# Patient Record
Sex: FEMALE | Race: WHITE | NOT HISPANIC OR LATINO | Employment: OTHER | URBAN - METROPOLITAN AREA
[De-identification: names, ages, dates, MRNs, and addresses within clinical notes are randomized per-mention and may not be internally consistent; named-entity substitution may affect disease eponyms.]

---

## 2017-01-27 ENCOUNTER — APPOINTMENT (EMERGENCY)
Dept: RADIOLOGY | Facility: HOSPITAL | Age: 77
DRG: 189 | End: 2017-01-27
Payer: COMMERCIAL

## 2017-01-27 ENCOUNTER — ALLSCRIPTS OFFICE VISIT (OUTPATIENT)
Dept: OTHER | Facility: OTHER | Age: 77
End: 2017-01-27

## 2017-01-27 ENCOUNTER — HOSPITAL ENCOUNTER (INPATIENT)
Facility: HOSPITAL | Age: 77
LOS: 3 days | Discharge: HOME | DRG: 189 | End: 2017-01-30
Attending: EMERGENCY MEDICINE | Admitting: INTERNAL MEDICINE
Payer: COMMERCIAL

## 2017-01-27 ENCOUNTER — APPOINTMENT (EMERGENCY)
Dept: CT IMAGING | Facility: HOSPITAL | Age: 77
DRG: 189 | End: 2017-01-27
Payer: COMMERCIAL

## 2017-01-27 DIAGNOSIS — R93.89 ABNORMAL CT OF THE CHEST: ICD-10-CM

## 2017-01-27 DIAGNOSIS — J18.9 PNEUMONIA: Primary | ICD-10-CM

## 2017-01-27 DIAGNOSIS — J45.901 ACUTE ASTHMA EXACERBATION: ICD-10-CM

## 2017-01-27 DIAGNOSIS — R09.02 HYPOXIA: ICD-10-CM

## 2017-01-27 PROBLEM — J96.01 ACUTE RESPIRATORY FAILURE WITH HYPOXIA (HCC): Status: ACTIVE | Noted: 2017-01-27

## 2017-01-27 LAB
ALBUMIN SERPL BCP-MCNC: 3 G/DL (ref 3.5–5)
ALP SERPL-CCNC: 97 U/L (ref 46–116)
ALT SERPL W P-5'-P-CCNC: 26 U/L (ref 12–78)
ANION GAP SERPL CALCULATED.3IONS-SCNC: 8 MMOL/L (ref 4–13)
AST SERPL W P-5'-P-CCNC: 41 U/L (ref 5–45)
ATRIAL RATE: 118 BPM
BASOPHILS # BLD AUTO: 0.02 THOUSANDS/ΜL (ref 0–0.1)
BASOPHILS NFR BLD AUTO: 0 % (ref 0–1)
BILIRUB SERPL-MCNC: 0.74 MG/DL (ref 0.2–1)
BUN SERPL-MCNC: 15 MG/DL (ref 5–25)
CALCIUM SERPL-MCNC: 9.3 MG/DL (ref 8.3–10.1)
CHLORIDE SERPL-SCNC: 91 MMOL/L (ref 100–108)
CO2 SERPL-SCNC: 32 MMOL/L (ref 21–32)
CREAT SERPL-MCNC: 0.68 MG/DL (ref 0.6–1.3)
EOSINOPHIL # BLD AUTO: 0 THOUSAND/ΜL (ref 0–0.61)
EOSINOPHIL NFR BLD AUTO: 0 % (ref 0–6)
ERYTHROCYTE [DISTWIDTH] IN BLOOD BY AUTOMATED COUNT: 13.4 % (ref 11.6–15.1)
GFR SERPL CREATININE-BSD FRML MDRD: >60 ML/MIN/1.73SQ M
GLUCOSE SERPL-MCNC: 117 MG/DL (ref 65–140)
HCT VFR BLD AUTO: 41.8 % (ref 34.8–46.1)
HGB BLD-MCNC: 14.8 G/DL (ref 11.5–15.4)
LACTATE SERPL-SCNC: 1.1 MMOL/L (ref 0.5–2)
LYMPHOCYTES # BLD AUTO: 1.23 THOUSANDS/ΜL (ref 0.6–4.47)
LYMPHOCYTES NFR BLD AUTO: 8 % (ref 14–44)
MCH RBC QN AUTO: 31.4 PG (ref 26.8–34.3)
MCHC RBC AUTO-ENTMCNC: 35.4 G/DL (ref 31.4–37.4)
MCV RBC AUTO: 89 FL (ref 82–98)
MONOCYTES # BLD AUTO: 2.27 THOUSAND/ΜL (ref 0.17–1.22)
MONOCYTES NFR BLD AUTO: 15 % (ref 4–12)
NEUTROPHILS # BLD AUTO: 11.96 THOUSANDS/ΜL (ref 1.85–7.62)
NEUTS SEG NFR BLD AUTO: 77 % (ref 43–75)
NRBC BLD AUTO-RTO: 0 /100 WBCS
NT-PROBNP SERPL-MCNC: 355 PG/ML
P AXIS: 76 DEGREES
PLATELET # BLD AUTO: 260 THOUSANDS/UL (ref 149–390)
PMV BLD AUTO: 11 FL (ref 8.9–12.7)
POTASSIUM SERPL-SCNC: 3.5 MMOL/L (ref 3.5–5.3)
PR INTERVAL: 166 MS
PROT SERPL-MCNC: 7.8 G/DL (ref 6.4–8.2)
QRS AXIS: 85 DEGREES
QRSD INTERVAL: 70 MS
QT INTERVAL: 290 MS
QTC INTERVAL: 406 MS
RBC # BLD AUTO: 4.72 MILLION/UL (ref 3.81–5.12)
SODIUM SERPL-SCNC: 131 MMOL/L (ref 136–145)
SPECIMEN SOURCE: NORMAL
T WAVE AXIS: 61 DEGREES
TROPONIN I BLD-MCNC: 0 NG/ML (ref 0–0.08)
VENTRICULAR RATE: 118 BPM
WBC # BLD AUTO: 15.48 THOUSAND/UL (ref 4.31–10.16)

## 2017-01-27 PROCEDURE — 85025 COMPLETE CBC W/AUTO DIFF WBC: CPT | Performed by: EMERGENCY MEDICINE

## 2017-01-27 PROCEDURE — 83605 ASSAY OF LACTIC ACID: CPT | Performed by: EMERGENCY MEDICINE

## 2017-01-27 PROCEDURE — 36415 COLL VENOUS BLD VENIPUNCTURE: CPT | Performed by: EMERGENCY MEDICINE

## 2017-01-27 PROCEDURE — 96365 THER/PROPH/DIAG IV INF INIT: CPT

## 2017-01-27 PROCEDURE — 87798 DETECT AGENT NOS DNA AMP: CPT | Performed by: PHYSICIAN ASSISTANT

## 2017-01-27 PROCEDURE — 96361 HYDRATE IV INFUSION ADD-ON: CPT

## 2017-01-27 PROCEDURE — 87040 BLOOD CULTURE FOR BACTERIA: CPT | Performed by: EMERGENCY MEDICINE

## 2017-01-27 PROCEDURE — 71275 CT ANGIOGRAPHY CHEST: CPT

## 2017-01-27 PROCEDURE — 99285 EMERGENCY DEPT VISIT HI MDM: CPT

## 2017-01-27 PROCEDURE — 84484 ASSAY OF TROPONIN QUANT: CPT

## 2017-01-27 PROCEDURE — 83880 ASSAY OF NATRIURETIC PEPTIDE: CPT | Performed by: EMERGENCY MEDICINE

## 2017-01-27 PROCEDURE — 93005 ELECTROCARDIOGRAM TRACING: CPT | Performed by: EMERGENCY MEDICINE

## 2017-01-27 PROCEDURE — 96375 TX/PRO/DX INJ NEW DRUG ADDON: CPT

## 2017-01-27 PROCEDURE — 93005 ELECTROCARDIOGRAM TRACING: CPT

## 2017-01-27 PROCEDURE — 71020 HB CHEST X-RAY 2VW FRONTAL&LATL: CPT

## 2017-01-27 PROCEDURE — 80053 COMPREHEN METABOLIC PANEL: CPT | Performed by: EMERGENCY MEDICINE

## 2017-01-27 RX ORDER — LEVOTHYROXINE SODIUM 0.05 MG/1
50 TABLET ORAL DAILY
COMMUNITY
End: 2018-03-09 | Stop reason: SDUPTHER

## 2017-01-27 RX ORDER — ASPIRIN 81 MG/1
81 TABLET, CHEWABLE ORAL DAILY
Status: DISCONTINUED | OUTPATIENT
Start: 2017-01-28 | End: 2017-01-30 | Stop reason: HOSPADM

## 2017-01-27 RX ORDER — DILTIAZEM HYDROCHLORIDE 5 MG/ML
10 INJECTION INTRAVENOUS EVERY 6 HOURS PRN
Status: DISCONTINUED | OUTPATIENT
Start: 2017-01-27 | End: 2017-01-30 | Stop reason: HOSPADM

## 2017-01-27 RX ORDER — METHYLPREDNISOLONE SODIUM SUCCINATE 125 MG/2ML
125 INJECTION, POWDER, LYOPHILIZED, FOR SOLUTION INTRAMUSCULAR; INTRAVENOUS ONCE
Status: COMPLETED | OUTPATIENT
Start: 2017-01-27 | End: 2017-01-27

## 2017-01-27 RX ORDER — ALBUTEROL SULFATE 90 UG/1
2 AEROSOL, METERED RESPIRATORY (INHALATION) EVERY 4 HOURS PRN
Status: DISCONTINUED | OUTPATIENT
Start: 2017-01-27 | End: 2017-01-30 | Stop reason: HOSPADM

## 2017-01-27 RX ORDER — ATORVASTATIN CALCIUM 10 MG/1
10 TABLET, FILM COATED ORAL
Status: DISCONTINUED | OUTPATIENT
Start: 2017-01-28 | End: 2017-01-30 | Stop reason: HOSPADM

## 2017-01-27 RX ORDER — SODIUM CHLORIDE 9 MG/ML
250 INJECTION, SOLUTION INTRAVENOUS CONTINUOUS
Status: DISCONTINUED | OUTPATIENT
Start: 2017-01-27 | End: 2017-01-30 | Stop reason: HOSPADM

## 2017-01-27 RX ORDER — PANTOPRAZOLE SODIUM 40 MG/1
40 TABLET, DELAYED RELEASE ORAL
Status: DISCONTINUED | OUTPATIENT
Start: 2017-01-28 | End: 2017-01-30 | Stop reason: HOSPADM

## 2017-01-27 RX ORDER — LEVALBUTEROL 1.25 MG/.5ML
1.25 SOLUTION, CONCENTRATE RESPIRATORY (INHALATION) EVERY 6 HOURS PRN
Status: DISCONTINUED | OUTPATIENT
Start: 2017-01-27 | End: 2017-01-27

## 2017-01-27 RX ORDER — ESOMEPRAZOLE MAGNESIUM 40 MG/1
40 CAPSULE, DELAYED RELEASE ORAL
COMMUNITY
End: 2018-02-06 | Stop reason: ALTCHOICE

## 2017-01-27 RX ORDER — MAGNESIUM HYDROXIDE/ALUMINUM HYDROXICE/SIMETHICONE 120; 1200; 1200 MG/30ML; MG/30ML; MG/30ML
30 SUSPENSION ORAL EVERY 6 HOURS PRN
Status: DISCONTINUED | OUTPATIENT
Start: 2017-01-27 | End: 2017-01-30 | Stop reason: HOSPADM

## 2017-01-27 RX ORDER — MONTELUKAST SODIUM 10 MG/1
10 TABLET ORAL
COMMUNITY
End: 2019-09-11 | Stop reason: ALTCHOICE

## 2017-01-27 RX ORDER — HYDROCHLOROTHIAZIDE 25 MG/1
25 TABLET ORAL DAILY
COMMUNITY
End: 2018-03-09 | Stop reason: SDUPTHER

## 2017-01-27 RX ORDER — ATORVASTATIN CALCIUM 10 MG/1
10 TABLET, FILM COATED ORAL DAILY
COMMUNITY
End: 2018-03-09 | Stop reason: SDUPTHER

## 2017-01-27 RX ORDER — B-COMPLEX WITH VITAMIN C
1 TABLET ORAL
Status: DISCONTINUED | OUTPATIENT
Start: 2017-01-28 | End: 2017-01-30 | Stop reason: HOSPADM

## 2017-01-27 RX ORDER — AZITHROMYCIN 250 MG/1
500 TABLET, FILM COATED ORAL EVERY 24 HOURS
Status: DISCONTINUED | OUTPATIENT
Start: 2017-01-28 | End: 2017-01-30

## 2017-01-27 RX ORDER — MONTELUKAST SODIUM 10 MG/1
10 TABLET ORAL
Status: DISCONTINUED | OUTPATIENT
Start: 2017-01-27 | End: 2017-01-30 | Stop reason: HOSPADM

## 2017-01-27 RX ORDER — ONDANSETRON 2 MG/ML
4 INJECTION INTRAMUSCULAR; INTRAVENOUS EVERY 6 HOURS PRN
Status: DISCONTINUED | OUTPATIENT
Start: 2017-01-27 | End: 2017-01-30 | Stop reason: HOSPADM

## 2017-01-27 RX ORDER — GUAIFENESIN 600 MG
600 TABLET, EXTENDED RELEASE 12 HR ORAL 2 TIMES DAILY
Status: DISCONTINUED | OUTPATIENT
Start: 2017-01-27 | End: 2017-01-30 | Stop reason: HOSPADM

## 2017-01-27 RX ORDER — SODIUM CHLORIDE 9 MG/ML
50 INJECTION, SOLUTION INTRAVENOUS ONCE
Status: COMPLETED | OUTPATIENT
Start: 2017-01-27 | End: 2017-01-27

## 2017-01-27 RX ORDER — LEVOTHYROXINE SODIUM 0.05 MG/1
50 TABLET ORAL
Status: DISCONTINUED | OUTPATIENT
Start: 2017-01-28 | End: 2017-01-30 | Stop reason: HOSPADM

## 2017-01-27 RX ORDER — CHLORAL HYDRATE 500 MG
1000 CAPSULE ORAL 2 TIMES DAILY
Status: DISCONTINUED | OUTPATIENT
Start: 2017-01-27 | End: 2017-01-30 | Stop reason: HOSPADM

## 2017-01-27 RX ADMIN — GUAIFENESIN 600 MG: 600 TABLET, EXTENDED RELEASE ORAL at 19:51

## 2017-01-27 RX ADMIN — AZITHROMYCIN MONOHYDRATE 500 MG: 500 INJECTION, POWDER, LYOPHILIZED, FOR SOLUTION INTRAVENOUS at 16:39

## 2017-01-27 RX ADMIN — SODIUM CHLORIDE 250 ML/HR: 0.9 INJECTION, SOLUTION INTRAVENOUS at 14:32

## 2017-01-27 RX ADMIN — DILTIAZEM HYDROCHLORIDE 10 MG: 5 INJECTION INTRAVENOUS at 17:26

## 2017-01-27 RX ADMIN — MONTELUKAST SODIUM 10 MG: 10 TABLET, FILM COATED ORAL at 22:36

## 2017-01-27 RX ADMIN — CEFTRIAXONE 1000 MG: 1 INJECTION, POWDER, FOR SOLUTION INTRAMUSCULAR; INTRAVENOUS at 15:08

## 2017-01-27 RX ADMIN — METHYLPREDNISOLONE SODIUM SUCCINATE 125 MG: 125 INJECTION, POWDER, FOR SOLUTION INTRAMUSCULAR; INTRAVENOUS at 13:13

## 2017-01-27 RX ADMIN — SODIUM CHLORIDE 50 ML/HR: 0.9 INJECTION, SOLUTION INTRAVENOUS at 18:57

## 2017-01-27 RX ADMIN — IOHEXOL 75 ML: 350 INJECTION, SOLUTION INTRAVENOUS at 14:47

## 2017-01-28 LAB
ANION GAP SERPL CALCULATED.3IONS-SCNC: 5 MMOL/L (ref 4–13)
BUN SERPL-MCNC: 12 MG/DL (ref 5–25)
CALCIUM SERPL-MCNC: 8.2 MG/DL (ref 8.3–10.1)
CHLORIDE SERPL-SCNC: 96 MMOL/L (ref 100–108)
CO2 SERPL-SCNC: 32 MMOL/L (ref 21–32)
CREAT SERPL-MCNC: 0.58 MG/DL (ref 0.6–1.3)
ERYTHROCYTE [DISTWIDTH] IN BLOOD BY AUTOMATED COUNT: 13.5 % (ref 11.6–15.1)
FLUAV AG SPEC QL: NORMAL
FLUBV AG SPEC QL: NORMAL
GFR SERPL CREATININE-BSD FRML MDRD: >60 ML/MIN/1.73SQ M
GLUCOSE SERPL-MCNC: 150 MG/DL (ref 65–140)
HCT VFR BLD AUTO: 36.1 % (ref 34.8–46.1)
HGB BLD-MCNC: 12.4 G/DL (ref 11.5–15.4)
L PNEUMO1 AG UR QL IA.RAPID: NEGATIVE
MCH RBC QN AUTO: 30.5 PG (ref 26.8–34.3)
MCHC RBC AUTO-ENTMCNC: 34.3 G/DL (ref 31.4–37.4)
MCV RBC AUTO: 89 FL (ref 82–98)
PLATELET # BLD AUTO: 249 THOUSANDS/UL (ref 149–390)
PMV BLD AUTO: 10.8 FL (ref 8.9–12.7)
POTASSIUM SERPL-SCNC: 3.1 MMOL/L (ref 3.5–5.3)
RBC # BLD AUTO: 4.06 MILLION/UL (ref 3.81–5.12)
RSV B RNA SPEC QL NAA+PROBE: NORMAL
SODIUM SERPL-SCNC: 133 MMOL/L (ref 136–145)
WBC # BLD AUTO: 13.35 THOUSAND/UL (ref 4.31–10.16)

## 2017-01-28 PROCEDURE — G8979 MOBILITY GOAL STATUS: HCPCS

## 2017-01-28 PROCEDURE — 85027 COMPLETE CBC AUTOMATED: CPT | Performed by: PHYSICIAN ASSISTANT

## 2017-01-28 PROCEDURE — G8978 MOBILITY CURRENT STATUS: HCPCS

## 2017-01-28 PROCEDURE — 87449 NOS EACH ORGANISM AG IA: CPT | Performed by: PHYSICIAN ASSISTANT

## 2017-01-28 PROCEDURE — 80048 BASIC METABOLIC PNL TOTAL CA: CPT | Performed by: PHYSICIAN ASSISTANT

## 2017-01-28 PROCEDURE — 87205 SMEAR GRAM STAIN: CPT | Performed by: PHYSICIAN ASSISTANT

## 2017-01-28 PROCEDURE — G8980 MOBILITY D/C STATUS: HCPCS

## 2017-01-28 PROCEDURE — 87070 CULTURE OTHR SPECIMN AEROBIC: CPT | Performed by: PHYSICIAN ASSISTANT

## 2017-01-28 PROCEDURE — 97161 PT EVAL LOW COMPLEX 20 MIN: CPT

## 2017-01-28 RX ORDER — POTASSIUM CHLORIDE 20 MEQ/1
20 TABLET, EXTENDED RELEASE ORAL ONCE
Status: COMPLETED | OUTPATIENT
Start: 2017-01-28 | End: 2017-01-28

## 2017-01-28 RX ORDER — ACETAMINOPHEN 325 MG/1
650 TABLET ORAL 4 TIMES DAILY PRN
Status: DISCONTINUED | OUTPATIENT
Start: 2017-01-28 | End: 2017-01-30 | Stop reason: HOSPADM

## 2017-01-28 RX ADMIN — GUAIFENESIN 600 MG: 600 TABLET, EXTENDED RELEASE ORAL at 09:37

## 2017-01-28 RX ADMIN — GUAIFENESIN 600 MG: 600 TABLET, EXTENDED RELEASE ORAL at 17:38

## 2017-01-28 RX ADMIN — PANTOPRAZOLE SODIUM 40 MG: 40 TABLET, DELAYED RELEASE ORAL at 06:16

## 2017-01-28 RX ADMIN — ENOXAPARIN SODIUM 40 MG: 40 INJECTION SUBCUTANEOUS at 09:37

## 2017-01-28 RX ADMIN — ATORVASTATIN CALCIUM 10 MG: 10 TABLET, FILM COATED ORAL at 17:38

## 2017-01-28 RX ADMIN — AZITHROMYCIN 500 MG: 250 TABLET, FILM COATED ORAL at 17:38

## 2017-01-28 RX ADMIN — CEFTRIAXONE 1000 MG: 1 INJECTION, POWDER, FOR SOLUTION INTRAMUSCULAR; INTRAVENOUS at 14:46

## 2017-01-28 RX ADMIN — ACETAMINOPHEN 650 MG: 325 TABLET, FILM COATED ORAL at 22:30

## 2017-01-28 RX ADMIN — MONTELUKAST SODIUM 10 MG: 10 TABLET, FILM COATED ORAL at 22:21

## 2017-01-28 RX ADMIN — ASPIRIN 81 MG 81 MG: 81 TABLET ORAL at 09:37

## 2017-01-28 RX ADMIN — ALBUTEROL SULFATE 2 PUFF: 90 AEROSOL, METERED RESPIRATORY (INHALATION) at 22:21

## 2017-01-28 RX ADMIN — POTASSIUM CHLORIDE 20 MEQ: 1500 TABLET, EXTENDED RELEASE ORAL at 22:30

## 2017-01-28 RX ADMIN — LEVOTHYROXINE SODIUM 50 MCG: 50 TABLET ORAL at 06:16

## 2017-01-29 LAB
ANION GAP SERPL CALCULATED.3IONS-SCNC: 4 MMOL/L (ref 4–13)
BASOPHILS # BLD MANUAL: 0 THOUSAND/UL (ref 0–0.1)
BASOPHILS NFR MAR MANUAL: 0 % (ref 0–1)
BUN SERPL-MCNC: 17 MG/DL (ref 5–25)
CALCIUM SERPL-MCNC: 8.1 MG/DL (ref 8.3–10.1)
CHLORIDE SERPL-SCNC: 99 MMOL/L (ref 100–108)
CO2 SERPL-SCNC: 33 MMOL/L (ref 21–32)
CREAT SERPL-MCNC: 0.59 MG/DL (ref 0.6–1.3)
EOSINOPHIL # BLD MANUAL: 0 THOUSAND/UL (ref 0–0.4)
EOSINOPHIL NFR BLD MANUAL: 0 % (ref 0–6)
ERYTHROCYTE [DISTWIDTH] IN BLOOD BY AUTOMATED COUNT: 13.7 % (ref 11.6–15.1)
GFR SERPL CREATININE-BSD FRML MDRD: >60 ML/MIN/1.73SQ M
GLUCOSE SERPL-MCNC: 105 MG/DL (ref 65–140)
HCT VFR BLD AUTO: 37.4 % (ref 34.8–46.1)
HGB BLD-MCNC: 12.5 G/DL (ref 11.5–15.4)
LYMPHOCYTES # BLD AUTO: 17 % (ref 14–44)
LYMPHOCYTES # BLD AUTO: 2.33 THOUSAND/UL (ref 0.6–4.47)
MCH RBC QN AUTO: 30.2 PG (ref 26.8–34.3)
MCHC RBC AUTO-ENTMCNC: 33.4 G/DL (ref 31.4–37.4)
MCV RBC AUTO: 90 FL (ref 82–98)
MONOCYTES # BLD AUTO: 0.82 THOUSAND/UL (ref 0–1.22)
MONOCYTES NFR BLD: 6 % (ref 4–12)
NEUTROPHILS # BLD MANUAL: 9.75 THOUSAND/UL (ref 1.85–7.62)
NEUTS BAND NFR BLD MANUAL: 19 % (ref 0–8)
NEUTS SEG NFR BLD AUTO: 52 % (ref 43–75)
NRBC BLD AUTO-RTO: 0 /100 WBCS
PLATELET # BLD AUTO: 266 THOUSANDS/UL (ref 149–390)
PLATELET BLD QL SMEAR: ADEQUATE
PMV BLD AUTO: 10.2 FL (ref 8.9–12.7)
POTASSIUM SERPL-SCNC: 2.8 MMOL/L (ref 3.5–5.3)
RBC # BLD AUTO: 4.14 MILLION/UL (ref 3.81–5.12)
RBC MORPH BLD: NORMAL
SODIUM SERPL-SCNC: 136 MMOL/L (ref 136–145)
TOTAL CELLS COUNTED SPEC: 100
VARIANT LYMPHS # BLD AUTO: 6 %
WBC # BLD AUTO: 13.73 THOUSAND/UL (ref 4.31–10.16)

## 2017-01-29 PROCEDURE — 80048 BASIC METABOLIC PNL TOTAL CA: CPT | Performed by: INTERNAL MEDICINE

## 2017-01-29 PROCEDURE — 85027 COMPLETE CBC AUTOMATED: CPT | Performed by: INTERNAL MEDICINE

## 2017-01-29 PROCEDURE — 85007 BL SMEAR W/DIFF WBC COUNT: CPT | Performed by: INTERNAL MEDICINE

## 2017-01-29 RX ORDER — HYDROCHLOROTHIAZIDE 25 MG/1
25 TABLET ORAL DAILY
Status: DISCONTINUED | OUTPATIENT
Start: 2017-01-29 | End: 2017-01-30 | Stop reason: HOSPADM

## 2017-01-29 RX ORDER — POTASSIUM CHLORIDE 20 MEQ/1
40 TABLET, EXTENDED RELEASE ORAL 2 TIMES DAILY
Status: DISCONTINUED | OUTPATIENT
Start: 2017-01-29 | End: 2017-01-30 | Stop reason: HOSPADM

## 2017-01-29 RX ADMIN — PANTOPRAZOLE SODIUM 40 MG: 40 TABLET, DELAYED RELEASE ORAL at 05:50

## 2017-01-29 RX ADMIN — GUAIFENESIN 600 MG: 600 TABLET, EXTENDED RELEASE ORAL at 08:16

## 2017-01-29 RX ADMIN — ASPIRIN 81 MG 81 MG: 81 TABLET ORAL at 08:16

## 2017-01-29 RX ADMIN — HYDROCHLOROTHIAZIDE 25 MG: 25 TABLET ORAL at 15:15

## 2017-01-29 RX ADMIN — LEVOTHYROXINE SODIUM 50 MCG: 50 TABLET ORAL at 05:51

## 2017-01-29 RX ADMIN — ATORVASTATIN CALCIUM 10 MG: 10 TABLET, FILM COATED ORAL at 17:03

## 2017-01-29 RX ADMIN — ALBUTEROL SULFATE 2 PUFF: 90 AEROSOL, METERED RESPIRATORY (INHALATION) at 16:59

## 2017-01-29 RX ADMIN — AZITHROMYCIN 500 MG: 250 TABLET, FILM COATED ORAL at 17:02

## 2017-01-29 RX ADMIN — ENOXAPARIN SODIUM 40 MG: 40 INJECTION SUBCUTANEOUS at 08:16

## 2017-01-29 RX ADMIN — GUAIFENESIN 600 MG: 600 TABLET, EXTENDED RELEASE ORAL at 17:03

## 2017-01-29 RX ADMIN — POTASSIUM CHLORIDE 40 MEQ: 1500 TABLET, EXTENDED RELEASE ORAL at 17:02

## 2017-01-29 RX ADMIN — CEFTRIAXONE 1000 MG: 1 INJECTION, POWDER, FOR SOLUTION INTRAMUSCULAR; INTRAVENOUS at 15:15

## 2017-01-30 VITALS
OXYGEN SATURATION: 91 % | TEMPERATURE: 99.4 F | WEIGHT: 180.34 LBS | DIASTOLIC BLOOD PRESSURE: 88 MMHG | SYSTOLIC BLOOD PRESSURE: 166 MMHG | RESPIRATION RATE: 18 BRPM | HEART RATE: 94 BPM

## 2017-01-30 LAB
BACTERIA SPT RESP CULT: NORMAL
GRAM STN SPEC: NORMAL
PLATELET # BLD AUTO: 326 THOUSANDS/UL (ref 149–390)
PMV BLD AUTO: 9.9 FL (ref 8.9–12.7)
POTASSIUM SERPL-SCNC: 3.6 MMOL/L (ref 3.5–5.3)

## 2017-01-30 PROCEDURE — 84132 ASSAY OF SERUM POTASSIUM: CPT | Performed by: INTERNAL MEDICINE

## 2017-01-30 PROCEDURE — 85049 AUTOMATED PLATELET COUNT: CPT | Performed by: PHYSICIAN ASSISTANT

## 2017-01-30 PROCEDURE — 94761 N-INVAS EAR/PLS OXIMETRY MLT: CPT

## 2017-01-30 RX ORDER — GUAIFENESIN 600 MG
600 TABLET, EXTENDED RELEASE 12 HR ORAL 2 TIMES DAILY
Qty: 20 TABLET | Refills: 0 | Status: SHIPPED | OUTPATIENT
Start: 2017-01-30 | End: 2017-02-09

## 2017-01-30 RX ORDER — CEFDINIR 300 MG/1
300 CAPSULE ORAL EVERY 12 HOURS
Qty: 6 CAPSULE | Refills: 0 | Status: SHIPPED | OUTPATIENT
Start: 2017-01-30 | End: 2017-02-02

## 2017-01-30 RX ADMIN — CEFTRIAXONE 1000 MG: 1 INJECTION, POWDER, FOR SOLUTION INTRAMUSCULAR; INTRAVENOUS at 15:14

## 2017-01-30 RX ADMIN — GUAIFENESIN 600 MG: 600 TABLET, EXTENDED RELEASE ORAL at 09:00

## 2017-01-30 RX ADMIN — HYDROCHLOROTHIAZIDE 25 MG: 25 TABLET ORAL at 09:00

## 2017-01-30 RX ADMIN — ENOXAPARIN SODIUM 40 MG: 40 INJECTION SUBCUTANEOUS at 09:02

## 2017-01-30 RX ADMIN — POTASSIUM CHLORIDE 40 MEQ: 1500 TABLET, EXTENDED RELEASE ORAL at 09:02

## 2017-01-30 RX ADMIN — LEVOTHYROXINE SODIUM 50 MCG: 50 TABLET ORAL at 06:08

## 2017-01-30 RX ADMIN — PANTOPRAZOLE SODIUM 40 MG: 40 TABLET, DELAYED RELEASE ORAL at 06:10

## 2017-01-30 RX ADMIN — DILTIAZEM HYDROCHLORIDE 10 MG: 5 INJECTION INTRAVENOUS at 00:09

## 2017-01-30 RX ADMIN — ASPIRIN 81 MG 81 MG: 81 TABLET ORAL at 09:00

## 2017-02-01 LAB
BACTERIA BLD CULT: NORMAL
BACTERIA BLD CULT: NORMAL

## 2017-02-03 ENCOUNTER — ALLSCRIPTS OFFICE VISIT (OUTPATIENT)
Dept: OTHER | Facility: OTHER | Age: 77
End: 2017-02-03

## 2017-02-08 ENCOUNTER — GENERIC CONVERSION - ENCOUNTER (OUTPATIENT)
Dept: OTHER | Facility: OTHER | Age: 77
End: 2017-02-08

## 2017-02-12 ENCOUNTER — LAB CONVERSION - ENCOUNTER (OUTPATIENT)
Dept: OTHER | Facility: OTHER | Age: 77
End: 2017-02-12

## 2017-02-12 LAB — TSH SERPL DL<=0.05 MIU/L-ACNC: 1.73 MIU/L (ref 0.4–4.5)

## 2017-02-13 ENCOUNTER — GENERIC CONVERSION - ENCOUNTER (OUTPATIENT)
Dept: OTHER | Facility: OTHER | Age: 77
End: 2017-02-13

## 2017-02-23 ENCOUNTER — TRANSCRIBE ORDERS (OUTPATIENT)
Dept: ADMINISTRATIVE | Facility: HOSPITAL | Age: 77
End: 2017-02-23

## 2017-02-23 DIAGNOSIS — R93.89 ABNORMAL RADIOLOGICAL FINDINGS IN SKIN AND SUBCUTANEOUS TISSUE: Primary | ICD-10-CM

## 2017-02-23 DIAGNOSIS — J45.20 ASTHMATIC BRONCHITIS, MILD INTERMITTENT, UNCOMPLICATED: ICD-10-CM

## 2017-03-09 ENCOUNTER — HOSPITAL ENCOUNTER (OUTPATIENT)
Dept: PULMONOLOGY | Facility: HOSPITAL | Age: 77
Discharge: HOME | End: 2017-03-09
Attending: INTERNAL MEDICINE
Payer: COMMERCIAL

## 2017-03-09 ENCOUNTER — GENERIC CONVERSION - ENCOUNTER (OUTPATIENT)
Dept: OTHER | Facility: OTHER | Age: 77
End: 2017-03-09

## 2017-03-09 DIAGNOSIS — R93.89 ABNORMAL RADIOLOGICAL FINDINGS IN SKIN AND SUBCUTANEOUS TISSUE: ICD-10-CM

## 2017-03-09 DIAGNOSIS — J45.20 ASTHMATIC BRONCHITIS, MILD INTERMITTENT, UNCOMPLICATED: ICD-10-CM

## 2017-03-09 PROCEDURE — 94729 DIFFUSING CAPACITY: CPT

## 2017-03-09 PROCEDURE — 94726 PLETHYSMOGRAPHY LUNG VOLUMES: CPT

## 2017-03-09 PROCEDURE — 94760 N-INVAS EAR/PLS OXIMETRY 1: CPT

## 2017-03-09 PROCEDURE — 94060 EVALUATION OF WHEEZING: CPT

## 2017-03-09 RX ORDER — ALBUTEROL SULFATE 2.5 MG/3ML
2.5 SOLUTION RESPIRATORY (INHALATION) ONCE AS NEEDED
Status: DISCONTINUED | OUTPATIENT
Start: 2017-03-09 | End: 2017-03-13 | Stop reason: HOSPADM

## 2017-03-21 ENCOUNTER — ALLSCRIPTS OFFICE VISIT (OUTPATIENT)
Dept: OTHER | Facility: OTHER | Age: 77
End: 2017-03-21

## 2017-04-04 ENCOUNTER — TRANSCRIBE ORDERS (OUTPATIENT)
Dept: PULMONOLOGY | Facility: CLINIC | Age: 77
End: 2017-04-04

## 2017-04-04 DIAGNOSIS — J43.9 PULMONARY EMPHYSEMA, UNSPECIFIED EMPHYSEMA TYPE (HCC): ICD-10-CM

## 2017-04-04 DIAGNOSIS — J44.9 CHRONIC OBSTRUCTIVE PULMONARY DISEASE, UNSPECIFIED COPD TYPE (HCC): Primary | ICD-10-CM

## 2017-04-21 ENCOUNTER — APPOINTMENT (OUTPATIENT)
Dept: PULMONOLOGY | Facility: CLINIC | Age: 77
End: 2017-04-21
Payer: COMMERCIAL

## 2017-04-21 DIAGNOSIS — J44.9 CHRONIC OBSTRUCTIVE PULMONARY DISEASE, UNSPECIFIED COPD TYPE (HCC): ICD-10-CM

## 2017-04-21 DIAGNOSIS — J43.9 PULMONARY EMPHYSEMA, UNSPECIFIED EMPHYSEMA TYPE (HCC): ICD-10-CM

## 2017-04-21 PROCEDURE — 94620 HB PULMONARY STRESS TEST/SIMPLE: CPT

## 2017-04-24 ENCOUNTER — HOSPITAL ENCOUNTER (OUTPATIENT)
Dept: CT IMAGING | Facility: HOSPITAL | Age: 77
Discharge: HOME | End: 2017-04-24
Attending: INTERNAL MEDICINE
Payer: COMMERCIAL

## 2017-04-24 DIAGNOSIS — R93.89 ABNORMAL RADIOLOGICAL FINDINGS IN SKIN AND SUBCUTANEOUS TISSUE: ICD-10-CM

## 2017-04-24 PROCEDURE — 71250 CT THORAX DX C-: CPT

## 2017-04-26 ENCOUNTER — APPOINTMENT (OUTPATIENT)
Dept: PULMONOLOGY | Facility: CLINIC | Age: 77
End: 2017-04-26
Payer: COMMERCIAL

## 2017-04-26 PROCEDURE — G0424 PULMONARY REHAB W EXER: HCPCS

## 2017-05-01 ENCOUNTER — APPOINTMENT (OUTPATIENT)
Dept: PULMONOLOGY | Facility: CLINIC | Age: 77
End: 2017-05-01
Payer: COMMERCIAL

## 2017-05-01 PROCEDURE — G0424 PULMONARY REHAB W EXER: HCPCS

## 2017-05-03 ENCOUNTER — APPOINTMENT (OUTPATIENT)
Dept: PULMONOLOGY | Facility: CLINIC | Age: 77
End: 2017-05-03
Payer: COMMERCIAL

## 2017-05-03 PROCEDURE — G0424 PULMONARY REHAB W EXER: HCPCS

## 2017-05-08 ENCOUNTER — APPOINTMENT (OUTPATIENT)
Dept: PULMONOLOGY | Facility: CLINIC | Age: 77
End: 2017-05-08
Payer: COMMERCIAL

## 2017-05-08 PROCEDURE — G0424 PULMONARY REHAB W EXER: HCPCS

## 2017-05-10 ENCOUNTER — APPOINTMENT (OUTPATIENT)
Dept: PULMONOLOGY | Facility: CLINIC | Age: 77
End: 2017-05-10
Payer: COMMERCIAL

## 2017-05-10 PROCEDURE — G0424 PULMONARY REHAB W EXER: HCPCS

## 2017-05-12 ENCOUNTER — ALLSCRIPTS OFFICE VISIT (OUTPATIENT)
Dept: OTHER | Facility: OTHER | Age: 77
End: 2017-05-12

## 2017-05-15 ENCOUNTER — APPOINTMENT (OUTPATIENT)
Dept: PULMONOLOGY | Facility: CLINIC | Age: 77
End: 2017-05-15
Payer: COMMERCIAL

## 2017-05-15 PROCEDURE — G0424 PULMONARY REHAB W EXER: HCPCS

## 2017-05-17 ENCOUNTER — APPOINTMENT (OUTPATIENT)
Dept: PULMONOLOGY | Facility: CLINIC | Age: 77
End: 2017-05-17
Payer: COMMERCIAL

## 2017-05-17 PROCEDURE — G0424 PULMONARY REHAB W EXER: HCPCS

## 2017-05-22 ENCOUNTER — APPOINTMENT (OUTPATIENT)
Dept: PULMONOLOGY | Facility: CLINIC | Age: 77
End: 2017-05-22
Payer: COMMERCIAL

## 2017-05-22 PROCEDURE — G0424 PULMONARY REHAB W EXER: HCPCS

## 2017-05-24 ENCOUNTER — APPOINTMENT (OUTPATIENT)
Dept: PULMONOLOGY | Facility: CLINIC | Age: 77
End: 2017-05-24
Payer: COMMERCIAL

## 2017-05-24 PROCEDURE — G0424 PULMONARY REHAB W EXER: HCPCS

## 2017-05-31 ENCOUNTER — APPOINTMENT (OUTPATIENT)
Dept: PULMONOLOGY | Facility: CLINIC | Age: 77
End: 2017-05-31
Payer: COMMERCIAL

## 2017-06-06 ENCOUNTER — ALLSCRIPTS OFFICE VISIT (OUTPATIENT)
Dept: OTHER | Facility: OTHER | Age: 77
End: 2017-06-06

## 2017-06-30 ENCOUNTER — GENERIC CONVERSION - ENCOUNTER (OUTPATIENT)
Dept: OTHER | Facility: OTHER | Age: 77
End: 2017-06-30

## 2017-06-30 ENCOUNTER — ALLSCRIPTS OFFICE VISIT (OUTPATIENT)
Dept: OTHER | Facility: OTHER | Age: 77
End: 2017-06-30

## 2017-07-05 ENCOUNTER — ALLSCRIPTS OFFICE VISIT (OUTPATIENT)
Dept: OTHER | Facility: OTHER | Age: 77
End: 2017-07-05

## 2017-07-10 ENCOUNTER — ALLSCRIPTS OFFICE VISIT (OUTPATIENT)
Dept: OTHER | Facility: OTHER | Age: 77
End: 2017-07-10

## 2017-07-10 ENCOUNTER — LAB REQUISITION (OUTPATIENT)
Dept: LAB | Facility: HOSPITAL | Age: 77
End: 2017-07-10
Payer: COMMERCIAL

## 2017-07-10 DIAGNOSIS — N30.00 ACUTE CYSTITIS WITHOUT HEMATURIA: ICD-10-CM

## 2017-07-10 LAB
BILIRUB UR QL STRIP: NORMAL
CLARITY UR: NORMAL
COLOR UR: YELLOW
GLUCOSE (HISTORICAL): NORMAL
HGB UR QL STRIP.AUTO: NORMAL
KETONES UR STRIP-MCNC: NORMAL MG/DL
LEUKOCYTE ESTERASE UR QL STRIP: NORMAL
NITRITE UR QL STRIP: NORMAL
PH UR STRIP.AUTO: 9 [PH]
PROT UR STRIP-MCNC: 15 MG/DL
SP GR UR STRIP.AUTO: 1
UROBILINOGEN UR QL STRIP.AUTO: 0.2

## 2017-07-10 PROCEDURE — 87147 CULTURE TYPE IMMUNOLOGIC: CPT | Performed by: FAMILY MEDICINE

## 2017-07-10 PROCEDURE — 87086 URINE CULTURE/COLONY COUNT: CPT | Performed by: FAMILY MEDICINE

## 2017-07-12 LAB — BACTERIA UR CULT: NORMAL

## 2017-07-14 ENCOUNTER — GENERIC CONVERSION - ENCOUNTER (OUTPATIENT)
Dept: OTHER | Facility: OTHER | Age: 77
End: 2017-07-14

## 2017-09-08 ENCOUNTER — ALLSCRIPTS OFFICE VISIT (OUTPATIENT)
Dept: OTHER | Facility: OTHER | Age: 77
End: 2017-09-08

## 2017-10-20 ENCOUNTER — ALLSCRIPTS OFFICE VISIT (OUTPATIENT)
Dept: OTHER | Facility: OTHER | Age: 77
End: 2017-10-20

## 2017-10-23 NOTE — PROGRESS NOTES
Assessment  1  URTI (acute upper respiratory infection) (465 9) (J06 9)    Plan  Screening for genitourinary condition    · *VB - Urinary Incontinence Screen (Dx Z13 89 Screen for UI); Status:Complete;   Done:  96WCV6841 09:56AM  URTI (acute upper respiratory infection)    · Azithromycin 250 MG Oral Tablet; TAKE 2 TABLETS ON DAY 1 THEN TAKE 1  TABLET A DAY FOR 4 DAYS    Chief Complaint  Patient c/o stuffy nose, headache, and sore throat since yesterday  History of Present Illness  HPI: nasal congestion feels pressure in face  past 2-3 days  Review of Systems    Constitutional: feeling poorly  ENT: nasal congestion  Cardiovascular: no complaints of slow or fast heart rate, no chest pain, no palpitations, no leg claudication or lower extremity edema  Respiratory: cough  Breasts: no complaints of breast pain, breast lump or nipple discharge  Gastrointestinal: no complaints of abdominal pain, no constipation, no nausea or diarrhea, no vomiting, no bloody stools  Genitourinary: no complaints of dysuria, no incontinence, no pelvic pain, no dysmenorrhea, no vaginal discharge or abnormal vaginal bleeding  Musculoskeletal: no complaints of arthralgia, no myalgia, no joint swelling or stiffness, no limb pain or swelling  Integumentary: no complaints of skin rash or lesion, no itching or dry skin, no skin wounds  Neurological: no complaints of headache, no confusion, no numbness or tingling, no dizziness or fainting  Preventive Quality 65 and Older: The patient is currently experiencing urinary symptoms  Urinary Incontinence Symptoms includes: nocturia, but no urinary incontinence, no incomplete bladder emptying, no urinary frequency, no urinary urgency, no dysuria, no straining, no weak stream, no intermittent stream, no post-void dribbling, no vaginal pressure and no vaginal dryness       Active Problems  1  Abnormal chest CT (793 2) (R93 8)   2   Acute cystitis without hematuria (595 0) (N30 00)   3  Anxiety (300 00) (F41 9)   4  Back pain (724 5) (M54 9)   5  Cataract, anterior subcapsular polar senile (366 13) (H25 039)   6  Chronic fatigue (780 79) (R53 82)   7  Chronic respiratory failure with hypoxia (518 83,799 02) (J96 11)   8  Cough, persistent (786 2) (R05)   9  Encounter for screening mammogram for breast cancer (V76 12) (Z12 31)   10  Essential hypertension (401 9) (I10)   11  Flu vaccine need (V04 81) (Z23)   12  History of Frequent urination (788 41) (R35 0)   13  Gastroesophageal reflux disease with esophagitis (530 11) (K21 0)   14  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 1) (K14 1)   15  GERD (gastroesophageal reflux disease) (530 81) (K21 9)   16  HTN (hypertension) (401 9) (I10)   17  Hyperlipidemia (272 4) (E78 5)   18  Hypothyroidism (244 9) (E03 9)   19  Multiple pulmonary nodules (793 19) (R91 8)   20  Need for influenza vaccination (V04 81) (Z23)   21  Pharyngitis, acute (462) (J02 9)   22  Pulmonary emphysema (492 8) (J43 9)   23  Sciatica of left side (724 3) (M54 32)   24  Screening for genitourinary condition (V81 6) (Z13 89)   25  Screening for osteoporosis (V82 81) (Z13 820)   26  Seasonal allergies (477 9) (J30 2)   27  Tachycardia (785 0) (R00 0)   28  Tremor (781 0) (R25 1)   29  History of Urinary tract infection (599 0) (N39 0)    Past Medical History  1  History of Cough, persistent (786 2) (R05)   2  History of Frequent urination (788 41) (R35 0)   3  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 1) (K14 1)   4  History of Urinary tract infection (599 0) (N39 0)  Active Problems And Past Medical History Reviewed: The active problems and past medical history were reviewed and updated today  Family History  Mother    1  Family history of cerebrovascular accident (V17 1) (Z82 3)  Daughter    2  Family history of malignant neoplasm (V16 9) (Z80 9)  Sister    3  Family history of malignant neoplasm (V16 9) (Z80 9)  Family History    4   Denied: Family history of substance abuse 5  Denied: FHx: mental illness    Social History   · Denied: History of Active advance directive   · Denied: History of Alcohol use   · Always uses seat belt   · Daily caffeine consumption, 2-3 servings a day   · Former smoker (V15 82) (Z87 891)   · 30 years   · Lack physical exercise (V69 0) (Z72 3)   ·    · No living will   · Retired   · Denied: History of Substances, toxic environmental   · Supportive and safe   · Three children  The social history was reviewed and updated today  The social history was reviewed and is unchanged  Surgical History  1  History of Back Surgery    Current Meds   1  Anoro Ellipta 62 5-25 MCG/INH Inhalation Aerosol Powder Breath Activated; INHALE 1   PUFFS Daily; Therapy: (Recorded:21Mar2017) to Recorded   2  Aspirin 81 MG TABS; TAKE 1 TABLET DAILY; Therapy: (Recorded:09Jun2014) to Recorded   3  Atorvastatin Calcium 10 MG Oral Tablet; Take 1 tablet daily  Requested for: 61LGT4027;   Last Rx:13Eex3124 Ordered   4  Enalapril Maleate 5 MG Oral Tablet; TAKE 1 TABLET DAILY  Requested for: 11Orq1078;   Last Rx:32Ggu4367 Ordered   5  Fish Oil CAPS; 2 daily Recorded   6  Levothyroxine Sodium 50 MCG Oral Tablet; TAKE 1 TABLET DAILY  Requested for:   62YHZ4881; Last Rx:97Jyk6358 Ordered   7  Mucinex Maximum Strength 1200 MG Oral Tablet Extended Release 12 Hour; Take 1   tablet daily; Therapy: (Erwin Montalvo) to Recorded   8  Singulair 10 MG Oral Tablet; Take 1 tablet daily; Therapy: (Recorded:09Jun2014) to Recorded   9  Sulfamethoxazole-Trimethoprim 800-160 MG Oral Tablet; TAKE 1 TABLET TWICE DAILY   UNTIL FINISHED; Therapy: 74XIM4591 to (Evaluate:09Ieg2152)  Requested for: 03DXA4007; Last   Rx:04Qim1626 Ordered    The medication list was reviewed and updated today  Allergies  1  amlodipine   2  Levaquin TABS  3   No Known Food Allergies    Vitals   Recorded: 58XAG5334 09:57AM   Temperature 98 3 F   Heart Rate 74   Respiration 14   Systolic 914   Diastolic 72 Height 5 ft 6 in   Weight 176 lb 8 0 oz   BMI Calculated 28 49   BSA Calculated 1 9     Physical Exam    Constitutional   General appearance: No acute distress, well appearing and well nourished  Ears, Nose, Mouth, and Throat   Otoscopic examination: Tympanic membranes translucent with normal light reflex  Canals patent without erythema  Oropharynx: Normal with no erythema, edema, exudate or lesions  Pulmonary   Auscultation of lungs: Clear to auscultation  Cardiovascular   Auscultation of heart: Normal rate and rhythm, normal S1 and S2, without murmurs  Examination of extremities for edema and/or varicosities: Normal     Lymphatic   Palpation of lymph nodes in neck: No lymphadenopathy      Psychiatric   Orientation to person, place, and time: Normal     Mood and affect: Normal          Results/Data  *VB - Urinary Incontinence Screen (Dx Z13 89 Screen for UI) 20Oct2017 09:56AM Griselda Bulls     Test Name Result Flag Reference   Urinary Incontinence Assessment 20Oct2017         Future Appointments    Date/Time Provider Specialty Site   03/09/2018 08:00 AM Griselda Bulls, DO [address] PRACTICE     Signatures   Electronically signed by : Branden Gallegos DO; Oct 22 2017  7:53PM EST                       (Author)

## 2017-11-10 ENCOUNTER — GENERIC CONVERSION - ENCOUNTER (OUTPATIENT)
Dept: OTHER | Facility: OTHER | Age: 77
End: 2017-11-10

## 2017-12-08 ENCOUNTER — ALLSCRIPTS OFFICE VISIT (OUTPATIENT)
Dept: OTHER | Facility: OTHER | Age: 77
End: 2017-12-08

## 2017-12-11 NOTE — PROGRESS NOTES
Assessment    1  Acute pharyngitis, unspecified etiology (462) (J02 9)    Plan  Acute pharyngitis, unspecified etiology    · Azithromycin 250 MG Oral Tablet; TAKE 2 TABLETS ON DAY 1 THEN TAKE 1TABLET A DAY FOR 4 DAYS    Chief Complaint  head congestion/sore throat      History of Present Illness  HPI: pt co sore throat congestion past few days      Review of Systems   Constitutional: feeling poorly  ENT: sore throat  Cardiovascular: no complaints of slow or fast heart rate, no chest pain, no palpitations, no leg claudication or lower extremity edema  Respiratory: cough  Gastrointestinal: no complaints of abdominal pain, no constipation, no nausea or diarrhea, no vomiting, no bloody stools  Genitourinary: no complaints of dysuria, no incontinence, no pelvic pain, no dysmenorrhea, no vaginal discharge or abnormal vaginal bleeding  Musculoskeletal: no complaints of arthralgia, no myalgia, no joint swelling or stiffness, no limb pain or swelling  Integumentary: no complaints of skin rash or lesion, no itching or dry skin, no skin wounds  Active Problems  1  Abnormal chest CT (793 2) (R93 8)   2  Acute cystitis without hematuria (595 0) (N30 00)   3  Anxiety (300 00) (F41 9)   4  Back pain (724 5) (M54 9)   5  Cataract, anterior subcapsular polar senile (366 13) (H25 039)   6  Chronic fatigue (780 79) (R53 82)   7  Chronic respiratory failure with hypoxia (518 83,799 02) (J96 11)   8  Cough, persistent (786 2) (R05)   9  Encounter for screening mammogram for breast cancer (V76 12) (Z12 31)   10  Essential hypertension (401 9) (I10)   11  Flu vaccine need (V04 81) (Z23)   12  History of Frequent urination (788 41) (R35 0)   13  Gastroesophageal reflux disease with esophagitis (530 11) (K21 0)   14  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 1) (K14 1)   15  GERD (gastroesophageal reflux disease) (530 81) (K21 9)   16  HTN (hypertension) (401 9) (I10)   17  Hyperlipidemia (272 4) (E78 5)   18   Hypothyroidism (244 9) (E03 9)   19  Multiple pulmonary nodules (793 19) (R91 8)   20  Need for influenza vaccination (V04 81) (Z23)   21  Pharyngitis, acute (462) (J02 9)   22  Pulmonary emphysema (492 8) (J43 9)   23  Sciatica of left side (724 3) (M54 32)   24  Screening for genitourinary condition (V81 6) (Z13 89)   25  Screening for osteoporosis (V82 81) (Z13 820)   26  Seasonal allergies (477 9) (J30 2)   27  Tachycardia (785 0) (R00 0)   28  Tremor (781 0) (R25 1)   29  History of Urinary tract infection (599 0) (N39 0)   30  URTI (acute upper respiratory infection) (465 9) (J06 9)    Past Medical History  1  History of Cough, persistent (786 2) (R05)   2  History of Frequent urination (788 41) (R35 0)   3  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 1) (K14 1)   4  History of Urinary tract infection (599 0) (N39 0)  Active Problems And Past Medical History Reviewed: The active problems and past medical history were reviewed and updated today  Family History  Mother    1  Family history of cerebrovascular accident (V17 1) (Z82 3)  Daughter    2  Family history of malignant neoplasm (V16 9) (Z80 9)  Sister    3  Family history of malignant neoplasm (V16 9) (Z80 9)  Family History    4  Denied: Family history of substance abuse   5  Denied: FHx: mental illness    Social History   · Denied: History of Active advance directive   · Denied: History of Alcohol use   · Always uses seat belt   · Daily caffeine consumption, 2-3 servings a day   · Former smoker (V15 82) (Z87 891)   · 30 years   · Lack physical exercise (V69 0) (Z72 3)   ·    · No living will   · Retired   · Denied: History of Substances, toxic environmental   · Supportive and safe   · Three children  The social history was reviewed and updated today  The social history was reviewed and is unchanged  Surgical History    1  History of Back Surgery    Current Meds   1   Anoro Ellipta 62 5-25 MCG/INH Inhalation Aerosol Powder Breath Activated; INHALE 1 PUFFS Daily; Therapy: (Recorded:21Mar2017) to Recorded   2  Aspirin 81 MG TABS; TAKE 1 TABLET DAILY; Therapy: (Recorded:09Jun2014) to Recorded   3  Atorvastatin Calcium 10 MG Oral Tablet; Take 1 tablet daily  Requested for: 35YKM3537; Last Rx:83Dzy3431 Ordered   4  Enalapril Maleate 5 MG Oral Tablet; TAKE 1 TABLET DAILY  Requested for: 20Jlv3492; Last Rx:04Ytu5906 Ordered   5  Fish Oil CAPS; 2 daily Recorded   6  Levothyroxine Sodium 50 MCG Oral Tablet; TAKE 1 TABLET DAILY  Requested for: 69SWS9026; Last Rx:66Emm6812 Ordered   7  Singulair 10 MG Oral Tablet; Take 1 tablet daily; Therapy: (Recorded:09Jun2014) to Recorded    The medication list was reviewed and updated today  Allergies  1  amlodipine   2  Levaquin TABS  3  No Known Food Allergies    Vitals   Recorded: 78FHE5720 08:54AM   Temperature 97 6 F   Heart Rate 78   Respiration 18   Systolic 530   Diastolic 76   Height 5 ft 6 in   Weight 173 lb 12 8 oz   BMI Calculated 28 05   BSA Calculated 1 88       Physical Exam   Ears, Nose, Mouth, and Throat  Oropharynx: Abnormal   There was 2+ enlargement of the right tonsil          Future Appointments    Date/Time Provider Specialty Site   03/09/2018 08:00 AM Crista Councilman, DO Family Medicine [place] [address] [facility]       Signatures   Electronically signed by : Shiloh Greenwood DO; Dec 10 2017  7:56PM EST                       (Author)

## 2018-01-11 NOTE — RESULT NOTES
Verified Results  (Q) TSH, 3RD GENERATION 86AJJ2024 09:59AM Daylene Began   REPORT COMMENT:  FASTING:NO     Test Name Result Flag Reference   TSH 1 73 mIU/L  0 40-4 50

## 2018-01-12 VITALS
BODY MASS INDEX: 28.28 KG/M2 | HEIGHT: 66 IN | WEIGHT: 176 LBS | OXYGEN SATURATION: 94 % | TEMPERATURE: 98.4 F | RESPIRATION RATE: 16 BRPM | HEART RATE: 68 BPM | SYSTOLIC BLOOD PRESSURE: 128 MMHG | DIASTOLIC BLOOD PRESSURE: 78 MMHG

## 2018-01-12 NOTE — PROGRESS NOTES
History of Present Illness  Care Coordination Encounter Information:   Type of Encounter: Telephonic    Spoke to  Left voicemail message  Care Coordination [facility] Nurse  Luke: Called pt to speak to her about how she is managing postdischarge  Left a message on her voicemail  Active Problems    1  Acute bronchitis (466 0) (J20 9)   2  Anxiety (300 00) (F41 9)   3  Asthma (493 90) (J45 909)   4  Back pain (724 5) (M54 9)   5  Cataract, anterior subcapsular polar senile (366 13) (H25 039)   6  Cough, persistent (786 2) (R05)   7  Encounter for screening mammogram for breast cancer (V76 12) (Z12 31)   8  History of Frequent urination (788 41) (R35 0)   9  Gastroesophageal reflux disease with esophagitis (530 11) (K21 0)   10  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 1) (K14 1)   11  GERD (gastroesophageal reflux disease) (530 81) (K21 9)   12  HTN (hypertension) (401 9) (I10)   13  Hyperlipidemia (272 4) (E78 5)   14  Hypothyroidism (244 9) (E03 9)   15  Hypoxia (799 02) (R09 02)   16  Sciatica of left side (724 3) (M54 32)   17  Screening for genitourinary condition (V81 6) (Z13 89)   18  Seasonal allergies (477 9) (J30 2)   19  Tachycardia (785 0) (R00 0)   20  Tremor (781 0) (R25 1)   21  History of Urinary tract infection (599 0) (N39 0)    Past Medical History    1  Acute bronchitis (466 0) (J20 9)   2  History of Cough, persistent (786 2) (R05)   3  History of Frequent urination (788 41) (R35 0)   4  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 1) (K14 1)   5  History of Urinary tract infection (599 0) (N39 0)    Surgical History    1  History of Back Surgery    Family History  Mother    1  Family history of cerebrovascular accident (V17 1) (Z82 3)  Daughter    2  Family history of malignant neoplasm (V16 9) (Z80 9)  Sister    3   Family history of malignant neoplasm (V16 9) (Z80 9)    Social History    · Denied: History of Alcohol use   · Daily caffeine consumption, 2-3 servings a day   · Former smoker (V15 82) (X16 058)   · Lack physical exercise (V69 0) (Z72 3)   ·    · Retired   · Three children    Current Meds    1  LORazepam 0 5 MG Oral Tablet (Ativan); Take 1 tablet 3 times daily as needed; Therapy: 66URL9583 to (Evaluate:16Vuo1230); Last Rx:12Jan2016 Ordered    2  Sertraline HCl - 100 MG Oral Tablet; TAKE 1 TABLET DAILY AS DIRECTED; Therapy: 04JUM0160 to (Evaluate:36Xyd1645)  Requested for: 14Apr2015; Last   Rx:14Apr2015 Ordered    3  Spiriva HandiHaler 18 MCG Inhalation Capsule; INHALE CONTENTS OF CAPSULE   ONCE DAY; Therapy: (Recorded:09Jun2014) to Recorded    4  Metaxalone 800 MG Oral Tablet (Skelaxin); 1/2-1 PO TID PRN; Therapy: 86Xtr8885 to (Evaluate:50Zjv8747)  Requested for: 93Ykp3232; Last   Rx:24Aio1060 Ordered   5  TraMADol HCl - 50 MG Oral Tablet; TAKE 1 TABLET 4 TIMES DAILY AS NEEDED FOR   PAIN;   Therapy: 43YEX8208 to (Evaluate:08Oct2015); Last Rx:53Aan4512 Ordered    6  HydroCHLOROthiazide 25 MG Oral Tablet; TAKE 1 TABLET DAILY  Requested for:   02DIY9783; Last Rx:19Oct2015 Ordered    7  Atorvastatin Calcium 10 MG Oral Tablet; Take 1 tablet daily  Requested for: 31BND1919;   Last Rx:32Baw3713 Ordered    8  Levothyroxine Sodium 50 MCG Oral Tablet (Synthroid); TAKE 1 TABLET DAILY    Requested for: 33XGW4997; Last Rx:19Oct2015 Ordered    9  Singulair 10 MG Oral Tablet (Montelukast Sodium); Take 1 tablet daily; Therapy: (Recorded:09Jun2014) to Recorded    10  Aspirin 81 MG TABS; TAKE 1 TABLET DAILY; Therapy: (Recorded:09Jun2014) to Recorded   11  Calcium 1200 CHEW; CHEW 1 TABLET Daily at 4pm Recorded   12  Claritin 10 MG Oral Capsule; TAKE 1 CAPSULE 4 TIMES DAILY AS NEEDED Recorded   13  Fish Oil CAPS; Therapy: (Recorded:09Jun2014) to Recorded   14  Foradil Aerolizer 12 MCG CAPS; ONE INHALATION EVERY 12 HOURS DAILY Recorded   15  PredniSONE 10 MG Oral Tablet; Therapy: (Recorded:27Jan2017) to Recorded    Allergies    1  amlodipine   2  Levaquin TABS    3   No Known Food Allergies    Health Management   Medicare Annual Wellness Visit; every 1 year; Next Due: 68TYE0016; Overdue    End of Encounter Meds    1  LORazepam 0 5 MG Oral Tablet (Ativan); Take 1 tablet 3 times daily as needed; Therapy: 06QER4864 to (Evaluate:89Phm6133); Last Rx:12Jan2016 Ordered    2  Sertraline HCl - 100 MG Oral Tablet; TAKE 1 TABLET DAILY AS DIRECTED; Therapy: 32POT4101 to (Evaluate:36Jyc8714)  Requested for: 14Apr2015; Last   Rx:47Egl3850 Ordered    3  Spiriva HandiHaler 18 MCG Inhalation Capsule; INHALE CONTENTS OF CAPSULE   ONCE DAY; Therapy: (Recorded:09Jun2014) to Recorded    4  Metaxalone 800 MG Oral Tablet (Skelaxin); 1/2-1 PO TID PRN; Therapy: 79Kww9633 to (Evaluate:44Was2031)  Requested for: 35Ozs7501; Last   Rx:39Nwb1669 Ordered   5  TraMADol HCl - 50 MG Oral Tablet; TAKE 1 TABLET 4 TIMES DAILY AS NEEDED FOR   PAIN;   Therapy: 69ZHZ2136 to (Evaluate:08Oct2015); Last Rx:22Bqc0493 Ordered    6  HydroCHLOROthiazide 25 MG Oral Tablet; TAKE 1 TABLET DAILY  Requested for:   80FFG0042; Last Rx:19Oct2015 Ordered    7  Atorvastatin Calcium 10 MG Oral Tablet; Take 1 tablet daily  Requested for: 84LVF8389;   Last Rx:97Dli9780 Ordered    8  Levothyroxine Sodium 50 MCG Oral Tablet (Synthroid); TAKE 1 TABLET DAILY    Requested for: 79JWU9570; Last Rx:19Oct2015 Ordered    9  Singulair 10 MG Oral Tablet (Montelukast Sodium); Take 1 tablet daily; Therapy: (Recorded:09Jun2014) to Recorded    10  Aspirin 81 MG TABS; TAKE 1 TABLET DAILY; Therapy: (Recorded:09Jun2014) to Recorded   11  Calcium 1200 CHEW; CHEW 1 TABLET Daily at 4pm Recorded   12  Claritin 10 MG Oral Capsule; TAKE 1 CAPSULE 4 TIMES DAILY AS NEEDED Recorded   13  Fish Oil CAPS; Therapy: (Recorded:09Jun2014) to Recorded   14  Foradil Aerolizer 12 MCG CAPS; ONE INHALATION EVERY 12 HOURS DAILY Recorded   15  PredniSONE 10 MG Oral Tablet;     Therapy: (Christina Daily) to Recorded    Future Appointments    Date/Time Provider Specialty Site 02/23/2017 11:30 AM NAOMI Ibarra   Pulmonary [address] [place] [address]     Patient Care Team    Care Team Member Role Specialty Office Number   Krystyna Granados MD Specialist Otolaryngology [phone]   Mai Moseley MD Specialist Anesthesia [phone]   Coreen Solorio MD Specialist Gastroenterology Adult [phone]     Signatures   Electronically signed by : Zora Moreno RN; Feb 8 2017  1:37PM EST                       (Author)

## 2018-01-12 NOTE — RESULT NOTES
Verified Results  (1) URINE CULTURE 14MYY4840 05:17PM Marky Scherer     Test Name Result Flag Reference   CLINICAL REPORT (Report)     Test:        Urine culture  Specimen Type:   Urine  Specimen Date:   7/10/2017 5:17 PM  Result Date:    7/12/2017 3:41 PM  Result Status:   Final result  Resulting Lab:   [facility] [address] [place] [address]            Tel: [phone]      CULTURE                                       ------------------                                   80,000-89,000 cfu/ml Mixed Contaminants X6     *** This organism has been edited   The previous result was Enterococcus species     on 7/11/2017 at 1723 EDT  ***

## 2018-01-13 VITALS
DIASTOLIC BLOOD PRESSURE: 74 MMHG | RESPIRATION RATE: 14 BRPM | WEIGHT: 174.38 LBS | TEMPERATURE: 97.9 F | OXYGEN SATURATION: 92 % | BODY MASS INDEX: 28.03 KG/M2 | HEIGHT: 66 IN | SYSTOLIC BLOOD PRESSURE: 126 MMHG | HEART RATE: 72 BPM

## 2018-01-13 VITALS
TEMPERATURE: 99.7 F | HEIGHT: 66 IN | WEIGHT: 173.6 LBS | OXYGEN SATURATION: 84 % | DIASTOLIC BLOOD PRESSURE: 84 MMHG | SYSTOLIC BLOOD PRESSURE: 144 MMHG | BODY MASS INDEX: 27.9 KG/M2 | HEART RATE: 130 BPM

## 2018-01-13 VITALS
RESPIRATION RATE: 16 BRPM | WEIGHT: 174 LBS | TEMPERATURE: 98.3 F | BODY MASS INDEX: 28.08 KG/M2 | DIASTOLIC BLOOD PRESSURE: 60 MMHG | OXYGEN SATURATION: 94 % | HEART RATE: 76 BPM | SYSTOLIC BLOOD PRESSURE: 130 MMHG

## 2018-01-13 VITALS
BODY MASS INDEX: 28.21 KG/M2 | DIASTOLIC BLOOD PRESSURE: 80 MMHG | RESPIRATION RATE: 16 BRPM | TEMPERATURE: 99.2 F | SYSTOLIC BLOOD PRESSURE: 164 MMHG | HEIGHT: 66 IN | HEART RATE: 84 BPM | WEIGHT: 175.5 LBS | OXYGEN SATURATION: 92 %

## 2018-01-14 VITALS
RESPIRATION RATE: 24 BRPM | HEIGHT: 66 IN | HEART RATE: 83 BPM | DIASTOLIC BLOOD PRESSURE: 78 MMHG | WEIGHT: 176 LBS | SYSTOLIC BLOOD PRESSURE: 160 MMHG | BODY MASS INDEX: 28.28 KG/M2

## 2018-01-14 VITALS
HEIGHT: 66 IN | DIASTOLIC BLOOD PRESSURE: 96 MMHG | BODY MASS INDEX: 28.63 KG/M2 | OXYGEN SATURATION: 92 % | RESPIRATION RATE: 18 BRPM | SYSTOLIC BLOOD PRESSURE: 148 MMHG | HEART RATE: 90 BPM | WEIGHT: 178.13 LBS

## 2018-01-15 VITALS
BODY MASS INDEX: 28.37 KG/M2 | RESPIRATION RATE: 14 BRPM | SYSTOLIC BLOOD PRESSURE: 140 MMHG | HEART RATE: 74 BPM | HEIGHT: 66 IN | DIASTOLIC BLOOD PRESSURE: 72 MMHG | WEIGHT: 176.5 LBS | TEMPERATURE: 98.3 F

## 2018-01-16 NOTE — PROGRESS NOTES
Plan   Chronic respiratory failure with hypoxia    · Prevnar 13 Intramuscular Suspension  Essential hypertension, SocHx: No living will    · Enalapril Maleate 5 MG Oral Tablet; TAKE 1 TABLET DAILY AS DIRECTED    Prevnar 13 Intramuscular Suspension; INJECT 0 5  ML Intramuscular; To Be Done: 53KSN0256; Status: Hold For - Administration, Retrospective Authorization;  Ordered  For: Need for influenza vaccination; Ordered By:Almas Richey; Effective Date:06Jun2017; Last Updated By: Zenobia Dutta; 6/7/2017 8:12:49 AM     Discussion/Summary  Impression: Initial Annual Wellness Visit  Cardiovascular screening and counseling: counseling was given on ways to improve exercise tolerance  Diabetes screening and counseling: screening is current  Colorectal cancer screening and counseling: the patient declines screening  Cervical cancer screening and counseling: screening not indicated  Osteoporosis screening and counseling: screening is current  Glaucoma screening and counseling: screening is current  HIV screening and counseling: screening not indicated  Immunizations: prevnar today  Chief Complaint  Pt here for AWV      History of Present Illness  The patient is being seen for the initial annual wellness visit  Medicare Screening and Risk Factors   Hospitalizations: she has been previously hospitalizied  Once per lifetime medicare screening tests: ECG has not been done and AAA screening US has not yet been done  Medicare Screening Tests Risk Questions   Abdominal aortic aneurysm risk assessment: none indicated  Osteoporosis risk assessment: none indicated  HIV risk assessment: none indicated  Drug and Alcohol Use: The patient quit smoking 1993  She has smoked for 50 year(s) and has 50+ pack year(s) of cigarette use  The patient reports never drinking alcohol  She has never used illicit drugs     Diet and Physical Activity: Current diet includes well balanced meals, 1 servings of fruit per day, 2 servings of vegetables per day, 1 servings of meat per day, 1 servings of whole grains per day, 1 servings of dairy products per day, 2 cups of coffee per day and 0 cans of regular soda per day  She exercises 5 times per week  Exercise: walking 30 minutes per day  Mood Disorder and Cognitive Impairment Screening: She denies feeling down, depressed, or hopeless over the past two weeks  She denies feeling little interest or pleasure in doing things over the past two weeks  Cognitive impairment screening: denies difficulty learning/retaining new information, denies difficulty handling complex tasks, denies difficulty with reasoning, denies difficulty with spatial ability and orientation, denies difficulty with language and denies difficulty with behavior  Functional Ability/Level of Safety: Hearing is slightly decreased, slightly decreased in the right ear, slightly decreased in the left ear and a hearing aid is used  The patient is currently able to do activities of daily living without limitations, able to do instrumental activities of daily living without limitations, able to participate in social activities without limitations and able to drive without limitations  Activities of daily living details: does not need help using the phone, no transportation help needed, does not need help shopping, no meal preparation help needed, does not need help doing housework, does not need help doing laundry, does not need help managing medications and does not need help managing money  Fall risk factors: The patient fell 0 times in the past 12 months , no polypharmacy, no alcohol use, no mobility impairment, no antidepressant use, no deconditioning, no postural hypotension, no sedative use, no visual impairment, no urinary incontinence, no antihypertensive use, no cognitive impairment, up and go test was normal and no previous fall     Advance Directives: Advance directives: no living will, no durable power of  for health care directives and no advance directives  end of life decisions were reviewed with the patient and I agree with the patient's decisions  Co-Managers and Medical Equipment/Suppliers: See Patient Care Team   Urinary Incontinence Symptoms includes: urinary urgency and nocturia, but no urinary incontinence, no incomplete bladder emptying, no urinary frequency, no urinary hesitancy, no dysuria, no straining, no weak stream, no intermittent stream, no post-void dribbling, no vaginal pressure and no vaginal dryness        Patient Care Team    Care Team Member Role Specialty Office Number   Kinga Thurman MD Specialist Otolaryngology [phone]   Gris Guerrier MD Specialist Anesthesia [phone]   Josep Whalen MD Specialist Gastroenterology Adult [phone]   Michael Downing MD Specialist Pulmonary Medicine [phone]     Active Problems    1  Abnormal chest CT (793 2) (R93 8)   2  Acute bronchitis (466 0) (J20 9)   3  Anxiety (300 00) (F41 9)   4  Back pain (724 5) (M54 9)   5  Cataract, anterior subcapsular polar senile (366 13) (H25 039)   6  Chronic fatigue (780 79) (R53 82)   7  Chronic respiratory failure with hypoxia (518 83,799 02) (J96 11)   8  Cough, persistent (786 2) (R05)   9  Encounter for screening mammogram for breast cancer (V76 12) (Z12 31)   10  Essential hypertension (401 9) (I10)   11  History of Frequent urination (788 41) (R35 0)   12  Gastroesophageal reflux disease with esophagitis (530 11) (K21 0)   13  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 1) (K14 1)   14  GERD (gastroesophageal reflux disease) (530 81) (K21 9)   15  HTN (hypertension) (401 9) (I10)   16  Hyperlipidemia (272 4) (E78 5)   17  Hypothyroidism (244 9) (E03 9)   18  Pneumonia (486) (J18 9)   19  Pulmonary emphysema (492 8) (J43 9)   20  Sciatica of left side (724 3) (M54 32)   21  Screening for genitourinary condition (V81 6) (Z13 89)   22  Seasonal allergies (477 9) (J30 2)   23   Tachycardia (785 0) (R00 0)   24  Tremor (781 0) (R25 1)   25  History of Urinary tract infection (599 0) (N39 0)    Past Medical History    1  Acute bronchitis (466 0) (J20 9)   2  History of Cough, persistent (786 2) (R05)   3  History of Frequent urination (788 41) (R35 0)   4  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 1) (K14 1)   5  History of Urinary tract infection (599 0) (N39 0)    Surgical History    1  History of Back Surgery    Family History  Mother    1  Family history of cerebrovascular accident (V17 1) (Z82 3)  Daughter    2  Family history of malignant neoplasm (V16 9) (Z80 9)  Sister    3  Family history of malignant neoplasm (V16 9) (Z80 9)  Family History    4  Denied: Family history of substance abuse   5  Denied: FHx: mental illness    Social History    · Denied: History of Alcohol use   · Always uses seat belt   · Daily caffeine consumption, 2-3 servings a day   · Former smoker (V15 82) (E93 954)   · Lack physical exercise (V69 0) (Z72 3)   ·    · No living will   · Retired   · Denied: History of Substances, toxic environmental   · Supportive and safe   · Three children    Current Meds   1  Anoro Ellipta 62 5-25 MCG/INH Inhalation Aerosol Powder Breath Activated; INHALE 1   PUFFS Daily; Therapy: (Recorded:21Mar2017) to Recorded   2  Aspirin 81 MG TABS; TAKE 1 TABLET DAILY; Therapy: (Recorded:09Jun2014) to Recorded   3  Atorvastatin Calcium 10 MG Oral Tablet; Take 1 tablet daily  Requested for: 73JZI0359;   Last Rx:11Oyh8778 Ordered   4  Enalapril Maleate 5 MG Oral Tablet; TAKE 1 TABLET DAILY AS DIRECTED; Therapy: 79ABO3011 to (Evaluate:08Nov2017)  Requested for: 69WLV6280; Last   Rx:09Tmr4113 Ordered   5  Esomeprazole Magnesium 40 MG Oral Capsule Delayed Release; TAKE 1 CAPSULE   ONCE DAILY; Therapy: (Joanne Velez) to Recorded   6  Fish Oil CAPS; 2 daily Recorded   7  Levothyroxine Sodium 50 MCG Oral Tablet; TAKE 1 TABLET DAILY  Requested for:   34QZW0303; Last HH:97GTI1753 Ordered   8   Levothyroxine Sodium 50 MCG Oral Tablet; TAKE 1 TABLET DAILY  Requested for:   51PQN7671; Last Rx:19Oct2015 Ordered   9  Lipitor 10 MG Oral Tablet; TAKE 1 TABLET AT BEDTIME; Therapy: (Recorded:21Mar2017) to Recorded   10  Mucinex Maximum Strength 1200 MG Oral Tablet Extended Release 12 Hour; Take 1    tablet daily; Therapy: (Vevelyn Dues) to Recorded   11  Singulair 10 MG Oral Tablet; Take 1 tablet daily; Therapy: (Recorded:09Jun2014) to Recorded    Allergies    1  amlodipine   2  Levaquin TABS    3  No Known Food Allergies    Vitals  Signs   Recorded: 06Jun2017 09:47AM   Temperature: 98 2 F  Heart Rate: 80  Respiration: 16  Systolic: 803  Diastolic: 74  Height: 5 ft 6 in  Weight: 179 lb 2 08 oz  BMI Calculated: 28 91  BSA Calculated: 1 92  O2 Saturation: 93    Health Management  Health Maintenance   Medicare Annual Wellness Visit; every 1 year; Next Due: 93PTN8130; Overdue    Future Appointments    Date/Time Provider Specialty Site   07/05/2017 01:40 PM NAOMI Jaffe   Pulmonary Medicine [facility] [place]     Signatures   Electronically signed by : Aurea Levin DO; Jun 18 2017  6:58PM EST                       (Author)

## 2018-01-16 NOTE — MISCELLANEOUS
Assessment   1  Hypoxia (799 02) (R09 02)1   2  Pneumonia (5) (J18 9)1      1 Amended By: Chaitanya Zamarripa; Feb 27 2017 8:44 PM EST    Plan  Hyperlipidemia    · Renew: Atorvastatin Calcium 10 MG Oral Tablet; Take 1 tablet daily  Rx By: Chaitanya Zamarripa; Dispense: 90 Days ; #:90 Tablet; Refill: 3;For: Hyperlipidemia; KVNG =   N; Verified Transmission to [address] [place] [address]; Last Updated By: System   CLEAR; 2/3/2017 10:40:44 AM    Chief Complaint  Chief Complaint Free Text Note Form: charly for shortness of breath      History of Present Illness  TCM Communication [facility]: The patient is being contacted for follow-up after hospitalization  Hospital records were reviewed  She was hospitalized at [facility]  The date of admission: 01/27/2017, date of discharge: 01/30/2017  She was discharged to home  Symptoms: cough and shortness of breath, but no fever, no weakness, no dizziness, no headache, no fatigue, no chest pain, no back pain on left side, no back pain on right side, no arm pain left side, no arm pain on right side, no leg pain on left side, no leg pain on right side, no upper abdominal pain, no middle abdominal pain, no lower abdominal pain, no rash:, no anorexia, no nausea, no vomiting, no loose stools, no constipation, no pain with urinating, no incisional pain, no wound drainage and no swelling  The patient is currently experiencing symptoms  Communication performed and completed by      Review of Systems  Complete-Female:   Constitutional: No fever, no chills, feels well, no tiredness, no recent weight gain or weight loss  Eyes: No complaints of eye pain, no red eyes, no eyesight problems, no discharge, no dry eyes, no itching of eyes  ENT: no complaints of earache, no loss of hearing, no nose bleeds, no nasal discharge, no sore throat, no hoarseness  Cardiovascular: No complaints of slow heart rate, no fast heart rate, no chest pain, no palpitations, no leg claudication, no lower extremity edema  Respiratory: cough and shortness of breath during exertion  Gastrointestinal: No complaints of abdominal pain, no constipation, no nausea or vomiting, no diarrhea, no bloody stools  Genitourinary: No complaints of dysuria, no incontinence, no pelvic pain, no dysmenorrhea, no vaginal discharge or bleeding  Musculoskeletal: No complaints of arthralgias, no myalgias, no joint swelling or stiffness, no limb pain or swelling  Integumentary: No complaints of skin rash or lesions, no itching, no skin wounds, no breast pain or lump  Neurological: No complaints of headache, no confusion, no convulsions, no numbness, no dizziness or fainting, no tingling, no limb weakness, no difficulty walking  Psychiatric: Not suicidal, no sleep disturbance, no anxiety or depression, no change in personality, no emotional problems  Endocrine: No complaints of proptosis, no hot flashes, no muscle weakness, no deepening of the voice, no feelings of weakness  Hematologic/Lymphatic: No complaints of swollen glands, no swollen glands in the neck, does not bleed easily, does not bruise easily  Active Problems   1  Acute bronchitis (466 0) (J20 9)  2  Anxiety (300 00) (F41 9)  3  Asthma (493 90) (J45 909)  4  Back pain (724 5) (M54 9)  5  Cataract, anterior subcapsular polar senile (366 13) (H25 039)  6  Cough, persistent (786 2) (R05)  7  Encounter for screening mammogram for breast cancer (V76 12) (Z12 31)  8  History of Frequent urination (788 41) (R35 0)  9  Gastroesophageal reflux disease with esophagitis (530 11) (K21 0)  10  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 1) (K14 1)  11  GERD (gastroesophageal reflux disease) (530 81) (K21 9)  12  HTN (hypertension) (401 9) (I10)  13  Hyperlipidemia (272 4) (E78 5)  14  Hypothyroidism (244 9) (E03 9)  15  Hypoxia (799 02) (R09 02)  16  Sciatica of left side (724 3) (M54 32)  17  Screening for genitourinary condition (V81 6) (Z13 89)  18  Seasonal allergies (477 9) (J30 2)  19  Tachycardia (785 0) (R00 0)  20  Tremor (781 0) (R25 1)  21  History of Urinary tract infection (599 0) (N39 0)    Past Medical History   1  Acute bronchitis (466 0) (J20 9)  2  History of Cough, persistent (786 2) (R05)  3  History of Frequent urination (788 41) (R35 0)  4  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 1) (K14 1)  5  History of Urinary tract infection (599 0) (N39 0)    Surgical History   1  History of Back Surgery  Surgical History Reviewed: The surgical history was reviewed and updated today  Family History  Mother   1  Family history of cerebrovascular accident (V17 1) (Z82 3)  Daughter   2  Family history of malignant neoplasm (V16 9) (Z80 9)  Sister   3  Family history of malignant neoplasm (V16 9) (Z80 9)    Social History    · Denied: History of Alcohol use   · Daily caffeine consumption, 2-3 servings a day   · Former smoker (V15 82) (V43 844)   · Lack physical exercise (V69 0) (Z72 3)   ·    · Retired   · Three children  Social History Reviewed: The social history was reviewed and updated today  The social history was reviewed and is unchanged  Current Meds  1  Aspirin 81 MG TABS; TAKE 1 TABLET DAILY; Therapy: (Recorded:09Jun2014) to Recorded  2  Atorvastatin Calcium 10 MG Oral Tablet; Take 1 tablet daily  Requested for: 36YXL2543;   Last Rx:23Mar2016 Ordered  3  Calcium 1200 CHEW; CHEW 1 TABLET Daily at 4pm Recorded  4  Claritin 10 MG Oral Capsule; TAKE 1 CAPSULE 4 TIMES DAILY AS NEEDED Recorded  5  Fish Oil CAPS; Therapy: (Recorded:09Jun2014) to Recorded  6  Foradil Aerolizer 12 MCG CAPS; ONE INHALATION EVERY 12 HOURS DAILY Recorded  7  HydroCHLOROthiazide 25 MG Oral Tablet; TAKE 1 TABLET DAILY  Requested for:   80VFC1463; Last Rx:19Oct2015 Ordered  8  Levothyroxine Sodium 50 MCG Oral Tablet; TAKE 1 TABLET DAILY  Requested for:   12LTK8607; Last Rx:19Oct2015 Ordered  9  LORazepam 0 5 MG Oral Tablet; Take 1 tablet 3 times daily as needed;    Therapy: 28LNM2616 to (Evaluate:65Ivv7804); Last Rx:12Jan2016 Ordered  10  Metaxalone 800 MG Oral Tablet; 1/2-1 PO TID PRN; Therapy: 44Laj0771 to (Evaluate:92Ccz4024)  Requested for: 84Kwk8387; Last    Rx:50Vkz6270 Ordered  11  PredniSONE 10 MG Oral Tablet; Therapy: (608 676 542) to Recorded  12  Sertraline HCl - 100 MG Oral Tablet; TAKE 1 TABLET DAILY AS DIRECTED; Therapy: 60CVQ1663 to (Evaluate:74Dfx6324)  Requested for: 74Dca2288; Last    Rx:09Fvw1483 Ordered  13  Singulair 10 MG Oral Tablet; Take 1 tablet daily; Therapy: (Recorded:09Jun2014) to Recorded  14  Spiriva HandiHaler 18 MCG Inhalation Capsule; INHALE CONTENTS OF CAPSULE    ONCE DAY; Therapy: (Recorded:09Jun2014) to Recorded  15  TraMADol HCl - 50 MG Oral Tablet; TAKE 1 TABLET 4 TIMES DAILY AS NEEDED FOR    PAIN;    Therapy: 56TIW3877 to (Evaluate:08Oct2015); Last Rx:08Sep2015 Ordered  Medication List Reviewed: The medication list was reviewed and updated today  Allergies   1  amlodipine  2  Levaquin TABS   3  No Known Food Allergies    Vitals  Signs   Recorded: 80Qar3333 10:34AM   O2 Saturation: 88, RA  Recorded: 07PFI6588 90:68RD   Systolic: 950  Diastolic: 82  Height: 5 ft 6 in  Weight: 174 lb 12 8 oz  BMI Calculated: 28 21  BSA Calculated: 1 89  O2 Saturation: 80    Physical Exam    Constitutional   General appearance: No acute distress, well appearing and well nourished  Pulmonary   Auscultation of lungs: Clear to auscultation  Cardiovascular   Auscultation of heart: Normal rate and rhythm, normal S1 and S2, without murmurs  Examination of extremities for edema and/or varicosities: Normal     Abdomen   Abdomen: Non-tender, no masses  Musculoskeletal   Gait and station: Normal     Psychiatric   Orientation to person, place, and time: Normal     Mood and affect: Normal          Health Management  Health Maintenance   Medicare Annual Wellness Visit; every 1 year; Next Due: 02DZW3168;  Overdue    Future Appointments    Date/Time Provider Specialty Site   03/21/2017 02:30 PM NAOMI Campbell   Pulmonary Medicine [facility] [facility] [place]     Signatures   Electronically signed by : Anahi Wisdom DO; Feb 27 2017  8:42PM EST                       (Author)    Electronically signed by : Anahi Wisdom DO; Feb 27 2017  8:44PM EST                       (Author)

## 2018-01-22 VITALS
RESPIRATION RATE: 24 BRPM | HEIGHT: 66 IN | HEART RATE: 83 BPM | TEMPERATURE: 98 F | DIASTOLIC BLOOD PRESSURE: 78 MMHG | BODY MASS INDEX: 28.19 KG/M2 | OXYGEN SATURATION: 87 % | WEIGHT: 175.38 LBS | SYSTOLIC BLOOD PRESSURE: 160 MMHG

## 2018-01-22 VITALS
WEIGHT: 174.8 LBS | HEIGHT: 66 IN | DIASTOLIC BLOOD PRESSURE: 82 MMHG | BODY MASS INDEX: 28.09 KG/M2 | SYSTOLIC BLOOD PRESSURE: 134 MMHG | OXYGEN SATURATION: 88 %

## 2018-01-22 VITALS
OXYGEN SATURATION: 93 % | HEIGHT: 66 IN | RESPIRATION RATE: 16 BRPM | WEIGHT: 179.13 LBS | SYSTOLIC BLOOD PRESSURE: 140 MMHG | DIASTOLIC BLOOD PRESSURE: 74 MMHG | HEART RATE: 80 BPM | TEMPERATURE: 98.2 F | BODY MASS INDEX: 28.79 KG/M2

## 2018-01-23 VITALS
DIASTOLIC BLOOD PRESSURE: 76 MMHG | BODY MASS INDEX: 27.93 KG/M2 | HEIGHT: 66 IN | WEIGHT: 173.8 LBS | SYSTOLIC BLOOD PRESSURE: 134 MMHG | HEART RATE: 78 BPM | RESPIRATION RATE: 18 BRPM | TEMPERATURE: 97.6 F

## 2018-02-06 ENCOUNTER — OFFICE VISIT (OUTPATIENT)
Dept: PULMONOLOGY | Facility: CLINIC | Age: 78
End: 2018-02-06
Payer: COMMERCIAL

## 2018-02-06 VITALS
DIASTOLIC BLOOD PRESSURE: 72 MMHG | WEIGHT: 176.6 LBS | HEIGHT: 67 IN | TEMPERATURE: 97.7 F | HEART RATE: 77 BPM | SYSTOLIC BLOOD PRESSURE: 126 MMHG | OXYGEN SATURATION: 92 % | BODY MASS INDEX: 27.72 KG/M2

## 2018-02-06 DIAGNOSIS — J96.11 CHRONIC RESPIRATORY FAILURE WITH HYPOXIA (HCC): ICD-10-CM

## 2018-02-06 DIAGNOSIS — R91.8 MULTIPLE PULMONARY NODULES: ICD-10-CM

## 2018-02-06 DIAGNOSIS — J43.2 CENTRILOBULAR EMPHYSEMA (HCC): Primary | ICD-10-CM

## 2018-02-06 DIAGNOSIS — J45.30 MILD PERSISTENT ASTHMA WITHOUT COMPLICATION: ICD-10-CM

## 2018-02-06 PROBLEM — J43.9 PULMONARY EMPHYSEMA (HCC): Status: ACTIVE | Noted: 2017-03-21

## 2018-02-06 PROCEDURE — 99213 OFFICE O/P EST LOW 20 MIN: CPT | Performed by: INTERNAL MEDICINE

## 2018-02-06 RX ORDER — ENALAPRIL MALEATE 5 MG/1
5 TABLET ORAL DAILY
COMMUNITY
End: 2018-02-06

## 2018-02-06 RX ORDER — LOSARTAN POTASSIUM 50 MG/1
50 TABLET ORAL DAILY
Refills: 0 | COMMUNITY
Start: 2018-01-10 | End: 2018-02-16 | Stop reason: SDUPTHER

## 2018-02-06 NOTE — LETTER
February 6, 2018     Briseida Moreau DO  [address] [facility]    Patient: Maria Del Rosario Ribera   YOB: 1940   Date of Visit: 2/6/2018       Dear Dr Jayshree Thompson:    Thank you for referring Maria Del Rosario Ribera to me for evaluation  Below are my notes for this consultation  If you have questions, please do not hesitate to call me  I look forward to following your patient along with you  Sincerely,        Nicanor Strong MD        CC: No Recipients  Nicanor Strong MD  2/6/2018  8:15 PM  Sign at close encounter    Progress note - [address] 65 y o  female MRN: [id number]        Multiple pulmonary nodules  · These have been radiographically stable and do not require any additional follow-up    Pulmonary emphysema ([address] )  · She has overlap with asthma, Co managed with Dr Harsh Jonas  · Continue Haider Handy which she seems to respond well to  I did renew her prescription for this today    Mild persistent asthma without complication  · Continue on Anoro Ellipta  · Also on Singulair for component of seasonal allergies contributing to allergic rhinitis  · Significant component of chronic cough which may be in part related to asthma and allergies but she has been on enalapril  This was recently transitioned to Cozaar and may take some time to evaluate the effectiveness of this change    Chronic respiratory failure with hypoxia ([address] )  · Continues on supplemental oxygen and is using this appropriately  · She does take it off for several hours while she is sitting and resting  She has a home pulse oximeter which she monitors her oxygen levels with         ______________________________________________________________________    HPI:    Maria Del Rosario Ribera presents today for follow-up of COPD with asthma overlap and chronic hypoxemic respiratory failure  She also has a chronic cough which has been bothersome to her for several months    She has been on enalapril which was recently transitioned by Dr Lili Longoria to [place]  This was cleared with her primary care physician  This had been recommended by me at her last evaluation but she was going to clear her primary care physician  She has been doing very well  She is using her supplemental oxygen appropriately  She feels that the [employer] works well for her  She does have a rescue inhaler which she does not need to use very frequently  She is still bothered by the cough which is somewhat paroxysmal and harsh at times  It is dry and typically nonproductive  From the asthma perspective she remains on Singulair for seasonal allergic rhinitis and the combination Anoro Ellipta inhaler  The symptoms seem to be fairly well controlled  She does not have chest pain or cardiac complaints  She denies any headache or visual change  She has not had any sore throat or other upper respiratory complaints  She does not have active esophageal reflux symptoms  She denies abdominal pain  She has some chronic arthritic complaints but otherwise is doing well from that standpoint as well  Her back does sometimes bother her with sciatic lumbar pain        Review of Systems:  Review of Systems   Constitutional: Negative for unexpected weight change  As per HPI   HENT: Negative for congestion, postnasal drip, sinus pressure, sore throat, trouble swallowing and voice change  Eyes: Negative  Respiratory:        As per HPI   Cardiovascular:        As per HPI   Gastrointestinal: Negative for abdominal pain  No GERD   Endocrine: Negative  Genitourinary: Negative  Musculoskeletal: Negative for arthralgias and myalgias  Skin: Negative for rash  Allergic/Immunologic: Positive for environmental allergies  Neurological: Negative for speech difficulty and headaches  Hematological: Negative  Psychiatric/Behavioral: Negative for confusion and sleep disturbance  The patient is not nervous/anxious            Social history updates:  History   Smoking Status    Former Smoker    Packs/day: 2 00    Years: 36 00    Types: Cigarettes    Quit date: 1993   Smokeless Tobacco    Never Used         PhysicalExamination:  Vitals:   /72 (BP Location: Left arm, Patient Position: Sitting)   Pulse 77   Temp 97 7 °F (36 5 °C) (Tympanic)   Ht 5' 7" (1 702 m)   Wt 80 1 kg (176 lb 9 6 oz)   SpO2 92%   BMI 27 66 kg/m²    Gen:   Looks well today  No respiratory distress  Using her supplemental oxygen at 2 L   HEENT: PERRL  Oropharynx is clear, moist  Neck: Supple  There is no JVD, lymphadenopathy or thyromegaly  Trachea is midline  Chest:  Chest excursion symmetric  Lungs are hyperinflated  Breath sounds are distant bilaterally but otherwise clear  Hyper-resonant to percussion  Cardiac:   Regular  There are no murmurs  Abdomen: Soft and nontender  Benign  Extremities: No clubbing, cyanosis or edema  Diagnostic Data:  Labs:   I personally reviewed the most recent laboratory data pertinent to today's visit  None pertinent to today's encounter      Josephine Mcclain MD

## 2018-02-07 NOTE — ASSESSMENT & PLAN NOTE
· Continue on Anoro Ellipta  · Also on Singulair for component of seasonal allergies contributing to allergic rhinitis  · Significant component of chronic cough which may be in part related to asthma and allergies but she has been on enalapril    This was recently transitioned to Cozaar and may take some time to evaluate the effectiveness of this change

## 2018-02-07 NOTE — ASSESSMENT & PLAN NOTE
· She has overlap with asthma, Co managed with Dr Ashu Finney  · Continue Henrique Stephie which she seems to respond well to    I did renew her prescription for this today

## 2018-02-07 NOTE — PROGRESS NOTES
Progress note - Pulmonary Medicine   Maylin Urias 65 y o  female MRN: [id number]        Multiple pulmonary nodules  · These have been radiographically stable and do not require any additional follow-up    Pulmonary emphysema (HCC)  · She has overlap with asthma, Co managed with Dr Peggy Díaz  · Continue Rogena Repress which she seems to respond well to  I did renew her prescription for this today    Mild persistent asthma without complication  · Continue on Anoro Ellipta  · Also on Singulair for component of seasonal allergies contributing to allergic rhinitis  · Significant component of chronic cough which may be in part related to asthma and allergies but she has been on enalapril  This was recently transitioned to Cozaar and may take some time to evaluate the effectiveness of this change    Chronic respiratory failure with hypoxia ([address] )  · Continues on supplemental oxygen and is using this appropriately  · She does take it off for several hours while she is sitting and resting  She has a home pulse oximeter which she monitors her oxygen levels with         ______________________________________________________________________    HPI:    Maylin Urias presents today for follow-up of COPD with asthma overlap and chronic hypoxemic respiratory failure  She also has a chronic cough which has been bothersome to her for several months  She has been on enalapril which was recently transitioned by Dr Peggy Díaz to Cozaar  This was cleared with her primary care physician  This had been recommended by me at her last evaluation but she was going to clear her primary care physician  She has been doing very well  She is using her supplemental oxygen appropriately  She feels that the Rogena Repress works well for her  She does have a rescue inhaler which she does not need to use very frequently  She is still bothered by the cough which is somewhat paroxysmal and harsh at times    It is dry and typically nonproductive  From the asthma perspective she remains on Singulair for seasonal allergic rhinitis and the combination Anoro Ellipta inhaler  The symptoms seem to be fairly well controlled  She does not have chest pain or cardiac complaints  She denies any headache or visual change  She has not had any sore throat or other upper respiratory complaints  She does not have active esophageal reflux symptoms  She denies abdominal pain  She has some chronic arthritic complaints but otherwise is doing well from that standpoint as well  Her back does sometimes bother her with sciatic lumbar pain        Review of Systems:  Review of Systems   Constitutional: Negative for unexpected weight change  As per HPI   HENT: Negative for congestion, postnasal drip, sinus pressure, sore throat, trouble swallowing and voice change  Eyes: Negative  Respiratory:        As per HPI   Cardiovascular:        As per HPI   Gastrointestinal: Negative for abdominal pain  No GERD   Endocrine: Negative  Genitourinary: Negative  Musculoskeletal: Negative for arthralgias and myalgias  Skin: Negative for rash  Allergic/Immunologic: Positive for environmental allergies  Neurological: Negative for speech difficulty and headaches  Hematological: Negative  Psychiatric/Behavioral: Negative for confusion and sleep disturbance  The patient is not nervous/anxious  Social history updates:  History   Smoking Status    Former Smoker    Packs/day: 2 00    Years: 36 00    Types: Cigarettes    Quit date: 1993   Smokeless Tobacco    Never Used         PhysicalExamination:  Vitals:   /72 (BP Location: Left arm, Patient Position: Sitting)   Pulse 77   Temp 97 7 °F (36 5 °C) (Tympanic)   Ht 5' 7" (1 702 m)   Wt 80 1 kg (176 lb 9 6 oz)   SpO2 92%   BMI 27 66 kg/m²   Gen:   Looks well today  No respiratory distress  Using her supplemental oxygen at 2 L   HEENT: PERRL   Oropharynx is clear, moist  Neck: Supple  There is no JVD, lymphadenopathy or thyromegaly  Trachea is midline  Chest:  Chest excursion symmetric  Lungs are hyperinflated  Breath sounds are distant bilaterally but otherwise clear  Hyper-resonant to percussion  Cardiac:   Regular  There are no murmurs  Abdomen: Soft and nontender  Benign  Extremities: No clubbing, cyanosis or edema  Diagnostic Data:  Labs:   I personally reviewed the most recent laboratory data pertinent to today's visit  None pertinent to today's encounter      Usman Dodson MD

## 2018-02-07 NOTE — PATIENT INSTRUCTIONS
· Continue Anoro Ellipta, Singulair, and rescue inhaler if necessary  · I agree with the change of your blood pressure medicine from enalapril to losartan  · Continue using her supplemental oxygen at 2 L continuously  It is okay to take the oxygen off for periods of rest   He should use it regularly and routinely with exercise of any kind and during sleep    · Periodically check your oxygen level with your pulse oximeter

## 2018-02-07 NOTE — ASSESSMENT & PLAN NOTE
· Continues on supplemental oxygen and is using this appropriately  · She does take it off for several hours while she is sitting and resting  She has a home pulse oximeter which she monitors her oxygen levels with

## 2018-02-16 ENCOUNTER — OFFICE VISIT (OUTPATIENT)
Dept: FAMILY MEDICINE CLINIC | Facility: CLINIC | Age: 78
End: 2018-02-16
Payer: COMMERCIAL

## 2018-02-16 VITALS
SYSTOLIC BLOOD PRESSURE: 158 MMHG | HEART RATE: 88 BPM | HEIGHT: 65 IN | TEMPERATURE: 98.6 F | RESPIRATION RATE: 16 BRPM | DIASTOLIC BLOOD PRESSURE: 72 MMHG | BODY MASS INDEX: 29.2 KG/M2 | WEIGHT: 175.25 LBS

## 2018-02-16 DIAGNOSIS — I10 ESSENTIAL HYPERTENSION: ICD-10-CM

## 2018-02-16 DIAGNOSIS — J00 ACUTE NASOPHARYNGITIS: Primary | ICD-10-CM

## 2018-02-16 PROCEDURE — 99213 OFFICE O/P EST LOW 20 MIN: CPT | Performed by: FAMILY MEDICINE

## 2018-02-16 RX ORDER — AZITHROMYCIN 250 MG/1
250 TABLET, FILM COATED ORAL EVERY 24 HOURS
Qty: 6 TABLET | Refills: 0 | Status: SHIPPED | OUTPATIENT
Start: 2018-02-16 | End: 2018-02-21

## 2018-02-16 RX ORDER — LOSARTAN POTASSIUM 100 MG/1
100 TABLET ORAL DAILY
Qty: 30 TABLET | Refills: 1 | Status: SHIPPED | OUTPATIENT
Start: 2018-02-16 | End: 2018-03-09

## 2018-02-16 NOTE — PROGRESS NOTES
Assessment/Plan:    No problem-specific Assessment & Plan notes found for this encounter  There are no diagnoses linked to this encounter  Subjective:      Patient ID: Radha Clark is a 65 y o  female  Sore throat congestion past 3 days dayquuil not helping        The following portions of the patient's history were reviewed and updated as appropriate: allergies, current medications, past family history, past medical history, past social history, past surgical history and problem list     Review of Systems   HENT: Positive for congestion and sore throat  All other systems reviewed and are negative  Objective:    Vitals:    02/16/18 1001   BP: 158/72   Pulse: 88   Resp: 16   Temp: 98 6 °F (37 °C)        Physical Exam   Constitutional: She is oriented to person, place, and time  She appears well-developed and well-nourished  HENT:   Head: Normocephalic  Eyes: EOM are normal  Pupils are equal, round, and reactive to light  Neck: No thyromegaly present  Cardiovascular: Normal rate and regular rhythm  No murmur heard  Pulmonary/Chest: Effort normal and breath sounds normal  No respiratory distress  She has no wheezes  She has no rales  Abdominal: She exhibits no distension  Musculoskeletal: She exhibits no edema  Lymphadenopathy:     She has no cervical adenopathy  Neurological: She is alert and oriented to person, place, and time  Skin: Skin is warm  Psychiatric: She has a normal mood and affect   Her behavior is normal

## 2018-03-09 ENCOUNTER — OFFICE VISIT (OUTPATIENT)
Dept: FAMILY MEDICINE CLINIC | Facility: CLINIC | Age: 78
End: 2018-03-09
Payer: COMMERCIAL

## 2018-03-09 VITALS
TEMPERATURE: 98.6 F | HEART RATE: 65 BPM | WEIGHT: 177.6 LBS | HEIGHT: 65 IN | RESPIRATION RATE: 18 BRPM | SYSTOLIC BLOOD PRESSURE: 154 MMHG | DIASTOLIC BLOOD PRESSURE: 70 MMHG | BODY MASS INDEX: 29.59 KG/M2

## 2018-03-09 DIAGNOSIS — E03.9 HYPOTHYROIDISM, UNSPECIFIED TYPE: ICD-10-CM

## 2018-03-09 DIAGNOSIS — R30.0 DYSURIA: ICD-10-CM

## 2018-03-09 DIAGNOSIS — E78.5 HYPERLIPIDEMIA, UNSPECIFIED HYPERLIPIDEMIA TYPE: ICD-10-CM

## 2018-03-09 DIAGNOSIS — R82.90 FOUL SMELLING URINE: ICD-10-CM

## 2018-03-09 DIAGNOSIS — J43.9 PULMONARY EMPHYSEMA, UNSPECIFIED EMPHYSEMA TYPE (HCC): ICD-10-CM

## 2018-03-09 DIAGNOSIS — I10 ESSENTIAL HYPERTENSION: Primary | ICD-10-CM

## 2018-03-09 LAB
SL AMB  POCT GLUCOSE, UA: ABNORMAL
SL AMB LEUKOCYTE ESTERASE,UA: ABNORMAL
SL AMB POCT BILIRUBIN,UA: ABNORMAL
SL AMB POCT BLOOD,UA: ABNORMAL
SL AMB POCT CLARITY,UA: ABNORMAL
SL AMB POCT COLOR,UA: YELLOW
SL AMB POCT KETONES,UA: ABNORMAL
SL AMB POCT NITRITE,UA: ABNORMAL
SL AMB POCT PH,UA: 7.5
SL AMB POCT SPECIFIC GRAVITY,UA: 1
SL AMB POCT URINE PROTEIN: 15
SL AMB POCT UROBILINOGEN: ABNORMAL

## 2018-03-09 PROCEDURE — 3725F SCREEN DEPRESSION PERFORMED: CPT | Performed by: FAMILY MEDICINE

## 2018-03-09 PROCEDURE — 87086 URINE CULTURE/COLONY COUNT: CPT | Performed by: FAMILY MEDICINE

## 2018-03-09 PROCEDURE — 81002 URINALYSIS NONAUTO W/O SCOPE: CPT | Performed by: FAMILY MEDICINE

## 2018-03-09 PROCEDURE — 99214 OFFICE O/P EST MOD 30 MIN: CPT | Performed by: FAMILY MEDICINE

## 2018-03-09 PROCEDURE — 1101F PT FALLS ASSESS-DOCD LE1/YR: CPT | Performed by: FAMILY MEDICINE

## 2018-03-09 RX ORDER — ATORVASTATIN CALCIUM 10 MG/1
10 TABLET, FILM COATED ORAL DAILY
Qty: 90 TABLET | Refills: 3 | Status: SHIPPED | OUTPATIENT
Start: 2018-03-09 | End: 2019-02-19 | Stop reason: SDUPTHER

## 2018-03-09 RX ORDER — LEVOTHYROXINE SODIUM 0.05 MG/1
50 TABLET ORAL DAILY
Qty: 90 TABLET | Refills: 3 | Status: SHIPPED | OUTPATIENT
Start: 2018-03-09 | End: 2018-04-16 | Stop reason: SDUPTHER

## 2018-03-09 RX ORDER — HYDROCHLOROTHIAZIDE 25 MG/1
25 TABLET ORAL DAILY
Qty: 90 TABLET | Refills: 3 | Status: SHIPPED | OUTPATIENT
Start: 2018-03-09 | End: 2018-04-16 | Stop reason: SDUPTHER

## 2018-03-09 RX ORDER — VALSARTAN 160 MG/1
160 TABLET ORAL DAILY
Qty: 30 TABLET | Refills: 5 | Status: SHIPPED | OUTPATIENT
Start: 2018-03-09 | End: 2018-10-26 | Stop reason: CLARIF

## 2018-03-09 RX ORDER — CEPHALEXIN 500 MG/1
500 CAPSULE ORAL EVERY 6 HOURS SCHEDULED
Qty: 28 CAPSULE | Refills: 0 | Status: SHIPPED | OUTPATIENT
Start: 2018-03-09 | End: 2018-03-16

## 2018-03-09 NOTE — PROGRESS NOTES
Assessment/Plan:    Hypertension  Pt has co lightness of head on daily basis seems to be since starting valsartan  Also no improvement in bp noted    Will change valsartan to diovan       Diagnoses and all orders for this visit:    Essential hypertension  -     atorvastatin (LIPITOR) 10 mg tablet; Take 1 tablet (10 mg total) by mouth daily for 90 days  -     levothyroxine 50 mcg tablet; Take 1 tablet (50 mcg total) by mouth daily for 90 days  -     hydrochlorothiazide (HYDRODIURIL) 25 mg tablet; Take 1 tablet (25 mg total) by mouth daily for 90 days  -     valsartan (DIOVAN) 160 mg tablet; Take 1 tablet (160 mg total) by mouth daily for 30 days  -     Lipid panel; Future  -     Comprehensive metabolic panel; Future    Foul smelling urine  -     POCT urine dip    Dysuria  Comments:  mild urinary symptoms persist   abnormal urine dip  Orders:  -     cephalexin (KEFLEX) 500 mg capsule; Take 1 capsule (500 mg total) by mouth every 6 (six) hours for 7 days  -     Urine culture; Future    Hypothyroidism, unspecified type  -     TSH, 3rd generation; Future    Pulmonary emphysema, unspecified emphysema type ([facility] [address] )    Hyperlipidemia, unspecified hyperlipidemia type          Subjective:      Patient ID: Og Xavier is a 77 y o  female  HPI    The following portions of the patient's history were reviewed and updated as appropriate: current medications, past family history, past medical history, past social history, past surgical history and problem list     Review of Systems      Objective:      /70 (BP Location: Left arm, Patient Position: Sitting, Cuff Size: Standard)   Pulse 65   Temp 98 6 °F (37 °C) (Temporal)   Resp 18   Ht 5' 5" (1 651 m)   Wt 80 6 kg (177 lb 9 6 oz)   Breastfeeding? No   BMI 29 55 kg/m²          Physical Exam   Constitutional: She is oriented to person, place, and time  She appears well-developed and well-nourished  HENT:   Head: Normocephalic     Eyes: EOM are normal  Pupils are equal, round, and reactive to light  Neck: No thyromegaly present  Cardiovascular: Normal rate and regular rhythm  No murmur heard  Pulmonary/Chest: Effort normal and breath sounds normal  No respiratory distress  She has no wheezes  She has no rales  Abdominal: She exhibits no distension  Musculoskeletal: She exhibits no edema  Lymphadenopathy:     She has no cervical adenopathy  Neurological: She is alert and oriented to person, place, and time  Skin: Skin is warm  Psychiatric: She has a normal mood and affect   Her behavior is normal

## 2018-03-09 NOTE — ASSESSMENT & PLAN NOTE
Pt has co lightness of head on daily basis seems to be since starting valsartan  Also no improvement in bp noted    Will change valsartan to diovan

## 2018-03-09 NOTE — PROGRESS NOTES
Assessment/Plan:    No problem-specific Assessment & Plan notes found for this encounter  Diagnoses and all orders for this visit:    Foul smelling urine  -     POCT urine dip          Subjective:      Patient ID: Sonny Velez is a 77 y o  female  HPI    The following portions of the patient's history were reviewed and updated as appropriate: current medications, past family history, past medical history, past social history, past surgical history and problem list     Review of Systems      Objective:      /70 (BP Location: Left arm, Patient Position: Sitting, Cuff Size: Standard)   Pulse 65   Temp 98 6 °F (37 °C) (Temporal)   Resp 18   Ht 5' 5" (1 651 m)   Wt 80 6 kg (177 lb 9 6 oz)   Breastfeeding?  No   BMI 29 55 kg/m²          Physical Exam

## 2018-03-10 LAB
ALBUMIN SERPL-MCNC: 4.3 G/DL (ref 3.6–5.1)
ALBUMIN/GLOB SERPL: 1.7 (CALC) (ref 1–2.5)
ALP SERPL-CCNC: 82 U/L (ref 33–130)
ALT SERPL-CCNC: 16 U/L (ref 6–29)
AST SERPL-CCNC: 20 U/L (ref 10–35)
BACTERIA UR CULT: ABNORMAL
BILIRUB SERPL-MCNC: 0.6 MG/DL (ref 0.2–1.2)
BUN SERPL-MCNC: 12 MG/DL (ref 7–25)
BUN/CREAT SERPL: ABNORMAL (CALC) (ref 6–22)
CALCIUM SERPL-MCNC: 9.4 MG/DL (ref 8.6–10.4)
CHLORIDE SERPL-SCNC: 93 MMOL/L (ref 98–110)
CHOLEST SERPL-MCNC: 232 MG/DL
CHOLEST/HDLC SERPL: 2.4 (CALC)
CO2 SERPL-SCNC: 34 MMOL/L (ref 20–31)
CREAT SERPL-MCNC: 0.67 MG/DL (ref 0.6–0.93)
GLOBULIN SER CALC-MCNC: 2.6 G/DL (CALC) (ref 1.9–3.7)
GLUCOSE SERPL-MCNC: 102 MG/DL (ref 65–99)
HDLC SERPL-MCNC: 96 MG/DL
LDLC SERPL CALC-MCNC: 114 MG/DL (CALC)
NONHDLC SERPL-MCNC: 136 MG/DL (CALC)
POTASSIUM SERPL-SCNC: 3.7 MMOL/L (ref 3.5–5.3)
PROT SERPL-MCNC: 6.9 G/DL (ref 6.1–8.1)
SL AMB EGFR AFRICAN AMERICAN: 97 ML/MIN/1.73M2
SL AMB EGFR NON AFRICAN AMERICAN: 84 ML/MIN/1.73M2
SODIUM SERPL-SCNC: 134 MMOL/L (ref 135–146)
TRIGL SERPL-MCNC: 117 MG/DL
TSH SERPL-ACNC: 1.39 MIU/L (ref 0.4–4.5)

## 2018-03-12 ENCOUNTER — TELEPHONE (OUTPATIENT)
Dept: FAMILY MEDICINE CLINIC | Facility: CLINIC | Age: 78
End: 2018-03-12

## 2018-03-12 NOTE — TELEPHONE ENCOUNTER
Pt notified of results    ----- Message from Bright Coyne DO sent at 3/12/2018  5:58 PM EDT -----  Lab looks good

## 2018-04-16 DIAGNOSIS — I10 ESSENTIAL HYPERTENSION: ICD-10-CM

## 2018-04-16 RX ORDER — LEVOTHYROXINE SODIUM 0.05 MG/1
50 TABLET ORAL DAILY
Qty: 90 TABLET | Refills: 3 | Status: SHIPPED | OUTPATIENT
Start: 2018-04-16 | End: 2019-03-28

## 2018-04-16 RX ORDER — HYDROCHLOROTHIAZIDE 25 MG/1
25 TABLET ORAL DAILY
Qty: 90 TABLET | Refills: 3 | Status: SHIPPED | OUTPATIENT
Start: 2018-04-16 | End: 2019-03-28 | Stop reason: SDUPTHER

## 2018-04-16 RX ORDER — LOSARTAN POTASSIUM 100 MG/1
100 TABLET ORAL DAILY
Qty: 90 TABLET | Refills: 3 | Status: SHIPPED | OUTPATIENT
Start: 2018-04-16 | End: 2018-05-07 | Stop reason: SDUPTHER

## 2018-04-16 RX ORDER — LOSARTAN POTASSIUM 100 MG/1
TABLET ORAL
COMMUNITY
Start: 2018-04-10 | End: 2018-04-16 | Stop reason: SDUPTHER

## 2018-04-16 NOTE — TELEPHONE ENCOUNTER
Pt called in for refill on  Losartan potassium 100 mg  1 time daily   hydrochlorothiazide 25 mg tablet   levothyroxine 50 mcg tablet  All medication sent for 90 days to [facility] on [address]

## 2018-04-30 ENCOUNTER — TELEPHONE (OUTPATIENT)
Dept: FAMILY MEDICINE CLINIC | Facility: CLINIC | Age: 78
End: 2018-04-30

## 2018-05-01 DIAGNOSIS — N39.0 URINARY TRACT INFECTION WITHOUT HEMATURIA, SITE UNSPECIFIED: Primary | ICD-10-CM

## 2018-05-01 RX ORDER — CIPROFLOXACIN 500 MG/1
500 TABLET, FILM COATED ORAL EVERY 12 HOURS SCHEDULED
Qty: 10 TABLET | Refills: 0 | Status: SHIPPED | OUTPATIENT
Start: 2018-05-01 | End: 2018-05-06

## 2018-05-07 DIAGNOSIS — I10 ESSENTIAL HYPERTENSION: ICD-10-CM

## 2018-05-08 ENCOUNTER — OFFICE VISIT (OUTPATIENT)
Dept: PULMONOLOGY | Facility: CLINIC | Age: 78
End: 2018-05-08
Payer: COMMERCIAL

## 2018-05-08 VITALS
WEIGHT: 176 LBS | DIASTOLIC BLOOD PRESSURE: 66 MMHG | OXYGEN SATURATION: 94 % | HEART RATE: 86 BPM | SYSTOLIC BLOOD PRESSURE: 142 MMHG | HEIGHT: 66 IN | BODY MASS INDEX: 28.28 KG/M2 | TEMPERATURE: 98.3 F

## 2018-05-08 DIAGNOSIS — J43.2 CENTRILOBULAR EMPHYSEMA (HCC): ICD-10-CM

## 2018-05-08 DIAGNOSIS — Z01.811 PREOP PULMONARY/RESPIRATORY EXAM: Primary | ICD-10-CM

## 2018-05-08 DIAGNOSIS — J96.11 CHRONIC RESPIRATORY FAILURE WITH HYPOXIA (HCC): ICD-10-CM

## 2018-05-08 DIAGNOSIS — R91.8 MULTIPLE PULMONARY NODULES: ICD-10-CM

## 2018-05-08 PROBLEM — R82.90 FOUL SMELLING URINE: Status: RESOLVED | Noted: 2018-03-09 | Resolved: 2018-05-08

## 2018-05-08 PROBLEM — R30.0 DYSURIA: Status: RESOLVED | Noted: 2018-03-09 | Resolved: 2018-05-08

## 2018-05-08 PROCEDURE — 99214 OFFICE O/P EST MOD 30 MIN: CPT | Performed by: INTERNAL MEDICINE

## 2018-05-08 RX ORDER — VALSARTAN 160 MG/1
TABLET ORAL
COMMUNITY
Start: 2018-04-30 | End: 2018-05-08 | Stop reason: SDUPTHER

## 2018-05-08 RX ORDER — LOSARTAN POTASSIUM 100 MG/1
50 TABLET ORAL DAILY
Qty: 90 TABLET | Refills: 1 | Status: SHIPPED | OUTPATIENT
Start: 2018-05-08 | End: 2018-10-26 | Stop reason: SDUPTHER

## 2018-05-08 NOTE — PROGRESS NOTES
Progress note - Pulmonary Medicine   Neymar Eagle 77 y o  female MRN: [id number]       Impression & Plan:     Preop pulmonary/respiratory exam  · I have advised Isatu Jonna against surgical intervention for her shoulder  · She is a poor candidate for general anesthesia and would have significant perioperative risk for respiratory failure, pneumonia, and ventilator dependence  · She is high risk for pulmonary complications related to her underlying emphysema and chronic hypoxemic respiratory failure    Chronic respiratory failure with hypoxia ([address] )  · On continuous oxygen and using this appropriately and regularly as prescribed    Multiple pulmonary nodules  · No further follow-up is needed  · Stable 6 mm right upper lobe nodule since 2012, last imaged April 2017      I did send a message to her primary care physician regarding her antihypertensive regimen  Currently she is on 2 angiotensin receptor blocking agents, Diovan and Cozaar  I asked her to clarify the regimen for the patient  Isatu Jonna will return to see me in 6 months   ______________________________________________________________________    HPI:    Neymar Eagle presents today for follow-up of emphysema and chronic hypoxemic respiratory failure  He she is here sooner than anticipated because she needs preoperative evaluation for possible right total shoulder replacement  This is intended to be performed at [facility] under general anesthesia  Pulmonary clearance was requested by her orthopedic surgeon  She reports no new symptoms but is dependent upon supplemental oxygen  She is using Anoro Ellipta with good response  She has reasonable exercise tolerance when using her supplemental oxygen  She has not been hospitalized  She also has not had any recent surgery but denies ever having had complications related to surgery  She does have significant shoulder pain in limited mobility    Alternative to surgery recommended to her was possible injections and physical therapy for increased range of motion  Otherwise she has been doing well  She does report a widely gait that is possibly secondary to blood pressure medications  She has a normal blood pressure on today's visit but is having difficulty with the adjustments in her medications  The elimination of enalapril has resulted in significantly less cough  She is now on angiotensin receptor blocker as an alternative but currently has been taking both Cozaar and Diovan  She is also on hydrochlorothiazide        Review of Systems:  Review of Systems   Constitutional:        As per HPI   HENT:        As per HPI   Eyes: Negative  Respiratory:        As per HPI   Cardiovascular: Negative for leg swelling  Gastrointestinal: Negative for abdominal pain  No GERD symptoms   Endocrine: Negative  Genitourinary:        Recent treatment for urinary tract infection   Musculoskeletal:        As per HPI   Skin: Negative  Allergic/Immunologic: Positive for environmental allergies  Negative for food allergies and immunocompromised state  Neurological: Negative  Hematological: Negative  Psychiatric/Behavioral: Negative  All other systems reviewed and are negative  Past medical history, surgical history, and family history were reviewed and updated as appropriate    Social history updates:  History   Smoking Status    Former Smoker    Packs/day: 2 00    Years: 36 00    Types: Cigarettes    Quit date: 1993   Smokeless Tobacco    Never Used       PhysicalExamination:  Vitals:   /66 (BP Location: Left arm, Patient Position: Sitting)   Pulse 86   Temp 98 3 °F (36 8 °C) (Tympanic)   Ht 5' 6" (1 676 m)   Wt 79 8 kg (176 lb)   SpO2 94%   BMI 28 41 kg/m²     Gen:  Appears well today  Comfortable on 2 L supplemental oxygen  HEENT: PERRL  Oropharynx is clear, moist  Neck: Supple  There is no JVD, lymphadenopathy or thyromegaly  Trachea is midline     Chest: Chest excursion symmetric  Lungs are hyperinflated  Breath sounds are very distant bilaterally but otherwise clear  Hyper-resonant to percussion  Cardiac:   Regular There are no murmurs  Abdomen: Soft and nontender  Benign  Extremities: No clubbing, cyanosis or edema  Neurologic: No focal deficits  Normal affect  Skin: No appreciable rashes  Diagnostic Data:      PFT results: The most recent pulmonary function tests were reviewed  Last complete pulmonary function test were from March 2017 and shows severe obstruction with an FEV1 of 0 73 L   There is hyperinflation and air trapping, with a normal total lung capacity and severe reduction in diffusing capacity at 20% predicted    Imaging:  I personally reviewed the images on the [facility] system pertinent to today's visit  Last pulmonary imaging was her CT scan from April 2017 which shows severe diffuse emphysema and a stable 6 mm right upper lobe pulmonary nodule      Tammy Cordero MD

## 2018-05-08 NOTE — LETTER
May 8, 2018     [facility]  [address] [place] [address]    Patient: Padmaja Kasper   YOB: 1940   Date of Visit: 5/8/2018       Dear Dr Kuldip Jacobo:    Thank you for referring Padmaja Kasper to me for evaluation  Below are my notes for this consultation  If you have questions, please do not hesitate to call me  I look forward to following your patient along with you  Sincerely,        Rhonda Parker MD        CC: MD Rhonda Castrejon MD  5/8/2018  7:22 PM  Sign at close encounter    Progress note - Pulmonary Medicine   Padmaja Kasper 77 y o  female MRN: [id number]       Impression & Plan:     Preop pulmonary/respiratory exam  · I have advised Rhea Obregon against surgical intervention for her shoulder  · She is a poor candidate for general anesthesia and would have significant perioperative risk for respiratory failure, pneumonia, and ventilator dependence  · She is high risk for pulmonary complications related to her underlying emphysema and chronic hypoxemic respiratory failure    Chronic respiratory failure with hypoxia ([address] )  · On continuous oxygen and using this appropriately and regularly as prescribed    Multiple pulmonary nodules  · No further follow-up is needed  · Stable 6 mm right upper lobe nodule since 2012, last imaged April 2017      I did send a message to her primary care physician regarding her antihypertensive regimen  Currently she is on 2 angiotensin receptor blocking agents, Diovan and Cozaar  I asked her to clarify the regimen for the patient  Rhea Obregon will return to see me in 6 months   ______________________________________________________________________    HPI:    Padmaja Kasper presents today for follow-up of emphysema and chronic hypoxemic respiratory failure  He she is here sooner than anticipated because she needs preoperative evaluation for possible right total shoulder replacement    This is intended to be performed at [facility] under general anesthesia  Pulmonary clearance was requested by her orthopedic surgeon  She reports no new symptoms but is dependent upon supplemental oxygen  She is using Anoro Ellipta with good response  She has reasonable exercise tolerance when using her supplemental oxygen  She has not been hospitalized  She also has not had any recent surgery but denies ever having had complications related to surgery  She does have significant shoulder pain in limited mobility  Alternative to surgery recommended to her was possible injections and physical therapy for increased range of motion  Otherwise she has been doing well  She does report a widely gait that is possibly secondary to blood pressure medications  She has a normal blood pressure on today's visit but is having difficulty with the adjustments in her medications  The elimination of enalapril has resulted in significantly less cough  She is now on angiotensin receptor blocker as an alternative but currently has been taking both Cozaar and Diovan  She is also on hydrochlorothiazide        Review of Systems:  Review of Systems   Constitutional:        As per HPI   HENT:        As per HPI   Eyes: Negative  Respiratory:        As per HPI   Cardiovascular: Negative for leg swelling  Gastrointestinal: Negative for abdominal pain  No GERD symptoms   Endocrine: Negative  Genitourinary:        Recent treatment for urinary tract infection   Musculoskeletal:        As per HPI   Skin: Negative  Allergic/Immunologic: Positive for environmental allergies  Negative for food allergies and immunocompromised state  Neurological: Negative  Hematological: Negative  Psychiatric/Behavioral: Negative  All other systems reviewed and are negative          Past medical history, surgical history, and family history were reviewed and updated as appropriate    Social history updates:  History   Smoking Status    Former Smoker  Packs/day: 2 00    Years: 36 00    Types: Cigarettes    Quit date: 1993   Smokeless Tobacco    Never Used       PhysicalExamination:  Vitals:   /66 (BP Location: Left arm, Patient Position: Sitting)   Pulse 86   Temp 98 3 °F (36 8 °C) (Tympanic)   Ht 5' 6" (1 676 m)   Wt 79 8 kg (176 lb)   SpO2 94%   BMI 28 41 kg/m²      Gen:  Appears well today  Comfortable on 2 L supplemental oxygen  HEENT: PERRL  Oropharynx is clear, moist  Neck: Supple  There is no JVD, lymphadenopathy or thyromegaly  Trachea is midline  Chest:  Chest excursion symmetric  Lungs are hyperinflated  Breath sounds are very distant bilaterally but otherwise clear  Hyper-resonant to percussion  Cardiac:   Regular There are no murmurs  Abdomen: Soft and nontender  Benign  Extremities: No clubbing, cyanosis or edema  Neurologic: No focal deficits  Normal affect  Skin: No appreciable rashes  Diagnostic Data:      PFT results: The most recent pulmonary function tests were reviewed  Last complete pulmonary function test were from March 2017 and shows severe obstruction with an FEV1 of 0 73 L   There is hyperinflation and air trapping, with a normal total lung capacity and severe reduction in diffusing capacity at 20% predicted    Imaging:  I personally reviewed the images on the [facility] system pertinent to today's visit  Last pulmonary imaging was her CT scan from April 2017 which shows severe diffuse emphysema and a stable 6 mm right upper lobe pulmonary nodule      Tomy Beltrán MD

## 2018-05-08 NOTE — ASSESSMENT & PLAN NOTE
· No further follow-up is needed  · Stable 6 mm right upper lobe nodule since 2012, last imaged April 2017

## 2018-05-08 NOTE — ASSESSMENT & PLAN NOTE
· I have advised Freedom Thomas against surgical intervention for her shoulder  · She is a poor candidate for general anesthesia and would have significant perioperative risk for respiratory failure, pneumonia, and ventilator dependence  · She is high risk for pulmonary complications related to her underlying emphysema and chronic hypoxemic respiratory failure

## 2018-10-26 ENCOUNTER — OFFICE VISIT (OUTPATIENT)
Dept: FAMILY MEDICINE CLINIC | Facility: CLINIC | Age: 78
End: 2018-10-26
Payer: COMMERCIAL

## 2018-10-26 VITALS
TEMPERATURE: 98 F | HEIGHT: 66 IN | DIASTOLIC BLOOD PRESSURE: 70 MMHG | RESPIRATION RATE: 18 BRPM | BODY MASS INDEX: 27.8 KG/M2 | HEART RATE: 80 BPM | WEIGHT: 173 LBS | SYSTOLIC BLOOD PRESSURE: 150 MMHG

## 2018-10-26 DIAGNOSIS — Z23 ENCOUNTER FOR IMMUNIZATION: Primary | ICD-10-CM

## 2018-10-26 DIAGNOSIS — I10 ESSENTIAL HYPERTENSION: ICD-10-CM

## 2018-10-26 DIAGNOSIS — E78.5 HYPERLIPIDEMIA, UNSPECIFIED HYPERLIPIDEMIA TYPE: ICD-10-CM

## 2018-10-26 DIAGNOSIS — R42 DIZZINESS: ICD-10-CM

## 2018-10-26 DIAGNOSIS — J43.2 CENTRILOBULAR EMPHYSEMA (HCC): ICD-10-CM

## 2018-10-26 DIAGNOSIS — E03.9 ACQUIRED HYPOTHYROIDISM: ICD-10-CM

## 2018-10-26 PROBLEM — J00 ACUTE NASOPHARYNGITIS: Status: RESOLVED | Noted: 2018-02-16 | Resolved: 2018-10-26

## 2018-10-26 PROBLEM — J44.9 CHRONIC OBSTRUCTIVE PULMONARY DISEASE (HCC): Status: ACTIVE | Noted: 2017-03-21

## 2018-10-26 LAB
ALBUMIN SERPL-MCNC: 4.3 G/DL (ref 3.6–5.1)
ALBUMIN/GLOB SERPL: 1.7 (CALC) (ref 1–2.5)
ALP SERPL-CCNC: 86 U/L (ref 33–130)
ALT SERPL-CCNC: 15 U/L (ref 6–29)
AST SERPL-CCNC: 19 U/L (ref 10–35)
BASOPHILS # BLD AUTO: 38 CELLS/UL (ref 0–200)
BASOPHILS NFR BLD AUTO: 0.6 %
BILIRUB SERPL-MCNC: 0.7 MG/DL (ref 0.2–1.2)
BUN SERPL-MCNC: 12 MG/DL (ref 7–25)
BUN/CREAT SERPL: ABNORMAL (CALC) (ref 6–22)
CALCIUM SERPL-MCNC: 9.5 MG/DL (ref 8.6–10.4)
CHLORIDE SERPL-SCNC: 92 MMOL/L (ref 98–110)
CHOLEST SERPL-MCNC: 223 MG/DL
CHOLEST/HDLC SERPL: 2.2 (CALC)
CO2 SERPL-SCNC: 32 MMOL/L (ref 20–32)
CREAT SERPL-MCNC: 0.67 MG/DL (ref 0.6–0.93)
EOSINOPHIL # BLD AUTO: 113 CELLS/UL (ref 15–500)
EOSINOPHIL NFR BLD AUTO: 1.8 %
ERYTHROCYTE [DISTWIDTH] IN BLOOD BY AUTOMATED COUNT: 13.1 % (ref 11–15)
GLOBULIN SER CALC-MCNC: 2.6 G/DL (CALC) (ref 1.9–3.7)
GLUCOSE SERPL-MCNC: 107 MG/DL (ref 65–99)
HCT VFR BLD AUTO: 41.5 % (ref 35–45)
HDLC SERPL-MCNC: 101 MG/DL
HGB BLD-MCNC: 13.9 G/DL (ref 11.7–15.5)
LDLC SERPL CALC-MCNC: 106 MG/DL (CALC)
LYMPHOCYTES # BLD AUTO: 1550 CELLS/UL (ref 850–3900)
LYMPHOCYTES NFR BLD AUTO: 24.6 %
MCH RBC QN AUTO: 29.8 PG (ref 27–33)
MCHC RBC AUTO-ENTMCNC: 33.5 G/DL (ref 32–36)
MCV RBC AUTO: 88.9 FL (ref 80–100)
MONOCYTES # BLD AUTO: 592 CELLS/UL (ref 200–950)
MONOCYTES NFR BLD AUTO: 9.4 %
NEUTROPHILS # BLD AUTO: 4007 CELLS/UL (ref 1500–7800)
NEUTROPHILS NFR BLD AUTO: 63.6 %
NONHDLC SERPL-MCNC: 122 MG/DL (CALC)
PLATELET # BLD AUTO: 275 THOUSAND/UL (ref 140–400)
PMV BLD REES-ECKER: 10.3 FL (ref 7.5–12.5)
POTASSIUM SERPL-SCNC: 4 MMOL/L (ref 3.5–5.3)
PROT SERPL-MCNC: 6.9 G/DL (ref 6.1–8.1)
RBC # BLD AUTO: 4.67 MILLION/UL (ref 3.8–5.1)
SL AMB EGFR AFRICAN AMERICAN: 97 ML/MIN/1.73M2
SL AMB EGFR NON AFRICAN AMERICAN: 84 ML/MIN/1.73M2
SODIUM SERPL-SCNC: 132 MMOL/L (ref 135–146)
TRIGL SERPL-MCNC: 75 MG/DL
WBC # BLD AUTO: 6.3 THOUSAND/UL (ref 3.8–10.8)

## 2018-10-26 PROCEDURE — G0008 ADMIN INFLUENZA VIRUS VAC: HCPCS

## 2018-10-26 PROCEDURE — 99214 OFFICE O/P EST MOD 30 MIN: CPT | Performed by: FAMILY MEDICINE

## 2018-10-26 PROCEDURE — 90662 IIV NO PRSV INCREASED AG IM: CPT

## 2018-10-26 RX ORDER — LOSARTAN POTASSIUM 100 MG/1
100 TABLET ORAL DAILY
Qty: 90 TABLET | Refills: 1 | Status: SHIPPED | OUTPATIENT
Start: 2018-10-26 | End: 2019-03-28 | Stop reason: SDUPTHER

## 2018-10-26 NOTE — PROGRESS NOTES
Assessment/Plan:    Dizziness  Try meclizine over weekend, go for lab to include blood count       Diagnoses and all orders for this visit:    Encounter for immunization  -     influenza vaccine, 1306-1676, high-dose, PF 0 5 mL, for patients 65 yr+ (FLUZONE HIGH-DOSE)    Essential hypertension  -     losartan (COZAAR) 100 MG tablet; Take 1 tablet (100 mg total) by mouth daily  -     CBC and differential; Future    Centrilobular emphysema (HCC)  -     CBC and differential; Future    Acquired hypothyroidism    Hyperlipidemia, unspecified hyperlipidemia type  -     Lipid Panel with Direct LDL reflex; Future  -     Comprehensive metabolic panel; Future    Dizziness  -     CBC and differential; Future          Subjective:      Patient ID: Radha Clark is a 77 y o  female  Hypertension   This is a chronic problem  The current episode started more than 1 year ago  The problem is unchanged  The problem is controlled  Associated symptoms include headaches and shortness of breath  Pertinent negatives include no anxiety, blurred vision, chest pain, palpitations or peripheral edema  There are no associated agents to hypertension  Risk factors for coronary artery disease include obesity, post-menopausal state and smoking/tobacco exposure  Past treatments include angiotensin blockers and diuretics  The current treatment provides moderate improvement  There are no compliance problems  The following portions of the patient's history were reviewed and updated as appropriate: allergies, current medications, past family history, past medical history, past social history, past surgical history and problem list       Review of Systems   Constitutional: Negative for activity change and unexpected weight change  Eyes: Negative for blurred vision  Respiratory: Positive for cough and shortness of breath  Cardiovascular: Negative for chest pain and palpitations  Neurological: Positive for dizziness and headaches  Psychiatric/Behavioral: Negative for dysphoric mood  Objective:      /70 (BP Location: Left arm, Patient Position: Sitting, Cuff Size: Adult)   Pulse 80   Temp 98 °F (36 7 °C) (Temporal)   Resp 18   Ht 5' 6" (1 676 m)   Wt 78 5 kg (173 lb)   BMI 27 92 kg/m²          Physical Exam   Constitutional: She appears well-developed and well-nourished  HENT:   Right Ear: Tympanic membrane normal    Left Ear: Tympanic membrane normal    Mouth/Throat: No oropharyngeal exudate, posterior oropharyngeal edema or posterior oropharyngeal erythema  Neck: No thyromegaly present  Cardiovascular: Normal rate, regular rhythm and normal heart sounds  Pulmonary/Chest:   Decreased breath sounds   Lymphadenopathy:     She has no cervical adenopathy  Vitals reviewed

## 2018-10-29 ENCOUNTER — TELEPHONE (OUTPATIENT)
Dept: FAMILY MEDICINE CLINIC | Facility: CLINIC | Age: 78
End: 2018-10-29

## 2018-10-29 DIAGNOSIS — R42 LIGHTHEADED: Primary | ICD-10-CM

## 2018-10-29 RX ORDER — MECLIZINE HCL 12.5 MG/1
12.5 TABLET ORAL EVERY 8 HOURS PRN
Qty: 60 TABLET | Refills: 2 | Status: SHIPPED | OUTPATIENT
Start: 2018-10-29 | End: 2018-11-28 | Stop reason: ALTCHOICE

## 2018-10-29 NOTE — TELEPHONE ENCOUNTER
----- Message from Hosea Edge MD sent at 10/28/2018 10:10 AM EDT -----  Lab all ok except slight decrease in sodium-increase salt in diet and take meclizine and see if lightheadedness improves

## 2018-10-29 NOTE — TELEPHONE ENCOUNTER
Patient notified of results and recommendations   Pt advised she wants her Rx for meclizine sent to [facility] pharmacy

## 2018-11-06 ENCOUNTER — OFFICE VISIT (OUTPATIENT)
Dept: FAMILY MEDICINE CLINIC | Facility: CLINIC | Age: 78
End: 2018-11-06
Payer: COMMERCIAL

## 2018-11-06 VITALS
RESPIRATION RATE: 18 BRPM | WEIGHT: 171.4 LBS | BODY MASS INDEX: 27.55 KG/M2 | DIASTOLIC BLOOD PRESSURE: 70 MMHG | OXYGEN SATURATION: 92 % | SYSTOLIC BLOOD PRESSURE: 130 MMHG | TEMPERATURE: 99.2 F | HEART RATE: 78 BPM | HEIGHT: 66 IN

## 2018-11-06 DIAGNOSIS — J06.9 ACUTE URI: Primary | ICD-10-CM

## 2018-11-06 PROCEDURE — 3008F BODY MASS INDEX DOCD: CPT | Performed by: NURSE PRACTITIONER

## 2018-11-06 PROCEDURE — 99213 OFFICE O/P EST LOW 20 MIN: CPT | Performed by: NURSE PRACTITIONER

## 2018-11-06 PROCEDURE — 1160F RVW MEDS BY RX/DR IN RCRD: CPT | Performed by: NURSE PRACTITIONER

## 2018-11-06 RX ORDER — AZITHROMYCIN 250 MG/1
TABLET, FILM COATED ORAL
Qty: 6 TABLET | Refills: 0 | Status: SHIPPED | OUTPATIENT
Start: 2018-11-06 | End: 2018-11-10

## 2018-11-06 NOTE — PROGRESS NOTES
Chief Complaint   Patient presents with    Cold Like Symptoms     cough, cold, SOB, sore throat, tired, headache, and bodyache       Assessment/Plan:     Diagnoses and all orders for this visit:    Acute URI  -     azithromycin (ZITHROMAX) 250 mg tablet; Take 2 tablets today then 1 tablet daily x 4 days          Subjective:      Patient ID: Lesvia Ball is a 77 y o  female  Patient states she was seen 2 weeks ago  Had flu shot and has been sick since  URI    This is a new problem  The current episode started 1 to 4 weeks ago  The problem has been waxing and waning  The maximum temperature recorded prior to her arrival was 100 4 - 100 9 F  The fever has been present for less than 1 day  Associated symptoms include coughing, headaches, rhinorrhea and a sore throat  Pertinent negatives include no abdominal pain, chest pain, congestion, diarrhea, ear pain or wheezing  The following portions of the patient's history were reviewed and updated as appropriate: allergies, current medications, past family history, past medical history, past social history, past surgical history and problem list     Review of Systems   Constitutional: Positive for chills, fatigue and fever  HENT: Positive for rhinorrhea and sore throat  Negative for congestion and ear pain  Respiratory: Positive for cough and shortness of breath  Negative for wheezing  Cardiovascular: Negative for chest pain and palpitations  Gastrointestinal: Negative for abdominal pain and diarrhea  Musculoskeletal: Positive for myalgias  Neurological: Positive for headaches  Objective:      /70 (BP Location: Left arm, Patient Position: Sitting, Cuff Size: Adult)   Pulse 78   Temp 99 2 °F (37 3 °C) (Temporal)   Resp 18   Ht 5' 6" (1 676 m)   Wt 77 7 kg (171 lb 6 4 oz)   SpO2 92%   Breastfeeding? No   BMI 27 66 kg/m²          Physical Exam   Constitutional: Vital signs are normal  She does not have a sickly appearance   She appears ill  HENT:   Head: Normocephalic and atraumatic  Right Ear: Tympanic membrane normal    Left Ear: Tympanic membrane normal    Nose: Rhinorrhea present  Mouth/Throat: Posterior oropharyngeal erythema (PND significant ) present  Cardiovascular: Normal rate, regular rhythm, S1 normal and S2 normal     No murmur heard  Pulmonary/Chest: Effort normal  She has rhonchi (anterior ) in the right upper field and the left upper field  Lymphadenopathy:     She has cervical adenopathy

## 2018-11-16 ENCOUNTER — TELEPHONE (OUTPATIENT)
Dept: FAMILY MEDICINE CLINIC | Facility: CLINIC | Age: 78
End: 2018-11-16

## 2018-11-16 DIAGNOSIS — J01.11 ACUTE RECURRENT FRONTAL SINUSITIS: Primary | ICD-10-CM

## 2018-11-16 RX ORDER — DOXYCYCLINE HYCLATE 100 MG/1
100 CAPSULE ORAL EVERY 12 HOURS SCHEDULED
Qty: 14 CAPSULE | Refills: 0 | Status: SHIPPED | OUTPATIENT
Start: 2018-11-16 | End: 2018-11-23

## 2018-11-16 NOTE — TELEPHONE ENCOUNTER
I called twice and It looks like someone picks up and then hangs up  Emergency contact is the same number we have for her home

## 2018-11-16 NOTE — TELEPHONE ENCOUNTER
Patient called in with sx of headache congestions and stuffy nose she was put on antibiotic that is not helping patient will like to know if you can call in a stronger medication please advise

## 2018-11-28 ENCOUNTER — OFFICE VISIT (OUTPATIENT)
Dept: FAMILY MEDICINE CLINIC | Facility: CLINIC | Age: 78
End: 2018-11-28
Payer: COMMERCIAL

## 2018-11-28 VITALS
DIASTOLIC BLOOD PRESSURE: 68 MMHG | HEIGHT: 66 IN | TEMPERATURE: 97.7 F | HEART RATE: 73 BPM | OXYGEN SATURATION: 93 % | SYSTOLIC BLOOD PRESSURE: 120 MMHG | WEIGHT: 169 LBS | BODY MASS INDEX: 27.16 KG/M2

## 2018-11-28 DIAGNOSIS — I10 ESSENTIAL HYPERTENSION: Primary | ICD-10-CM

## 2018-11-28 DIAGNOSIS — R05.9 COUGH: ICD-10-CM

## 2018-11-28 PROCEDURE — 99213 OFFICE O/P EST LOW 20 MIN: CPT | Performed by: FAMILY MEDICINE

## 2018-11-28 PROCEDURE — 3074F SYST BP LT 130 MM HG: CPT | Performed by: FAMILY MEDICINE

## 2018-11-28 PROCEDURE — 4040F PNEUMOC VAC/ADMIN/RCVD: CPT | Performed by: FAMILY MEDICINE

## 2018-11-28 PROCEDURE — 3078F DIAST BP <80 MM HG: CPT | Performed by: FAMILY MEDICINE

## 2018-11-28 PROCEDURE — 1036F TOBACCO NON-USER: CPT | Performed by: FAMILY MEDICINE

## 2018-11-28 RX ORDER — BENZONATATE 200 MG/1
200 CAPSULE ORAL 3 TIMES DAILY PRN
Qty: 30 CAPSULE | Refills: 2 | Status: SHIPPED | OUTPATIENT
Start: 2018-11-28 | End: 2019-02-19 | Stop reason: ALTCHOICE

## 2018-11-28 NOTE — PROGRESS NOTES
Assessment/Plan:    Essential hypertension  BP better       Diagnoses and all orders for this visit:    Essential hypertension          Subjective:      Patient ID: Nelida Record is a 77 y o  female  Hypertension   This is a chronic problem  The current episode started more than 1 year ago  The problem is unchanged  The problem is controlled  Associated symptoms include shortness of breath  Pertinent negatives include no anxiety, blurred vision, chest pain or headaches  There are no associated agents to hypertension  Risk factors for coronary artery disease include post-menopausal state, sedentary lifestyle and smoking/tobacco exposure  Past treatments include angiotensin blockers  The current treatment provides moderate improvement  There are no compliance problems  The following portions of the patient's history were reviewed and updated as appropriate: allergies, current medications, past family history, past medical history, past social history, past surgical history and problem list       Review of Systems   Constitutional: Negative for activity change, appetite change and unexpected weight change  Eyes: Negative for blurred vision  Respiratory: Positive for cough and shortness of breath  Cardiovascular: Negative for chest pain  Neurological: Negative for headaches  Objective:      /68 (BP Location: Left arm, Patient Position: Sitting, Cuff Size: Standard)   Pulse 73   Temp 97 7 °F (36 5 °C)   Ht 5' 6" (1 676 m)   Wt 76 7 kg (169 lb)   SpO2 93%   BMI 27 28 kg/m²          Physical Exam   Constitutional: She appears well-developed and well-nourished  Cardiovascular: Normal rate, regular rhythm and normal heart sounds  Pulmonary/Chest:   Decreased bs   Musculoskeletal: She exhibits no edema  Lymphadenopathy:     She has no cervical adenopathy  Vitals reviewed

## 2018-11-29 ENCOUNTER — TELEPHONE (OUTPATIENT)
Dept: FAMILY MEDICINE CLINIC | Facility: CLINIC | Age: 78
End: 2018-11-29

## 2019-02-18 RX ORDER — LOSARTAN POTASSIUM AND HYDROCHLOROTHIAZIDE 25; 100 MG/1; MG/1
TABLET ORAL DAILY
COMMUNITY
End: 2019-02-19 | Stop reason: DRUGHIGH

## 2019-02-18 RX ORDER — OMEPRAZOLE 10 MG/1
20 CAPSULE, DELAYED RELEASE ORAL
COMMUNITY
Start: 2016-02-18 | End: 2019-02-19 | Stop reason: ALTCHOICE

## 2019-02-18 RX ORDER — OXYCODONE HYDROCHLORIDE AND ACETAMINOPHEN 5; 325 MG/1; MG/1
TABLET ORAL EVERY 6 HOURS
COMMUNITY
End: 2019-02-19 | Stop reason: ALTCHOICE

## 2019-02-18 RX ORDER — ACETAMINOPHEN 160 MG
4000 TABLET,DISINTEGRATING ORAL DAILY
COMMUNITY

## 2019-02-18 RX ORDER — ATORVASTATIN CALCIUM 10 MG/1
10 TABLET, FILM COATED ORAL
COMMUNITY
Start: 2016-02-18 | End: 2019-03-28 | Stop reason: SDUPTHER

## 2019-02-18 RX ORDER — LEVOTHYROXINE SODIUM 0.05 MG/1
50 TABLET ORAL
COMMUNITY
Start: 2016-02-18 | End: 2019-03-28 | Stop reason: SDUPTHER

## 2019-02-18 RX ORDER — HYDROCHLOROTHIAZIDE 25 MG/1
25 TABLET ORAL
COMMUNITY
Start: 2016-02-18 | End: 2019-02-19 | Stop reason: SDUPTHER

## 2019-02-19 ENCOUNTER — OFFICE VISIT (OUTPATIENT)
Dept: PULMONOLOGY | Facility: CLINIC | Age: 79
End: 2019-02-19
Payer: COMMERCIAL

## 2019-02-19 VITALS
HEART RATE: 71 BPM | HEIGHT: 66 IN | TEMPERATURE: 98.4 F | WEIGHT: 177.6 LBS | BODY MASS INDEX: 28.54 KG/M2 | RESPIRATION RATE: 16 BRPM | OXYGEN SATURATION: 93 % | SYSTOLIC BLOOD PRESSURE: 154 MMHG | DIASTOLIC BLOOD PRESSURE: 80 MMHG

## 2019-02-19 DIAGNOSIS — J96.11 CHRONIC RESPIRATORY FAILURE WITH HYPOXIA (HCC): ICD-10-CM

## 2019-02-19 DIAGNOSIS — J43.2 CENTRILOBULAR EMPHYSEMA (HCC): Primary | ICD-10-CM

## 2019-02-19 PROBLEM — Z01.811 PREOP PULMONARY/RESPIRATORY EXAM: Status: RESOLVED | Noted: 2018-05-08 | Resolved: 2019-02-19

## 2019-02-19 PROCEDURE — 99213 OFFICE O/P EST LOW 20 MIN: CPT | Performed by: INTERNAL MEDICINE

## 2019-02-19 NOTE — PROGRESS NOTES
Progress note - Pulmonary Medicine   Augusta Strickland 77 y o  female MRN: [id number]       Impression & Plan:     Chronic obstructive pulmonary disease ([address] )  · Symptoms are stable  · Remains on Anoro Ellipta with good response  · Rarely requires rescue albuterol  · Still with some coughing  · Inquiring about Kathy Diehl but has been advised by her ophthalmologist not to take steroids due to prior glaucoma  I would not expect a significant response to inhaled corticosteroids based on her COPD phenotype and inhaled corticosteroids are likely not necessary for long-term maintenance    Chronic respiratory failure with hypoxia (HCC)  · Using her oxygen appropriately  · No new symptoms or worsening shortness of breath  Continue regular follow-up in 6 months    ______________________________________________________________________    HPI:    Augusta Strickland presents today for follow-up of 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  She has been doing well from a pulmonary standpoint  No recent exacerbations  No chest pain  No increased wheezing  She does have a chronic cough  She states that she has had this for many years, even back when she was smoking  It is typically nonproductive in mostly dry  She was suggested by her allergist, Dr Toni Silveira to consider Kathy Diehl  He recommended that she discuss this with me however  She does tell me that she was advised by her ophthalmologist not to take steroids  She was warned about inhaled steroids due to prior history of glaucoma  She has not had recent glaucoma and this recommendation was initially many years ago but maintained by her current ophthalmologist     She denies any other new symptoms  No abdominal pain  No GERD symptoms  No postnasal drip per allergy symptoms currently  Since I last saw her she reports that she has been doing well    She uses her supplemental oxygen regularly and has tolerated this well         Review of Systems:  Review of Systems   HENT: Negative  Gastrointestinal: Negative  Musculoskeletal: Negative  Skin: Negative  Allergic/Immunologic: Negative  Neurological: Negative for weakness and headaches  All other systems reviewed and are negative  Past medical history, surgical history, and family history were reviewed and updated as appropriate    Social history updates:  Social History     Tobacco Use   Smoking Status Former Smoker    Packs/day: 2 00    Years: 36 00    Pack years: 72 00    Types: Cigarettes    Last attempt to quit:     Years since quittin 1   Smokeless Tobacco Never Used       PhysicalExamination:  Vitals:   /80 (BP Location: Left arm, Patient Position: Sitting, Cuff Size: Standard)   Pulse 71   Temp 98 4 °F (36 9 °C) (Tympanic)   Resp 16   Ht 5' 6" (1 676 m)   Wt 80 6 kg (177 lb 9 6 oz)   SpO2 93% Comment: 2 liters nasal canula O2  BMI 28 67 kg/m²     Physical Exam:   Gen: Comfortable  Non-labored  HEENT: PERRL  O/P: clear  moist  Neck: Trachea is midline  No JVD  No adenopathy  Chest:  Breath sounds are distant but without wheeze or rhonchi  Cardiac:  Heart tones distant, regular  no murmur  Abdomen: Soft and nontender  Bowel sounds are present    Extremities: No edema    Diagnostic Data:    No recent pertinent diagnostic data    Philippe Light MD

## 2019-02-19 NOTE — ASSESSMENT & PLAN NOTE
· Symptoms are stable  · Remains on Anoro Ellipta with good response  · Rarely requires rescue albuterol  · Still with some coughing  · Inquiring about Larry Chad but has been advised by her ophthalmologist not to take steroids due to prior glaucoma    I would not expect a significant response to inhaled corticosteroids based on her COPD phenotype and inhaled corticosteroids are likely not necessary for long-term maintenance

## 2019-03-28 ENCOUNTER — OFFICE VISIT (OUTPATIENT)
Dept: FAMILY MEDICINE CLINIC | Facility: CLINIC | Age: 79
End: 2019-03-28
Payer: COMMERCIAL

## 2019-03-28 VITALS
RESPIRATION RATE: 16 BRPM | TEMPERATURE: 98.3 F | WEIGHT: 174.4 LBS | DIASTOLIC BLOOD PRESSURE: 72 MMHG | BODY MASS INDEX: 28.03 KG/M2 | HEART RATE: 84 BPM | SYSTOLIC BLOOD PRESSURE: 138 MMHG | HEIGHT: 66 IN

## 2019-03-28 DIAGNOSIS — I10 ESSENTIAL HYPERTENSION: Primary | ICD-10-CM

## 2019-03-28 DIAGNOSIS — E78.2 MIXED HYPERLIPIDEMIA: ICD-10-CM

## 2019-03-28 PROCEDURE — 3075F SYST BP GE 130 - 139MM HG: CPT | Performed by: FAMILY MEDICINE

## 2019-03-28 PROCEDURE — 1160F RVW MEDS BY RX/DR IN RCRD: CPT | Performed by: FAMILY MEDICINE

## 2019-03-28 PROCEDURE — 3078F DIAST BP <80 MM HG: CPT | Performed by: FAMILY MEDICINE

## 2019-03-28 PROCEDURE — 99213 OFFICE O/P EST LOW 20 MIN: CPT | Performed by: FAMILY MEDICINE

## 2019-03-28 PROCEDURE — 3725F SCREEN DEPRESSION PERFORMED: CPT | Performed by: FAMILY MEDICINE

## 2019-03-28 PROCEDURE — 1036F TOBACCO NON-USER: CPT | Performed by: FAMILY MEDICINE

## 2019-03-28 RX ORDER — ATORVASTATIN CALCIUM 10 MG/1
10 TABLET, FILM COATED ORAL DAILY
Qty: 90 TABLET | Refills: 1 | Status: SHIPPED | OUTPATIENT
Start: 2019-03-28 | End: 2019-08-06 | Stop reason: SDUPTHER

## 2019-03-28 RX ORDER — HYDROCHLOROTHIAZIDE 25 MG/1
25 TABLET ORAL DAILY
Qty: 90 TABLET | Refills: 1 | Status: SHIPPED | OUTPATIENT
Start: 2019-03-28 | End: 2019-08-06 | Stop reason: SDUPTHER

## 2019-03-28 RX ORDER — LEVOTHYROXINE SODIUM 0.05 MG/1
50 TABLET ORAL DAILY
Qty: 90 TABLET | Refills: 1 | Status: SHIPPED | OUTPATIENT
Start: 2019-03-28 | End: 2019-08-06 | Stop reason: SDUPTHER

## 2019-03-28 RX ORDER — LOSARTAN POTASSIUM 100 MG/1
100 TABLET ORAL DAILY
Qty: 90 TABLET | Refills: 1 | Status: SHIPPED | OUTPATIENT
Start: 2019-03-28 | End: 2020-02-06 | Stop reason: SDUPTHER

## 2019-03-28 NOTE — PROGRESS NOTES
Assessment/Plan:    No problem-specific Assessment & Plan notes found for this encounter  There are no diagnoses linked to this encounter  Subjective:      Patient ID: Safia Tracey is a [de-identified] y o  female  Hypertension   This is a chronic problem  The current episode started more than 1 year ago  The problem is unchanged  The problem is controlled  Pertinent negatives include no anxiety, blurred vision, chest pain, headaches, malaise/fatigue, peripheral edema or shortness of breath  There are no associated agents to hypertension  Risk factors for coronary artery disease include post-menopausal state and dyslipidemia  Past treatments include angiotensin blockers  The current treatment provides significant improvement  There are no compliance problems  The following portions of the patient's history were reviewed and updated as appropriate: allergies, current medications, past family history, past medical history, past social history, past surgical history and problem list       Review of Systems   Constitutional: Negative for activity change, appetite change, malaise/fatigue and unexpected weight change  HENT: Negative for rhinorrhea  Eyes: Negative for blurred vision  Respiratory: Negative for shortness of breath  Cardiovascular: Negative for chest pain  Neurological: Negative for dizziness and headaches  Objective:      /72 (BP Location: Right arm, Patient Position: Sitting, Cuff Size: Standard)   Pulse 84   Temp 98 3 °F (36 8 °C) (Temporal)   Resp 16   Ht 5' 6" (1 676 m)   Wt 79 1 kg (174 lb 6 4 oz)   BMI 28 15 kg/m²          Physical Exam   Constitutional: She appears well-developed and well-nourished  Neck: No thyromegaly present  Cardiovascular: Normal rate, regular rhythm and normal heart sounds  Pulmonary/Chest: Effort normal and breath sounds normal    Musculoskeletal: She exhibits no edema  Lymphadenopathy:     She has no cervical adenopathy  Vitals reviewed

## 2019-03-28 NOTE — PATIENT INSTRUCTIONS
[facility], printed rxed for meds, pt has determined that dizziness due to high dose flu, wants regular next year

## 2019-05-08 LAB
ALBUMIN SERPL-MCNC: 4 G/DL (ref 3.6–5.1)
ALBUMIN/GLOB SERPL: 1.7 (CALC) (ref 1–2.5)
ALP SERPL-CCNC: 72 U/L (ref 33–130)
ALT SERPL-CCNC: 20 U/L (ref 6–29)
AST SERPL-CCNC: 20 U/L (ref 10–35)
BILIRUB SERPL-MCNC: 0.5 MG/DL (ref 0.2–1.2)
BUN SERPL-MCNC: 14 MG/DL (ref 7–25)
BUN/CREAT SERPL: ABNORMAL (CALC) (ref 6–22)
CALCIUM SERPL-MCNC: 9.2 MG/DL (ref 8.6–10.4)
CHLORIDE SERPL-SCNC: 94 MMOL/L (ref 98–110)
CHOLEST SERPL-MCNC: 192 MG/DL
CHOLEST/HDLC SERPL: 2.3 (CALC)
CO2 SERPL-SCNC: 35 MMOL/L (ref 20–32)
CREAT SERPL-MCNC: 0.7 MG/DL (ref 0.6–0.88)
GLOBULIN SER CALC-MCNC: 2.3 G/DL (CALC) (ref 1.9–3.7)
GLUCOSE SERPL-MCNC: 92 MG/DL (ref 65–99)
HDLC SERPL-MCNC: 84 MG/DL
LDLC SERPL CALC-MCNC: 91 MG/DL (CALC)
NONHDLC SERPL-MCNC: 108 MG/DL (CALC)
POTASSIUM SERPL-SCNC: 3.7 MMOL/L (ref 3.5–5.3)
PROT SERPL-MCNC: 6.3 G/DL (ref 6.1–8.1)
SL AMB EGFR AFRICAN AMERICAN: 95 ML/MIN/1.73M2
SL AMB EGFR NON AFRICAN AMERICAN: 82 ML/MIN/1.73M2
SODIUM SERPL-SCNC: 134 MMOL/L (ref 135–146)
TRIGL SERPL-MCNC: 76 MG/DL

## 2019-05-09 ENCOUNTER — TELEPHONE (OUTPATIENT)
Dept: FAMILY MEDICINE CLINIC | Facility: CLINIC | Age: 79
End: 2019-05-09

## 2019-06-10 ENCOUNTER — TELEPHONE (OUTPATIENT)
Dept: FAMILY MEDICINE CLINIC | Facility: CLINIC | Age: 79
End: 2019-06-10

## 2019-06-10 ENCOUNTER — APPOINTMENT (OUTPATIENT)
Dept: LAB | Facility: HOSPITAL | Age: 79
End: 2019-06-10
Payer: COMMERCIAL

## 2019-06-10 DIAGNOSIS — N30.00 ACUTE CYSTITIS WITHOUT HEMATURIA: Primary | ICD-10-CM

## 2019-06-10 LAB
BACTERIA UR QL AUTO: ABNORMAL /HPF
BILIRUB UR QL STRIP: NEGATIVE
CLARITY UR: CLEAR
COLOR UR: YELLOW
GLUCOSE UR STRIP-MCNC: NEGATIVE MG/DL
HGB UR QL STRIP.AUTO: ABNORMAL
KETONES UR STRIP-MCNC: NEGATIVE MG/DL
LEUKOCYTE ESTERASE UR QL STRIP: ABNORMAL
NITRITE UR QL STRIP: NEGATIVE
NON-SQ EPI CELLS URNS QL MICRO: ABNORMAL /HPF
PH UR STRIP.AUTO: 8 [PH]
PROT UR STRIP-MCNC: NEGATIVE MG/DL
RBC #/AREA URNS AUTO: ABNORMAL /HPF
SP GR UR STRIP.AUTO: 1.01 (ref 1–1.03)
UROBILINOGEN UR QL STRIP.AUTO: 1 E.U./DL
WBC #/AREA URNS AUTO: ABNORMAL /HPF

## 2019-06-10 PROCEDURE — 87086 URINE CULTURE/COLONY COUNT: CPT | Performed by: FAMILY MEDICINE

## 2019-06-10 PROCEDURE — 81001 URINALYSIS AUTO W/SCOPE: CPT | Performed by: FAMILY MEDICINE

## 2019-06-10 PROCEDURE — 87186 SC STD MICRODIL/AGAR DIL: CPT | Performed by: FAMILY MEDICINE

## 2019-06-10 RX ORDER — CIPROFLOXACIN 500 MG/1
500 TABLET, FILM COATED ORAL EVERY 12 HOURS SCHEDULED
Qty: 10 TABLET | Refills: 0 | Status: SHIPPED | OUTPATIENT
Start: 2019-06-10 | End: 2019-06-15

## 2019-06-13 ENCOUNTER — TELEPHONE (OUTPATIENT)
Dept: FAMILY MEDICINE CLINIC | Facility: CLINIC | Age: 79
End: 2019-06-13

## 2019-06-13 LAB — BACTERIA UR CULT: ABNORMAL

## 2019-08-06 ENCOUNTER — OFFICE VISIT (OUTPATIENT)
Dept: FAMILY MEDICINE CLINIC | Facility: CLINIC | Age: 79
End: 2019-08-06
Payer: COMMERCIAL

## 2019-08-06 VITALS
RESPIRATION RATE: 20 BRPM | WEIGHT: 174 LBS | HEART RATE: 72 BPM | DIASTOLIC BLOOD PRESSURE: 76 MMHG | TEMPERATURE: 98.3 F | HEIGHT: 66 IN | BODY MASS INDEX: 27.97 KG/M2 | SYSTOLIC BLOOD PRESSURE: 144 MMHG

## 2019-08-06 DIAGNOSIS — E78.2 MIXED HYPERLIPIDEMIA: ICD-10-CM

## 2019-08-06 DIAGNOSIS — E55.9 VITAMIN D DEFICIENCY DISEASE: Primary | ICD-10-CM

## 2019-08-06 DIAGNOSIS — E03.9 ACQUIRED HYPOTHYROIDISM: ICD-10-CM

## 2019-08-06 DIAGNOSIS — E66.3 OVERWEIGHT (BMI 25.0-29.9): ICD-10-CM

## 2019-08-06 DIAGNOSIS — I10 ESSENTIAL HYPERTENSION: ICD-10-CM

## 2019-08-06 PROCEDURE — 1036F TOBACCO NON-USER: CPT | Performed by: FAMILY MEDICINE

## 2019-08-06 PROCEDURE — 1101F PT FALLS ASSESS-DOCD LE1/YR: CPT | Performed by: FAMILY MEDICINE

## 2019-08-06 PROCEDURE — 1160F RVW MEDS BY RX/DR IN RCRD: CPT | Performed by: FAMILY MEDICINE

## 2019-08-06 PROCEDURE — 1125F AMNT PAIN NOTED PAIN PRSNT: CPT | Performed by: FAMILY MEDICINE

## 2019-08-06 PROCEDURE — 1170F FXNL STATUS ASSESSED: CPT | Performed by: FAMILY MEDICINE

## 2019-08-06 PROCEDURE — 99214 OFFICE O/P EST MOD 30 MIN: CPT | Performed by: FAMILY MEDICINE

## 2019-08-06 RX ORDER — LEVOTHYROXINE SODIUM 0.05 MG/1
50 TABLET ORAL DAILY
Qty: 90 TABLET | Refills: 1 | Status: SHIPPED | OUTPATIENT
Start: 2019-08-06 | End: 2020-02-06 | Stop reason: SDUPTHER

## 2019-08-06 RX ORDER — HYDROCHLOROTHIAZIDE 25 MG/1
25 TABLET ORAL DAILY
Qty: 90 TABLET | Refills: 1 | Status: SHIPPED | OUTPATIENT
Start: 2019-08-06 | End: 2020-02-06 | Stop reason: SDUPTHER

## 2019-08-06 RX ORDER — ATORVASTATIN CALCIUM 10 MG/1
10 TABLET, FILM COATED ORAL DAILY
Qty: 90 TABLET | Refills: 1 | Status: SHIPPED | OUTPATIENT
Start: 2019-08-06 | End: 2020-02-06 | Stop reason: SDUPTHER

## 2019-08-06 NOTE — PATIENT INSTRUCTIONS
[facility],printed rxes  Weight Management   AMBULATORY CARE:   Why it is important to manage your weight:  Being overweight increases your risk of health conditions such as heart disease, high blood pressure, type 2 diabetes, and certain types of cancer  It can also increase your risk for osteoarthritis, sleep apnea, and other respiratory problems  Aim for a slow, steady weight loss  Even a small amount of weight loss can lower your risk of health problems  How to lose weight safely:  A safe and healthy way to lose weight is to eat fewer calories and get regular exercise  You can lose up about 1 pound a week by decreasing the number of calories you eat by 500 calories each day  You can decrease calories by eating smaller portion sizes or by cutting out high-calorie foods  Read labels to find out how many calories are in the foods you eat  You can also burn calories with exercise such as walking, swimming, or biking  You will be more likely to keep weight off if you make these changes part of your lifestyle  Healthy meal plan for weight management:  A healthy meal plan includes a variety of foods, contains fewer calories, and helps you stay healthy  A healthy meal plan includes the following:  · Eat whole-grain foods more often  A healthy meal plan should contain fiber  Fiber is the part of grains, fruits, and vegetables that is not broken down by your body  Whole-grain foods are healthy and provide extra fiber in your diet  Some examples of whole-grain foods are whole-wheat breads and pastas, oatmeal, brown rice, and bulgur  · Eat a variety of vegetables every day  Include dark, leafy greens such as spinach, kale, bhavin greens, and mustard greens  Eat yellow and orange vegetables such as carrots, sweet potatoes, and winter squash  · Eat a variety of fruits every day  Choose fresh or canned fruit (canned in its own juice or light syrup) instead of juice   Fruit juice has very little or no fiber     · Eat low-fat dairy foods  Drink fat-free (skim) milk or 1% milk  Eat fat-free yogurt and low-fat cottage cheese  Try low-fat cheeses such as mozzarella and other reduced-fat cheeses  · Choose meat and other protein foods that are low in fat  Choose beans or other legumes such as split peas or lentils  Choose fish, skinless poultry (chicken or turkey), or lean cuts of red meat (beef or pork)  Before you cook meat or poultry, cut off any visible fat  · Use less fat and oil  Try baking foods instead of frying them  Add less fat, such as margarine, sour cream, regular salad dressing and mayonnaise to foods  Eat fewer high-fat foods  Some examples of high-fat foods include french fries, doughnuts, ice cream, and cakes  · Eat fewer sweets  Limit foods and drinks that are high in sugar  This includes candy, cookies, regular soda, and sweetened drinks  Ways to decrease calories:   · Eat smaller portions  ¨ Use a small plate with smaller servings  ¨ Do not eat second helpings  ¨ When you eat at a restaurant, ask for a box and place half of your meal in the box before you eat  ¨ Share an entrée with someone else  · Replace high-calorie snacks with healthy, low-calorie snacks  ¨ Choose fresh fruit, vegetables, fat-free rice cakes, or air-popped popcorn instead of potato chips, nuts, or chocolate  ¨ Choose water or calorie-free drinks instead of soda or sweetened drinks  · Eat regular meals  Skipping meals can lead to overeating later in the day  Eat a healthy snack in place of a meal if you do not have time to eat a regular meal      · Do not shop for groceries when you are hungry  You may be more likely to make unhealthy food choices  Take a grocery list of healthy foods and shop after you have eaten  Exercise:  Exercise at least 30 minutes per day on most days of the week  Some examples of exercise include walking, biking, dancing, and swimming   You can also fit in more physical activity by taking the stairs instead of the elevator or parking farther away from stores  Ask your healthcare provider about the best exercise plan for you  Other things to consider as you try to lose weight:   · Be aware of situations that may give you the urge to overeat, such as eating while watching television  Find ways to avoid these situations  For example, read a book, go for a walk, or do crafts  · Meet with a weight loss support group or friends who are also trying to lose weight  This may help you stay motivated to continue working on your weight loss goals  © 2017 [address]  Information is for End User's use only and may not be sold, redistributed or otherwise used for commercial purposes  All illustrations and images included in CareNotes® are the copyrighted property of [employer]  or Jitendra Mauricio  The above information is an  only  It is not intended as medical advice for individual conditions or treatments  Talk to your doctor, nurse or pharmacist before following any medical regimen to see if it is safe and effective for you

## 2019-08-06 NOTE — PROGRESS NOTES
Assessment/Plan:    Acquired hypothyroidism  Await lab    Essential hypertension  BP stable    Hyperlipidemia  Await lab    Vitamin D deficiency disease  [facility]       Diagnoses and all orders for this visit:    Vitamin D deficiency disease  -     Vitamin D 25 hydroxy; Future    Essential hypertension  -     levothyroxine 50 mcg tablet; Take 1 tablet (50 mcg total) by mouth daily for 90 days  -     hydrochlorothiazide (HYDRODIURIL) 25 mg tablet; Take 1 tablet (25 mg total) by mouth daily for 90 days    Mixed hyperlipidemia  -     atorvastatin (LIPITOR) 10 mg tablet; Take 1 tablet (10 mg total) by mouth daily  -     Lipid panel; Future  -     Comprehensive metabolic panel; Future    Acquired hypothyroidism  -     TSH, 3rd generation; Future    Overweight (BMI 25 0-29  9)          Subjective:      Patient ID: Zaina Stephens is a [de-identified] y o  female  Hypertension   This is a chronic problem  The current episode started more than 1 year ago  The problem is unchanged  The problem is controlled  Associated symptoms include shortness of breath  Pertinent negatives include no chest pain, headaches or malaise/fatigue  There are no associated agents to hypertension  Risk factors for coronary artery disease include post-menopausal state and dyslipidemia  Past treatments include diuretics and angiotensin blockers  The current treatment provides moderate improvement  There are no compliance problems  The following portions of the patient's history were reviewed and updated as appropriate: allergies, current medications, past family history, past medical history, past social history, past surgical history and problem list       Review of Systems   Constitutional: Negative for activity change, appetite change, fatigue, malaise/fatigue and unexpected weight change  HENT: Negative for congestion  Respiratory: Positive for shortness of breath  Cardiovascular: Negative for chest pain     Neurological: Negative for headaches  Objective:      /76 (BP Location: Left arm, Patient Position: Sitting, Cuff Size: Standard)   Pulse 72   Temp 98 3 °F (36 8 °C) (Temporal)   Resp 20   Ht 5' 6" (1 676 m)   Wt 78 9 kg (174 lb)   BMI 28 08 kg/m²          Physical Exam   Constitutional: She appears well-developed and well-nourished  Neck: No thyromegaly present  Cardiovascular: Normal rate, regular rhythm and normal heart sounds  Pulmonary/Chest: Effort normal and breath sounds normal    Musculoskeletal: She exhibits no edema  Lymphadenopathy:     She has no cervical adenopathy  BMI Counseling: Body mass index is 28 08 kg/m²  Discussed the patient's BMI with her  The BMI is above average  BMI counseling and education was provided to the patient  Nutrition recommendations include reducing portion sizes, decreasing overall calorie intake, 3-5 servings of fruits/vegetables daily, reducing fast food intake and consuming healthier snacks  Exercise recommendations include exercising 3-5 times per week

## 2019-08-06 NOTE — PROGRESS NOTES
Assessment and Plan:     Problem List Items Addressed This Visit        Cardiovascular and Mediastinum    Essential hypertension       Other    Hyperlipidemia         History of Present Illness:     Patient presents for Medicare Annual Wellness visit    Patient Care Team:  Ximena Negrete MD as PCP - General (Family Medicine)  MD Ed Zabala MD     Problem List:     Patient Active Problem List   Diagnosis    Seasonal allergies    Essential hypertension    Hyperlipidemia    Sciatica    GERD (gastroesophageal reflux disease)    Anxiety    Acquired hypothyroidism    Chronic respiratory failure with hypoxia (HCC)    Chronic obstructive pulmonary disease ([address] )    Multiple pulmonary nodules    Tremor    Vitamin D deficiency disease    Female stress incontinence    OAB (overactive bladder)    Vaginal atrophy    Dizziness      Past Medical and Surgical History:     Past Medical History:   Diagnosis Date    Anxiety     Back pain     Cataract     Geographic tongue     last assessed: 2014    GERD (gastroesophageal reflux disease)     Hyperlipidemia     Hypertension     Hypothyroidism (acquired)     Mild intermittent asthma     Sciatica     Seasonal allergies      Past Surgical History:   Procedure Laterality Date    BACK SURGERY        Family History:     Family History   Problem Relation Age of Onset    Stroke Mother     Cirrhosis Father         alcoholic    Cancer Sister     Cancer Daughter       Social History:     Social History     Tobacco Use   Smoking Status Former Smoker    Packs/day: 2 00    Years: 36 00    Pack years: 72 00    Types: Cigarettes    Last attempt to quit:     Years since quittin 6   Smokeless Tobacco Never Used     Social History     Substance and Sexual Activity   Alcohol Use No     Social History     Substance and Sexual Activity   Drug Use No      Medications and Allergies:     Current Outpatient Medications   Medication Sig Dispense Refill    aspirin 81 MG tablet Take 81 mg by mouth daily      Cholecalciferol (VITAMIN D3) 2000 units capsule Vitamin D3 2,000 unit capsule   Take by oral route   losartan (COZAAR) 100 MG tablet Take 1 tablet (100 mg total) by mouth daily 90 tablet 1    montelukast (SINGULAIR) 10 mg tablet Take 10 mg by mouth daily at bedtime      Omega-3 Fatty Acids (FISH OIL PO) Take 1,200 mg by mouth 2 (two) times a day      Umeclidinium-Vilanterol (ANORO ELLIPTA) 62 5-25 MCG/INH AEPB Inhale 1 puff daily 3 each 3    atorvastatin (LIPITOR) 10 mg tablet Take 1 tablet (10 mg total) by mouth daily 90 tablet 1    hydrochlorothiazide (HYDRODIURIL) 25 mg tablet Take 1 tablet (25 mg total) by mouth daily for 90 days 90 tablet 1    levothyroxine 50 mcg tablet Take 1 tablet (50 mcg total) by mouth daily for 90 days 90 tablet 1     No current facility-administered medications for this visit  Allergies   Allergen Reactions    Other Other (See Comments)     Steroids, it effects her eyes      Immunizations:     Immunization History   Administered Date(s) Administered    Influenza Split High Dose Preservative Free IM 09/08/2017    Influenza, high dose seasonal 0 5 mL 10/26/2018    Pneumococcal Conjugate 13-Valent 06/06/2017    Td (adult), adsorbed 07/13/2007      Medicare Screening Tests and Risk Assessments:     Fabian Amin is here for her Subsequent Wellness visit  Health Risk Assessment:  Patient rates overall health as good  Patient feels that their physical health rating is Slightly better  Eyesight was rated as Same  Hearing was rated as Same  Pain experienced by patient in the last 7 days has been None  Patient states that she has experienced no weight loss or gain in last 6 months  Emotional/Mental Health:  Patient has been feeling nervous/anxious  PHQ-9 Depression Screening:    Frequency of the following problems over the past two weeks:      1   Little interest or pleasure in doing things: 0 - not at all      2  Feeling down, depressed, or hopeless: 1 - several days  PHQ-2 Score: 1          Broken Bones/Falls: Fall Risk Assessment:    In the past year, patient has experienced: No history of falling in past year          Bladder/Bowel:  Patient has leaked urine accidently in the last six months  Patient reports no loss of bowel control  Immunizations:  Patient has not had a flu vaccination within the last year  Patient has not received a pneumonia shot  Patient has not received a shingles shot  Patient has not received tetanus/diphtheria shot  Home Safety:  Patient does not have trouble with stairs inside or outside of their home  Patient currently reports that there are safety hazards present in home , working smoke alarms, working carbon monoxide detectors  Preventative Screenings:   Breast cancer screening performed, no colon cancer screen completed, cholesterol screen completed, glaucoma eye exam completed,     Nutrition:  Current diet: Regular and Frequent junk food with servings of the following:    Medications:  Patient is currently taking over-the-counter supplements  List of OTC medications includes: ekta farnsworth  Patient is able to manage medications  Lifestyle Choices:  Patient reports no tobacco use  Patient has not smoked or used tobacco in the past   Patient reports no alcohol use  Patient drives a vehicle  Patient wears seat belt  Activities of Daily Living:  Can get out of bed by his or her self, able to dress self, able to make own meals, able to do own shopping, able to bathe self, can do own laundry/housekeeping, can manage own money, pay bills and track expenses    Previous Hospitalizations:  No hospitalization or ED visit in past 12 months        Advanced Directives:  Patient has decided on a power of   Patient has spoken to designated power of   Patient has not completed advanced directive          Preventative Screening/Counseling:      Cardiovascular:      General: Screening Current      Counseling: Healthy Diet and Healthy Weight          Diabetes:      General: Screening Current      Counseling: Healthy Diet and Healthy Weight          Colorectal Cancer:      General: Screening Not Indicated          Breast Cancer:      General: Screening Current          Cervical Cancer:      General: Screening Not Indicated          AAA:      General: Screening Not Indicated          Glaucoma:      General: Risks and Benefits Discussed          HIV:      General: Screening Not Indicated          Hepatitis C:      General: Screening Not Indicated        Advanced Directives:   Patient has living will for healthcare, has durable POA for healthcare, patient does not have an advanced directive       Immunizations:      Influenza: Influenza Recommended Annually      Pneumococcal: Lifetime Vaccine Completed      TDAP: Tdap Vaccine UTD

## 2019-08-08 DIAGNOSIS — I10 ESSENTIAL HYPERTENSION: ICD-10-CM

## 2019-08-08 LAB
25(OH)D3 SERPL-MCNC: 56 NG/ML (ref 30–100)
ALBUMIN SERPL-MCNC: 4.2 G/DL (ref 3.6–5.1)
ALBUMIN/GLOB SERPL: 1.8 (CALC) (ref 1–2.5)
ALP SERPL-CCNC: 71 U/L (ref 33–130)
ALT SERPL-CCNC: 18 U/L (ref 6–29)
AST SERPL-CCNC: 20 U/L (ref 10–35)
BILIRUB SERPL-MCNC: 0.6 MG/DL (ref 0.2–1.2)
BUN SERPL-MCNC: 13 MG/DL (ref 7–25)
BUN/CREAT SERPL: ABNORMAL (CALC) (ref 6–22)
CALCIUM SERPL-MCNC: 9.5 MG/DL (ref 8.6–10.4)
CHLORIDE SERPL-SCNC: 90 MMOL/L (ref 98–110)
CHOLEST SERPL-MCNC: 202 MG/DL
CHOLEST/HDLC SERPL: 2.3 (CALC)
CO2 SERPL-SCNC: 35 MMOL/L (ref 20–32)
CREAT SERPL-MCNC: 0.79 MG/DL (ref 0.6–0.88)
GLOBULIN SER CALC-MCNC: 2.3 G/DL (CALC) (ref 1.9–3.7)
GLUCOSE SERPL-MCNC: 92 MG/DL (ref 65–99)
HDLC SERPL-MCNC: 86 MG/DL
LDLC SERPL CALC-MCNC: 102 MG/DL (CALC)
NONHDLC SERPL-MCNC: 116 MG/DL (CALC)
POTASSIUM SERPL-SCNC: 3.6 MMOL/L (ref 3.5–5.3)
PROT SERPL-MCNC: 6.5 G/DL (ref 6.1–8.1)
SL AMB EGFR AFRICAN AMERICAN: 82 ML/MIN/1.73M2
SL AMB EGFR NON AFRICAN AMERICAN: 71 ML/MIN/1.73M2
SODIUM SERPL-SCNC: 132 MMOL/L (ref 135–146)
TRIGL SERPL-MCNC: 57 MG/DL
TSH SERPL-ACNC: 2.44 MIU/L (ref 0.4–4.5)

## 2019-08-08 RX ORDER — LOSARTAN POTASSIUM 100 MG/1
TABLET ORAL
Qty: 90 TABLET | Refills: 1 | Status: SHIPPED | OUTPATIENT
Start: 2019-08-08 | End: 2019-09-30 | Stop reason: SDUPTHER

## 2019-09-04 ENCOUNTER — TELEPHONE (OUTPATIENT)
Dept: FAMILY MEDICINE CLINIC | Facility: CLINIC | Age: 79
End: 2019-09-04

## 2019-09-04 NOTE — TELEPHONE ENCOUNTER
Pt says she tried Pepcid but that is not helping  She claims her stomach is always feeling upset, especially after she eats  She said she had acid reflux years ago   Is there any other OTC medication that you suggest?

## 2019-09-04 NOTE — TELEPHONE ENCOUNTER
PATIENT STATES THAT SHE IS EXPERIENCING A BURNING SENSATION AND HEART BURN LIKE SYMPTOMS AFTER EVERYTHING SHE EATS  SHE WAS HOPING SOMETHING COULD BE CALLED INTO THE [facility] ON [place] [id number]  IF SOMETHING IS ORDERED CAN YOU PLEASE CALL THE PATIENT  THANK YOU!

## 2019-09-11 ENCOUNTER — OFFICE VISIT (OUTPATIENT)
Dept: PULMONOLOGY | Facility: CLINIC | Age: 79
End: 2019-09-11
Payer: COMMERCIAL

## 2019-09-11 VITALS
HEART RATE: 72 BPM | RESPIRATION RATE: 16 BRPM | WEIGHT: 173 LBS | OXYGEN SATURATION: 92 % | DIASTOLIC BLOOD PRESSURE: 82 MMHG | BODY MASS INDEX: 27.8 KG/M2 | HEIGHT: 66 IN | SYSTOLIC BLOOD PRESSURE: 138 MMHG

## 2019-09-11 DIAGNOSIS — J43.2 CENTRILOBULAR EMPHYSEMA (HCC): Primary | ICD-10-CM

## 2019-09-11 DIAGNOSIS — J96.11 CHRONIC RESPIRATORY FAILURE WITH HYPOXIA (HCC): ICD-10-CM

## 2019-09-11 PROCEDURE — 99213 OFFICE O/P EST LOW 20 MIN: CPT | Performed by: INTERNAL MEDICINE

## 2019-09-12 NOTE — ASSESSMENT & PLAN NOTE
· Using supplemental oxygen appropriately  3 L if she is significantly exerting herself but otherwise 2 L  Home concentrator set at 2 5  · She is having difficulty with a weight of her portable supply  She is inquiring with her oxygen company if she is eligible for a portable concentrator or lighter device    I would be happy to provide any necessary prescription for this

## 2019-09-12 NOTE — ASSESSMENT & PLAN NOTE
· Symptoms well controlled on Anoro Ellipta  · Has been on Singulair  Recently has been off it for 3 days  Has not noticed a difference in her breathing on or off this medication  · I did suggest a trial of discontinuation    · She is up-to-date with her influenza vaccination for this season

## 2019-09-30 ENCOUNTER — OFFICE VISIT (OUTPATIENT)
Dept: FAMILY MEDICINE CLINIC | Facility: CLINIC | Age: 79
End: 2019-09-30
Payer: COMMERCIAL

## 2019-09-30 VITALS
HEIGHT: 66 IN | HEART RATE: 80 BPM | BODY MASS INDEX: 28.12 KG/M2 | WEIGHT: 175 LBS | SYSTOLIC BLOOD PRESSURE: 162 MMHG | DIASTOLIC BLOOD PRESSURE: 60 MMHG | TEMPERATURE: 98 F

## 2019-09-30 DIAGNOSIS — K21.9 GASTROESOPHAGEAL REFLUX DISEASE, ESOPHAGITIS PRESENCE NOT SPECIFIED: Primary | ICD-10-CM

## 2019-09-30 DIAGNOSIS — J01.00 ACUTE MAXILLARY SINUSITIS, RECURRENCE NOT SPECIFIED: ICD-10-CM

## 2019-09-30 PROCEDURE — 99213 OFFICE O/P EST LOW 20 MIN: CPT | Performed by: FAMILY MEDICINE

## 2019-09-30 RX ORDER — CEFUROXIME AXETIL 500 MG/1
500 TABLET ORAL EVERY 12 HOURS SCHEDULED
Qty: 20 TABLET | Refills: 0 | Status: SHIPPED | OUTPATIENT
Start: 2019-09-30 | End: 2019-10-10

## 2019-09-30 RX ORDER — PANTOPRAZOLE SODIUM 40 MG/1
40 TABLET, DELAYED RELEASE ORAL
Qty: 30 TABLET | Refills: 5 | Status: SHIPPED | OUTPATIENT
Start: 2019-09-30 | End: 2020-03-16 | Stop reason: SDUPTHER

## 2019-09-30 NOTE — PROGRESS NOTES
Assessment and Plan:    Problem List Items Addressed This Visit     None                 There are no diagnoses linked to this encounter  Subjective:      Patient ID: Jakob Coreas is a [de-identified] y o  female  CC:    Chief Complaint   Patient presents with    Cold Like Symptoms     patient c/o productive cough, chills, sore throat, headache, stomach upset, heartburn x 1 week  patient is taking Pepsid but nothing for her cold symptoms  ak       HPI:    Worried about getting sick since  going in 10/11/19 for revision of knee replacement    URI    This is a new problem  The current episode started in the past 7 days  The problem has been waxing and waning  There has been no fever  Associated symptoms include congestion, coughing, nausea, sinus pain and a sore throat  Pertinent negatives include no ear pain or headaches  Associated symptoms comments: heartburn  She has tried increased fluids for the symptoms  The treatment provided mild relief  The following portions of the patient's history were reviewed and updated as appropriate: allergies, current medications, past family history, past medical history, past social history, past surgical history and problem list         Review of Systems   Constitutional: Positive for appetite change and chills  Negative for activity change and fever  HENT: Positive for congestion, sinus pain, sore throat and voice change  Negative for ear pain  Respiratory: Positive for cough  Gastrointestinal: Positive for nausea  Neurological: Negative for headaches  Data to review:       Objective:    Vitals:    09/30/19 1156   BP: 162/60   Pulse: 80   Temp: 98 °F (36 7 °C)   Weight: 79 4 kg (175 lb)   Height: 5' 6" (1 676 m)        Physical Exam   Constitutional: She appears well-developed and well-nourished  HENT:   Right Ear: Tympanic membrane normal    Left Ear: Tympanic membrane normal    Nose: Mucosal edema present  No rhinorrhea   Right sinus exhibits maxillary sinus tenderness  Left sinus exhibits maxillary sinus tenderness  Mouth/Throat: Posterior oropharyngeal erythema present  No oropharyngeal exudate or posterior oropharyngeal edema  Neck: No thyromegaly present  Cardiovascular: Normal rate, regular rhythm and normal heart sounds  Pulmonary/Chest: Effort normal  She has wheezes in the right upper field and the left upper field  Lymphadenopathy:     She has no cervical adenopathy  Vitals reviewed

## 2019-11-04 ENCOUNTER — OFFICE VISIT (OUTPATIENT)
Dept: FAMILY MEDICINE CLINIC | Facility: CLINIC | Age: 79
End: 2019-11-04
Payer: COMMERCIAL

## 2019-11-04 VITALS
BODY MASS INDEX: 27.64 KG/M2 | SYSTOLIC BLOOD PRESSURE: 180 MMHG | HEART RATE: 92 BPM | WEIGHT: 172 LBS | DIASTOLIC BLOOD PRESSURE: 70 MMHG | TEMPERATURE: 98.1 F | HEIGHT: 66 IN

## 2019-11-04 DIAGNOSIS — H40.009 GLAUCOMA SUSPECT, UNSPECIFIED LATERALITY: ICD-10-CM

## 2019-11-04 DIAGNOSIS — I10 ESSENTIAL HYPERTENSION: ICD-10-CM

## 2019-11-04 DIAGNOSIS — E78.2 MIXED HYPERLIPIDEMIA: ICD-10-CM

## 2019-11-04 DIAGNOSIS — J44.1 CHRONIC OBSTRUCTIVE PULMONARY DISEASE WITH ACUTE EXACERBATION (HCC): Primary | ICD-10-CM

## 2019-11-04 DIAGNOSIS — K21.9 GASTROESOPHAGEAL REFLUX DISEASE, ESOPHAGITIS PRESENCE NOT SPECIFIED: ICD-10-CM

## 2019-11-04 DIAGNOSIS — J96.11 CHRONIC RESPIRATORY FAILURE WITH HYPOXIA (HCC): ICD-10-CM

## 2019-11-04 PROCEDURE — 99214 OFFICE O/P EST MOD 30 MIN: CPT | Performed by: FAMILY MEDICINE

## 2019-11-04 PROCEDURE — 94640 AIRWAY INHALATION TREATMENT: CPT | Performed by: FAMILY MEDICINE

## 2019-11-04 RX ORDER — ALBUTEROL SULFATE 90 UG/1
2 AEROSOL, METERED RESPIRATORY (INHALATION) EVERY 4 HOURS PRN
Qty: 1 INHALER | Refills: 0 | Status: SHIPPED | OUTPATIENT
Start: 2019-11-04 | End: 2019-12-04

## 2019-11-04 RX ORDER — LEVALBUTEROL INHALATION SOLUTION 1.25 MG/3ML
1.25 SOLUTION RESPIRATORY (INHALATION) ONCE
Status: COMPLETED | OUTPATIENT
Start: 2019-11-04 | End: 2019-11-04

## 2019-11-04 RX ORDER — AZITHROMYCIN 250 MG/1
TABLET, FILM COATED ORAL
Qty: 6 TABLET | Refills: 0 | Status: SHIPPED | OUTPATIENT
Start: 2019-11-04 | End: 2019-11-09

## 2019-11-04 RX ADMIN — LEVALBUTEROL INHALATION SOLUTION 1.25 MG: 1.25 SOLUTION RESPIRATORY (INHALATION) at 16:02

## 2019-11-04 NOTE — ASSESSMENT & PLAN NOTE
Stable for now  In August, her HDL was 86  With that, would not make any adjustments to her cholesterol

## 2019-11-04 NOTE — PATIENT INSTRUCTIONS
Problem List Items Addressed This Visit     Chronic obstructive pulmonary disease ([facility] [address] ) - Primary     Patient does appear to have a worsening of her COPD  At this point, would recommend that she add albuterol to her current regimen, 2 puffs every 4 hours  She does use Anoro, but I would wonder if she should try Treilgy  This is as steroid plus long-acting beta agonist plus anticholinergic, so I would ask that she speak with her optometrist or ophthalmologist about the possibility of doing this given her history of possible glaucoma  Relevant Medications    levalbuterol (XOPENEX) inhalation solution 1 25 mg    azithromycin (ZITHROMAX) 250 mg tablet    albuterol (VENTOLIN HFA) 90 mcg/act inhaler    Other Relevant Orders    Mini neb    Chronic respiratory failure with hypoxia (HCC)     Patient does have chronic respiratory failure  She follows with pulmonology  No changes at the moment  Relevant Medications    azithromycin (ZITHROMAX) 250 mg tablet    albuterol (VENTOLIN HFA) 90 mcg/act inhaler    Other Relevant Orders    Mini neb    Essential hypertension     Blood pressure is clearly elevated  Patient does need to limit decongestants  At this point, would recommend that she follow up in the near future, then re-evaluate blood pressure  When she is feeling better, hopefully this will improve her numbers  If it does not, she will need to have medications adjusted  GERD (gastroesophageal reflux disease)     Stable with Protonix  No changes at the moment  Re-evaluate in the future  Hyperlipidemia     Stable for now  In August, her HDL was 86  With that, would not make any adjustments to her cholesterol  Other Visit Diagnoses     Glaucoma suspect, unspecified laterality        Patient reports Optometry and Ophthalmology told her not to take steroids secondary to pressures  Request report from Ophthalmology and Optometry          Please have your eye doctor send us a copy of their most recent note, including her pressures  I am concerned that you are not able to use steroids, but inhaled steroids may be reasonable  I would certainly not start that until after you have confirmation from your optometrist or an ophthalmologist that this would be acceptable

## 2019-11-04 NOTE — ASSESSMENT & PLAN NOTE
Patient does have chronic respiratory failure  She follows with pulmonology  No changes at the moment

## 2019-11-04 NOTE — ASSESSMENT & PLAN NOTE
Patient does appear to have a worsening of her COPD  At this point, would recommend that she add albuterol to her current regimen, 2 puffs every 4 hours  She does use Anoro, but I would wonder if she should try Treilgy  This is as steroid plus long-acting beta agonist plus anticholinergic, so I would ask that she speak with her optometrist or ophthalmologist about the possibility of doing this given her history of possible glaucoma

## 2019-11-04 NOTE — PROGRESS NOTES
Assessment and Plan:    Problem List Items Addressed This Visit     Chronic obstructive pulmonary disease ([facility] [address] ) - Primary     Patient does appear to have a worsening of her COPD  At this point, would recommend that she add albuterol to her current regimen, 2 puffs every 4 hours  She does use Anoro, but I would wonder if she should try Treilgy  This is as steroid plus long-acting beta agonist plus anticholinergic, so I would ask that she speak with her optometrist or ophthalmologist about the possibility of doing this given her history of possible glaucoma  Relevant Medications    levalbuterol (XOPENEX) inhalation solution 1 25 mg    azithromycin (ZITHROMAX) 250 mg tablet    albuterol (VENTOLIN HFA) 90 mcg/act inhaler    Chronic respiratory failure with hypoxia (HCC)     Patient does have chronic respiratory failure  She follows with pulmonology  No changes at the moment  Relevant Medications    azithromycin (ZITHROMAX) 250 mg tablet    albuterol (VENTOLIN HFA) 90 mcg/act inhaler    Essential hypertension     Blood pressure is clearly elevated  Patient does need to limit decongestants  At this point, would recommend that she follow up in the near future, then re-evaluate blood pressure  When she is feeling better, hopefully this will improve her numbers  If it does not, she will need to have medications adjusted  GERD (gastroesophageal reflux disease)     Stable with Protonix  No changes at the moment  Re-evaluate in the future  Hyperlipidemia     Stable for now  In August, her HDL was 86  With that, would not make any adjustments to her cholesterol  Other Visit Diagnoses     Glaucoma suspect, unspecified laterality        Patient reports Optometry and Ophthalmology told her not to take steroids secondary to pressures  Request report from Ophthalmology and Optometry                   Diagnoses and all orders for this visit:    Chronic obstructive pulmonary disease with acute exacerbation (HCC)  -     levalbuterol (XOPENEX) inhalation solution 1 25 mg  -     azithromycin (ZITHROMAX) 250 mg tablet; Take 2 tablets on day 1, then 1 tablet daily days 2 through 5  -     albuterol (VENTOLIN HFA) 90 mcg/act inhaler; Inhale 2 puffs every 4 (four) hours as needed for shortness of breath    Essential hypertension    Chronic respiratory failure with hypoxia (HCC)  -     azithromycin (ZITHROMAX) 250 mg tablet; Take 2 tablets on day 1, then 1 tablet daily days 2 through 5  -     albuterol (VENTOLIN HFA) 90 mcg/act inhaler; Inhale 2 puffs every 4 (four) hours as needed for shortness of breath    Mixed hyperlipidemia    Gastroesophageal reflux disease, esophagitis presence not specified    Glaucoma suspect, unspecified laterality  Comments:  Patient reports Optometry and Ophthalmology told her not to take steroids secondary to pressures  Request report from Ophthalmology and Optometry  Other orders  -     Mini neb              Subjective:      Patient ID: Meliton Kendrick is a [de-identified] y o  female  CC:    Chief Complaint   Patient presents with    Sore Throat     Onset Saturday  mjs    Cough    Headache       HPI:    Patient is here for multiple issues  She has cold symptoms including headache, sore throat, coughing  No tooth pain or face pain with this  She does have a fair amount of chest tightness  She has noticed that her voice is off recently as well  Patient does have anoro, and reports she can't take streroids: "It will give me glaucoma "  She is currently seeing an optometrist in [place] [id number]  Patient does have a history of hypertension  No particular issues with it, though her blood pressure has been somewhat labile  Hyperlipidemia:  Currently on atorvastatin 10 mg  Denies any problems  Using Protonix for reflux            The following portions of the patient's history were reviewed and updated as appropriate: allergies, current medications, past family history, past medical history, past social history, past surgical history and problem list       Review of Systems   Constitutional: Positive for appetite change and fatigue  HENT: Negative  Respiratory: Positive for cough, shortness of breath and wheezing  Cardiovascular: Negative  Gastrointestinal: Negative  All other systems reviewed and are negative  Data to review:       Objective:    Vitals:    11/04/19 1419   BP: (!) 180/70   Pulse: 92   Temp: 98 1 °F (36 7 °C)   Weight: 78 kg (172 lb)   Height: 5' 6" (1 676 m)        Physical Exam   Constitutional: She appears well-developed and well-nourished  HENT:   Head: Normocephalic and atraumatic  Cardiovascular: Normal rate, regular rhythm and normal heart sounds  Pulses:       Carotid pulses are 2+ on the right side, and 2+ on the left side  Pulmonary/Chest: Effort normal  She has decreased breath sounds  She has wheezes  She has rhonchi  She has no rales  She exhibits no tenderness  Nursing note and vitals reviewed  Mini neb  Performed by: Phu Cameron MD  Authorized by: Phu Cameron MD     Number of treatments:  1  Treatment 1:   Pre-Procedure     Symptoms:  Wheezing, shortness of breath and cough    Medication: Xopenex 1 25 mg  Post-Procedure     Symptoms:  Cough and shortness of breath    Lung sounds:  Slightly improved, cough is less pronounced

## 2019-11-04 NOTE — ASSESSMENT & PLAN NOTE
Blood pressure is clearly elevated  Patient does need to limit decongestants  At this point, would recommend that she follow up in the near future, then re-evaluate blood pressure  When she is feeling better, hopefully this will improve her numbers  If it does not, she will need to have medications adjusted

## 2019-11-25 ENCOUNTER — TELEPHONE (OUTPATIENT)
Dept: FAMILY MEDICINE CLINIC | Facility: CLINIC | Age: 79
End: 2019-11-25

## 2019-11-25 NOTE — TELEPHONE ENCOUNTER
Pt called stating she has a uti and would like Dr Juhi Peguero to send in a script for her, I informed pt she would need an appt, pt refused an appt with anyone other than , who has not open slots, please advise

## 2019-11-26 ENCOUNTER — OFFICE VISIT (OUTPATIENT)
Dept: FAMILY MEDICINE CLINIC | Facility: CLINIC | Age: 79
End: 2019-11-26
Payer: COMMERCIAL

## 2019-11-26 VITALS
BODY MASS INDEX: 27.64 KG/M2 | HEIGHT: 66 IN | HEART RATE: 92 BPM | DIASTOLIC BLOOD PRESSURE: 52 MMHG | SYSTOLIC BLOOD PRESSURE: 136 MMHG | WEIGHT: 172 LBS | TEMPERATURE: 98.8 F

## 2019-11-26 DIAGNOSIS — R35.0 URINARY FREQUENCY: Primary | ICD-10-CM

## 2019-11-26 DIAGNOSIS — R30.0 BURNING WITH URINATION: ICD-10-CM

## 2019-11-26 LAB
SL AMB  POCT GLUCOSE, UA: NEGATIVE
SL AMB LEUKOCYTE ESTERASE,UA: NEGATIVE
SL AMB POCT BILIRUBIN,UA: NEGATIVE
SL AMB POCT BLOOD,UA: NEGATIVE
SL AMB POCT CLARITY,UA: CLEAR
SL AMB POCT COLOR,UA: YELLOW
SL AMB POCT KETONES,UA: ABNORMAL
SL AMB POCT NITRITE,UA: NEGATIVE
SL AMB POCT PH,UA: 7.5
SL AMB POCT SPECIFIC GRAVITY,UA: 1.02
SL AMB POCT URINE PROTEIN: 30
SL AMB POCT UROBILINOGEN: 2

## 2019-11-26 PROCEDURE — 1036F TOBACCO NON-USER: CPT | Performed by: PHYSICIAN ASSISTANT

## 2019-11-26 PROCEDURE — 81002 URINALYSIS NONAUTO W/O SCOPE: CPT | Performed by: PHYSICIAN ASSISTANT

## 2019-11-26 PROCEDURE — 99214 OFFICE O/P EST MOD 30 MIN: CPT | Performed by: PHYSICIAN ASSISTANT

## 2019-11-26 PROCEDURE — 1160F RVW MEDS BY RX/DR IN RCRD: CPT | Performed by: PHYSICIAN ASSISTANT

## 2019-11-26 RX ORDER — NITROFURANTOIN 25; 75 MG/1; MG/1
100 CAPSULE ORAL 2 TIMES DAILY
Qty: 14 CAPSULE | Refills: 0 | Status: SHIPPED | OUTPATIENT
Start: 2019-11-26 | End: 2019-12-03

## 2019-11-26 NOTE — PATIENT INSTRUCTIONS
Problem List Items Addressed This Visit        Other    Burning with urination     Pt  prone to UTI  Urine dip not very impressive but will treat symptoms with macrobid 100 mg twice daily for 7 days and send urine for a culture  Call with results only if we need to change her antibiotic  Increase fluids            Relevant Medications    nitrofurantoin (MACROBID) 100 mg capsule    Other Relevant Orders    POCT urine dip (Completed)    Urine culture    Urinary frequency - Primary    Relevant Medications    nitrofurantoin (MACROBID) 100 mg capsule    Other Relevant Orders    POCT urine dip (Completed)    Urine culture

## 2019-11-26 NOTE — PROGRESS NOTES
Assessment and Plan:    Problem List Items Addressed This Visit        Other    Burning with urination     Pt  prone to UTI  Urine dip not very impressive but will treat symptoms with macrobid 100 mg twice daily for 7 days and send urine for a culture  Call with results only if we need to change her antibiotic  Increase fluids  Relevant Medications    nitrofurantoin (MACROBID) 100 mg capsule    Other Relevant Orders    POCT urine dip (Completed)    Urine culture    Urinary frequency - Primary    Relevant Medications    nitrofurantoin (MACROBID) 100 mg capsule    Other Relevant Orders    POCT urine dip (Completed)    Urine culture                 Diagnoses and all orders for this visit:    Urinary frequency  -     POCT urine dip  -     Urine culture  -     nitrofurantoin (MACROBID) 100 mg capsule; Take 1 capsule (100 mg total) by mouth 2 (two) times a day for 7 days    Burning with urination  -     POCT urine dip  -     Urine culture  -     nitrofurantoin (MACROBID) 100 mg capsule; Take 1 capsule (100 mg total) by mouth 2 (two) times a day for 7 days              Subjective:      Patient ID: María Pina is a [de-identified] y o  female  CC:    Chief Complaint   Patient presents with    Urinary Tract Infection     patient c/o urinary burning, pressure, frequency x 4 days  Patient is taking Azo with some relief  ak       HPI:      Patient states that she has been having urinary frequency and burning and pelvic pressure for about 4 days now  No fever or back pain  Patient states that she only drinks 2 cups of coffee a day and then sometimes a glass of milk before she goes to bed otherwise no other non caffeinated beverages  She states she knows she does not drink as much as she should  Her last urinary tract infection was in June of this past year        The following portions of the patient's history were reviewed and updated as appropriate: allergies, current medications, past family history, past medical history, past social history, past surgical history and problem list       Review of Systems   Constitutional: Negative  HENT: Negative  Eyes: Negative  Respiratory: Negative  Cardiovascular: Negative  Gastrointestinal: Negative  Endocrine: Negative  Genitourinary: Positive for dysuria, frequency and pelvic pain  Musculoskeletal: Negative  Skin: Negative  Allergic/Immunologic: Negative  Neurological: Negative  Hematological: Negative  Psychiatric/Behavioral: Negative  Data to review:       Objective:    Vitals:    11/26/19 1456   BP: 136/52   Pulse: 92   Temp: 98 8 °F (37 1 °C)   Weight: 78 kg (172 lb)   Height: 5' 6" (1 676 m)        Physical Exam   Constitutional: She is oriented to person, place, and time  She appears well-developed and well-nourished  No distress  HENT:   Head: Normocephalic and atraumatic  Eyes: Conjunctivae are normal  Right eye exhibits no discharge  Left eye exhibits no discharge  Neck: Neck supple  Carotid bruit is not present  Cardiovascular: Normal rate and regular rhythm  Exam reveals no gallop and no friction rub  Murmur heard  Systolic murmur is present with a grade of 2/6  Pulmonary/Chest: Effort normal and breath sounds normal  No respiratory distress  She has no wheezes  She has no rales  Abdominal: Soft  Normal appearance and bowel sounds are normal  She exhibits no shifting dullness, no distension, no pulsatile liver, no fluid wave, no abdominal bruit, no ascites, no pulsatile midline mass and no mass  There is no tenderness  There is no CVA tenderness  Neurological: She is alert and oriented to person, place, and time  Skin: Skin is warm and dry  She is not diaphoretic  Psychiatric: She has a normal mood and affect  Judgment normal    Nursing note and vitals reviewed

## 2019-11-26 NOTE — ASSESSMENT & PLAN NOTE
Pt  prone to UTI  Urine dip not very impressive but will treat symptoms with macrobid 100 mg twice daily for 7 days and send urine for a culture  Call with results only if we need to change her antibiotic  Increase fluids

## 2019-12-09 DIAGNOSIS — R35.0 URINARY FREQUENCY: Primary | ICD-10-CM

## 2019-12-09 DIAGNOSIS — R30.0 BURNING WITH URINATION: ICD-10-CM

## 2019-12-09 RX ORDER — NITROFURANTOIN 25; 75 MG/1; MG/1
100 CAPSULE ORAL 2 TIMES DAILY
Qty: 14 CAPSULE | Refills: 0 | Status: SHIPPED | OUTPATIENT
Start: 2019-12-09 | End: 2019-12-16

## 2020-02-06 ENCOUNTER — OFFICE VISIT (OUTPATIENT)
Dept: FAMILY MEDICINE CLINIC | Facility: CLINIC | Age: 80
End: 2020-02-06
Payer: COMMERCIAL

## 2020-02-06 VITALS
WEIGHT: 169 LBS | TEMPERATURE: 98.7 F | DIASTOLIC BLOOD PRESSURE: 70 MMHG | SYSTOLIC BLOOD PRESSURE: 158 MMHG | BODY MASS INDEX: 27.16 KG/M2 | HEART RATE: 88 BPM | HEIGHT: 66 IN

## 2020-02-06 DIAGNOSIS — J01.00 ACUTE MAXILLARY SINUSITIS, RECURRENCE NOT SPECIFIED: ICD-10-CM

## 2020-02-06 DIAGNOSIS — H66.003 ACUTE SUPPURATIVE OTITIS MEDIA OF BOTH EARS WITHOUT SPONTANEOUS RUPTURE OF TYMPANIC MEMBRANES, RECURRENCE NOT SPECIFIED: ICD-10-CM

## 2020-02-06 DIAGNOSIS — E03.9 ACQUIRED HYPOTHYROIDISM: ICD-10-CM

## 2020-02-06 DIAGNOSIS — J44.1 CHRONIC OBSTRUCTIVE PULMONARY DISEASE WITH ACUTE EXACERBATION (HCC): ICD-10-CM

## 2020-02-06 DIAGNOSIS — E78.2 MIXED HYPERLIPIDEMIA: ICD-10-CM

## 2020-02-06 DIAGNOSIS — Z00.00 MEDICARE ANNUAL WELLNESS VISIT, SUBSEQUENT: ICD-10-CM

## 2020-02-06 DIAGNOSIS — I10 ESSENTIAL HYPERTENSION: Primary | ICD-10-CM

## 2020-02-06 PROCEDURE — 3077F SYST BP >= 140 MM HG: CPT | Performed by: FAMILY MEDICINE

## 2020-02-06 PROCEDURE — 4040F PNEUMOC VAC/ADMIN/RCVD: CPT | Performed by: FAMILY MEDICINE

## 2020-02-06 PROCEDURE — 3008F BODY MASS INDEX DOCD: CPT | Performed by: FAMILY MEDICINE

## 2020-02-06 PROCEDURE — 1170F FXNL STATUS ASSESSED: CPT | Performed by: FAMILY MEDICINE

## 2020-02-06 PROCEDURE — 3078F DIAST BP <80 MM HG: CPT | Performed by: FAMILY MEDICINE

## 2020-02-06 PROCEDURE — 1036F TOBACCO NON-USER: CPT | Performed by: FAMILY MEDICINE

## 2020-02-06 PROCEDURE — 1125F AMNT PAIN NOTED PAIN PRSNT: CPT | Performed by: FAMILY MEDICINE

## 2020-02-06 PROCEDURE — 1160F RVW MEDS BY RX/DR IN RCRD: CPT | Performed by: FAMILY MEDICINE

## 2020-02-06 PROCEDURE — G0439 PPPS, SUBSEQ VISIT: HCPCS | Performed by: FAMILY MEDICINE

## 2020-02-06 PROCEDURE — 99214 OFFICE O/P EST MOD 30 MIN: CPT | Performed by: FAMILY MEDICINE

## 2020-02-06 RX ORDER — ATORVASTATIN CALCIUM 10 MG/1
10 TABLET, FILM COATED ORAL DAILY
Qty: 90 TABLET | Refills: 1 | Status: SHIPPED | OUTPATIENT
Start: 2020-02-06 | End: 2020-04-02

## 2020-02-06 RX ORDER — HYDROCHLOROTHIAZIDE 25 MG/1
25 TABLET ORAL DAILY
Qty: 90 TABLET | Refills: 1 | Status: SHIPPED | OUTPATIENT
Start: 2020-02-06 | End: 2020-04-02

## 2020-02-06 RX ORDER — ECHINACEA 400 MG
CAPSULE ORAL DAILY
COMMUNITY
End: 2021-07-02 | Stop reason: ALTCHOICE

## 2020-02-06 RX ORDER — MECLIZINE HCL 12.5 MG/1
TABLET ORAL
Qty: 30 TABLET | Refills: 1 | Status: SHIPPED | OUTPATIENT
Start: 2020-02-06 | End: 2020-07-28 | Stop reason: CLARIF

## 2020-02-06 RX ORDER — LEVOTHYROXINE SODIUM 0.05 MG/1
50 TABLET ORAL DAILY
Qty: 90 TABLET | Refills: 1 | Status: SHIPPED | OUTPATIENT
Start: 2020-02-06 | End: 2020-07-28 | Stop reason: SDUPTHER

## 2020-02-06 RX ORDER — AZITHROMYCIN 250 MG/1
TABLET, FILM COATED ORAL
Qty: 6 TABLET | Refills: 0 | Status: SHIPPED | OUTPATIENT
Start: 2020-02-06 | End: 2020-02-10

## 2020-02-06 RX ORDER — LOSARTAN POTASSIUM 100 MG/1
100 TABLET ORAL DAILY
Qty: 90 TABLET | Refills: 1 | Status: SHIPPED | OUTPATIENT
Start: 2020-02-06 | End: 2020-02-09

## 2020-02-06 NOTE — PROGRESS NOTES
Assessment and Plan:     Problem List Items Addressed This Visit        Endocrine    Acquired hypothyroidism     Due for lab         Relevant Medications    levothyroxine 50 mcg tablet    Other Relevant Orders    TSH, 3rd generation       Respiratory    Chronic obstructive pulmonary disease (HCC)     Stable on meds            Cardiovascular and Mediastinum    Essential hypertension - Primary     BP stable         Relevant Medications    losartan (COZAAR) 100 MG tablet    hydrochlorothiazide (HYDRODIURIL) 25 mg tablet    levothyroxine 50 mcg tablet    Other Relevant Orders    TSH, 3rd generation       Other    Other hyperlipidemia     Due for lab         Relevant Medications    atorvastatin (LIPITOR) 10 mg tablet      Other Visit Diagnoses     Medicare annual wellness visit, subsequent        Acute suppurative otitis media of both ears without spontaneous rupture of tympanic membranes, recurrence not specified        Relevant Medications    azithromycin (ZITHROMAX) 250 mg tablet    meclizine (ANTIVERT) 12 5 MG tablet    Acute maxillary sinusitis, recurrence not specified        Relevant Medications    azithromycin (ZITHROMAX) 250 mg tablet           Preventive health issues were discussed with patient, and age appropriate screening tests were ordered as noted in patient's After Visit Summary  Personalized health advice and appropriate referrals for health education or preventive services given if needed, as noted in patient's After Visit Summary       History of Present Illness:     Patient presents for Medicare Annual Wellness visit    Patient Care Team:  Gregor Brooks MD as PCP - General (Family Medicine)  MD Barb Lofton MD     Problem List:     Patient Active Problem List   Diagnosis    Seasonal allergies    Essential hypertension    Other hyperlipidemia    Sciatica    GERD (gastroesophageal reflux disease)    Anxiety    Acquired hypothyroidism    Chronic respiratory failure with hypoxia (HCC)    Chronic obstructive pulmonary disease (HCC)    Multiple pulmonary nodules    Tremor    Vitamin D deficiency disease    Burning with urination    Female stress incontinence    OAB (overactive bladder)    Vaginal atrophy    Dizziness    Urinary frequency      Past Medical and Surgical History:     Past Medical History:   Diagnosis Date    Anxiety     Back pain     Cataract     Geographic tongue     last assessed: 2014    GERD (gastroesophageal reflux disease)     Hyperlipidemia     Hypertension     Hypothyroidism (acquired)     Mild intermittent asthma     Sciatica     Seasonal allergies      Past Surgical History:   Procedure Laterality Date    BACK SURGERY        Family History:     Family History   Problem Relation Age of Onset    Stroke Mother     Cirrhosis Father         alcoholic    Cancer Sister     Cancer Daughter       Social History:     Social History     Socioeconomic History    Marital status: /Civil Union     Spouse name: None    Number of children: 3    Years of education: None    Highest education level: None   Occupational History    Occupation: Retired   Social Needs    Financial resource strain: None    Food insecurity:     Worry: None     Inability: None    Transportation needs:     Medical: None     Non-medical: None   Tobacco Use    Smoking status: Former Smoker     Packs/day: 2 00     Years: 36 00     Pack years: 72 00     Types: Cigarettes     Last attempt to quit:      Years since quittin     Smokeless tobacco: Never Used   Substance and Sexual Activity    Alcohol use: No    Drug use: No    Sexual activity: Never     Birth control/protection: None   Lifestyle    Physical activity:     Days per week: None     Minutes per session: None    Stress: None   Relationships    Social connections:     Talks on phone: None     Gets together: None     Attends Sabianism service: None     Active member of club or organization: None     Attends meetings of clubs or organizations: None     Relationship status: None    Intimate partner violence:     Fear of current or ex partner: None     Emotionally abused: None     Physically abused: None     Forced sexual activity: None   Other Topics Concern    None   Social History Narrative    Denied history of active advance directive     Always uses a seat belt    Daily caffeine consumption: 2-3 servings/day     Lack physical exercise     No living will    Denied history of substances, toxic environmental    Supportive and safe       Medications and Allergies:     Current Outpatient Medications   Medication Sig Dispense Refill    aspirin 81 MG tablet Take 81 mg by mouth daily      atorvastatin (LIPITOR) 10 mg tablet Take 1 tablet (10 mg total) by mouth daily 90 tablet 1    Black Elderberry,Berry-Flower, 575 MG CAPS Take by mouth daily      Cholecalciferol (VITAMIN D3) 2000 units capsule Vitamin D3 2,000 unit capsule   Take by oral route   losartan (COZAAR) 100 MG tablet Take 1 tablet (100 mg total) by mouth daily 90 tablet 1    Omega-3 Fatty Acids (FISH OIL PO) Take 1,200 mg by mouth 2 (two) times a day      pantoprazole (PROTONIX) 40 mg tablet Take 1 tablet (40 mg total) by mouth daily before breakfast 30 tablet 5    umeclidinium-vilanterol (ANORO ELLIPTA) 62 5-25 MCG/INH inhaler Inhale 1 puff daily 3 each 3    azithromycin (ZITHROMAX) 250 mg tablet 2 1st day, 1/d for 4 days 6 tablet 0    hydrochlorothiazide (HYDRODIURIL) 25 mg tablet Take 1 tablet (25 mg total) by mouth daily 90 tablet 1    levothyroxine 50 mcg tablet Take 1 tablet (50 mcg total) by mouth daily 90 tablet 1    meclizine (ANTIVERT) 12 5 MG tablet 1/2-1  Po tid prn for dizziness 30 tablet 1     No current facility-administered medications for this visit        Allergies   Allergen Reactions    Other Other (See Comments)     Steroids, it effects her eyes      Immunizations:     Immunization History   Administered Date(s) Administered    Influenza Split High Dose Preservative Free IM 09/08/2017    Influenza, high dose seasonal 0 5 mL 10/26/2018    Pneumococcal Conjugate 13-Valent 02/18/2016, 06/06/2017    Td (adult), adsorbed 07/13/2007      Health Maintenance: There are no preventive care reminders to display for this patient  There are no preventive care reminders to display for this patient  Medicare Health Risk Assessment:     /70   Pulse 88   Temp 98 7 °F (37 1 °C) (Temporal)   Ht 5' 6" (1 676 m)   Wt 76 7 kg (169 lb)   BMI 27 28 kg/m²      Rohit Combs is here for her Subsequent Wellness visit  Health Risk Assessment:   Patient rates overall health as fair  Patient feels that their physical health rating is same  Eyesight was rated as slightly worse  Hearing was rated as slightly worse  Patient feels that their emotional and mental health rating is same  Pain experienced in the last 7 days has been some  Patient's pain rating has been 3/10  Patient states that she has experienced no weight loss or gain in last 6 months  Fall Risk Screening: In the past year, patient has experienced: no history of falling in past year      Urinary Incontinence Screening:   Patient has leaked urine accidently in the last six months  When gets UTIs    Home Safety:  Patient has trouble with stairs inside or outside of their home  Patient has working smoke alarms and has working carbon monoxide detector  Home safety hazards include: none  Nutrition:   Current diet is Regular  Medications:   Patient is currently taking over-the-counter supplements  OTC medications include: see medication list  Patient is able to manage medications  Activities of Daily Living (ADLs)/Instrumental Activities of Daily Living (IADLs):   Walk and transfer into and out of bed and chair?: Yes  Dress and groom yourself?: Yes    Bathe or shower yourself?: Yes    Feed yourself?  Yes  Do your laundry/housekeeping?: No  Manage your money, pay your bills and track your expenses?: Yes  Make your own meals?: Yes    Do your own shopping?: Yes    Previous Hospitalizations:   Any hospitalizations or ED visits within the last 12 months?: No      Advance Care Planning:   Living will: Yes    Durable POA for healthcare:  Yes    Advanced directive: Yes      Cognitive Screening:   Provider or family/friend/caregiver concerned regarding cognition?: No    PREVENTIVE SCREENINGS      Cardiovascular Screening:    General: History Lipid Disorder and Screening Current      Diabetes Screening:     General: Screening Current    Due for: Blood Glucose      Colorectal Cancer Screening:     General: Screening Not Indicated      Breast Cancer Screening:     General: Screening Current      Cervical Cancer Screening:    General: Screening Not Indicated      Osteoporosis Screening:    General: Screening Current      Abdominal Aortic Aneurysm (AAA) Screening:        General: Screening Not Indicated      Lung Cancer Screening:     General: Screening Current      Hepatitis C Screening:    General: Screening Not Indicated      Aj Esteves MD

## 2020-02-06 NOTE — PROGRESS NOTES
Assessment and Plan:    Problem List Items Addressed This Visit        Cardiovascular and Mediastinum    Essential hypertension       Other    Hyperlipidemia      Other Visit Diagnoses     Need for pneumococcal vaccine    -  Primary                 Diagnoses and all orders for this visit:    Need for pneumococcal vaccine  -     PNEUMOCOCCAL POLYSACCHARIDE VACCINE 23-VALENT =>1YO SQ IM    Essential hypertension  -     losartan (COZAAR) 100 MG tablet; Take 1 tablet (100 mg total) by mouth daily  -     hydrochlorothiazide (HYDRODIURIL) 25 mg tablet; Take 1 tablet (25 mg total) by mouth daily  -     levothyroxine 50 mcg tablet; Take 1 tablet (50 mcg total) by mouth daily    Mixed hyperlipidemia  -     atorvastatin (LIPITOR) 10 mg tablet; Take 1 tablet (10 mg total) by mouth daily    Other orders  -     Black Elderberry,Berry-Flower, 575 MG CAPS; Take by mouth daily              Subjective:      Patient ID: Irma Gooden is a [de-identified] y o  female  CC:    Chief Complaint   Patient presents with    Follow-up     f/u to chronic conditions  c/o lightheaded and sinus pressure for 1 week  mjs       HPI:    Here for f/u BP and cholesterol, feels sinus pressure with some yellow mucus, some dizziness and chills      The following portions of the patient's history were reviewed and updated as appropriate: allergies, current medications, past family history, past medical history, past social history, past surgical history and problem list         Review of Systems   Constitutional: Positive for chills  Negative for fever  HENT: Positive for congestion, sinus pressure, sinus pain and voice change  Respiratory: Negative for shortness of breath  Cardiovascular: Negative for chest pain  Neurological: Positive for dizziness  Negative for headaches           Data to review:       Objective:    Vitals:    02/06/20 0902   BP: 158/70   Pulse: 88   Weight: 76 7 kg (169 lb)   Height: 5' 6" (1 676 m)        Physical Exam Constitutional: She appears well-developed and well-nourished  HENT:   Right Ear: A middle ear effusion is present  Left Ear: Tympanic membrane is erythematous  A middle ear effusion is present  Nose: Mucosal edema present  No rhinorrhea  Right sinus exhibits maxillary sinus tenderness  Left sinus exhibits maxillary sinus tenderness  Mouth/Throat: No oropharyngeal exudate, posterior oropharyngeal edema or posterior oropharyngeal erythema  Neck: No thyromegaly present  Cardiovascular: Normal rate, regular rhythm and normal heart sounds  Pulmonary/Chest: Effort normal and breath sounds normal    Vitals reviewed

## 2020-02-08 DIAGNOSIS — I10 ESSENTIAL HYPERTENSION: ICD-10-CM

## 2020-02-09 RX ORDER — LOSARTAN POTASSIUM 100 MG/1
TABLET ORAL
Qty: 90 TABLET | Refills: 1 | Status: SHIPPED | OUTPATIENT
Start: 2020-02-09 | End: 2020-02-26 | Stop reason: SINTOL

## 2020-02-12 ENCOUNTER — TELEPHONE (OUTPATIENT)
Dept: FAMILY MEDICINE CLINIC | Facility: CLINIC | Age: 80
End: 2020-02-12

## 2020-02-12 DIAGNOSIS — J44.1 CHRONIC OBSTRUCTIVE PULMONARY DISEASE WITH ACUTE EXACERBATION (HCC): ICD-10-CM

## 2020-02-12 DIAGNOSIS — J01.00 ACUTE MAXILLARY SINUSITIS, RECURRENCE NOT SPECIFIED: ICD-10-CM

## 2020-02-12 DIAGNOSIS — H66.003 ACUTE SUPPURATIVE OTITIS MEDIA OF BOTH EARS WITHOUT SPONTANEOUS RUPTURE OF TYMPANIC MEMBRANES, RECURRENCE NOT SPECIFIED: Primary | ICD-10-CM

## 2020-02-12 LAB
ALBUMIN SERPL-MCNC: 4 G/DL (ref 3.6–5.1)
ALBUMIN/GLOB SERPL: 1.7 (CALC) (ref 1–2.5)
ALP SERPL-CCNC: 65 U/L (ref 37–153)
ALT SERPL-CCNC: 17 U/L (ref 6–29)
AST SERPL-CCNC: 19 U/L (ref 10–35)
BILIRUB SERPL-MCNC: 0.6 MG/DL (ref 0.2–1.2)
BUN SERPL-MCNC: 11 MG/DL (ref 7–25)
BUN/CREAT SERPL: ABNORMAL (CALC) (ref 6–22)
CALCIUM SERPL-MCNC: 9.2 MG/DL (ref 8.6–10.4)
CHLORIDE SERPL-SCNC: 92 MMOL/L (ref 98–110)
CHOLEST SERPL-MCNC: 207 MG/DL
CHOLEST/HDLC SERPL: 2.3 (CALC)
CO2 SERPL-SCNC: 34 MMOL/L (ref 20–32)
CREAT SERPL-MCNC: 0.63 MG/DL (ref 0.6–0.88)
GLOBULIN SER CALC-MCNC: 2.4 G/DL (CALC) (ref 1.9–3.7)
GLUCOSE SERPL-MCNC: 104 MG/DL (ref 65–99)
HDLC SERPL-MCNC: 90 MG/DL
LDLC SERPL CALC-MCNC: 104 MG/DL (CALC)
NONHDLC SERPL-MCNC: 117 MG/DL (CALC)
POTASSIUM SERPL-SCNC: 3.6 MMOL/L (ref 3.5–5.3)
PROT SERPL-MCNC: 6.4 G/DL (ref 6.1–8.1)
SL AMB EGFR AFRICAN AMERICAN: 98 ML/MIN/1.73M2
SL AMB EGFR NON AFRICAN AMERICAN: 85 ML/MIN/1.73M2
SODIUM SERPL-SCNC: 133 MMOL/L (ref 135–146)
TRIGL SERPL-MCNC: 52 MG/DL
TSH SERPL-ACNC: 1.31 MIU/L (ref 0.4–4.5)

## 2020-02-12 RX ORDER — LEVOFLOXACIN 500 MG/1
500 TABLET, FILM COATED ORAL EVERY 24 HOURS
Qty: 10 TABLET | Refills: 0 | Status: SHIPPED | OUTPATIENT
Start: 2020-02-12 | End: 2020-02-17 | Stop reason: HOSPADM

## 2020-02-12 NOTE — TELEPHONE ENCOUNTER
PT SAID SHE IS STILL HAVING EAR AND HEAD CONGESTION AND STILL COUGHING A LOT AND WANTS TO KNOW IF MF WILL GIVE HER SOMETHING ELSE    PT CAN BE REACHED -[phone]

## 2020-02-16 ENCOUNTER — APPOINTMENT (EMERGENCY)
Dept: CT IMAGING | Facility: HOSPITAL | Age: 80
DRG: 312 | End: 2020-02-16
Payer: COMMERCIAL

## 2020-02-16 ENCOUNTER — HOSPITAL ENCOUNTER (INPATIENT)
Facility: HOSPITAL | Age: 80
LOS: 1 days | Discharge: HOME | DRG: 312 | End: 2020-02-17
Attending: EMERGENCY MEDICINE | Admitting: HOSPITALIST
Payer: COMMERCIAL

## 2020-02-16 ENCOUNTER — APPOINTMENT (EMERGENCY)
Dept: RADIOLOGY | Facility: HOSPITAL | Age: 80
DRG: 312 | End: 2020-02-16
Payer: COMMERCIAL

## 2020-02-16 DIAGNOSIS — R42 DIZZINESS: ICD-10-CM

## 2020-02-16 DIAGNOSIS — R26.81 UNSTEADY GAIT: Primary | ICD-10-CM

## 2020-02-16 DIAGNOSIS — R26.81 GAIT INSTABILITY: ICD-10-CM

## 2020-02-16 LAB
ALBUMIN SERPL BCP-MCNC: 3.8 G/DL (ref 3.5–5)
ALP SERPL-CCNC: 78 U/L (ref 46–116)
ALT SERPL W P-5'-P-CCNC: 8 U/L (ref 12–78)
ANION GAP SERPL CALCULATED.3IONS-SCNC: 7 MMOL/L (ref 4–13)
APTT PPP: 27 SECONDS (ref 23–37)
AST SERPL W P-5'-P-CCNC: 21 U/L (ref 5–45)
ATRIAL RATE: 83 BPM
BACTERIA UR QL AUTO: ABNORMAL /HPF
BASOPHILS # BLD AUTO: 0.04 THOUSANDS/ΜL (ref 0–0.1)
BASOPHILS NFR BLD AUTO: 1 % (ref 0–1)
BILIRUB SERPL-MCNC: 0.54 MG/DL (ref 0.2–1)
BILIRUB UR QL STRIP: NEGATIVE
BUN SERPL-MCNC: 10 MG/DL (ref 5–25)
CALCIUM SERPL-MCNC: 9.2 MG/DL (ref 8.3–10.1)
CHLORIDE SERPL-SCNC: 89 MMOL/L (ref 100–108)
CLARITY UR: CLEAR
CO2 SERPL-SCNC: 34 MMOL/L (ref 21–32)
COLOR UR: YELLOW
CREAT SERPL-MCNC: 0.72 MG/DL (ref 0.6–1.3)
EOSINOPHIL # BLD AUTO: 0.02 THOUSAND/ΜL (ref 0–0.61)
EOSINOPHIL NFR BLD AUTO: 0 % (ref 0–6)
ERYTHROCYTE [DISTWIDTH] IN BLOOD BY AUTOMATED COUNT: 13 % (ref 11.6–15.1)
GFR SERPL CREATININE-BSD FRML MDRD: 79 ML/MIN/1.73SQ M
GLUCOSE SERPL-MCNC: 113 MG/DL (ref 65–140)
GLUCOSE UR STRIP-MCNC: NEGATIVE MG/DL
HCT VFR BLD AUTO: 41.5 % (ref 34.8–46.1)
HGB BLD-MCNC: 13.5 G/DL (ref 11.5–15.4)
HGB UR QL STRIP.AUTO: ABNORMAL
IMM GRANULOCYTES # BLD AUTO: 0.02 THOUSAND/UL (ref 0–0.2)
IMM GRANULOCYTES NFR BLD AUTO: 0 % (ref 0–2)
INR PPP: 0.95 (ref 0.84–1.19)
KETONES UR STRIP-MCNC: NEGATIVE MG/DL
LEUKOCYTE ESTERASE UR QL STRIP: NEGATIVE
LYMPHOCYTES # BLD AUTO: 1.19 THOUSANDS/ΜL (ref 0.6–4.47)
LYMPHOCYTES NFR BLD AUTO: 15 % (ref 14–44)
MCH RBC QN AUTO: 29.5 PG (ref 26.8–34.3)
MCHC RBC AUTO-ENTMCNC: 32.5 G/DL (ref 31.4–37.4)
MCV RBC AUTO: 91 FL (ref 82–98)
MONOCYTES # BLD AUTO: 0.57 THOUSAND/ΜL (ref 0.17–1.22)
MONOCYTES NFR BLD AUTO: 7 % (ref 4–12)
NEUTROPHILS # BLD AUTO: 5.88 THOUSANDS/ΜL (ref 1.85–7.62)
NEUTS SEG NFR BLD AUTO: 77 % (ref 43–75)
NITRITE UR QL STRIP: NEGATIVE
NON-SQ EPI CELLS URNS QL MICRO: ABNORMAL /HPF
NRBC BLD AUTO-RTO: 0 /100 WBCS
OTHER STN SPEC: ABNORMAL
P AXIS: 77 DEGREES
PH UR STRIP.AUTO: 6.5 [PH]
PLATELET # BLD AUTO: 300 THOUSANDS/UL (ref 149–390)
PMV BLD AUTO: 9.8 FL (ref 8.9–12.7)
POTASSIUM SERPL-SCNC: 3.4 MMOL/L (ref 3.5–5.3)
PR INTERVAL: 170 MS
PROT SERPL-MCNC: 7.4 G/DL (ref 6.4–8.2)
PROT UR STRIP-MCNC: NEGATIVE MG/DL
PROTHROMBIN TIME: 12.8 SECONDS (ref 11.6–14.5)
QRS AXIS: 80 DEGREES
QRSD INTERVAL: 76 MS
QT INTERVAL: 342 MS
QTC INTERVAL: 401 MS
RBC # BLD AUTO: 4.58 MILLION/UL (ref 3.81–5.12)
RBC #/AREA URNS AUTO: ABNORMAL /HPF
SODIUM SERPL-SCNC: 130 MMOL/L (ref 136–145)
SP GR UR STRIP.AUTO: 1.01 (ref 1–1.03)
T WAVE AXIS: 74 DEGREES
UROBILINOGEN UR QL STRIP.AUTO: 0.2 E.U./DL
VENTRICULAR RATE: 83 BPM
WBC # BLD AUTO: 7.72 THOUSAND/UL (ref 4.31–10.16)
WBC #/AREA URNS AUTO: ABNORMAL /HPF

## 2020-02-16 PROCEDURE — 70498 CT ANGIOGRAPHY NECK: CPT

## 2020-02-16 PROCEDURE — 96360 HYDRATION IV INFUSION INIT: CPT

## 2020-02-16 PROCEDURE — 96361 HYDRATE IV INFUSION ADD-ON: CPT

## 2020-02-16 PROCEDURE — 85025 COMPLETE CBC W/AUTO DIFF WBC: CPT | Performed by: EMERGENCY MEDICINE

## 2020-02-16 PROCEDURE — 99284 EMERGENCY DEPT VISIT MOD MDM: CPT | Performed by: EMERGENCY MEDICINE

## 2020-02-16 PROCEDURE — 93010 ELECTROCARDIOGRAM REPORT: CPT | Performed by: INTERNAL MEDICINE

## 2020-02-16 PROCEDURE — 93005 ELECTROCARDIOGRAM TRACING: CPT

## 2020-02-16 PROCEDURE — 70496 CT ANGIOGRAPHY HEAD: CPT

## 2020-02-16 PROCEDURE — 85730 THROMBOPLASTIN TIME PARTIAL: CPT | Performed by: EMERGENCY MEDICINE

## 2020-02-16 PROCEDURE — 81001 URINALYSIS AUTO W/SCOPE: CPT | Performed by: EMERGENCY MEDICINE

## 2020-02-16 PROCEDURE — 85610 PROTHROMBIN TIME: CPT | Performed by: EMERGENCY MEDICINE

## 2020-02-16 PROCEDURE — 99285 EMERGENCY DEPT VISIT HI MDM: CPT

## 2020-02-16 PROCEDURE — 99223 1ST HOSP IP/OBS HIGH 75: CPT | Performed by: HOSPITALIST

## 2020-02-16 PROCEDURE — 80053 COMPREHEN METABOLIC PANEL: CPT | Performed by: EMERGENCY MEDICINE

## 2020-02-16 PROCEDURE — 71046 X-RAY EXAM CHEST 2 VIEWS: CPT

## 2020-02-16 PROCEDURE — 36415 COLL VENOUS BLD VENIPUNCTURE: CPT | Performed by: EMERGENCY MEDICINE

## 2020-02-16 RX ORDER — ATORVASTATIN CALCIUM 40 MG/1
40 TABLET, FILM COATED ORAL EVERY EVENING
Status: DISCONTINUED | OUTPATIENT
Start: 2020-02-16 | End: 2020-02-17

## 2020-02-16 RX ORDER — ONDANSETRON 2 MG/ML
4 INJECTION INTRAMUSCULAR; INTRAVENOUS EVERY 6 HOURS PRN
Status: DISCONTINUED | OUTPATIENT
Start: 2020-02-16 | End: 2020-02-17 | Stop reason: HOSPADM

## 2020-02-16 RX ORDER — MECLIZINE HCL 12.5 MG/1
25 TABLET ORAL ONCE
Status: COMPLETED | OUTPATIENT
Start: 2020-02-16 | End: 2020-02-16

## 2020-02-16 RX ORDER — HYDRALAZINE HYDROCHLORIDE 20 MG/ML
10 INJECTION INTRAMUSCULAR; INTRAVENOUS EVERY 4 HOURS PRN
Status: DISCONTINUED | OUTPATIENT
Start: 2020-02-16 | End: 2020-02-17 | Stop reason: HOSPADM

## 2020-02-16 RX ORDER — LEVOFLOXACIN 750 MG/1
750 TABLET ORAL EVERY 24 HOURS
Status: DISCONTINUED | OUTPATIENT
Start: 2020-02-17 | End: 2020-02-17

## 2020-02-16 RX ORDER — ASPIRIN 325 MG
325 TABLET ORAL ONCE
Status: COMPLETED | OUTPATIENT
Start: 2020-02-16 | End: 2020-02-16

## 2020-02-16 RX ORDER — POTASSIUM CHLORIDE 20 MEQ/1
40 TABLET, EXTENDED RELEASE ORAL ONCE
Status: COMPLETED | OUTPATIENT
Start: 2020-02-16 | End: 2020-02-16

## 2020-02-16 RX ORDER — LEVOTHYROXINE SODIUM 0.05 MG/1
50 TABLET ORAL
Status: DISCONTINUED | OUTPATIENT
Start: 2020-02-16 | End: 2020-02-17 | Stop reason: HOSPADM

## 2020-02-16 RX ORDER — ASPIRIN 81 MG/1
81 TABLET, CHEWABLE ORAL DAILY
Status: DISCONTINUED | OUTPATIENT
Start: 2020-02-17 | End: 2020-02-17 | Stop reason: HOSPADM

## 2020-02-16 RX ORDER — HEPARIN SODIUM 5000 [USP'U]/ML
5000 INJECTION, SOLUTION INTRAVENOUS; SUBCUTANEOUS EVERY 8 HOURS SCHEDULED
Status: DISCONTINUED | OUTPATIENT
Start: 2020-02-16 | End: 2020-02-17 | Stop reason: HOSPADM

## 2020-02-16 RX ORDER — LORAZEPAM 2 MG/ML
0.5 INJECTION INTRAMUSCULAR ONCE AS NEEDED
Status: COMPLETED | OUTPATIENT
Start: 2020-02-16 | End: 2020-02-17

## 2020-02-16 RX ORDER — PANTOPRAZOLE SODIUM 40 MG/1
40 TABLET, DELAYED RELEASE ORAL
Status: DISCONTINUED | OUTPATIENT
Start: 2020-02-17 | End: 2020-02-17 | Stop reason: HOSPADM

## 2020-02-16 RX ADMIN — ASPIRIN 325 MG ORAL TABLET 325 MG: 325 PILL ORAL at 16:02

## 2020-02-16 RX ADMIN — SODIUM CHLORIDE 1000 ML: 0.9 INJECTION, SOLUTION INTRAVENOUS at 10:52

## 2020-02-16 RX ADMIN — IOHEXOL 85 ML: 350 INJECTION, SOLUTION INTRAVENOUS at 11:39

## 2020-02-16 RX ADMIN — POTASSIUM CHLORIDE 40 MEQ: 1500 TABLET, EXTENDED RELEASE ORAL at 13:36

## 2020-02-16 RX ADMIN — HEPARIN SODIUM 5000 UNITS: 5000 INJECTION INTRAVENOUS; SUBCUTANEOUS at 21:47

## 2020-02-16 RX ADMIN — MECLIZINE 25 MG: 12.5 TABLET ORAL at 13:36

## 2020-02-16 RX ADMIN — HEPARIN SODIUM 5000 UNITS: 5000 INJECTION INTRAVENOUS; SUBCUTANEOUS at 16:02

## 2020-02-16 NOTE — ASSESSMENT & PLAN NOTE
Poorly described  Etiology is unclear but concern for possible cerebellar infarction  ekg nsr w/o ectopy    Given recent URI consider labyrinthitis, however pt does not described vertiginous s/sx explicitly  cta head/neck negative for large vessel disease or cva  Admit to stroke pathway check mri brain w/1x dose of ativan prior to procedure given claustrophobia  Check tte, flp and hgb a1c, consult neuro, pt/ot/cm  Give asa 325mg x 1 and 81mg thereafter, high dose statin and permissive htn x48h  Check orthostatics and monitor on tele for arrhythmias for completion  Continue meclizine 25mg q 8 h prn dizziness

## 2020-02-16 NOTE — PROGRESS NOTES
Patient received on unit  Mary Gonsalez PA-C in room assessing patient  Decision made to transfer patient to [place] [id number]  Report given to [employer] on [facility]

## 2020-02-16 NOTE — H&P
H&P- Gideon Rowley 1940, [de-identified] y o  female MRN: [id number]    Unit/Bed#: E4 -01 Encounter: [id number]    Primary Care Provider: Satinder Allison MD   Date and time admitted to hospital: 2/16/2020 10:00 AM        * Dizziness and giddiness  Assessment & Plan  Poorly described  Etiology is unclear but concern for possible cerebellar infarction  ekg nsr w/o ectopy  Given recent URI consider labyrinthitis, however pt does not described vertiginous s/sx explicitly  cta head/neck negative for large vessel disease or cva  Admit to stroke pathway check mri brain w/1x dose of ativan prior to procedure given claustrophobia  Check tte, flp and hgb a1c, consult neuro, pt/ot/cm  Give asa 325mg x 1 and 81mg thereafter, high dose statin and permissive htn x48h  Check orthostatics and monitor on tele for arrhythmias for completion  Continue meclizine 25mg q 8 h prn dizziness      Multiple pulmonary nodules  Assessment & Plan  6mm RUL nodule noted which has been followed previously w/[facility] with no further follow up recommended given stability previously      Chronic obstructive pulmonary disease ([address] )  Assessment & Plan  No acute exacerbation   Pt brought in anoro ellipta from home  contineu anoro ellipta    Acquired hypothyroidism  Assessment & Plan  Recent tsh reviewed and wnl  Continue levothyroxine    Anxiety  Assessment & Plan  Ativan 0 5mg iv x 1 prior to mri    Other hyperlipidemia  Assessment & Plan  F/u flp  Start high dose statin    Essential hypertension  Assessment & Plan  Hold bp meds for permissive htn x48h  Hydralazine 10mg prn sbp >200mmHg        VTE Prophylaxis: Heparin  / sequential compression device   Code Status: fc  POLST:   Discussion with family: dispo workup w/daughter at bedside    Anticipated Length of Stay:  Patient will be admitted on an Inpatient basis with an anticipated length of stay of  Greater than 2 midnights     Justification for Hospital Stay: r/o cva    Total Time for Visit, including Counseling / Coordination of Care: 45 minutes  Greater than 50% of this total time spent on direct patient counseling and coordination of care  Chief Complaint:   Dizziness x 3 weeks    History of Present Illness:    Anais Huerta is a [de-identified] y o  female who presents with pmh of htn/hld/hypothyroid, former smoker status and copd, anxiety coming into hospital for dizziness x 3 weeks  Pt is a somewhat poor historian  HPI is constructed by d/w pt and pt's daughter at bedside  Pt notes sore throat over last 2-3 weeks  She was treated with what is described as with a z pack for this but has been increasing episodes of lightheadedness/dizziness  She has difficulty describing this but note some lightheadedness w/standing as well as some problems w/ambulation  She has significant anxiety at baseline but her daughter notes she has not been walking around as much at home due to these s/sx  She denies any room spinning sensation although per ED provider there was at least one episode of this described  She denies and loc, blurry vision loss of vision, numbness/weakness/headache/neck pain/falls  She has no cp/sob/palpitations  She has no tinnitus or ear fullness or loss of hearing  She was seen by her pcp 3 days ago and started on meclizine and levaquin  Meclizine has only mildly helped her s/sx  She came into ed to be evaluated and her blood pressure was quite elevated  She notes her bp is usually modestly elevated but never in 180s+  She has not been taking any OTC cough/cold medications  She underwent evaluation by cta head/neck and cxr/ekg which were unremarkable  We are asked to admit pt to r/o cva  Review of Systems:    Review of Systems   Constitutional: Negative for appetite change, chills and fever  HENT: Positive for sore throat  Respiratory: Negative for shortness of breath  Cardiovascular: Negative for chest pain     Gastrointestinal: Negative for abdominal pain, diarrhea, nausea and vomiting  Neurological: Positive for dizziness and light-headedness  Negative for syncope, weakness, numbness and headaches  All other systems reviewed and are negative  Past Medical and Surgical History:     Past Medical History:   Diagnosis Date    Anxiety     Back pain     Cancer ([facility] [address] )     skin    Cataract     Geographic tongue     last assessed: 07/25/2014    GERD (gastroesophageal reflux disease)     Hyperlipidemia     Hypertension     Hypothyroidism (acquired)     Mild intermittent asthma     Sciatica     Seasonal allergies        Past Surgical History:   Procedure Laterality Date    BACK SURGERY         Meds/Allergies:    Prior to Admission medications    Medication Sig Start Date End Date Taking?  Authorizing Provider   aspirin 81 MG tablet Take 81 mg by mouth daily   Yes Historical Provider, MD   atorvastatin (LIPITOR) 10 mg tablet Take 1 tablet (10 mg total) by mouth daily 2/6/20  Yes Larry Zepeda MD   Black Elderberry,Berry-Flower, [place] Take by mouth daily   Yes Historical Provider, MD   Cholecalciferol (VITAMIN D3) 2000 units capsule Take 4,000 Units by mouth daily    Yes Historical Provider, MD   hydrochlorothiazide (HYDRODIURIL) 25 mg tablet Take 1 tablet (25 mg total) by mouth daily 2/6/20 5/6/20 Yes Larry Zepeda MD   levofloxacin (LEVAQUIN) 500 mg tablet Take 1 tablet (500 mg total) by mouth every 24 hours for 10 days 2/12/20 2/22/20 Yes Larry Zepeda MD   levothyroxine 50 mcg tablet Take 1 tablet (50 mcg total) by mouth daily 2/6/20 5/6/20 Yes Larry Zepeda MD   losartan (COZAAR) 100 MG tablet TAKE 1 TABLET BY MOUTH DAILY 2/9/20  Yes Larry Zepeda MD   meclizine (ANTIVERT) 12 5 MG tablet 1/2-1  Po tid prn for dizziness 2/6/20  Yes Larry Zepeda MD   Omega-3 Fatty Acids (FISH OIL PO) Take 1,200 mg by mouth 2 (two) times a day   Yes Historical Provider, MD   pantoprazole (PROTONIX) 40 mg tablet Take 1 tablet (40 mg total) by mouth daily before breakfast 9/30/19  Yes Lashonda Murphy Sharyle Darner, MD   umeclidinium-vilanterol [facility] ELLIPTA) 62 5-25 MCG/INH inhaler Inhale 1 puff daily 19  Yes Lester Garcia MD     I have reviewed home medications with patient personally  Allergies: Allergies   Allergen Reactions    Other Other (See Comments)     Steroids, it effects her eyes       Social History:     Marital Status: /Civil Union   Occupation:   Patient Pre-hospital Living Situation:   Patient Pre-hospital Level of Mobility:   Patient Pre-hospital Diet Restrictions:   Substance Use History:   Social History     Substance and Sexual Activity   Alcohol Use No     Social History     Tobacco Use   Smoking Status Former Smoker    Packs/day: 2 00    Years: 36 00    Pack years: 72 00    Types: Cigarettes    Last attempt to quit: Charla Monteiro Years since quittin 1   Smokeless Tobacco Never Used     Social History     Substance and Sexual Activity   Drug Use No       Family History:    Family History   Problem Relation Age of Onset    Stroke Mother     Cirrhosis Father         alcoholic    Cancer Sister     Cancer Daughter        Physical Exam:     Vitals:   Blood Pressure: (!) 180/84 (20 1357)  Pulse: 84 (20 1357)  Temperature: 97 7 °F (36 5 °C) (20 1357)  Temp Source: Oral (20 1007)  Respirations: 18 (20 1357)  SpO2: 97 % (20 1357)    Physical Exam   Constitutional: She is oriented to person, place, and time  She appears well-developed  No distress  HENT:   Head: Normocephalic and atraumatic  Right Ear: External ear normal    Left Ear: External ear normal    Eyes: Conjunctivae are normal    Neck: Normal range of motion  Cardiovascular: Normal rate, regular rhythm and normal heart sounds  Exam reveals no gallop and no friction rub  No murmur heard  Pulmonary/Chest: Effort normal and breath sounds normal  No respiratory distress  She has no wheezes  She has no rales  Abdominal: Soft  She exhibits no distension and no mass   There is no tenderness  There is no guarding  Musculoskeletal: She exhibits no edema  Neurological: She is alert and oriented to person, place, and time  She displays normal reflexes  No cranial nerve deficit or sensory deficit  She exhibits normal muscle tone  No finger to nose abnormality  No abnormality with heel to shin testing  Skin: Skin is warm and dry  She is not diaphoretic  Vitals reviewed  (  Be Sure to Include Physical Exam: Delete this entire line when you have entered your exam)    Additional Data:     Lab Results: I have personally reviewed pertinent reports  Results from last 7 days   Lab Units 02/16/20  1010   WBC Thousand/uL 7 72   HEMOGLOBIN g/dL 13 5   HEMATOCRIT % 41 5   PLATELETS Thousands/uL 300   NEUTROS PCT % 77*   LYMPHS PCT % 15   MONOS PCT % 7   EOS PCT % 0     Results from last 7 days   Lab Units 02/16/20  1010   SODIUM mmol/L 130*   POTASSIUM mmol/L 3 4*   CHLORIDE mmol/L 89*   CO2 mmol/L 34*   BUN mg/dL 10   CREATININE mg/dL 0 72   ANION GAP mmol/L 7   CALCIUM mg/dL 9 2   ALBUMIN g/dL 3 8   TOTAL BILIRUBIN mg/dL 0 54   ALK PHOS U/L 78   ALT U/L 8*   AST U/L 21   GLUCOSE RANDOM mg/dL 113     Results from last 7 days   Lab Units 02/16/20  1010   INR  0 95                   Imaging: I have personally reviewed pertinent reports  CTA head and neck with and without contrast   ED Interpretation by Brandi Kaiser DO (02/16 1246)   See below      Final Result by Freddy Handy MD (02/16 1214)      No acute intracranial disease  Diffuse generalized volume loss relatively advanced chronic small vessel disease  No large vessel flow restrictive disease within the head or neck  Stable right apical 6 mm pulmonary nodule                    Workstation performed: [id number]         XR chest 2 views   ED Interpretation by Brandi Kaiser DO (02/16 2250)   No acute findings      MRI Inpatient Order    (Results Pending)       EKG, Pathology, and Other Studies Reviewed on Admission:   · EKG: nsr  No ectopy    Allscripts / Epic Records Reviewed: Yes     ** Please Note: This note has been constructed using a voice recognition system   **

## 2020-02-16 NOTE — ASSESSMENT & PLAN NOTE
6mm RUL nodule noted which has been followed previously w/[facility] with no further follow up recommended given stability previously

## 2020-02-16 NOTE — PLAN OF CARE
Problem: Potential for Falls  Goal: Patient will remain free of falls  Description  INTERVENTIONS:  - Assess patient frequently for physical needs  -  Identify cognitive and physical deficits and behaviors that affect risk of falls  -  [place] fall precautions as indicated by assessment   - Educate patient/family on patient safety including physical limitations  - Instruct patient to call for assistance with activity based on assessment  - Modify environment to reduce risk of injury  - Consider OT/PT consult to assist with strengthening/mobility  Outcome: Progressing     Problem: Neurological Deficit  Goal: Neurological status is stable or improving  Description  Interventions:  - Monitor and assess patient's level of consciousness, motor function, sensory function, and level of assistance needed for ADLs  - Monitor and report changes from baseline  Collaborate with interdisciplinary team to initiate plan and implement interventions as ordered  - Provide and maintain a safe environment  - Consider seizure precautions  - Consider fall precautions  - Consider aspiration precautions  - Consider bleeding precautions  Outcome: Progressing     Problem: Activity Intolerance/Impaired Mobility  Goal: Mobility/activity is maintained at optimum level for patient  Description  Interventions:  - Assess and monitor patient  barriers to mobility and need for assistive/adaptive devices  - Assess patient's emotional response to limitations  - Collaborate with interdisciplinary team and initiate plans and interventions as ordered  - Encourage independent activity per ability   - Maintain proper body alignment  - Perform active/passive rom as tolerated/ordered    - Plan activities to conserve energy   - Turn patient as appropriate  Outcome: Progressing     Problem: Communication Impairment  Goal: Ability to express needs and understand communication  Description  Assess patient's communication skills and ability to understand information  Patient will demonstrate use of effective communication techniques, alternative methods of communication and understanding even if not able to speak  - Encourage communication and provide alternate methods of communication as needed  - Collaborate with case management/ for discharge needs  - Include patient/family/caregiver in decisions related to communication    Outcome: Progressing

## 2020-02-16 NOTE — ED PROVIDER NOTES
History  Chief Complaint   Patient presents with    Dizziness     pt currently on levaquin for sore throat for a few days  now over the last few days pt feels dizzy/lightheaded  denies syncope just feeling like she might pass out  denies pain, blurry vision, nausea, vomiting, or fevers  denies sick contacts  pt chronically on 3L oxygen for COPD  79y F here for dizziness  Has been having episodic periods of dizziness for weeks, but has been relatively constant  Pt states she feels off-balance when walks and feels like she is going to fall down  occas gets the sensation of movement, but not always  Denies falls or injuries  Wears hearing aids and denies any changes in hearing or tinnitus  Denies any unilateral numbness or weakness, denies any changes in vision or speech  Of note about 2wks ago complained to pcm about a sore throat w/ radiation into the right ear and was placed on abx  Felt better for a few days after abx finished, but 'sore throat' came back again, so placed on a different abx (levaquin)  Pt feels the persistent dizziness started when the levaquin started  Per record review, seen 2/6 for sinus pressure and dizziness and placed on azithromycin and meclizine for sinusitis and AOM  Called 2/12 stating no improvement and had levaquin called in  Has never had a stroke or a stroke w/u in the past       History provided by:  Patient   used: No    Dizziness   Quality:  Imbalance  Severity:  Moderate  Onset quality:  Gradual  Timing:  Intermittent  Progression:  Waxing and waning  Chronicity:  Recurrent      Prior to Admission Medications   Prescriptions Last Dose Informant Patient Reported? Taking?    Black Elderberry,Berry-Flower, 575 MG CAPS  Self Yes Yes   Sig: Take by mouth daily   Cholecalciferol (VITAMIN D3) 2000 units capsule  Self Yes Yes   Sig: Take 4,000 Units by mouth daily    Omega-3 Fatty Acids (FISH OIL PO)  Self Yes Yes   Sig: Take 1,200 mg by mouth 2 (two) times a day   aspirin 81 MG tablet  Self Yes Yes   Sig: Take 81 mg by mouth daily   atorvastatin (LIPITOR) 10 mg tablet   No Yes   Sig: Take 1 tablet (10 mg total) by mouth daily   hydrochlorothiazide (HYDRODIURIL) 25 mg tablet   No Yes   Sig: Take 1 tablet (25 mg total) by mouth daily   levofloxacin (LEVAQUIN) 500 mg tablet   No Yes   Sig: Take 1 tablet (500 mg total) by mouth every 24 hours for 10 days   levothyroxine 50 mcg tablet   No Yes   Sig: Take 1 tablet (50 mcg total) by mouth daily   losartan (COZAAR) 100 MG tablet   No Yes   Sig: TAKE 1 TABLET BY MOUTH DAILY   meclizine (ANTIVERT) 12 5 MG tablet   No Yes   Si/2-1  Po tid prn for dizziness   pantoprazole (PROTONIX) 40 mg tablet  Self No Yes   Sig: Take 1 tablet (40 mg total) by mouth daily before breakfast   umeclidinium-vilanterol (ANORO ELLIPTA) 62 5-25 MCG/INH inhaler  Self No Yes   Sig: Inhale 1 puff daily      Facility-Administered Medications: None       Past Medical History:   Diagnosis Date    Anxiety     Back pain     Cancer (HCC)     skin    Cataract     Geographic tongue     last assessed: 2014    GERD (gastroesophageal reflux disease)     Hyperlipidemia     Hypertension     Hypothyroidism (acquired)     Mild intermittent asthma     Sciatica     Seasonal allergies        Past Surgical History:   Procedure Laterality Date    BACK SURGERY         Family History   Problem Relation Age of Onset    Stroke Mother     Cirrhosis Father         alcoholic    Cancer Sister     Cancer Daughter      I have reviewed and agree with the history as documented  Social History     Tobacco Use    Smoking status: Former Smoker     Packs/day: 2 00     Years: 36 00     Pack years: 72 00     Types: Cigarettes     Last attempt to quit:      Years since quittin 1    Smokeless tobacco: Never Used   Substance Use Topics    Alcohol use: No    Drug use: No       Review of Systems   Neurological: Positive for dizziness  Physical Exam  Physical Exam    Vital Signs  ED Triage Vitals   Temperature Pulse Respirations Blood Pressure SpO2   02/16/20 1007 02/16/20 1007 02/16/20 1007 02/16/20 1007 02/16/20 1007   97 8 °F (36 6 °C) 96 18 (!) 188/86 92 %      Temp Source Heart Rate Source Patient Position - Orthostatic VS BP Location FiO2 (%)   02/16/20 1007 02/16/20 1329 02/16/20 1328 02/16/20 1329 --   Oral Monitor Lying - Orthostatic VS Right arm       Pain Score       02/16/20 1007       No Pain           Vitals:    02/16/20 1137 02/16/20 1328 02/16/20 1329 02/16/20 1330   BP: (!) 173/82 (!) 204/95 (!) 192/93 166/82   Pulse: 86 91 85 92   Patient Position - Orthostatic VS:  Lying - Orthostatic VS Sitting - Orthostatic VS Standing - Orthostatic VS         Visual Acuity      ED Medications  Medications   sodium chloride 0 9 % bolus 1,000 mL (0 mL Intravenous Stopped 2/16/20 1317)   iohexol (OMNIPAQUE) 350 MG/ML injection (MULTI-DOSE) 85 mL (85 mL Intravenous Given 2/16/20 1139)   potassium chloride (K-DUR,KLOR-CON) CR tablet 40 mEq (40 mEq Oral Given 2/16/20 1336)   meclizine (ANTIVERT) tablet 25 mg (25 mg Oral Given 2/16/20 1336)       Diagnostic Studies  Results Reviewed     Procedure Component Value Units Date/Time    Urine Microscopic [[id number]]  (Abnormal) Collected:  02/16/20 1124    Lab Status:  Final result Specimen:  Urine, Clean Catch Updated:  02/16/20 1140     RBC, UA 2-4 /hpf      WBC, UA 0-1 /hpf      Epithelial Cells Occasional /hpf      Bacteria, UA None Seen /hpf      OTHER OBSERVATIONS Renal Epithelial Cells Present    UA (URINE) with reflex to Scope [[id number]]  (Abnormal) Collected:  02/16/20 1124    Lab Status:  Final result Specimen:  Urine, Clean Catch Updated:  02/16/20 1133     Color, UA Yellow     Clarity, UA Clear     Specific Gravity, UA 1 015     pH, UA 6 5     Leukocytes, UA Negative     Nitrite, UA Negative     Protein, UA Negative mg/dl      Glucose, UA Negative mg/dl      Ketones, UA Negative mg/dl Urobilinogen, UA 0 2 E U /dl      Bilirubin, UA Negative     Blood, UA Small    Comprehensive metabolic panel [[id number]]  (Abnormal) Collected:  02/16/20 1010    Lab Status:  Final result Specimen:  Blood from Arm, Left Updated:  02/16/20 1112     Sodium 130 mmol/L      Potassium 3 4 mmol/L      Chloride 89 mmol/L      CO2 34 mmol/L      ANION GAP 7 mmol/L      BUN 10 mg/dL      Creatinine 0 72 mg/dL      Glucose 113 mg/dL      Calcium 9 2 mg/dL      AST 21 U/L      ALT 8 U/L      Alkaline Phosphatase 78 U/L      Total Protein 7 4 g/dL      Albumin 3 8 g/dL      Total Bilirubin 0 54 mg/dL      eGFR 79 ml/min/1 73sq m     Narrative:       [place] guidelines for Chronic Kidney Disease (CKD):     Stage 1 with normal or high GFR (GFR > 90 mL/min/1 73 square meters)    Stage 2 Mild CKD (GFR = 60-89 mL/min/1 73 square meters)    Stage 3A Moderate CKD (GFR = 45-59 mL/min/1 73 square meters)    Stage 3B Moderate CKD (GFR = 30-44 mL/min/1 73 square meters)    Stage 4 Severe CKD (GFR = 15-29 mL/min/1 73 square meters)    Stage 5 End Stage CKD (GFR <15 mL/min/1 73 square meters)  Note: GFR calculation is accurate only with a steady state creatinine    Protime-INR [[id number]]  (Normal) Collected:  02/16/20 1010    Lab Status:  Final result Specimen:  Blood from Arm, Left Updated:  02/16/20 1107     Protime 12 8 seconds      INR 0 95    APTT [[id number]]  (Normal) Collected:  02/16/20 1010    Lab Status:  Final result Specimen:  Blood from Arm, Left Updated:  02/16/20 1107     PTT 27 seconds     CBC and differential [[id number]]  (Abnormal) Collected:  02/16/20 1010    Lab Status:  Final result Specimen:  Blood from Arm, Left Updated:  02/16/20 1057     WBC 7 72 Thousand/uL      RBC 4 58 Million/uL      Hemoglobin 13 5 g/dL      Hematocrit 41 5 %      MCV 91 fL      MCH 29 5 pg      MCHC 32 5 g/dL      RDW 13 0 %      MPV 9 8 fL      Platelets 794 Thousands/uL      nRBC 0 /100 WBCs Neutrophils Relative 77 %      Immat GRANS % 0 %      Lymphocytes Relative 15 %      Monocytes Relative 7 %      Eosinophils Relative 0 %      Basophils Relative 1 %      Neutrophils Absolute 5 88 Thousands/µL      Immature Grans Absolute 0 02 Thousand/uL      Lymphocytes Absolute 1 19 Thousands/µL      Monocytes Absolute 0 57 Thousand/µL      Eosinophils Absolute 0 02 Thousand/µL      Basophils Absolute 0 04 Thousands/µL                  CTA head and neck with and without contrast   ED Interpretation by Winfield Canavan, DO (02/16 1428)   See below      Final Result by Anjelica Weber MD (02/16 1214)      No acute intracranial disease  Diffuse generalized volume loss relatively advanced chronic small vessel disease  No large vessel flow restrictive disease within the head or neck  Stable right apical 6 mm pulmonary nodule                    Workstation performed: [id number]         XR chest 2 views   ED Interpretation by Winfield Canavan, DO (02/16 8700)   No acute findings                 Procedures  ECG 12 Lead Documentation Only  Date/Time: 2/16/2020 10:53 AM  Performed by: Winfield Canavan, DO  Authorized by: Winfield Canavan, DO     ECG reviewed by me, the ED Provider: yes    Patient location:  ED  Previous ECG:     Previous ECG:  Compared to current    Similarity:  Changes noted  Interpretation:     Interpretation: normal    Rate:     ECG rate:  83    ECG rate assessment: normal    Rhythm:     Rhythm: sinus rhythm    Ectopy:     Ectopy: none    QRS:     QRS axis:  Normal  ST segments:     ST segments:  Normal  T waves:     T waves: normal               ED Course  ED Course as of Feb 16 1340   Sun Feb 16, 2020   1139 Leukocytes, UA: Negative   1139 Nitrite, UA: Negative   1139 Giving IVF   Sodium(!): 130   1139 Potassium(!): 3 4                               MDM  Number of Diagnoses or Management Options  Dizziness: new and requires workup  Unsteady gait: new and requires workup  Diagnosis management comments: Episodic dizziness and unsteady on feet for weeks, but now persistent x3 days  Afraid to get up b/c she is afraid to fall  Denies any numbness or weakness  Unsteady on exam w/ general gait and heel to toe walk, otherwise non-focal exam        Amount and/or Complexity of Data Reviewed  Clinical lab tests: reviewed and ordered  Tests in the radiology section of CPT®: reviewed and ordered  Tests in the medicine section of CPT®: reviewed and ordered  Decide to obtain previous medical records or to obtain history from someone other than the patient: yes  Obtain history from someone other than the patient: yes  Independent visualization of images, tracings, or specimens: yes          Disposition  Final diagnoses:   Unsteady gait   Dizziness     Time reflects when diagnosis was documented in both MDM as applicable and the Disposition within this note     Time User Action Codes Description Comment    2/16/2020  1:28 PM Saranya Stauffer Add [R26 81] Unsteady gait     2/16/2020  1:28 PM Saranya Stauffer Add [R42] Dizziness     2/16/2020  1:32 PM Anh Epperson Add [R26 81] Gait instability       ED Disposition     ED Disposition Condition Date/Time Comment    Admit Stable Sun Feb 16, 2020  1:27 PM Case was discussed with [employer] DAIANA and the patient's admission status was agreed to be Admission Status: inpatient status to the service of Dr William Amador   Follow-up Information    None         Patient's Medications   Discharge Prescriptions    No medications on file     No discharge procedures on file      PDMP Review     None          ED Provider  Electronically Signed by           Maliha Gan DO  02/16/20 6349

## 2020-02-17 ENCOUNTER — APPOINTMENT (INPATIENT)
Dept: MRI IMAGING | Facility: HOSPITAL | Age: 80
DRG: 312 | End: 2020-02-17
Payer: COMMERCIAL

## 2020-02-17 ENCOUNTER — APPOINTMENT (INPATIENT)
Dept: NON INVASIVE DIAGNOSTICS | Facility: HOSPITAL | Age: 80
DRG: 312 | End: 2020-02-17
Payer: COMMERCIAL

## 2020-02-17 VITALS
TEMPERATURE: 98.6 F | SYSTOLIC BLOOD PRESSURE: 128 MMHG | OXYGEN SATURATION: 99 % | WEIGHT: 168.65 LBS | HEART RATE: 89 BPM | BODY MASS INDEX: 26.47 KG/M2 | RESPIRATION RATE: 18 BRPM | HEIGHT: 67 IN | DIASTOLIC BLOOD PRESSURE: 78 MMHG

## 2020-02-17 LAB
ANION GAP SERPL CALCULATED.3IONS-SCNC: 8 MMOL/L (ref 4–13)
BUN SERPL-MCNC: 11 MG/DL (ref 5–25)
CALCIUM SERPL-MCNC: 8.6 MG/DL (ref 8.3–10.1)
CHLORIDE SERPL-SCNC: 91 MMOL/L (ref 100–108)
CHOLEST SERPL-MCNC: 194 MG/DL (ref 50–200)
CO2 SERPL-SCNC: 30 MMOL/L (ref 21–32)
CREAT SERPL-MCNC: 0.61 MG/DL (ref 0.6–1.3)
EST. AVERAGE GLUCOSE BLD GHB EST-MCNC: 123 MG/DL
GFR SERPL CREATININE-BSD FRML MDRD: 86 ML/MIN/1.73SQ M
GLUCOSE SERPL-MCNC: 102 MG/DL (ref 65–140)
HBA1C MFR BLD: 5.9 %
HDLC SERPL-MCNC: 101 MG/DL
LDLC SERPL CALC-MCNC: 84 MG/DL (ref 0–100)
POTASSIUM SERPL-SCNC: 3.5 MMOL/L (ref 3.5–5.3)
SODIUM SERPL-SCNC: 129 MMOL/L (ref 136–145)
TRIGL SERPL-MCNC: 44 MG/DL

## 2020-02-17 PROCEDURE — 83036 HEMOGLOBIN GLYCOSYLATED A1C: CPT | Performed by: PHYSICIAN ASSISTANT

## 2020-02-17 PROCEDURE — 97166 OT EVAL MOD COMPLEX 45 MIN: CPT

## 2020-02-17 PROCEDURE — 93306 TTE W/DOPPLER COMPLETE: CPT

## 2020-02-17 PROCEDURE — 99239 HOSP IP/OBS DSCHRG MGMT >30: CPT | Performed by: INTERNAL MEDICINE

## 2020-02-17 PROCEDURE — 97163 PT EVAL HIGH COMPLEX 45 MIN: CPT

## 2020-02-17 PROCEDURE — 80061 LIPID PANEL: CPT | Performed by: PHYSICIAN ASSISTANT

## 2020-02-17 PROCEDURE — 70551 MRI BRAIN STEM W/O DYE: CPT

## 2020-02-17 PROCEDURE — 80048 BASIC METABOLIC PNL TOTAL CA: CPT | Performed by: PHYSICIAN ASSISTANT

## 2020-02-17 PROCEDURE — 99223 1ST HOSP IP/OBS HIGH 75: CPT | Performed by: PHYSICIAN ASSISTANT

## 2020-02-17 RX ORDER — LORAZEPAM 2 MG/ML
0.5 INJECTION INTRAMUSCULAR ONCE
Status: COMPLETED | OUTPATIENT
Start: 2020-02-17 | End: 2020-02-17

## 2020-02-17 RX ORDER — ATORVASTATIN CALCIUM 10 MG/1
10 TABLET, FILM COATED ORAL EVERY EVENING
Status: DISCONTINUED | OUTPATIENT
Start: 2020-02-17 | End: 2020-02-17 | Stop reason: HOSPADM

## 2020-02-17 RX ADMIN — HEPARIN SODIUM 5000 UNITS: 5000 INJECTION INTRAVENOUS; SUBCUTANEOUS at 14:28

## 2020-02-17 RX ADMIN — ASPIRIN 81 MG 81 MG: 81 TABLET ORAL at 08:54

## 2020-02-17 RX ADMIN — HEPARIN SODIUM 5000 UNITS: 5000 INJECTION INTRAVENOUS; SUBCUTANEOUS at 06:17

## 2020-02-17 RX ADMIN — PANTOPRAZOLE SODIUM 40 MG: 40 TABLET, DELAYED RELEASE ORAL at 06:17

## 2020-02-17 RX ADMIN — LORAZEPAM 0.5 MG: 2 INJECTION INTRAMUSCULAR; INTRAVENOUS at 10:08

## 2020-02-17 RX ADMIN — LORAZEPAM 0.5 MG: 2 INJECTION INTRAMUSCULAR; INTRAVENOUS at 09:51

## 2020-02-17 RX ADMIN — LEVOTHYROXINE SODIUM 50 MCG: 50 TABLET ORAL at 06:17

## 2020-02-17 RX ADMIN — LEVOFLOXACIN 750 MG: 750 TABLET, FILM COATED ORAL at 00:03

## 2020-02-17 RX ADMIN — ATORVASTATIN CALCIUM 10 MG: 10 TABLET, FILM COATED ORAL at 18:14

## 2020-02-17 NOTE — PHYSICAL THERAPY NOTE
PT EVALUATION    Pt  Name: Xavier Rachel  Pt  Age: [de-identified] y o  MRN: [id number]  LENGTH OF STAY: 1    Patient Active Problem List   Diagnosis    Seasonal allergies    Essential hypertension    Other hyperlipidemia    Sciatica    GERD (gastroesophageal reflux disease)    Anxiety    Acquired hypothyroidism    Chronic respiratory failure with hypoxia (HCC)    Chronic obstructive pulmonary disease (HCC)    Multiple pulmonary nodules    Tremor    Vitamin D deficiency disease    Burning with urination    Female stress incontinence    OAB (overactive bladder)    Vaginal atrophy    Dizziness    Urinary frequency    Dizziness and giddiness       Admitting Diagnoses:   Dizziness [R42]  Unsteady gait [R26 81]  Gait instability [R26 81]    Past Medical History:   Diagnosis Date    Anxiety     Back pain     Cancer ([facility] [address] )     skin    Cataract     Geographic tongue     last assessed: 07/25/2014    GERD (gastroesophageal reflux disease)     Hyperlipidemia     Hypertension     Hypothyroidism (acquired)     Mild intermittent asthma     Sciatica     Seasonal allergies        Past Surgical History:   Procedure Laterality Date    BACK SURGERY         Imaging Studies:  MRI brain wo contrast   Final Result by Felipe Layne MD (02/17 1104)      No acute infarction  Moderate cerebral chronic microangiopathic disease  Workstation performed: [id number]         CTA head and neck with and without contrast   ED Interpretation by Elizabeth Fajardo DO (02/16 1246)   See below      Final Result by Lambert Boxer, MD (02/16 1214)      No acute intracranial disease  Diffuse generalized volume loss relatively advanced chronic small vessel disease  No large vessel flow restrictive disease within the head or neck  Stable right apical 6 mm pulmonary nodule                    Workstation performed: [id number]         XR chest 2 views   ED Interpretation by Elizabeth aFjardo DO (02/16 1153)   No acute findings      Final Result by Bryce Schaeffer MD (02/17 9360)      No acute cardiopulmonary disease  Workstation performed: [id number]            02/17/20 5999   Note Type   Note type Eval only   Pain Assessment   Pain Assessment No/denies pain   Pain Score No Pain   Home Living   Type of [address] One level;Stairs to enter with rails   Bathroom Shower/Tub Tub/shower unit   Bathroom Toilet Standard   Bathroom Equipment Grab bars in shower; Shower chair  (has shower chair but doesn't use it )   [address] Walker;Crutches  (doesn't reguarly use )   Additional Comments Pt live in [facility] with 2STE with rail  Pt reports she does not use an AD at baseline  (+)  (+) O2 use at basline 3L     Prior Function   Level of [place] Independent with ADLs and functional mobility   Lives With Spouse; Son   [place] Help From Family   ADL Assistance Independent   Falls in the last 6 months 0   Vocational Retired   Comments Pt lives with  and son who are able to help her   Pt able to complete all ADLs and house hold chores independently    Restrictions/Precautions   Weight Bearing Precautions Per Order No   Other Precautions Fall Risk;Hard of hearing;O2  (3LO2)   General   Family/Caregiver Present Yes  ( )   Cognition   Overall Cognitive Status WFL   Arousal/Participation Alert   Attention Within functional limits   Orientation Level Oriented X4   Following Commands Follows one step commands without difficulty   RUE Assessment   RUE Assessment   (refer to OT)   LUE Assessment   LUE Assessment   (refer to OT )   RLE Assessment   RLE Assessment WFL  (4+/5 grossly )   LLE Assessment   LLE Assessment WFL  (4+/5 grossly )   Coordination   Movements are Fluid and Coordinated 1   Sensation WFL   Bed Mobility   Supine to Sit 7  Independent   Additional items HOB elevated   Sit to Supine 7  Independent   Additional items HOB elevated   Additional Comments (+) orthostatic hypotension BP supine 165/85; sitting 158/88; standing 134/90   Transfers   Sit to Stand 5  Supervision   Additional items Bedrails; Increased time required;Verbal cues   Stand to Sit 5  Supervision   Additional items Increased time required;Verbal cues; Bedrails   Additional Comments cues for technique and safety    Ambulation/Elevation   Gait pattern Decreased foot clearance; Excessively slow   Gait Assistance 5  Supervision   Additional items Verbal cues   Assistive Device None   Distance 20'   Balance   Static Sitting Good   Dynamic Sitting Fair +   Static Standing Fair   Dynamic Standing Fair -   Ambulatory Fair -   Endurance Deficit   Endurance Deficit Yes   Endurance Deficit Description   (weakness; fatigue )   Activity Tolerance   Activity Tolerance Patient limited by fatigue   [place] [id number] [facility]    Nurse Made Aware RN Tesha    Assessment   Prognosis Good   Problem List Decreased strength;Decreased endurance; Impaired balance;Decreased mobility; Impaired hearing   Assessment Pt is a [de-identified] yo female who presented with dizziness x 3 weeks  MRI pending and CT scan negative for acute intercranial disease  Patient was admitted for dizziness  PMH is significant for multiple pulmonary nodules, COPD, hypothyroidism, anxiety, hyperlipidemia and HTN  Prior to admittance patient reports being I with ADLs and ambulating w/o AD  Patient lives with  and son in [facility] with 2STE with railing  Pt referred to PT for mobility assessment & D/C planning w/ orders of up with assistance  Patient demonstrated mild deficits in mobility, strength, balance and ambulation  Pt was I with bed mobility and S for transfers (+) cues for technique and safety  Patient able to ambulate 20 ft with supervision and no AD  Gait deviations are as followed decreased foot clearance and excessively slow  Limit ambulation 2* to symptomatic orthostatic hypotension   Pt with complaints of mild dizziness (+) orthostatic hypotension BP supine 165/85; sitting 158/88; standing 134/90  Symptom improvement with rest when back in bed  At end of session, pt back in bed w/o issues, call bell & phone in reach  At this time patient is functioning below baseline and would benefit from PT treatment to address deficits  At discharge patient will require family support and HHPT  Nursing notified  Barriers to Discharge Inaccessible home environment   Barriers to Discharge Comments 2STE    Goals   Patient Goals To get better    STG Expiration Date 03/02/20   Short Term Goal #1 PT goals to be met in 14 days: 1  Pt will increase strength by 1/2 grade in order to increase functional independence and safety with ADLs  2  Patient will improve balance by 1/2 grade in order to improve functional independence and safety with ADLs  3  Patient will be able to ambulate >150 ft with mod I and appropriate AD in order to increase functional independence and mobility  4  Pt will improve transfers to mod I  in order to increase functional independence and decrease caregiver burden  5 Patient will be able to navigate stairs with S to allow for increased functional independence when navigating the home and community environments  6  Patient will increase Barthel score to 85  to demonstrate improved functional mobility and independence  7  Patient and caregiver education  PT Treatment Day 0   Plan   Treatment/Interventions Functional transfer training;LE strengthening/ROM; Elevations; Therapeutic exercise; Endurance training;Bed mobility;Gait training;Spoke to nursing;OT;Family; Patient/family training   PT Frequency Other (Comment)  (3-5 x/day)   Recommendation   Recommendation Home with family support;Home PT   Equipment Recommended Other (Comment)  (will monitor )   Barthel Index   Feeding 10   Bathing 5   Grooming Score 5   Dressing Score 10   Bladder Score 10   Bowels Score 10   Toilet Use Score 10   Transfers (Bed/Chair) Score 10   Mobility (Level Surface) Score 0 Stairs Score 0   Barthel Index Score 70     Hx/personal factors: co-morbidities, inaccessible home, advanced age, mutliple lines, use of AD, fall risk and O2  Examination: dec mobility, dec balance, dec endurance, dec amb, moderate fall risk  Clinical: unpredictable (ongoing medical status, abnormal lab values, moderate fall risk, imaging test/result pending and consult pending)  Complexity: high     Jose D Dukes

## 2020-02-17 NOTE — CONSULTS
Consult d/t pt on stroke pathway  Noted, MRI negative  Current problem in physician note is lightheadedness  No need for diet education at this time  Pt eating well  No further nutrition interventions or goals to follow at this time  Please re-consult if needed

## 2020-02-17 NOTE — PLAN OF CARE
Problem: Potential for Falls  Goal: Patient will remain free of falls  Description  INTERVENTIONS:  - Assess patient frequently for physical needs  -  Identify cognitive and physical deficits and behaviors that affect risk of falls  -  [place] fall precautions as indicated by assessment   - Educate patient/family on patient safety including physical limitations  - Instruct patient to call for assistance with activity based on assessment  - Modify environment to reduce risk of injury  - Consider OT/PT consult to assist with strengthening/mobility  Outcome: Progressing     Problem: Neurological Deficit  Goal: Neurological status is stable or improving  Description  Interventions:  - Monitor and assess patient's level of consciousness, motor function, sensory function, and level of assistance needed for ADLs  - Monitor and report changes from baseline  Collaborate with interdisciplinary team to initiate plan and implement interventions as ordered  - Provide and maintain a safe environment  - Consider seizure precautions  - Consider fall precautions  - Consider aspiration precautions  - Consider bleeding precautions  Outcome: Progressing     Problem: Activity Intolerance/Impaired Mobility  Goal: Mobility/activity is maintained at optimum level for patient  Description  Interventions:  - Assess and monitor patient  barriers to mobility and need for assistive/adaptive devices  - Assess patient's emotional response to limitations  - Collaborate with interdisciplinary team and initiate plans and interventions as ordered  - Encourage independent activity per ability   - Maintain proper body alignment  - Perform active/passive rom as tolerated/ordered    - Plan activities to conserve energy   - Turn patient as appropriate  Outcome: Progressing     Problem: Communication Impairment  Goal: Ability to express needs and understand communication  Description  Assess patient's communication skills and ability to understand information  Patient will demonstrate use of effective communication techniques, alternative methods of communication and understanding even if not able to speak  - Encourage communication and provide alternate methods of communication as needed  - Collaborate with case management/ for discharge needs  - Include patient/family/caregiver in decisions related to communication    Outcome: Progressing

## 2020-02-17 NOTE — DISCHARGE SUMMARY
Discharge Summary - Sanjeev Lino, 1940, [id number]        Admission Date: 2/16/2020  Discharge Date:  2/17/2020    Admitting Diagnosis: Dizziness [R42]  Unsteady gait [R26 81]  Gait instability [R26 81]    Discharge Diagnosis:   1  Lightheadedness, possibly related to orthostatic hypotension  2  Hypertension  3  COPD without exacerbation  4  Hypothyroidism  5  Hyperlipidemia  6  Multiple pulmonary nodules   7  Anxiety    Consulting Physicians:  Dr Daniel Lawson, neurology    Procedures Performed:   None    HPI:  Patient is an 19-year-old woman who came to the emergency room because of lightheadedness and dizziness on and off for 3 weeks  She was treated for sinusitis without any change  She was using meclizine which also was not particularly helpful  Evaluation in the Emergency remains a possibility of cerebellar stroke  She was admitted for further evaluation and care  Hospital Course: The patient was admitted to the hospital and monitored carefully on telemetry  She remained stable from a heart rhythm standpoint  She did demonstrate orthostatic drop in her blood pressure  At times, especially immediately after admission, her blood pressure was markedly elevated  This seemed to improve with ongoing monitoring  The patient felt better during her stay  The patient was evaluated by the neurology service  They recommended echocardiography  Fortunately this showed no major abnormalities  She also had an MRI which showed no evidence of acute stroke  On this basis, it is suspected that the patient's symptoms were largely related to orthostatic hypotension  Her medications were not altered because of the marked elevation at other times  The patient was cautioned to be careful when changing position  She was advised to maintain adequate fluid intake  She will need careful follow-up of her blood pressure as an outpatient  The patient's other issues remained stable during this hospitalization  At the time of discharge she was feeling well  Vital signs were stable  Lungs were clear  Cardiac exam revealed regular rhythm  The abdomen was soft and nontender  There was no edema  There was no overt focal neurologic sign  Disposition:  The patient was discharged home on February 17th  Diet and activity will be as tolerated  She will be re-evaluated by Dr Danielito Hill within 1 week  It should be noted that at the time of admission was expected that the patient would require more than 2 midnights of hospitalization  Fortunately, her workup was expedited and her symptoms resolved sooner than expected  This allowed for discharge earlier than was originally anticipated  Discharge instructions/Information to patient and family:   See after visit summary for information provided to patient and family  Provisions for Follow-Up Care:  See after visit summary for information related to follow-up care and any pertinent home health orders  Planned Readmission: No    Discharge Statement   I spent 35 minutes discharging the patient  This time was spent on the day of discharge  I had direct contact with the patient on the day of discharge  Discharge Medications:  See after visit summary for reconciled discharge medications provided to patient and family

## 2020-02-17 NOTE — UTILIZATION REVIEW
Initial Clinical Review    Admission: Date/Time/Statement: Admission Orders (From admission, onward)     Ordered        02/16/20 1328  Inpatient Admission  Once                   Orders Placed This Encounter   Procedures    Inpatient Admission     Standing Status:   Standing     Number of Occurrences:   1     Order Specific Question:   Admitting Physician     Answer:   Kandice Bah [[id number]]     Order Specific Question:   Level of Care     Answer:   Med Surg [[id number]]     Order Specific Question:   Estimated length of stay     Answer:   More than 2 Midnights     Order Specific Question:   Certification     Answer:   I certify that inpatient services are medically necessary for this patient for a duration of greater than two midnights  See H&P and MD Progress Notes for additional information about the patient's course of treatment  ED Arrival Information     Expected Arrival Acuity Means of Arrival Escorted By Service Admission Type    - 2/16/2020 10:00 [facility] ([address]) General Medicine Urgent    Arrival Complaint    Dizziness        Chief Complaint   Patient presents with    Dizziness     pt currently on levaquin for sore throat for a few days  now over the last few days pt feels dizzy/lightheaded  denies syncope just feeling like she might pass out  denies pain, blurry vision, nausea, vomiting, or fevers  denies sick contacts  pt chronically on 3L oxygen for COPD  Assessment/Plan:  [de-identified] yo female w/ pmh htn/hld/hypothyroid,copd, anxiety presents to ED from home with  Episodes of dizziness x 3 weeks  She was seen by PCP 2/6 for sinus pressure and dizziness and placed on azithromycin and meclizine for sinusitis and AOM  Called PCP on 2/12 no improvement and started on Levaquin  Afraid to ambulate for fear of falling  BP elevated on arrival  CTA Head&Neck, CXR and EKG in ED unremarkable  Admitted to IP with Dizziness - R/O CVA   Plan: Tele, Neuro checks, MRI, ECHO, Consult Neuro, Orthostatics, meclizine prn, allow for permissive htn x48h    2/17 Per neuro - persistent lightheadedness and balance difficulties, that have been occurring over the past few weeks and worse upon standing  Orthostatic vital signs were positive with a 20-30 point drop upon standing, which may explain her symptoms  MRI brain negative for CVA  Unlikely peripheral vertigo given lack of room-spinning sensation   Continue Telemetry, ECHO pending, monitor orthostatics     ED Triage Vitals   Temperature Pulse Respirations Blood Pressure SpO2   02/16/20 1007 02/16/20 1007 02/16/20 1007 02/16/20 1007 02/16/20 1007   97 8 °F (36 6 °C) 96 18 (!) 188/86 92 %      Temp Source Heart Rate Source Patient Position - Orthostatic VS BP Location FiO2 (%)   02/16/20 1007 02/16/20 1329 02/16/20 1328 02/16/20 1329 --   Oral Monitor Lying - Orthostatic VS Right arm       Pain Score       02/16/20 1007       No Pain        Wt Readings from Last 1 Encounters:   02/16/20 76 5 kg (168 lb 10 4 oz)     Additional Vital Signs:   02/17/20 1143  99 2 °F (37 3 °C) 88 18 108/56 95 % None (Room air) Sitting   02/17/20 0725  98 1 °F (36 7 °C) 81 18 181/82 99 % Nasal cannula Lying   02/17/20 0200  97 °F (36 1 °C) 79 18 148/83 100 % -- Lying   02/17/20 0000  97 5 °F (36 4 °C) 76 18 134/80 99 % None (Room air) Lying   02/16/20 2202  -- 82 18 106/61 98 % -- Standing - Orthostatic VS   02/16/20 2201  -- 77 18 127/72 99 % -- Sitting - Orthostatic VS   02/16/20 2200  98 °F (36 7 °C) 76 18 129/61 98 % -- Lying - Orthostatic VS   02/16/20 2000  98 °F (36 7 °C) 81 18 144/68 95 % Nasal cannula 3L Sitting   02/16/20 1800  98 °F (36 7 °C) 92 18 135/75 98 % Nasal cannula Lying   02/16/20 1600  98 1 °F (36 7 °C) 82 18 166/89 98 % Nasal cannula Lying   02/16/20 1504  -- 97 -- 142/81 -- -- Standing   02/16/20 1503  -- 91 18 164/97 -- -- Sitting   02/16/20 1500  98 1 °F (36 7 °C) 85 18 162/95 100 % Nasal cannula Lying   02/16/20 1330  -- 92 18 166/82 95 % -- Standing - Orthostatic VS   02/16/20 1329  -- 85 -- 192/93 -- -- Sitting - Orthostatic VS   02/16/20 1328  -- 91 -- 204/95 -- -- Lying - Orthostatic VS   02/16/20 1137  -- 86 18 173/82  97 % Nasal cannula --       Pertinent Labs/Diagnostic Test Results:   Results from last 7 days   Lab Units 02/16/20  1010   WBC Thousand/uL 7 72   HEMOGLOBIN g/dL 13 5   HEMATOCRIT % 41 5   PLATELETS Thousands/uL 300   NEUTROS ABS Thousands/µL 5 88     Results from last 7 days   Lab Units 02/17/20  0523 02/16/20  1010 02/11/20  0950   SODIUM mmol/L 129* 130* 133*   POTASSIUM mmol/L 3 5 3 4* 3 6   CHLORIDE mmol/L 91* 89* 92*   CO2 mmol/L 30 34* 34*   ANION GAP mmol/L 8 7  --    BUN mg/dL 11 10 11   CREATININE mg/dL 0 61 0 72 0 63   EGFR ml/min/1 73sq m 86 79  --    SL AMB CALCIUM mg/dL  --   --  9 2   CALCIUM mg/dL 8 6 9 2  --      Results from last 7 days   Lab Units 02/16/20  1010 02/11/20  0950   AST U/L 21 19   ALT U/L 8* 17   ALK PHOS U/L 78 65   TOTAL PROTEIN g/dL 7 4 6 4   ALBUMIN g/dL 3 8 4 0   TOTAL BILIRUBIN mg/dL 0 54 0 6     Results from last 7 days   Lab Units 02/17/20  0523 02/16/20  1010 02/11/20  0950   GLUCOSE RANDOM mg/dL 102 113 104*     Results from last 7 days   Lab Units 02/17/20  0523   HEMOGLOBIN A1C % 5 9*   EAG mg/dl 123     Results from last 7 days   Lab Units 02/16/20  1010   PROTIME seconds 12 8   INR  0 95   PTT seconds 27     Results from last 7 days   Lab Units 02/16/20  1124   CLARITY UA  Clear   COLOR UA  Yellow   SPEC GRAV UA  1 015   PH UA  6 5   GLUCOSE UA mg/dl Negative   KETONES UA mg/dl Negative   BLOOD UA  Small*   PROTEIN UA mg/dl Negative   NITRITE UA  Negative   BILIRUBIN UA  Negative   UROBILINOGEN UA E U /dl 0 2   LEUKOCYTES UA  Negative   WBC UA /hpf 0-1*   RBC UA /hpf 2-4*   BACTERIA UA /hpf None Seen   EPITHELIAL CELLS WET PREP /hpf Occasional     2/16 CTA Head & Neck: No acute intracranial disease   Diffuse generalized volume loss relatively advanced chronic small vessel disease   No large vessel flow restrictive disease within the head or neck  Stable right apical 6 mm pulmonary nodule  2/16 CXR: No acute cardiopulmonary disease  2/16 EKG: NSR    2/17 MRI Head: No acute infarction   Moderate cerebral chronic microangiopathic disease  ECHO    ED Treatment:   Medication Administration from 02/16/2020 0959 to 02/16/2020 1353       Date/Time Order Dose Route Action     02/16/2020 1052 sodium chloride 0 9 % bolus 1,000 mL 1,000 mL Intravenous New Bag     02/16/2020 1336 potassium chloride (K-DUR,KLOR-CON) CR tablet 40 mEq 40 mEq Oral Given     02/16/2020 1336 meclizine (ANTIVERT) tablet 25 mg 25 mg Oral Given        Past Medical History:   Diagnosis Date    Anxiety     Back pain     Cancer ([facility] [address] )     skin    Cataract     Geographic tongue     last assessed: 07/25/2014    GERD (gastroesophageal reflux disease)     Hyperlipidemia     Hypertension     Hypothyroidism (acquired)     Mild intermittent asthma     Sciatica     Seasonal allergies      Present on Admission:   Other hyperlipidemia   Multiple pulmonary nodules   Essential hypertension   Chronic obstructive pulmonary disease (HCC)   Anxiety   Acquired hypothyroidism      Admitting Diagnosis: Dizziness [R42]  Unsteady gait [R26 81]  Gait instability [R26 81]     Age/Sex: [de-identified] y o  female  Admission Orders:  Scheduled Medications:  Medications:  aspirin 81 mg Oral Daily   atorvastatin 10 mg Oral QPM   heparin (porcine) 5,000 Units Subcutaneous Q8H [facility] custod[facility]   levothyroxine 50 mcg Oral Early Morning   pantoprazole 40 mg Oral Daily Before Breakfast   umeclidinium-vilanterol 1 puff Inhalation Daily     Continuous IV Infusions:  PRN Meds:  hydrALAZINE 10 mg Intravenous Q4H PRN   ondansetron 4 mg Intravenous Q6H PRN   Ativan 0 5 mg IV x 1 2/17    TELE  Neuro checks q1h x 4, q2h x 8, q4h  Ortho BP q shift  SCDs  IP CONSULT TO NEUROLOGY  IIP CONSULT TO CASE MANAGEMENT    Network Utilization Review Department  [email or web address]  ATTENTION: Please call with any questions or concerns to [phone] and carefully listen to the prompts so that you are directed to the right person  All voicemails are confidential   Felisha Khan all requests for admission clinical reviews, approved or denied determinations and any other requests to dedicated fax number below belonging to the campus where the patient is receiving treatment   List of dedicated fax numbers for the Facilities:  [address] [place] [address] DENIALS (Administrative/Medical Necessity) [phone]   [address] [place] [address] (Maternity/NICU/Pediatrics) [phone]   Joo Campbell [phone]   [facility] [phone]   [address] [place] [address] [phone]   [address] [place] [address]  [phone]   [address] [place] [address] [address] [place] [address] [phone]   Carlita Newton [phone]   [address] [facility] [address]  [address] [facility][note 2] [address] [facility] [phone]

## 2020-02-17 NOTE — PLAN OF CARE
Problem: PHYSICAL THERAPY ADULT  Goal: Performs mobility at highest level of function for planned discharge setting  See evaluation for individualized goals  Description  Treatment/Interventions: Functional transfer training, LE strengthening/ROM, Elevations, Therapeutic exercise, Endurance training, Bed mobility, Gait training, Spoke to nursing, OT, Family, Patient/family training  Equipment Recommended: Other (Comment)(will monitor )       See flowsheet documentation for full assessment, interventions and recommendations  Note:   Prognosis: Good  Problem List: Decreased strength, Decreased endurance, Impaired balance, Decreased mobility, Impaired hearing  Assessment: Pt is a [de-identified] yo female who presented with dizziness x 3 weeks  MRI pending and CT scan negative for acute intercranial disease  Patient was admitted for dizziness  PMH is significant for multiple pulmonary nodules, COPD, hypothyroidism, anxiety, hyperlipidemia and HTN  Prior to admittance patient reports being I with ADLs and ambulating w/o AD  Patient lives with  and son in [facility] with 2STE with railing  Pt referred to PT for mobility assessment & D/C planning w/ orders of up with assistance  Patient demonstrated mild deficits in mobility, strength, balance and ambulation  Pt was I with bed mobility and S for transfers (+) cues for technique and safety  Patient able to ambulate 20 ft with supervision and no AD  Gait deviations are as followed decreased foot clearance and excessively slow  Limit ambulation 2* to symptomatic orthostatic hypotension  Pt with complaints of mild dizziness (+) orthostatic hypotension BP supine 165/85; sitting 158/88; standing 134/90  Symptom improvement with rest when back in bed  At end of session, pt back in bed w/o issues, call bell & phone in reach  At this time patient is functioning below baseline and would benefit from PT treatment to address deficits   At discharge patient will require family support and HHPT  Nursing notified  Barriers to Discharge: Inaccessible home environment  Barriers to Discharge Comments: 2STE   Recommendation: Home with family support, Home PT          See flowsheet documentation for full assessment

## 2020-02-17 NOTE — CONSULTS
Consultation - Neurology   Aaron Browne [de-identified] y o  female MRN: [id number]  Unit/Bed#: E4 -01 Encounter: [id number]      Assessment/Plan   Assessment:  Aaron Browne is a [de-identified] y o  female who presents for evaluation of persistent lightheadedness and balance difficulties, that have been occurring over the past few weeks and worse upon standing  Orthostatic vital signs were positive with a 20-30 point drop upon standing, which may explain her symptoms  MRI brain negative for CVA  Unlikely peripheral vertigo given lack of room-spinning sensation  Plan:  - Stroke pathway   Echocardiogram pending    Continue Aspirin 81 mg - was taking prior to admission   On atorvastatin 10 mg daily at home  Given negative MRI, age, and normal lipid panel, will continue with home dose  No indication to prescribe high intensity statin at this time   Continue telemetry   PT/OT/ST   Continue to monitor and notify neurology with any changes  - Continue to check orthostatics  - Consider compression stockings   - Encouraged hydration, slow transfers between positions   - Medical management and supportive care per primary team  Correction of any metabolic or infectious disturbances  - Further recommendations per attending neurologist        Results:   - CTA:  No acute abnormalities  Stable 6 mm pulmonary nodule noted  - Lipid panel WNL  - Hemoglobin A1c: 5 9  - MRI negative       History of Present Illness     Reason for Consult / Principal Problem: Dizziness     HPI: Aaron Browne is a [de-identified] y o  female with hypertension, hyperlipidemia, hypothyroidism, COPD on 3 L oxygen at baseline, GERD, skin cancer, prior tobacco use, and anxiety who presented for evaluation of dizziness  The patient has been experiencing intermittent dizziness for the past few weeks  Recently, it has become more constant    The patient reported sinus pressure and dizziness on 02/06 to her PCP, so she was placed on azithromycin and given meclizine for sinusitis and acute otitis media  She did not have any improvement with that antibiotic, so she was then placed on Levaquin  The patient states that she feels off balance when she is ambulating and is afraid she is going to fall  She has not had any falls  She notes lightheadedness upon standing, which is when her symptoms are most severe  She does not experience room-spinning dizziness  She denies any headache, visual disturbances, changes in her hearing, tinnitus, difficulty swallowing, facial asymmetry, speech disturbances, focal weakness, or numbness/tingling  On arrival to the ED, her blood pressure was 188/86  She was afebrile  She was slightly tachycardic with a heart rate of 96  Lab work revealed hyponatremia and hypokalemia, but no other significant abnormalities  CTA head and neck was unremarkable  Inpatient consult to Neurology  Consult performed by: Lucero Pastrana PA-C  Consult ordered by: Blanca Muñoz PA-C          Review of Systems   A 12 point ROS was completed  Other than the above mentioned complaints in the HPI and those commented on below, all remaining systems were negative:  Lightheadedness not currently present as patient is resting in bed  Reports anxiety about the MRI, chronic shortness of breath requiring oxygen  Otherwise, no complaints       Historical Information   Past Medical History:   Diagnosis Date    Anxiety     Back pain     Cancer ([address] )     skin    Cataract     Geographic tongue     last assessed: 07/25/2014    GERD (gastroesophageal reflux disease)     Hyperlipidemia     Hypertension     Hypothyroidism (acquired)     Mild intermittent asthma     Sciatica     Seasonal allergies      Past Surgical History:   Procedure Laterality Date    BACK SURGERY       Social History   Social History     Substance and Sexual Activity   Alcohol Use No     Social History     Substance and Sexual Activity   Drug Use No     Social History     Tobacco Use Smoking Status Former Smoker    Packs/day: 2 00    Years: 36 00    Pack years: 72 00    Types: Cigarettes    Last attempt to quit: Edwin Joiner Years since quittin 1   Smokeless Tobacco Never Used     Family History:   Family History   Problem Relation Age of Onset    Stroke Mother     Cirrhosis Father         alcoholic    Cancer Sister     Cancer Daughter        Review of previous medical records was completed  Meds/Allergies   all current active meds have been reviewed, current meds:   Current Facility-Administered Medications   Medication Dose Route Frequency    aspirin chewable tablet 81 mg  81 mg Oral Daily    atorvastatin (LIPITOR) tablet 10 mg  10 mg Oral QPM    heparin (porcine) subcutaneous injection 5,000 Units  5,000 Units Subcutaneous Q8H [address]    hydrALAZINE (APRESOLINE) injection 10 mg  10 mg Intravenous Q4H PRN    levothyroxine tablet 50 mcg  50 mcg Oral Early Morning    LORazepam (ATIVAN) injection 0 5 mg  0 5 mg Intravenous Once PRN    ondansetron (ZOFRAN) injection 4 mg  4 mg Intravenous Q6H PRN    pantoprazole (PROTONIX) EC tablet 40 mg  40 mg Oral Daily Before Breakfast    umeclidinium-vilanterol (ANORO ELLIPTA) 62 5-25 MCG/INH inhaler 1 puff  1 puff Inhalation Daily    and PTA meds:   Prior to Admission Medications   Prescriptions Last Dose Informant Patient Reported? Taking?    Black Elderberry,Berry-Flower, 575 MG CAPS 2020 at Unknown time Self Yes Yes   Sig: Take by mouth daily   Cholecalciferol (VITAMIN D3) 2000 units capsule 2020 at Unknown time Self Yes Yes   Sig: Take 4,000 Units by mouth daily    Omega-3 Fatty Acids (FISH OIL PO)  Self Yes Yes   Sig: Take 1,200 mg by mouth 2 (two) times a day   aspirin 81 MG tablet 2020 at Unknown time Self Yes Yes   Sig: Take 81 mg by mouth daily   atorvastatin (LIPITOR) 10 mg tablet 2020 at Unknown time  No Yes   Sig: Take 1 tablet (10 mg total) by mouth daily   hydrochlorothiazide (HYDRODIURIL) 25 mg tablet 2020 at Unknown time  No Yes   Sig: Take 1 tablet (25 mg total) by mouth daily   levofloxacin (LEVAQUIN) 500 mg tablet 2/15/2020 at Unknown time  No Yes   Sig: Take 1 tablet (500 mg total) by mouth every 24 hours for 10 days   levothyroxine 50 mcg tablet 2020 at Unknown time  No Yes   Sig: Take 1 tablet (50 mcg total) by mouth daily   losartan (COZAAR) 100 MG tablet 2020 at Unknown time  No Yes   Sig: TAKE 1 TABLET BY MOUTH DAILY   meclizine (ANTIVERT) 12 5 MG tablet 2020 at Unknown time  No Yes   Si/2-1  Po tid prn for dizziness   pantoprazole (PROTONIX) 40 mg tablet 2020 at Unknown time Self No Yes   Sig: Take 1 tablet (40 mg total) by mouth daily before breakfast   umeclidinium-vilanterol (ANORO ELLIPTA) 62 5-25 MCG/INH inhaler 2/15/2020 at Unknown time Self No Yes   Sig: Inhale 1 puff daily      Facility-Administered Medications: None       Allergies   Allergen Reactions    Other Other (See Comments)     Steroids, it effects her eyes       Objective   Vitals:Blood pressure (!) 181/82, pulse 81, temperature 98 1 °F (36 7 °C), temperature source Temporal, resp  rate 18, height 5' 7" (1 702 m), weight 76 5 kg (168 lb 10 4 oz), SpO2 99 %, not currently breastfeeding  ,Body mass index is 26 41 kg/m²  Intake/Output Summary (Last 24 hours) at 2020 0913  Last data filed at 2020 0601  Gross per 24 hour   Intake 1360 ml   Output --   Net 1360 ml       Invasive Devices: Invasive Devices     Peripheral Intravenous Line            Peripheral IV 20 Left Antecubital less than 1 day                Physical Exam   Constitutional: She is oriented to person, place, and time  She appears well-developed and well-nourished  No distress  Resting comfortably in bed  HENT:   Head: Normocephalic and atraumatic     Right Ear: External ear normal    Left Ear: External ear normal    Nose: Nose normal    Mouth/Throat: Oropharynx is clear and moist    Eyes: Pupils are equal, round, and reactive to light  Conjunctivae and EOM are normal  Right eye exhibits no discharge  Left eye exhibits no discharge  No scleral icterus  No nystagmus  Neck: Normal range of motion  Neck supple  Cardiovascular: Normal rate, regular rhythm and normal heart sounds  Exam reveals no gallop and no friction rub  No murmur heard  Pulmonary/Chest: Effort normal and breath sounds normal  No stridor  No respiratory distress  She has no wheezes  She has no rales  Currently receiving oxygen via nasal cannula  Abdominal: Soft  Musculoskeletal: Normal range of motion  She exhibits no edema, tenderness or deformity  Neurological: She is alert and oriented to person, place, and time  She has normal strength  No cranial nerve deficit or sensory deficit  She has a normal Finger-Nose-Finger Test  Coordination normal    Skin: Skin is warm and dry  No rash noted  She is not diaphoretic  No erythema  No pallor  Psychiatric: She has a normal mood and affect  Her speech is normal and behavior is normal  Judgment and thought content normal    Anxious regarding MRI  Nursing note and vitals reviewed  Neurologic Exam     Mental Status   Oriented to person, place, and time  Speech: speech is normal   Level of consciousness: alert  Knowledge: good  Able to name object  Able to repeat  Follows all commands  Cranial Nerves     CN II   Visual acuity: (Grossly intact )    CN III, IV, VI   Pupils are equal, round, and reactive to light  Extraocular motions are normal    Right pupil: Shape: regular  Left pupil: Shape: regular  Nystagmus: none   Conjugate gaze: present    CN V   Facial sensation intact  CN VII   Facial expression full, symmetric       CN VIII   Hearing: intact    CN IX, X   Palate: symmetric    CN XI   Right sternocleidomastoid strength: normal  Left sternocleidomastoid strength: normal  Right trapezius strength: normal  Left trapezius strength: normal    CN XII   Tongue: not atrophic  Fasciculations: absent  Tongue deviation: none    Motor Exam   Muscle bulk: normal  Overall muscle tone: normal    Strength   Strength 5/5 throughout  Sensory Exam   Light touch normal    Sensation to temperature intact  Gait, Coordination, and Reflexes     Coordination   Finger to nose coordination: normal    Tremor   Resting tremor: absent  Intention tremor: absent  Action tremor: absent      Lab Results: I have personally reviewed pertinent reports  Imaging Studies: I have personally reviewed pertinent reports  and I have personally reviewed pertinent films in PACS (CTA)  EKG, Pathology, and Other Studies: I have personally reviewed pertinent reports      VTE Prophylaxis: Heparin    Code Status: Level 1 - Full Code

## 2020-02-17 NOTE — OCCUPATIONAL THERAPY NOTE
Occupational Therapy Evaluation     Patient Name: Edmond Newby  DMRJR'N Date: 2/17/2020  Problem List  Principal Problem:    Dizziness and giddiness  Active Problems:    Essential hypertension    Other hyperlipidemia    Anxiety    Acquired hypothyroidism    Chronic obstructive pulmonary disease (HCC)    Multiple pulmonary nodules    Past Medical History  Past Medical History:   Diagnosis Date    Anxiety     Back pain     Cancer ([facility] [address] )     skin    Cataract     Geographic tongue     last assessed: 07/25/2014    GERD (gastroesophageal reflux disease)     Hyperlipidemia     Hypertension     Hypothyroidism (acquired)     Mild intermittent asthma     Sciatica     Seasonal allergies      Past Surgical History  Past Surgical History:   Procedure Laterality Date    BACK SURGERY             02/17/20 0830   Note Type   Note type Eval only   Restrictions/Precautions   Weight Bearing Precautions Per Order No   Other Precautions O2;Fall Risk;Hard of hearing   Pain Assessment   Pain Assessment No/denies pain   Pain Score No Pain   Home Living   Type of [address] [place] [address] One level;Performs ADLs on one level; Able to live on main level with bedroom/bathroom;Stairs to enter with rails; Laundry in basement  (2 NOEMI c B HR)   Bathroom Shower/Tub Tub/shower unit   Bathroom Toilet Standard   Bathroom Equipment Grab bars in shower; Shower chair  (does not use shower chair)   [address] Walker;Crutches   Additional Comments does not use AD at baseline   Prior Function   Level of [place] Independent with ADLs and functional mobility   Lives With Spouse; Son   [place] Help From Family   ADL Assistance Independent   IADLs Independent  (shares IADL tasks with family)   Falls in the last 6 months 0   Vocational Retired   Comments PTA pt living with  and son in a [facility] with 2 NOEMI, pt is I with ADLs and IADLs, does not use AD at baseline, -falls, +drives   Pt on 3L of O2 at baseline  Lifestyle   Autonomy PTA pt living with  and son in a [facility] with 2 NOEMI, pt is I with ADLs and IADLs, does not use AD at baseline, -falls, +drives  Pt on 3L of O2 at baseline  Reciprocal Relationships : home with wife 24/7; son lives with them works during the day   Service to Others retired   Intrinsic Gratification watching tv    Sherly Walker 18 (WDL) [address] "I suppose I feel dizzy, thats what they tell me anyways"   ADL   Where Assessed Edge of bed   Eating Assistance 7  Independent   Grooming Assistance 7  Independent   UB Bathing Assistance 6  Modified Independent   LB Bathing Assistance 6  Modified Independent   [address] 6  Modified independent   [address] 6  Modified independent   [address]  6  Modified independent   Bed Mobility   Supine to Sit 7  Independent   Additional items HOB elevated   Sit to Supine 7  Independent   Additional items HOB elevated   Additional Comments (+) orthostatic hypotension BP supine 165/85; sitting 158/88; standing 134/90   Transfers   Sit to Stand 5  Supervision   Additional items Bedrails; Increased time required;Verbal cues   Stand to Sit 5  Supervision   Additional items Bedrails; Increased time required;Verbal cues   Stand pivot 5  Supervision   Additional items Increased time required;Verbal cues   Additional Comments no use of AD   Functional Mobility   Functional Mobility 5  Supervision   Additional Comments no AD, functional distance in room   Balance   Static Sitting Good   Dynamic Sitting Good   Static Standing Fair +   Dynamic Standing Fair +   Ambulatory Fair +   Activity Tolerance   Activity Tolerance Patient limited by fatigue   Medical Staff Made Aware PTS Manny Montiel, PT Ben   Nurse Made Aware MAURILIO Parkinson   RUSARAH Assessment   RUE Assessment WFL  (4+/5 strength throughout)   LUE Assessment   LUE Assessment WFL  (4+/5 strength throughout)   Hand Function   Gross Motor Coordination Functional   Fine Motor Coordination Functional   Sensation   Light Touch No apparent deficits   Sharp/Dull No apparent deficits   Proprioception   Proprioception No apparent deficits   Vision - Complex Assessment   Acuity Able to read clock/calendar on wall without difficulty; Able to read employee name badge without difficulty   Cognition   Overall Cognitive Status [facility]   Arousal/Participation Alert; Cooperative   Attention Within functional limits   Orientation Level Oriented X4   Memory Within functional limits   Following Commands Follows one step commands without difficulty   Comments approrpaite and cooperative with OT evaluation   Assessment   Limitation Decreased endurance;Decreased self-care trans   Prognosis Good   Assessment Patient is a [de-identified] y o  female admitted to [facility] on 2/16/2020 due to Dizziness and giddiness  Pt admitted for a r/o stroke: cta head/neck negative for large vessel disease or cva  Comorbidities affecting pt's physical performance at time of assessment include COPD, HTN and anxiety  Patient has active OT orders and activity orders for Up with assistance  PTA pt living with  and son in a [facility] with 2 NOEMI, pt is I with ADLs and IADLs, does not use AD at baseline, -falls, +drives  Pt on 3L of O2 at baseline  At the time of evaluation patient currently requires no A for overall ADLs, and (S) for functional mobility  No further acute OT needs identified at this time  Recommend continued mobilization with hospital staff and restorative services while in the hospital to increase pts endurance and strength upon D/C  From OT standpoint, recommend D/C to home with family support when medically cleared  D/C pt from OT caseload at this time     Plan   OT Frequency Eval only   Recommendation   OT Discharge Recommendation Home with family support  (Pt may benefit from Home PT: refer to PT note)   OT - OK to Discharge   (when medically cleared )   Barthel Index   Feeding 10   Bathing 5 Grooming Score 5   Dressing Score 10   Bladder Score 10   Bowels Score 10   Toilet Use Score 10   Transfers (Bed/Chair) Score 10   Mobility (Level Surface) Score 0   Stairs Score 0   Barthel Index Score 70      No identified skilled OT needs at this time  D/c OT orders  If pt's functional status changes please re-consult  At the end of the session, all needs met and pt Call bell within reach and seated EOB      Creta Blade, OTR/L

## 2020-02-26 ENCOUNTER — OFFICE VISIT (OUTPATIENT)
Dept: FAMILY MEDICINE CLINIC | Facility: CLINIC | Age: 80
End: 2020-02-26
Payer: COMMERCIAL

## 2020-02-26 VITALS
BODY MASS INDEX: 26.37 KG/M2 | TEMPERATURE: 99.3 F | HEIGHT: 67 IN | HEART RATE: 76 BPM | SYSTOLIC BLOOD PRESSURE: 166 MMHG | DIASTOLIC BLOOD PRESSURE: 82 MMHG | WEIGHT: 168 LBS | RESPIRATION RATE: 18 BRPM

## 2020-02-26 DIAGNOSIS — I10 ESSENTIAL HYPERTENSION: Primary | ICD-10-CM

## 2020-02-26 PROCEDURE — 1170F FXNL STATUS ASSESSED: CPT | Performed by: FAMILY MEDICINE

## 2020-02-26 PROCEDURE — 1160F RVW MEDS BY RX/DR IN RCRD: CPT | Performed by: FAMILY MEDICINE

## 2020-02-26 PROCEDURE — 4040F PNEUMOC VAC/ADMIN/RCVD: CPT | Performed by: FAMILY MEDICINE

## 2020-02-26 PROCEDURE — 3079F DIAST BP 80-89 MM HG: CPT | Performed by: FAMILY MEDICINE

## 2020-02-26 PROCEDURE — 3008F BODY MASS INDEX DOCD: CPT | Performed by: FAMILY MEDICINE

## 2020-02-26 PROCEDURE — 1036F TOBACCO NON-USER: CPT | Performed by: FAMILY MEDICINE

## 2020-02-26 PROCEDURE — 99213 OFFICE O/P EST LOW 20 MIN: CPT | Performed by: FAMILY MEDICINE

## 2020-02-26 PROCEDURE — 1111F DSCHRG MED/CURRENT MED MERGE: CPT | Performed by: FAMILY MEDICINE

## 2020-02-26 PROCEDURE — 3077F SYST BP >= 140 MM HG: CPT | Performed by: FAMILY MEDICINE

## 2020-02-26 RX ORDER — OLMESARTAN MEDOXOMIL 20 MG/1
20 TABLET ORAL DAILY
Qty: 30 TABLET | Refills: 5 | Status: SHIPPED | OUTPATIENT
Start: 2020-02-26 | End: 2020-03-05 | Stop reason: DRUGHIGH

## 2020-02-26 NOTE — PROGRESS NOTES
Assessment and Plan:    Problem List Items Addressed This Visit     None                 There are no diagnoses linked to this encounter  Subjective:      Patient ID: Arjun Burton is a [de-identified] y o  female  CC:    Chief Complaint   Patient presents with    Dizziness     Patient present today for lightheaded, unsteady and dizziness for the past 4 week  Patient was seen at the ER last week due to the same symptoms  HPI:    Here for f/u dizziness, MRI and Ct scan shows small vessel disease, stopped Levaquin due to warnngs, concerned it could be losartan      The following portions of the patient's history were reviewed and updated as appropriate: allergies, current medications, past family history, past medical history, past social history, past surgical history and problem list         Review of Systems   Constitutional: Negative for activity change, appetite change and unexpected weight change  HENT: Positive for congestion  Respiratory: Positive for shortness of breath  Cardiovascular: Negative for chest pain, palpitations and leg swelling  Neurological: Positive for dizziness and light-headedness  Negative for headaches  Hematological: Bruises/bleeds easily  Psychiatric/Behavioral: The patient is not nervous/anxious  Data to review:   TCM Call (since 1/26/2020)     None      TCM Call (since 1/26/2020)     None          Objective:    Vitals:    02/26/20 0821 02/26/20 0830 02/26/20 0835 02/26/20 0836   BP: (!) 176/80 (!) 178/80 152/80 166/82   BP Location: Left arm Left arm Right arm Right arm   Patient Position: Sitting Other (Comment) Sitting Supine   Cuff Size: Standard Standard Standard Standard   Pulse: 80 80 76 76   Resp: 18      Temp: 99 3 °F (37 4 °C)      TempSrc: Temporal      Weight: 76 2 kg (168 lb)      Height: 5' 7" (1 702 m)           Physical Exam   Constitutional: She appears well-developed and well-nourished  Neck: No thyromegaly present     Cardiovascular: Normal rate, regular rhythm and normal heart sounds  Pulmonary/Chest: Effort normal and breath sounds normal    Musculoskeletal: She exhibits no edema  Lymphadenopathy:     She has no cervical adenopathy  Skin:   Bruise due to heparin shot in abdomen   Vitals reviewed

## 2020-02-26 NOTE — PROGRESS NOTES
Assessment and Plan:    Problem List Items Addressed This Visit     None                 {Assess/PlanSmartLinks:33340}          Subjective:      Patient ID: Wes Nino is a [de-identified] y o  female  CC:    Chief Complaint   Patient presents with    Dizziness     Patient present today for lightheaded, undsteady and digginess for the past 4 week  Patient was seen at the ER last week due to the same symptoms         HPI:    HPI    {Common ambulatory SmartLinks:35644}      Review of Systems      Data to review:       Objective:    Vitals:    02/26/20 0821   BP: (!) 176/80   BP Location: Left arm   Patient Position: Sitting   Cuff Size: Standard   Pulse: 80   Resp: 18   Temp: 99 3 °F (37 4 °C)   TempSrc: Temporal   Weight: 76 2 kg (168 lb)   Height: 5' 7" (1 702 m)        Physical Exam

## 2020-03-02 ENCOUNTER — OFFICE VISIT (OUTPATIENT)
Dept: FAMILY MEDICINE CLINIC | Facility: CLINIC | Age: 80
End: 2020-03-02
Payer: COMMERCIAL

## 2020-03-02 VITALS
HEIGHT: 67 IN | RESPIRATION RATE: 22 BRPM | SYSTOLIC BLOOD PRESSURE: 178 MMHG | HEART RATE: 96 BPM | DIASTOLIC BLOOD PRESSURE: 82 MMHG | TEMPERATURE: 98.6 F | WEIGHT: 168 LBS | BODY MASS INDEX: 26.37 KG/M2

## 2020-03-02 DIAGNOSIS — M54.2 NECK PAIN: ICD-10-CM

## 2020-03-02 DIAGNOSIS — I10 ESSENTIAL HYPERTENSION: Primary | ICD-10-CM

## 2020-03-02 PROCEDURE — 1170F FXNL STATUS ASSESSED: CPT | Performed by: FAMILY MEDICINE

## 2020-03-02 PROCEDURE — 99213 OFFICE O/P EST LOW 20 MIN: CPT | Performed by: FAMILY MEDICINE

## 2020-03-02 PROCEDURE — 4040F PNEUMOC VAC/ADMIN/RCVD: CPT | Performed by: FAMILY MEDICINE

## 2020-03-02 PROCEDURE — 3077F SYST BP >= 140 MM HG: CPT | Performed by: FAMILY MEDICINE

## 2020-03-02 PROCEDURE — 3008F BODY MASS INDEX DOCD: CPT | Performed by: FAMILY MEDICINE

## 2020-03-02 PROCEDURE — 1036F TOBACCO NON-USER: CPT | Performed by: FAMILY MEDICINE

## 2020-03-02 PROCEDURE — 3079F DIAST BP 80-89 MM HG: CPT | Performed by: FAMILY MEDICINE

## 2020-03-02 PROCEDURE — 1111F DSCHRG MED/CURRENT MED MERGE: CPT | Performed by: FAMILY MEDICINE

## 2020-03-02 PROCEDURE — 1160F RVW MEDS BY RX/DR IN RCRD: CPT | Performed by: FAMILY MEDICINE

## 2020-03-02 NOTE — ASSESSMENT & PLAN NOTE
BP still not at goal -accidentally took 2 HCTZ the other day, felt a little better then Other (Free Text): Patient was advised to start the Efudex on arms and legs BID X 2 weeks. The patient still has the Rx that was given in October 2018 and will figure out a time to do it. Note Text (......Xxx Chief Complaint.): This diagnosis correlates with the Detail Level: Zone

## 2020-03-04 LAB
BUN SERPL-MCNC: 15 MG/DL (ref 7–25)
BUN/CREAT SERPL: ABNORMAL (CALC) (ref 6–22)
CALCIUM SERPL-MCNC: 9.4 MG/DL (ref 8.6–10.4)
CHLORIDE SERPL-SCNC: 89 MMOL/L (ref 98–110)
CO2 SERPL-SCNC: 34 MMOL/L (ref 20–32)
CREAT SERPL-MCNC: 0.79 MG/DL (ref 0.6–0.88)
CRP SERPL-MCNC: 0.4 MG/L
GLUCOSE SERPL-MCNC: 105 MG/DL (ref 65–99)
POTASSIUM SERPL-SCNC: 3.7 MMOL/L (ref 3.5–5.3)
SL AMB EGFR AFRICAN AMERICAN: 82 ML/MIN/1.73M2
SL AMB EGFR NON AFRICAN AMERICAN: 71 ML/MIN/1.73M2
SODIUM SERPL-SCNC: 131 MMOL/L (ref 135–146)

## 2020-03-05 ENCOUNTER — TELEPHONE (OUTPATIENT)
Dept: FAMILY MEDICINE CLINIC | Facility: CLINIC | Age: 80
End: 2020-03-05

## 2020-03-05 DIAGNOSIS — I10 ESSENTIAL HYPERTENSION: Primary | ICD-10-CM

## 2020-03-05 DIAGNOSIS — I10 ESSENTIAL HYPERTENSION: ICD-10-CM

## 2020-03-05 RX ORDER — OLMESARTAN MEDOXOMIL 40 MG/1
40 TABLET ORAL DAILY
Qty: 30 TABLET | Refills: 5 | Status: SHIPPED | OUTPATIENT
Start: 2020-03-05 | End: 2020-03-16 | Stop reason: SDUPTHER

## 2020-03-05 NOTE — TELEPHONE ENCOUNTER
Pt called in to let Dr Bobby Redd know that she is feel better on the medication she was giving olmesartan and stated that she came for a ov and she was told to take to tablets to see if it would help her and it did so she would need a new prescription for the medication to take two tablets a daily  Please send to pt pharmacy and let her know if the medication will be charged or if she should continue the same dose of just one tablet   Please give pt call back  Thank you!

## 2020-03-05 NOTE — TELEPHONE ENCOUNTER
Pt states you told her to take 2 tablets daily which would be 40mg  Do you want her to go back to 20? If you want the 40 mg tablets, you will need to send new rx

## 2020-03-09 RX ORDER — OLMESARTAN MEDOXOMIL 20 MG/1
20 TABLET ORAL DAILY
Qty: 30 TABLET | Refills: 5 | Status: SHIPPED | OUTPATIENT
Start: 2020-03-09 | End: 2020-03-16 | Stop reason: SDUPTHER

## 2020-03-16 ENCOUNTER — OFFICE VISIT (OUTPATIENT)
Dept: FAMILY MEDICINE CLINIC | Facility: CLINIC | Age: 80
End: 2020-03-16
Payer: COMMERCIAL

## 2020-03-16 VITALS
RESPIRATION RATE: 22 BRPM | DIASTOLIC BLOOD PRESSURE: 78 MMHG | BODY MASS INDEX: 26.68 KG/M2 | HEIGHT: 67 IN | TEMPERATURE: 98.4 F | SYSTOLIC BLOOD PRESSURE: 142 MMHG | WEIGHT: 170 LBS | HEART RATE: 84 BPM

## 2020-03-16 DIAGNOSIS — I10 ESSENTIAL HYPERTENSION: Primary | ICD-10-CM

## 2020-03-16 DIAGNOSIS — K21.9 GASTROESOPHAGEAL REFLUX DISEASE, ESOPHAGITIS PRESENCE NOT SPECIFIED: ICD-10-CM

## 2020-03-16 PROCEDURE — 1111F DSCHRG MED/CURRENT MED MERGE: CPT | Performed by: FAMILY MEDICINE

## 2020-03-16 PROCEDURE — 3008F BODY MASS INDEX DOCD: CPT | Performed by: FAMILY MEDICINE

## 2020-03-16 PROCEDURE — 4040F PNEUMOC VAC/ADMIN/RCVD: CPT | Performed by: FAMILY MEDICINE

## 2020-03-16 PROCEDURE — 3077F SYST BP >= 140 MM HG: CPT | Performed by: FAMILY MEDICINE

## 2020-03-16 PROCEDURE — 99213 OFFICE O/P EST LOW 20 MIN: CPT | Performed by: FAMILY MEDICINE

## 2020-03-16 PROCEDURE — 1036F TOBACCO NON-USER: CPT | Performed by: FAMILY MEDICINE

## 2020-03-16 PROCEDURE — 1160F RVW MEDS BY RX/DR IN RCRD: CPT | Performed by: FAMILY MEDICINE

## 2020-03-16 PROCEDURE — 3078F DIAST BP <80 MM HG: CPT | Performed by: FAMILY MEDICINE

## 2020-03-16 RX ORDER — OLMESARTAN MEDOXOMIL 40 MG/1
40 TABLET ORAL DAILY
Qty: 90 TABLET | Refills: 1 | Status: SHIPPED | OUTPATIENT
Start: 2020-03-16 | End: 2020-04-10 | Stop reason: CLARIF

## 2020-03-16 RX ORDER — PANTOPRAZOLE SODIUM 40 MG/1
40 TABLET, DELAYED RELEASE ORAL
Qty: 90 TABLET | Refills: 1 | Status: SHIPPED | OUTPATIENT
Start: 2020-03-16 | End: 2020-05-29 | Stop reason: DRUGHIGH

## 2020-03-16 RX ORDER — OLMESARTAN MEDOXOMIL 20 MG/1
20 TABLET ORAL DAILY
Qty: 90 TABLET | Refills: 1 | Status: SHIPPED | OUTPATIENT
Start: 2020-03-16 | End: 2020-03-16 | Stop reason: DRUGHIGH

## 2020-03-23 ENCOUNTER — TELEPHONE (OUTPATIENT)
Dept: FAMILY MEDICINE CLINIC | Facility: CLINIC | Age: 80
End: 2020-03-23

## 2020-03-23 DIAGNOSIS — I10 ESSENTIAL HYPERTENSION: Primary | ICD-10-CM

## 2020-03-23 RX ORDER — CLONIDINE HYDROCHLORIDE 0.1 MG/1
0.1 TABLET ORAL
Qty: 30 TABLET | Refills: 5 | Status: SHIPPED | OUTPATIENT
Start: 2020-03-23 | End: 2020-03-26 | Stop reason: SINTOL

## 2020-03-23 NOTE — TELEPHONE ENCOUNTER
Pt called in because she said that she has been taking olmesartan and she said its not working  She said her bp has been high   Wants to know if something else can be called in as discussed    [facility] [address]

## 2020-03-26 ENCOUNTER — TELEPHONE (OUTPATIENT)
Dept: FAMILY MEDICINE CLINIC | Facility: CLINIC | Age: 80
End: 2020-03-26

## 2020-03-26 ENCOUNTER — TELEMEDICINE (OUTPATIENT)
Dept: PULMONOLOGY | Facility: CLINIC | Age: 80
End: 2020-03-26
Payer: COMMERCIAL

## 2020-03-26 DIAGNOSIS — J96.11 CHRONIC RESPIRATORY FAILURE WITH HYPOXIA (HCC): ICD-10-CM

## 2020-03-26 DIAGNOSIS — J43.2 CENTRILOBULAR EMPHYSEMA (HCC): ICD-10-CM

## 2020-03-26 DIAGNOSIS — I10 ESSENTIAL HYPERTENSION: Primary | ICD-10-CM

## 2020-03-26 DIAGNOSIS — J44.9 CHRONIC OBSTRUCTIVE PULMONARY DISEASE, UNSPECIFIED COPD TYPE (HCC): Primary | ICD-10-CM

## 2020-03-26 PROCEDURE — G2012 BRIEF CHECK IN BY MD/QHP: HCPCS | Performed by: INTERNAL MEDICINE

## 2020-03-26 RX ORDER — CARVEDILOL 3.12 MG/1
3.12 TABLET ORAL 2 TIMES DAILY WITH MEALS
Qty: 60 TABLET | Refills: 2 | Status: SHIPPED | OUTPATIENT
Start: 2020-03-26 | End: 2020-04-07 | Stop reason: SDUPTHER

## 2020-03-26 NOTE — PROGRESS NOTES
Virtual Regular Visit    Reason for visit is COPD and chronic hypoxemia follow-up    This virtual check-in was done via telephone  Encounter provider Luis Youngblood MD    Provider located at [address] [employer] [address] [place] [address]      Recent Visits  Date Type Provider Dept   03/23/20 Telephone Melvin Anglin  [address] [facility]   Showing recent visits within past 7 days and meeting all other requirements     Today's Visits  Date Type Provider Dept   03/26/20 Len Kat MD [facility] today's visits and meeting all other requirements     Future Appointments  No visits were found meeting these conditions  Showing future appointments within next 150 days and meeting all other requirements        Patient agrees to participate in a virtual check in via telephone or video visit instead of presenting to the office to address urgent/immediate medical needs  Patient is aware this is a billable service  After connecting through telephone, the patient was identified by name and date of birth  Juan Luis Smoker was informed that this was a telemedicine visit and that the exam was being conducted confidentially over secure lines  My office door was closed  No one else was in the room  She acknowledged consent and understanding of privacy and security of the virtual check-in visit  I informed the patient that I have reviewed her record in Epic and presented the opportunity for her to ask any questions regarding the visit today  The patient initiated communication and agreed to participate  Progress note - Pulmonary Medicine   Juan Luis Smoker 79 y o  female MRN: [id number]       Impression & Plan:     No problem-specific Assessment & Plan notes found for this encounter          Virtual visit time component:  Total visit time for chart and diagnostic data review, telephonic or video conference communication with the patient, and documentation: 23 minutes    I have personally spent 6 minutes reviewing the chart and imaging prior to the visit  I have personally spent 12 minutes on the phone with the patient  I have personally spent 3 minutes reviewing imaging, laboratory results and other testing results with the patient     ______________________________________________________________________    HPI:    María Pina is being evaluated by virtual visit for 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 failure  She reports that she is doing well from a respiratory standpoint  She is taking Anoro Ellipta consistently without any side effects or problems  She denies any increasing cough, phlegm production, chest tightness, or wheezing  She is using her inhaler appropriately  She has been having issues with hypertension  She has had some adjustments in her blood pressure medications  She is trying to monitor this at home with a automatic blood pressure cuff  She tells me that she was running a little bit high today at about 908 systolic but I reassured her that this may be okay since the automatic cuff may be over estimating things  She is not symptomatic  She was hospitalized in February for dizziness and was ruled out for cerebellar stroke with MRI and CT a of the head and neck  There was no evidence of stroke  She had a chest x-ray that was clear  The visualized portions of the upper thorax on the neck portion of the CT a did not show any change in the pulmonary nodularity  She does have a 6 mm nodule  This is stable dating back to 2017  She denies fever or chills  She has been maintaining social isolation with her  and son due to the COVID-19 crisis  No sick contacts  No weight or appetite changes    She otherwise has been doing well    Current Medications:    Current Outpatient Medications:     aspirin 81 MG tablet, Take 81 mg by mouth daily, Disp: , Rfl:     atorvastatin (LIPITOR) 10 mg tablet, Take 1 tablet (10 mg total) by mouth daily, Disp: 90 tablet, Rfl: 1    Black Elderberry,Berry-Flower, 575 MG CAPS, Take by mouth daily, Disp: , Rfl:     Cholecalciferol (VITAMIN D3) 2000 units capsule, Take 4,000 Units by mouth daily , Disp: , Rfl:     cloNIDine (CATAPRES) 0 1 mg tablet, Take 1 tablet (0 1 mg total) by mouth daily at bedtime, Disp: 30 tablet, Rfl: 5    hydrochlorothiazide (HYDRODIURIL) 25 mg tablet, Take 1 tablet (25 mg total) by mouth daily, Disp: 90 tablet, Rfl: 1    levothyroxine 50 mcg tablet, Take 1 tablet (50 mcg total) by mouth daily, Disp: 90 tablet, Rfl: 1    meclizine (ANTIVERT) 12 5 MG tablet, 1/2-1  Po tid prn for dizziness, Disp: 30 tablet, Rfl: 1    olmesartan (BENICAR) 40 mg tablet, Take 1 tablet (40 mg total) by mouth daily, Disp: 90 tablet, Rfl: 1    Omega-3 Fatty Acids (FISH OIL PO), Take 1,200 mg by mouth 2 (two) times a day, Disp: , Rfl:     pantoprazole (PROTONIX) 40 mg tablet, Take 1 tablet (40 mg total) by mouth daily before breakfast, Disp: 90 tablet, Rfl: 1    umeclidinium-vilanterol (ANORO ELLIPTA) 62 5-25 MCG/INH inhaler, Inhale 1 puff daily, Disp: 3 each, Rfl: 3    Review of Systems:  Aside from what is mentioned in the HPI, the review of systems is otherwise negative    Past medical history, surgical history, and family history were reviewed and updated as appropriate    Social history updates:  Social History     Tobacco Use   Smoking Status Former Smoker    Packs/day: 2 00    Years: 36 00    Pack years: 72 00    Types: Cigarettes    Last attempt to quit:     Years since quittin 2   Smokeless Tobacco Never Used       PhysicalExamination:  Patient was evaluated telephonically, no physical examination could be performed    Diagnostic Data:  Labs:   I personally reviewed the most recent laboratory data pertinent to today's visit    Lab Results   Component Value Date    WBC 7 72 2020 HGB 13 5 02/16/2020    HCT 41 5 02/16/2020    MCV 91 02/16/2020     02/16/2020     Lab Results   Component Value Date    SODIUM 131 (L) 03/03/2020    K 3 7 03/03/2020    CO2 34 (H) 03/03/2020    CL 89 (L) 03/03/2020    BUN 15 03/03/2020    CREATININE 0 79 03/03/2020    CALCIUM 9 4 03/03/2020       Imaging:  I personally reviewed the images on the [facility] system pertinent to today's visit  Chest x-ray performed 02/16/2020 shows clear lung fields but slightly hyperinflated  CTA of the head and neck, thoracic windows were reviewed  This was from 02/16/2020  There is evidence of a 6 mm nodule in the right upper lobe which is stable in comparison to 2017  There is apical pleural thickening      Fuad Haji MD

## 2020-03-26 NOTE — LETTER
March 26, 2020     Markel Jensen, [address]    Patient: Bradley Fisher   YOB: 1940   Date of Visit: 3/26/2020       Dear Dr Kaitlynn Worley:    Thank you for referring Bradley Fisher to me for evaluation  Below are my notes for this consultation  If you have questions, please do not hesitate to call me  I look forward to following your patient along with you  Sincerely,        Wendy Corrales MD        CC: No Recipients  Wendy Corrales MD  3/26/2020  9:47 AM  Sign at close encounter  Virtual Regular Visit    Reason for visit is COPD and chronic hypoxemia follow-up    This virtual check-in was done via telephone  Encounter provider Wendy Corrales MD    Provider located at [address] [place], [place] [address] [address]      Recent Visits  Date Type Provider Dept   03/23/20 Telephone Markel Jensen  [address] [facility]   Showing recent visits within past 7 days and meeting all other requirements     Today's Visits  Date Type Provider Dept   03/26/20 Len Kat MD [facility] today's visits and meeting all other requirements     Future Appointments  No visits were found meeting these conditions  Showing future appointments within next 150 days and meeting all other requirements        Patient agrees to participate in a virtual check in via telephone or video visit instead of presenting to the office to address urgent/immediate medical needs  Patient is aware this is a billable service  After connecting through telephone, the patient was identified by name and date of birth  Bradley Fisher was informed that this was a telemedicine visit and that the exam was being conducted confidentially over secure lines  My office door was closed  No one else was in the room    She acknowledged consent and understanding of privacy and security of the virtual check-in visit  I informed the patient that I have reviewed her record in Epic and presented the opportunity for her to ask any questions regarding the visit today  The patient initiated communication and agreed to participate  Progress note - Pulmonary Medicine   Kim Bah 79 y o  female MRN: [id number]       Impression & Plan:     No problem-specific Assessment & Plan notes found for this encounter  Virtual visit time component:  Total visit time for chart and diagnostic data review, telephonic or video conference communication with the patient, and documentation: 23 minutes    I have personally spent 6 minutes reviewing the chart and imaging prior to the visit  I have personally spent 12 minutes on the phone with the patient  I have personally spent 3 minutes reviewing imaging, laboratory results and other testing results with the patient     ______________________________________________________________________    HPI:    Kim Bah is being evaluated by virtual visit for 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 failure  She reports that she is doing well from a respiratory standpoint  She is taking Anoro Ellipta consistently without any side effects or problems  She denies any increasing cough, phlegm production, chest tightness, or wheezing  She is using her inhaler appropriately  She has been having issues with hypertension  She has had some adjustments in her blood pressure medications  She is trying to monitor this at home with a automatic blood pressure cuff  She tells me that she was running a little bit high today at about 659 systolic but I reassured her that this may be okay since the automatic cuff may be over estimating things  She is not symptomatic  She was hospitalized in February for dizziness and was ruled out for cerebellar stroke with MRI and CT a of the head and neck  There was no evidence of stroke    She had a chest x-ray that was clear  The visualized portions of the upper thorax on the neck portion of the CT a did not show any change in the pulmonary nodularity  She does have a 6 mm nodule  This is stable dating back to 2017  She denies fever or chills  She has been maintaining social isolation with her  and son due to the COVID-19 crisis  No sick contacts  No weight or appetite changes    She otherwise has been doing well    Current Medications:    Current Outpatient Medications:     aspirin 81 MG tablet, Take 81 mg by mouth daily, Disp: , Rfl:     atorvastatin (LIPITOR) 10 mg tablet, Take 1 tablet (10 mg total) by mouth daily, Disp: 90 tablet, Rfl: 1    Black Elderberry,Berry-Flower, 575 MG CAPS, Take by mouth daily, Disp: , Rfl:     Cholecalciferol (VITAMIN D3) 2000 units capsule, Take 4,000 Units by mouth daily , Disp: , Rfl:     cloNIDine (CATAPRES) 0 1 mg tablet, Take 1 tablet (0 1 mg total) by mouth daily at bedtime, Disp: 30 tablet, Rfl: 5    hydrochlorothiazide (HYDRODIURIL) 25 mg tablet, Take 1 tablet (25 mg total) by mouth daily, Disp: 90 tablet, Rfl: 1    levothyroxine 50 mcg tablet, Take 1 tablet (50 mcg total) by mouth daily, Disp: 90 tablet, Rfl: 1    meclizine (ANTIVERT) 12 5 MG tablet, 1/2-1  Po tid prn for dizziness, Disp: 30 tablet, Rfl: 1    olmesartan (BENICAR) 40 mg tablet, Take 1 tablet (40 mg total) by mouth daily, Disp: 90 tablet, Rfl: 1    Omega-3 Fatty Acids (FISH OIL PO), Take 1,200 mg by mouth 2 (two) times a day, Disp: , Rfl:     pantoprazole (PROTONIX) 40 mg tablet, Take 1 tablet (40 mg total) by mouth daily before breakfast, Disp: 90 tablet, Rfl: 1    umeclidinium-vilanterol (ANORO ELLIPTA) 62 5-25 MCG/INH inhaler, Inhale 1 puff daily, Disp: 3 each, Rfl: 3    Review of Systems:  Aside from what is mentioned in the HPI, the review of systems is otherwise negative    Past medical history, surgical history, and family history were reviewed and updated as appropriate    Social history updates:  Social History     Tobacco Use   Smoking Status Former Smoker    Packs/day: 2 00    Years: 36 00    Pack years: 72 00    Types: Cigarettes    Last attempt to quit:     Years since quittin 2   Smokeless Tobacco Never Used       PhysicalExamination:  Patient was evaluated telephonically, no physical examination could be performed    Diagnostic Data:  Labs: I personally reviewed the most recent laboratory data pertinent to today's visit    Lab Results   Component Value Date    WBC 7 72 2020    HGB 13 5 2020    HCT 41 5 2020    MCV 91 2020     2020     Lab Results   Component Value Date    SODIUM 131 (L) 2020    K 3 7 2020    CO2 34 (H) 2020    CL 89 (L) 2020    BUN 15 2020    CREATININE 0 79 2020    CALCIUM 9 4 2020       Imaging:  I personally reviewed the images on the [facility] system pertinent to today's visit  Chest x-ray performed 2020 shows clear lung fields but slightly hyperinflated  CTA of the head and neck, thoracic windows were reviewed  This was from 2020  There is evidence of a 6 mm nodule in the right upper lobe which is stable in comparison to 2017  There is apical pleural thickening      Kelin Osorio MD

## 2020-03-31 ENCOUNTER — TELEPHONE (OUTPATIENT)
Dept: OTHER | Facility: OTHER | Age: 80
End: 2020-03-31

## 2020-03-31 NOTE — TELEPHONE ENCOUNTER
These readings are much better and I think that lightheadedness should improve as Bps settle down, may be an element of anxiety in lightheadeness

## 2020-03-31 NOTE — TELEPHONE ENCOUNTER
Message is for office:  Received a call from Dr Celestino Soto on Friday & she wanted her to record her BP #s  Fri 138/69  Sat 154/80  Sun 131/70  Mon 147/75  Pt says she still feels lightheaded & doesn't think it's working  And, would like a call back to know if she can adjust med dose

## 2020-04-02 DIAGNOSIS — I10 ESSENTIAL HYPERTENSION: ICD-10-CM

## 2020-04-02 DIAGNOSIS — E78.2 MIXED HYPERLIPIDEMIA: ICD-10-CM

## 2020-04-02 RX ORDER — HYDROCHLOROTHIAZIDE 25 MG/1
TABLET ORAL
Qty: 90 TABLET | Refills: 1 | Status: SHIPPED | OUTPATIENT
Start: 2020-04-02 | End: 2020-04-03

## 2020-04-02 RX ORDER — ATORVASTATIN CALCIUM 10 MG/1
TABLET, FILM COATED ORAL
Qty: 90 TABLET | Refills: 1 | Status: SHIPPED | OUTPATIENT
Start: 2020-04-02 | End: 2020-04-03

## 2020-04-03 DIAGNOSIS — I10 ESSENTIAL HYPERTENSION: ICD-10-CM

## 2020-04-03 DIAGNOSIS — E78.2 MIXED HYPERLIPIDEMIA: ICD-10-CM

## 2020-04-03 RX ORDER — HYDROCHLOROTHIAZIDE 25 MG/1
TABLET ORAL
Qty: 90 TABLET | Refills: 1 | Status: SHIPPED | OUTPATIENT
Start: 2020-04-03 | End: 2020-07-28 | Stop reason: SDUPTHER

## 2020-04-03 RX ORDER — ATORVASTATIN CALCIUM 10 MG/1
TABLET, FILM COATED ORAL
Qty: 90 TABLET | Refills: 1 | Status: SHIPPED | OUTPATIENT
Start: 2020-04-03 | End: 2020-07-28 | Stop reason: SDUPTHER

## 2020-04-07 ENCOUNTER — TELEMEDICINE (OUTPATIENT)
Dept: FAMILY MEDICINE CLINIC | Facility: CLINIC | Age: 80
End: 2020-04-07
Payer: COMMERCIAL

## 2020-04-07 VITALS — HEART RATE: 67 BPM | DIASTOLIC BLOOD PRESSURE: 85 MMHG | OXYGEN SATURATION: 98 % | SYSTOLIC BLOOD PRESSURE: 172 MMHG

## 2020-04-07 DIAGNOSIS — R03.0 ELEVATED BLOOD PRESSURE READING: Primary | ICD-10-CM

## 2020-04-07 DIAGNOSIS — J44.9 CHRONIC OBSTRUCTIVE PULMONARY DISEASE, UNSPECIFIED COPD TYPE (HCC): ICD-10-CM

## 2020-04-07 DIAGNOSIS — J96.11 CHRONIC RESPIRATORY FAILURE WITH HYPOXIA (HCC): ICD-10-CM

## 2020-04-07 DIAGNOSIS — R42 DIZZINESS: ICD-10-CM

## 2020-04-07 DIAGNOSIS — I10 ESSENTIAL HYPERTENSION: ICD-10-CM

## 2020-04-07 PROCEDURE — G2012 BRIEF CHECK IN BY MD/QHP: HCPCS | Performed by: FAMILY MEDICINE

## 2020-04-07 RX ORDER — CARVEDILOL 6.25 MG/1
6.25 TABLET ORAL 2 TIMES DAILY WITH MEALS
Qty: 60 TABLET | Refills: 5 | Status: SHIPPED | OUTPATIENT
Start: 2020-04-07 | End: 2020-04-10 | Stop reason: CLARIF

## 2020-04-09 ENCOUNTER — TELEPHONE (OUTPATIENT)
Dept: FAMILY MEDICINE CLINIC | Facility: CLINIC | Age: 80
End: 2020-04-09

## 2020-04-10 ENCOUNTER — TELEMEDICINE (OUTPATIENT)
Dept: FAMILY MEDICINE CLINIC | Facility: CLINIC | Age: 80
End: 2020-04-10
Payer: COMMERCIAL

## 2020-04-10 VITALS
SYSTOLIC BLOOD PRESSURE: 165 MMHG | OXYGEN SATURATION: 96 % | TEMPERATURE: 97 F | HEART RATE: 74 BPM | DIASTOLIC BLOOD PRESSURE: 89 MMHG

## 2020-04-10 DIAGNOSIS — I10 ESSENTIAL HYPERTENSION: Primary | ICD-10-CM

## 2020-04-10 PROCEDURE — G2012 BRIEF CHECK IN BY MD/QHP: HCPCS | Performed by: FAMILY MEDICINE

## 2020-04-27 ENCOUNTER — TELEPHONE (OUTPATIENT)
Dept: FAMILY MEDICINE CLINIC | Facility: CLINIC | Age: 80
End: 2020-04-27

## 2020-04-28 ENCOUNTER — ANTICOAG VISIT (OUTPATIENT)
Dept: FAMILY MEDICINE CLINIC | Facility: CLINIC | Age: 80
End: 2020-04-28

## 2020-04-29 ENCOUNTER — TELEMEDICINE (OUTPATIENT)
Dept: FAMILY MEDICINE CLINIC | Facility: CLINIC | Age: 80
End: 2020-04-29
Payer: COMMERCIAL

## 2020-04-29 VITALS
TEMPERATURE: 97.3 F | DIASTOLIC BLOOD PRESSURE: 86 MMHG | SYSTOLIC BLOOD PRESSURE: 158 MMHG | BODY MASS INDEX: 26.68 KG/M2 | OXYGEN SATURATION: 98 % | RESPIRATION RATE: 20 BRPM | HEIGHT: 67 IN | WEIGHT: 170 LBS

## 2020-04-29 DIAGNOSIS — F41.9 ANXIETY: Primary | ICD-10-CM

## 2020-04-29 PROCEDURE — 99441 PR PHYS/QHP TELEPHONE EVALUATION 5-10 MIN: CPT | Performed by: FAMILY MEDICINE

## 2020-04-29 RX ORDER — ALPRAZOLAM 0.25 MG/1
0.25 TABLET ORAL 2 TIMES DAILY PRN
Qty: 30 TABLET | Refills: 1 | Status: SHIPPED | OUTPATIENT
Start: 2020-04-29 | End: 2020-05-29 | Stop reason: ALTCHOICE

## 2020-05-15 ENCOUNTER — TELEPHONE (OUTPATIENT)
Dept: FAMILY MEDICINE CLINIC | Facility: CLINIC | Age: 80
End: 2020-05-15

## 2020-05-16 ENCOUNTER — APPOINTMENT (EMERGENCY)
Dept: CT IMAGING | Facility: HOSPITAL | Age: 80
End: 2020-05-16
Payer: COMMERCIAL

## 2020-05-16 ENCOUNTER — HOSPITAL ENCOUNTER (EMERGENCY)
Facility: HOSPITAL | Age: 80
Discharge: HOME | End: 2020-05-16
Attending: EMERGENCY MEDICINE | Admitting: EMERGENCY MEDICINE
Payer: COMMERCIAL

## 2020-05-16 VITALS
TEMPERATURE: 98 F | HEART RATE: 87 BPM | WEIGHT: 177.47 LBS | BODY MASS INDEX: 27.8 KG/M2 | OXYGEN SATURATION: 99 % | SYSTOLIC BLOOD PRESSURE: 201 MMHG | DIASTOLIC BLOOD PRESSURE: 96 MMHG | RESPIRATION RATE: 16 BRPM

## 2020-05-16 DIAGNOSIS — M54.50 ACUTE LOW BACK PAIN WITHOUT SCIATICA, UNSPECIFIED BACK PAIN LATERALITY: Primary | ICD-10-CM

## 2020-05-16 DIAGNOSIS — R58 ECCHYMOSIS: ICD-10-CM

## 2020-05-16 DIAGNOSIS — M54.50 ACUTE LOW BACK PAIN WITHOUT SCIATICA: ICD-10-CM

## 2020-05-16 DIAGNOSIS — S22.080A COMPRESSION FRACTURE OF T12 VERTEBRA (HCC): Primary | ICD-10-CM

## 2020-05-16 DIAGNOSIS — M54.9 BACK PAIN: ICD-10-CM

## 2020-05-16 PROCEDURE — 72131 CT LUMBAR SPINE W/O DYE: CPT

## 2020-05-16 PROCEDURE — 99284 EMERGENCY DEPT VISIT MOD MDM: CPT | Performed by: PHYSICIAN ASSISTANT

## 2020-05-16 PROCEDURE — 99284 EMERGENCY DEPT VISIT MOD MDM: CPT

## 2020-05-16 RX ORDER — TRAMADOL HYDROCHLORIDE 50 MG/1
50 TABLET ORAL ONCE
Status: COMPLETED | OUTPATIENT
Start: 2020-05-16 | End: 2020-05-16

## 2020-05-16 RX ORDER — ACETAMINOPHEN 500 MG
500 TABLET ORAL EVERY 6 HOURS PRN
Qty: 30 TABLET | Refills: 0 | Status: SHIPPED | OUTPATIENT
Start: 2020-05-16 | End: 2022-06-27

## 2020-05-16 RX ORDER — TRAMADOL HYDROCHLORIDE 50 MG/1
50 TABLET ORAL EVERY 6 HOURS PRN
Qty: 12 TABLET | Refills: 0 | Status: SHIPPED | OUTPATIENT
Start: 2020-05-16 | End: 2020-05-16 | Stop reason: SDUPTHER

## 2020-05-16 RX ORDER — LIDOCAINE 50 MG/G
1 PATCH TOPICAL DAILY
Qty: 30 PATCH | Refills: 0 | Status: SHIPPED | OUTPATIENT
Start: 2020-05-16 | End: 2020-07-28 | Stop reason: CLARIF

## 2020-05-16 RX ORDER — LIDOCAINE 50 MG/G
1 PATCH TOPICAL ONCE
Status: DISCONTINUED | OUTPATIENT
Start: 2020-05-16 | End: 2020-05-16 | Stop reason: HOSPADM

## 2020-05-16 RX ORDER — CYCLOBENZAPRINE HCL 5 MG
5 TABLET ORAL
Qty: 14 TABLET | Refills: 0 | Status: SHIPPED | OUTPATIENT
Start: 2020-05-16 | End: 2020-05-29 | Stop reason: ALTCHOICE

## 2020-05-16 RX ORDER — TRAMADOL HYDROCHLORIDE 50 MG/1
50 TABLET ORAL EVERY 6 HOURS PRN
Qty: 12 TABLET | Refills: 0 | Status: SHIPPED | OUTPATIENT
Start: 2020-05-16 | End: 2020-05-26

## 2020-05-16 RX ORDER — METHOCARBAMOL 500 MG/1
500 TABLET, FILM COATED ORAL 2 TIMES DAILY
Qty: 20 TABLET | Refills: 0 | Status: SHIPPED | OUTPATIENT
Start: 2020-05-16 | End: 2020-07-28 | Stop reason: CLARIF

## 2020-05-16 RX ORDER — METHOCARBAMOL 500 MG/1
500 TABLET, FILM COATED ORAL 2 TIMES DAILY
Qty: 20 TABLET | Refills: 0 | Status: SHIPPED | OUTPATIENT
Start: 2020-05-16 | End: 2020-05-16 | Stop reason: SDUPTHER

## 2020-05-16 RX ORDER — LIDOCAINE 50 MG/G
1 PATCH TOPICAL DAILY
Qty: 30 PATCH | Refills: 0 | Status: SHIPPED | OUTPATIENT
Start: 2020-05-16 | End: 2020-05-16 | Stop reason: SDUPTHER

## 2020-05-16 RX ORDER — METHOCARBAMOL 500 MG/1
500 TABLET, FILM COATED ORAL ONCE
Status: COMPLETED | OUTPATIENT
Start: 2020-05-16 | End: 2020-05-16

## 2020-05-16 RX ORDER — IBUPROFEN 400 MG/1
400 TABLET ORAL EVERY 6 HOURS PRN
Qty: 30 TABLET | Refills: 0 | Status: SHIPPED | OUTPATIENT
Start: 2020-05-16 | End: 2020-07-28 | Stop reason: CLARIF

## 2020-05-16 RX ORDER — IBUPROFEN 400 MG/1
400 TABLET ORAL EVERY 6 HOURS PRN
Qty: 30 TABLET | Refills: 0 | Status: SHIPPED | OUTPATIENT
Start: 2020-05-16 | End: 2020-05-16 | Stop reason: SDUPTHER

## 2020-05-16 RX ADMIN — METHOCARBAMOL TABLETS 500 MG: 500 TABLET, COATED ORAL at 16:46

## 2020-05-16 RX ADMIN — LIDOCAINE 1 PATCH: 50 PATCH TOPICAL at 16:46

## 2020-05-16 RX ADMIN — TRAMADOL HYDROCHLORIDE 50 MG: 50 TABLET, FILM COATED ORAL at 16:46

## 2020-05-29 ENCOUNTER — OFFICE VISIT (OUTPATIENT)
Dept: FAMILY MEDICINE CLINIC | Facility: CLINIC | Age: 80
End: 2020-05-29
Payer: COMMERCIAL

## 2020-05-29 ENCOUNTER — TELEPHONE (OUTPATIENT)
Dept: FAMILY MEDICINE CLINIC | Facility: CLINIC | Age: 80
End: 2020-05-29

## 2020-05-29 VITALS
RESPIRATION RATE: 20 BRPM | SYSTOLIC BLOOD PRESSURE: 170 MMHG | HEART RATE: 82 BPM | DIASTOLIC BLOOD PRESSURE: 84 MMHG | TEMPERATURE: 98.2 F | WEIGHT: 163 LBS | HEIGHT: 67 IN | BODY MASS INDEX: 25.58 KG/M2

## 2020-05-29 DIAGNOSIS — S22.080D COMPRESSION FRACTURE OF T12 VERTEBRA WITH ROUTINE HEALING, SUBSEQUENT ENCOUNTER: Primary | ICD-10-CM

## 2020-05-29 DIAGNOSIS — S22.080D COMPRESSION FRACTURE OF T12 VERTEBRA WITH ROUTINE HEALING, SUBSEQUENT ENCOUNTER: ICD-10-CM

## 2020-05-29 DIAGNOSIS — K21.9 GASTROESOPHAGEAL REFLUX DISEASE, ESOPHAGITIS PRESENCE NOT SPECIFIED: ICD-10-CM

## 2020-05-29 DIAGNOSIS — F41.9 ANXIETY: ICD-10-CM

## 2020-05-29 DIAGNOSIS — I10 ESSENTIAL HYPERTENSION: Primary | ICD-10-CM

## 2020-05-29 PROBLEM — R42 DIZZINESS AND GIDDINESS: Status: RESOLVED | Noted: 2020-02-16 | Resolved: 2020-05-29

## 2020-05-29 PROCEDURE — 1036F TOBACCO NON-USER: CPT | Performed by: FAMILY MEDICINE

## 2020-05-29 PROCEDURE — 3008F BODY MASS INDEX DOCD: CPT | Performed by: FAMILY MEDICINE

## 2020-05-29 PROCEDURE — 99214 OFFICE O/P EST MOD 30 MIN: CPT | Performed by: FAMILY MEDICINE

## 2020-05-29 PROCEDURE — 3079F DIAST BP 80-89 MM HG: CPT | Performed by: FAMILY MEDICINE

## 2020-05-29 PROCEDURE — 4040F PNEUMOC VAC/ADMIN/RCVD: CPT | Performed by: FAMILY MEDICINE

## 2020-05-29 PROCEDURE — 3077F SYST BP >= 140 MM HG: CPT | Performed by: FAMILY MEDICINE

## 2020-05-29 PROCEDURE — 1160F RVW MEDS BY RX/DR IN RCRD: CPT | Performed by: FAMILY MEDICINE

## 2020-05-29 RX ORDER — TRAMADOL HYDROCHLORIDE 50 MG/1
50 TABLET ORAL EVERY 6 HOURS PRN
Qty: 30 TABLET | Refills: 0 | Status: SHIPPED | OUTPATIENT
Start: 2020-05-29 | End: 2020-07-28 | Stop reason: CLARIF

## 2020-05-29 RX ORDER — CLONIDINE HYDROCHLORIDE 0.1 MG/1
TABLET ORAL
Qty: 60 TABLET | Refills: 5 | Status: SHIPPED | OUTPATIENT
Start: 2020-05-29 | End: 2021-06-18

## 2020-05-29 RX ORDER — PANTOPRAZOLE SODIUM 20 MG/1
20 TABLET, DELAYED RELEASE ORAL
Qty: 30 TABLET | Refills: 5 | Status: SHIPPED | OUTPATIENT
Start: 2020-05-29 | End: 2020-05-29 | Stop reason: SDUPTHER

## 2020-05-29 RX ORDER — PANTOPRAZOLE SODIUM 20 MG/1
20 TABLET, DELAYED RELEASE ORAL
Qty: 90 TABLET | Refills: 1 | Status: SHIPPED | OUTPATIENT
Start: 2020-05-29 | End: 2020-07-19 | Stop reason: DRUGHIGH

## 2020-05-29 RX ORDER — LOSARTAN POTASSIUM 100 MG/1
25 TABLET ORAL DAILY
COMMUNITY
End: 2020-07-28 | Stop reason: SDUPTHER

## 2020-05-29 RX ORDER — TRAMADOL HYDROCHLORIDE 50 MG/1
50 TABLET ORAL EVERY 6 HOURS PRN
COMMUNITY
End: 2020-05-29 | Stop reason: SDUPTHER

## 2020-06-08 DIAGNOSIS — K21.9 GASTROESOPHAGEAL REFLUX DISEASE, ESOPHAGITIS PRESENCE NOT SPECIFIED: ICD-10-CM

## 2020-06-08 RX ORDER — PANTOPRAZOLE SODIUM 40 MG/1
TABLET, DELAYED RELEASE ORAL
Qty: 30 TABLET | Refills: 5 | Status: SHIPPED | OUTPATIENT
Start: 2020-06-08 | End: 2020-07-19 | Stop reason: ALTCHOICE

## 2020-06-09 ENCOUNTER — TELEPHONE (OUTPATIENT)
Dept: FAMILY MEDICINE CLINIC | Facility: CLINIC | Age: 80
End: 2020-06-09

## 2020-06-09 NOTE — TELEPHONE ENCOUNTER
Pt called stating she believes she has a UTI and would like  to send something to pharmacy, informed pt she will need appt but pt wants msg sent to [facility]

## 2020-06-10 ENCOUNTER — CLINICAL SUPPORT (OUTPATIENT)
Dept: FAMILY MEDICINE CLINIC | Facility: CLINIC | Age: 80
End: 2020-06-10
Payer: COMMERCIAL

## 2020-06-10 DIAGNOSIS — R35.0 URINARY FREQUENCY: Primary | ICD-10-CM

## 2020-06-10 LAB
SL AMB  POCT GLUCOSE, UA: NORMAL
SL AMB LEUKOCYTE ESTERASE,UA: NORMAL
SL AMB POCT BILIRUBIN,UA: NORMAL
SL AMB POCT BLOOD,UA: NORMAL
SL AMB POCT CLARITY,UA: CLEAR
SL AMB POCT COLOR,UA: YELLOW
SL AMB POCT KETONES,UA: NORMAL
SL AMB POCT NITRITE,UA: NORMAL
SL AMB POCT PH,UA: 7.5
SL AMB POCT SPECIFIC GRAVITY,UA: 1.02
SL AMB POCT URINE PROTEIN: NORMAL
SL AMB POCT UROBILINOGEN: 0.2

## 2020-06-10 PROCEDURE — 81002 URINALYSIS NONAUTO W/O SCOPE: CPT

## 2020-06-10 PROCEDURE — 87086 URINE CULTURE/COLONY COUNT: CPT | Performed by: FAMILY MEDICINE

## 2020-06-11 LAB — BACTERIA UR CULT: NORMAL

## 2020-06-18 ENCOUNTER — TELEPHONE (OUTPATIENT)
Dept: FAMILY MEDICINE CLINIC | Facility: CLINIC | Age: 80
End: 2020-06-18

## 2020-06-23 ENCOUNTER — TELEPHONE (OUTPATIENT)
Dept: FAMILY MEDICINE CLINIC | Facility: CLINIC | Age: 80
End: 2020-06-23

## 2020-07-19 ENCOUNTER — APPOINTMENT (EMERGENCY)
Dept: CT IMAGING | Facility: HOSPITAL | Age: 80
End: 2020-07-19
Payer: COMMERCIAL

## 2020-07-19 ENCOUNTER — HOSPITAL ENCOUNTER (EMERGENCY)
Facility: HOSPITAL | Age: 80
Discharge: HOME | End: 2020-07-19
Attending: EMERGENCY MEDICINE
Payer: COMMERCIAL

## 2020-07-19 VITALS
WEIGHT: 153.66 LBS | TEMPERATURE: 98.5 F | OXYGEN SATURATION: 97 % | DIASTOLIC BLOOD PRESSURE: 82 MMHG | BODY MASS INDEX: 24.07 KG/M2 | SYSTOLIC BLOOD PRESSURE: 133 MMHG | RESPIRATION RATE: 16 BRPM | HEART RATE: 86 BPM

## 2020-07-19 DIAGNOSIS — K76.9 HEPATIC LESION: ICD-10-CM

## 2020-07-19 DIAGNOSIS — E87.1 HYPONATREMIA: ICD-10-CM

## 2020-07-19 DIAGNOSIS — R06.89 HYPERCARBIA: ICD-10-CM

## 2020-07-19 DIAGNOSIS — R10.13 EPIGASTRIC PAIN: ICD-10-CM

## 2020-07-19 DIAGNOSIS — K40.90 LEFT INGUINAL HERNIA: ICD-10-CM

## 2020-07-19 DIAGNOSIS — E87.8 HYPOCHLOREMIA: ICD-10-CM

## 2020-07-19 DIAGNOSIS — E27.8 ADRENAL NODULE (HCC): ICD-10-CM

## 2020-07-19 DIAGNOSIS — S22.000A THORACIC COMPRESSION FRACTURE (HCC): ICD-10-CM

## 2020-07-19 DIAGNOSIS — N20.0 RIGHT NEPHROLITHIASIS: ICD-10-CM

## 2020-07-19 DIAGNOSIS — K86.9 PANCREATIC LESION: Primary | ICD-10-CM

## 2020-07-19 DIAGNOSIS — E87.6 HYPOKALEMIA: ICD-10-CM

## 2020-07-19 LAB
ALBUMIN SERPL BCP-MCNC: 3.7 G/DL (ref 3.5–5)
ALP SERPL-CCNC: 94 U/L (ref 46–116)
ALT SERPL W P-5'-P-CCNC: 29 U/L (ref 12–78)
AMORPH PHOS CRY URNS QL MICRO: NORMAL /HPF
ANION GAP SERPL CALCULATED.3IONS-SCNC: 4 MMOL/L (ref 4–13)
AST SERPL W P-5'-P-CCNC: 27 U/L (ref 5–45)
BACTERIA UR QL AUTO: NORMAL /HPF
BASOPHILS # BLD AUTO: 0.03 THOUSANDS/ΜL (ref 0–0.1)
BASOPHILS NFR BLD AUTO: 1 % (ref 0–1)
BILIRUB SERPL-MCNC: 0.5 MG/DL (ref 0.2–1)
BILIRUB UR QL STRIP: NEGATIVE
BUN SERPL-MCNC: 8 MG/DL (ref 5–25)
CALCIUM SERPL-MCNC: 8.9 MG/DL (ref 8.3–10.1)
CHLORIDE SERPL-SCNC: 88 MMOL/L (ref 100–108)
CLARITY UR: CLEAR
CO2 SERPL-SCNC: 38 MMOL/L (ref 21–32)
COLOR UR: YELLOW
CREAT SERPL-MCNC: 0.73 MG/DL (ref 0.6–1.3)
EOSINOPHIL # BLD AUTO: 0.06 THOUSAND/ΜL (ref 0–0.61)
EOSINOPHIL NFR BLD AUTO: 1 % (ref 0–6)
ERYTHROCYTE [DISTWIDTH] IN BLOOD BY AUTOMATED COUNT: 13.4 % (ref 11.6–15.1)
GFR SERPL CREATININE-BSD FRML MDRD: 77 ML/MIN/1.73SQ M
GLUCOSE SERPL-MCNC: 118 MG/DL (ref 65–140)
GLUCOSE UR STRIP-MCNC: NEGATIVE MG/DL
HCT VFR BLD AUTO: 40.9 % (ref 34.8–46.1)
HGB BLD-MCNC: 13.7 G/DL (ref 11.5–15.4)
HGB UR QL STRIP.AUTO: ABNORMAL
IMM GRANULOCYTES # BLD AUTO: 0.02 THOUSAND/UL (ref 0–0.2)
IMM GRANULOCYTES NFR BLD AUTO: 0 % (ref 0–2)
KETONES UR STRIP-MCNC: NEGATIVE MG/DL
LEUKOCYTE ESTERASE UR QL STRIP: NEGATIVE
LIPASE SERPL-CCNC: 154 U/L (ref 73–393)
LYMPHOCYTES # BLD AUTO: 1.29 THOUSANDS/ΜL (ref 0.6–4.47)
LYMPHOCYTES NFR BLD AUTO: 24 % (ref 14–44)
MCH RBC QN AUTO: 29.9 PG (ref 26.8–34.3)
MCHC RBC AUTO-ENTMCNC: 33.5 G/DL (ref 31.4–37.4)
MCV RBC AUTO: 89 FL (ref 82–98)
MONOCYTES # BLD AUTO: 0.53 THOUSAND/ΜL (ref 0.17–1.22)
MONOCYTES NFR BLD AUTO: 10 % (ref 4–12)
NEUTROPHILS # BLD AUTO: 3.46 THOUSANDS/ΜL (ref 1.85–7.62)
NEUTS SEG NFR BLD AUTO: 64 % (ref 43–75)
NITRITE UR QL STRIP: NEGATIVE
NON-SQ EPI CELLS URNS QL MICRO: NORMAL /HPF
NRBC BLD AUTO-RTO: 0 /100 WBCS
PH UR STRIP.AUTO: 8.5 [PH] (ref 4.5–8)
PLATELET # BLD AUTO: 323 THOUSANDS/UL (ref 149–390)
PMV BLD AUTO: 9.1 FL (ref 8.9–12.7)
POTASSIUM SERPL-SCNC: 3 MMOL/L (ref 3.5–5.3)
PROT SERPL-MCNC: 7.3 G/DL (ref 6.4–8.2)
PROT UR STRIP-MCNC: NEGATIVE MG/DL
RBC # BLD AUTO: 4.58 MILLION/UL (ref 3.81–5.12)
RBC #/AREA URNS AUTO: NORMAL /HPF
SODIUM SERPL-SCNC: 129 MMOL/L (ref 136–145)
SODIUM SERPL-SCNC: 130 MMOL/L (ref 136–145)
SP GR UR STRIP.AUTO: 1.02 (ref 1–1.03)
TROPONIN I SERPL-MCNC: <0.02 NG/ML
UROBILINOGEN UR QL STRIP.AUTO: 0.2 E.U./DL
WBC # BLD AUTO: 5.39 THOUSAND/UL (ref 4.31–10.16)
WBC #/AREA URNS AUTO: NORMAL /HPF

## 2020-07-19 PROCEDURE — 81001 URINALYSIS AUTO W/SCOPE: CPT

## 2020-07-19 PROCEDURE — 99284 EMERGENCY DEPT VISIT MOD MDM: CPT | Performed by: EMERGENCY MEDICINE

## 2020-07-19 PROCEDURE — 84295 ASSAY OF SERUM SODIUM: CPT | Performed by: PHYSICIAN ASSISTANT

## 2020-07-19 PROCEDURE — 83690 ASSAY OF LIPASE: CPT | Performed by: PHYSICIAN ASSISTANT

## 2020-07-19 PROCEDURE — 74177 CT ABD & PELVIS W/CONTRAST: CPT

## 2020-07-19 PROCEDURE — 96360 HYDRATION IV INFUSION INIT: CPT

## 2020-07-19 PROCEDURE — 85025 COMPLETE CBC W/AUTO DIFF WBC: CPT | Performed by: PHYSICIAN ASSISTANT

## 2020-07-19 PROCEDURE — 99284 EMERGENCY DEPT VISIT MOD MDM: CPT

## 2020-07-19 PROCEDURE — 36415 COLL VENOUS BLD VENIPUNCTURE: CPT | Performed by: PHYSICIAN ASSISTANT

## 2020-07-19 PROCEDURE — 93005 ELECTROCARDIOGRAM TRACING: CPT

## 2020-07-19 PROCEDURE — 80053 COMPREHEN METABOLIC PANEL: CPT | Performed by: PHYSICIAN ASSISTANT

## 2020-07-19 PROCEDURE — 84484 ASSAY OF TROPONIN QUANT: CPT | Performed by: PHYSICIAN ASSISTANT

## 2020-07-19 RX ORDER — FAMOTIDINE 20 MG/1
20 TABLET, FILM COATED ORAL DAILY
COMMUNITY
End: 2020-07-29

## 2020-07-19 RX ORDER — MAGNESIUM HYDROXIDE/ALUMINUM HYDROXICE/SIMETHICONE 120; 1200; 1200 MG/30ML; MG/30ML; MG/30ML
30 SUSPENSION ORAL ONCE
Status: COMPLETED | OUTPATIENT
Start: 2020-07-19 | End: 2020-07-19

## 2020-07-19 RX ORDER — SUCRALFATE ORAL 1 G/10ML
1 SUSPENSION ORAL 4 TIMES DAILY
Qty: 420 ML | Refills: 0 | Status: SHIPPED | OUTPATIENT
Start: 2020-07-19 | End: 2020-07-28 | Stop reason: CLARIF

## 2020-07-19 RX ORDER — LIDOCAINE HYDROCHLORIDE 20 MG/ML
15 SOLUTION OROPHARYNGEAL ONCE
Status: COMPLETED | OUTPATIENT
Start: 2020-07-19 | End: 2020-07-19

## 2020-07-19 RX ADMIN — ALUMINUM HYDROXIDE, MAGNESIUM HYDROXIDE, AND SIMETHICONE 30 ML: 200; 200; 20 SUSPENSION ORAL at 11:50

## 2020-07-19 RX ADMIN — IOHEXOL 100 ML: 350 INJECTION, SOLUTION INTRAVENOUS at 13:13

## 2020-07-19 RX ADMIN — LIDOCAINE HYDROCHLORIDE 15 ML: 20 SOLUTION ORAL; TOPICAL at 11:50

## 2020-07-19 RX ADMIN — SODIUM CHLORIDE 500 ML: 0.9 INJECTION, SOLUTION INTRAVENOUS at 13:22

## 2020-07-19 NOTE — DISCHARGE INSTRUCTIONS
DISCHARGE INSTRUCTIONS:    FOLLOW UP WITH YOUR PRIMARY CARE PROVIDER OR THE [address] [place] [address]  MAKE AN APPOINTMENT TO BE SEEN  FOLLOW UP WITH THE RECOMMENDED GASTROENTEROLOGIST AND SPINE SPECIALIST  TAKE MEDICATION AS PRESCRIBED  IF RASH, SHORTNESS OF BREATH OR TROUBLE SWALLOWING, STOP TAKING THE MEDICATION AND BE SEEN  REST AND DRINK PLENTY OF FLUIDS  IF SYMPTOMS WORSEN OR NEW SYMPTOMS ARISE, RETURN TO THE ER TO BE SEEN

## 2020-07-19 NOTE — ED PROVIDER NOTES
History  Chief Complaint   Patient presents with    Abdominal Pain     epigastric pain x3wks, PCP told to take otc meds, no relief  denies n/v/d/f and tolerates food well       80y  o female with PMH of anxiety, back pain, skin cancer, cataract, emphysema, GERD, HLD, HTN, hypothyroidism, asthma and sciatica presents to the ER for epigastric pain for 2-3 weeks  Patient has been taking Protonix and Nexium for symptoms without relief  She cannot describe her pain  Pain radiates across her upper abdomen and at times into her chest  Pain is constant  Associated symptoms: occasional nausea  She denies fever, chills, URI symptoms, current chest pain, dyspnea, vomiting, diarrhea, urinary symptoms, weakness or paresthesias  History provided by:  Patient   used: No        Prior to Admission Medications   Prescriptions Last Dose Informant Patient Reported? Taking?    Black Elderberry,Berry-Flower, 575 MG CAPS  Self Yes Yes   Sig: Take by mouth daily   Cholecalciferol (VITAMIN D3) 2000 units capsule  Self Yes Yes   Sig: Take 4,000 Units by mouth daily    Formoterol Fumarate (FORADIL AEROLIZER IN)   Yes Yes   Sig: Foradil Aerolizer 12 mcg capsule with inhalation device   Omega-3 Fatty Acids (FISH OIL PO)  Self Yes Yes   Sig: Take 1,200 mg by mouth 2 (two) times a day   acetaminophen (TYLENOL) 500 mg tablet   No Yes   Sig: Take 1 tablet (500 mg total) by mouth every 6 (six) hours as needed for mild pain or moderate pain   aspirin 81 MG tablet  Self Yes Yes   Sig: Take 81 mg by mouth daily   atorvastatin (LIPITOR) 10 mg tablet   No Yes   Sig: TAKE ONE TABLET BY MOUTH EVERY DAY   cloNIDine (CATAPRES) 0 1 mg tablet   No Yes   Si po bid prn if BP greater than 150   esomeprazole (NexIUM) 20 mg capsule   Yes Yes   Sig: Take 20 mg by mouth daily in the early morning   famotidine (PEPCID) 20 mg tablet   Yes Yes   Sig: Take 20 mg by mouth daily   hydrochlorothiazide (HYDRODIURIL) 25 mg tablet   No Yes   Sig: TAKE ONE TABLET BY MOUTH EVERY DAY   ibuprofen (MOTRIN) 400 mg tablet   No Yes   Sig: Take 1 tablet (400 mg total) by mouth every 6 (six) hours as needed for mild pain or moderate pain   levothyroxine 50 mcg tablet   No Yes   Sig: Take 1 tablet (50 mcg total) by mouth daily   lidocaine (LIDODERM) 5 %   No Yes   Sig: Apply 1 patch topically daily Remove & Discard patch within 12 hours or as directed by MD   losartan (COZAAR) 100 MG tablet  Self Yes Yes   Sig: Take 25 mg by mouth daily   meclizine (ANTIVERT) 12 5 MG tablet Not Taking at Unknown time  No No   Si/2-1  Po tid prn for dizziness   Patient not taking: Reported on 2020   methocarbamol (ROBAXIN) 500 mg tablet   No Yes   Sig: Take 1 tablet (500 mg total) by mouth 2 (two) times a day   traMADol (ULTRAM) 50 mg tablet   No Yes   Sig: Take 1 tablet (50 mg total) by mouth every 6 (six) hours as needed for moderate pain   umeclidinium-vilanterol (Anoro Ellipta) 62 5-25 MCG/INH inhaler   No Yes   Sig: Inhale 1 puff daily      Facility-Administered Medications: None       Past Medical History:   Diagnosis Date    Anxiety     Back pain     Cancer (HCC)     skin    Cataract     Emphysema lung ([facility] [address] )     Geographic tongue     last assessed: 2014    GERD (gastroesophageal reflux disease)     Hyperlipidemia     Hypertension     Hypothyroidism (acquired)     Mild intermittent asthma     Sciatica     Seasonal allergies        Past Surgical History:   Procedure Laterality Date    BACK SURGERY         Family History   Problem Relation Age of Onset    Stroke Mother     Cirrhosis Father         alcoholic    Cancer Sister     Cancer Daughter      I have reviewed and agree with the history as documented      E-Cigarette/Vaping    E-Cigarette Use Never User      E-Cigarette/Vaping Substances    Nicotine No     THC No     CBD No     Flavoring No     Other No     Unknown No      Social History     Tobacco Use    Smoking status: Former Smoker Packs/day: 2 00     Years: 36 00     Pack years: 72 00     Types: Cigarettes     Last attempt to quit:      Years since quittin 5    Smokeless tobacco: Never Used   Substance Use Topics    Alcohol use: No    Drug use: No       Review of Systems   Constitutional: Negative for activity change, appetite change, chills and fever  HENT: Negative for congestion, drooling, ear discharge, ear pain, facial swelling, rhinorrhea and sore throat  Eyes: Negative for redness  Respiratory: Negative for cough and shortness of breath  Cardiovascular: Negative for chest pain (intermittently but not currently)  Gastrointestinal: Positive for abdominal pain and nausea  Negative for diarrhea and vomiting  Genitourinary: Negative for dysuria, frequency, hematuria and urgency  Musculoskeletal: Negative for neck stiffness  Skin: Negative for rash  Allergic/Immunologic: Negative for food allergies  Neurological: Negative for weakness and numbness  Physical Exam  Physical Exam   Constitutional: She is active  Non-toxic appearance  No distress  HENT:   Head: Normocephalic and atraumatic  Eyes: Conjunctivae are normal    Neck: Normal range of motion  Neck supple  No tracheal deviation present  Cardiovascular: Normal rate, regular rhythm, S1 normal, S2 normal and normal heart sounds  Exam reveals no gallop and no friction rub  No murmur heard  Pulmonary/Chest: Effort normal and breath sounds normal  No respiratory distress  She has no decreased breath sounds  She has no wheezes  She has no rhonchi  She has no rales  She exhibits no tenderness  Abdominal: Soft  Bowel sounds are normal  She exhibits no distension  There is tenderness in the epigastric area  There is no rebound and no guarding  Neurological: She is alert  GCS eye subscore is 4  GCS verbal subscore is 5  GCS motor subscore is 6  Skin: Skin is warm and dry  No rash noted  Psychiatric: She has a normal mood and affect  Nursing note and vitals reviewed        Vital Signs  ED Triage Vitals   Temperature Pulse Respirations Blood Pressure SpO2   07/19/20 1126 07/19/20 1126 07/19/20 1126 07/19/20 1126 07/19/20 1126   98 5 °F (36 9 °C) 104 16 (!) 186/88 91 %      Temp Source Heart Rate Source Patient Position - Orthostatic VS BP Location FiO2 (%)   07/19/20 1126 07/19/20 1322 07/19/20 1126 07/19/20 1126 --   Oral Monitor Sitting Right arm       Pain Score       07/19/20 1126       4           Vitals:    07/19/20 1126 07/19/20 1322   BP: (!) 186/88 133/82   Pulse: 104 86   Patient Position - Orthostatic VS: Sitting Sitting         Visual Acuity      ED Medications  Medications   aluminum-magnesium hydroxide-simethicone (MYLANTA) 200-200-20 mg/5 mL oral suspension 30 mL (30 mL Oral Given 7/19/20 1150)   Lidocaine Viscous HCl (XYLOCAINE) 2 % mucosal solution 15 mL (15 mL Swish & Swallow Given 7/19/20 1150)   sodium chloride 0 9 % bolus 500 mL (0 mL Intravenous Stopped 7/19/20 1404)   iohexol (OMNIPAQUE) 350 MG/ML injection (MULTI-DOSE) 100 mL (100 mL Intravenous Given 7/19/20 1313)       Diagnostic Studies  Results Reviewed     Procedure Component Value Units Date/Time    Sodium [[id number]]  (Abnormal) Collected:  07/19/20 1408    Lab Status:  Final result Specimen:  Blood from Arm, Left Updated:  07/19/20 1422     Sodium 129 mmol/L     Urine Microscopic [[id number]] Collected:  07/19/20 1304    Lab Status:  Final result Specimen:  Urine, Other Updated:  07/19/20 1322     RBC, UA None Seen /hpf      WBC, UA None Seen /hpf      Epithelial Cells Occasional /hpf      Bacteria, UA None Seen /hpf      AMORPH PHOSPATES Occasional /hpf     POCT urinalysis dipstick [[id number]]  (Abnormal) Resulted:  07/19/20 1307    Lab Status:  Final result Specimen:  Urine Updated:  07/19/20 1307    Urine Macroscopic, POC [[id number]]  (Abnormal) Collected:  07/19/20 1304    Lab Status:  Final result Specimen:  Urine Updated:  07/19/20 1305     Color, UA Yellow Clarity, UA Clear     pH, UA 8 5     Leukocytes, UA Negative     Nitrite, UA Negative     Protein, UA Negative mg/dl      Glucose, UA Negative mg/dl      Ketones, UA Negative mg/dl      Urobilinogen, UA 0 2 E U /dl      Bilirubin, UA Negative     Blood, UA Trace     Specific [place], UA 1 020    Narrative:       CLINITEK RESULT    Troponin I [[id number]]  (Normal) Collected:  07/19/20 1150    Lab Status:  Final result Specimen:  Blood from Arm, Left Updated:  07/19/20 1218     Troponin I <0 02 ng/mL     Comprehensive metabolic panel [[id number]]  (Abnormal) Collected:  07/19/20 1150    Lab Status:  Final result Specimen:  Blood from Arm, Left Updated:  07/19/20 1216     Sodium 130 mmol/L      Potassium 3 0 mmol/L      Chloride 88 mmol/L      CO2 38 mmol/L      ANION GAP 4 mmol/L      BUN 8 mg/dL      Creatinine 0 73 mg/dL      Glucose 118 mg/dL      Calcium 8 9 mg/dL      AST 27 U/L      ALT 29 U/L      Alkaline Phosphatase 94 U/L      Total Protein 7 3 g/dL      Albumin 3 7 g/dL      Total Bilirubin 0 50 mg/dL      eGFR 77 ml/min/1 73sq m     Narrative:       [facility] guidelines for Chronic Kidney Disease (CKD):     Stage 1 with normal or high GFR (GFR > 90 mL/min/1 73 square meters)    Stage 2 Mild CKD (GFR = 60-89 mL/min/1 73 square meters)    Stage 3A Moderate CKD (GFR = 45-59 mL/min/1 73 square meters)    Stage 3B Moderate CKD (GFR = 30-44 mL/min/1 73 square meters)    Stage 4 Severe CKD (GFR = 15-29 mL/min/1 73 square meters)    Stage 5 End Stage CKD (GFR <15 mL/min/1 73 square meters)  Note: GFR calculation is accurate only with a steady state creatinine    Lipase [[id number]]  (Normal) Collected:  07/19/20 1150    Lab Status:  Final result Specimen:  Blood from Arm, Left Updated:  07/19/20 1216     Lipase 154 u/L     CBC and differential [[id number]] Collected:  07/19/20 1150    Lab Status:  Final result Specimen:  Blood from Arm, Left Updated:  07/19/20 1159     WBC 5 39 Thousand/uL      RBC 4 58 Million/uL      Hemoglobin 13 7 g/dL      Hematocrit 40 9 %      MCV 89 fL      MCH 29 9 pg      MCHC 33 5 g/dL      RDW 13 4 %      MPV 9 1 fL      Platelets 814 Thousands/uL      nRBC 0 /100 WBCs      Neutrophils Relative 64 %      Immat GRANS % 0 %      Lymphocytes Relative 24 %      Monocytes Relative 10 %      Eosinophils Relative 1 %      Basophils Relative 1 %      Neutrophils Absolute 3 46 Thousands/µL      Immature Grans Absolute 0 02 Thousand/uL      Lymphocytes Absolute 1 29 Thousands/µL      Monocytes Absolute 0 53 Thousand/µL      Eosinophils Absolute 0 06 Thousand/µL      Basophils Absolute 0 03 Thousands/µL                  CT abdomen pelvis with contrast   Final Result by Spencer Katz MD (07/19 7997)   1  No acute findings in the abdomen or pelvis  2   There is now vertebral plana of the T12 vertebral body with bony retropulsion of approximately 11 mm  This has progressed from the prior CT lumbar spine examination of 5/16/2020  Mild anterior wedging of T11 of indeterminate age  Follow-up with MRI    as warranted  3   Pancreatic cystic lesion at the head measuring up to 1 6 cm  For simple cyst(s) between 1 5 to 2 0 cm, recommend followup every 6 months for 4 times, then every 1 year for 2 times, then every 2 years for 3 times  If stable after 10 years, then no    more followups  (If patient reaches age [de-identified], then can switch to age [de-identified] algorithm ) Recommend next followup in 6 months   Preferred imaging modality: abdomen MRI and MRCP with and without IV contrast, or triple phase abdomen CT with IV contrast, or abdomen    MRI and MRCP without IV contrast       The recommendations regarding pancreatic findings assumes that patient does not have family history of pancreatic cancer nor have any symptoms potentially attributable to pancreatic cystic lesions (hyperamylasemia, recent-onset diabetes, severe    epigastric pain, weight loss, steatorrhea, or jaundice ) If these conditions are not true, then management should be deferred to judgement of specialists such as gastroenterologists or oncologic surgeons  Recommendations are based on recent consensus    statements on management of pancreatic cystic lesions from [address] [place] [address] Gastroenterology Association, [facility], the journal Pancreatology, and our own institutional consensus  REFERENCES:   [facility] Guidelines on the Diagnosis and Management of Asymptomatic Neoplastic Pancreatic Cysts  Gastroenterology 2015; 751:[phone] (AGA GUIDELINES)   International Consensus Guidelines for Management of Intraductal Papillary Mucinous Neoplasm and Mucinous Cystic Neoplasms of the Pancreas  Pancreatology 12 (2012) 183-197 Antoinette Chase paper)   [facility] Radiology: White Paper: Managing Incidental Findings on Abdominal CT, JACR, October 2011  (ACR GUIDELINES )      The study was marked in EPIC for immediate notification                                        Workstation performed: [id number]                    Procedures  ECG 12 Lead Documentation Only  Date/Time: 7/19/2020 11:45 AM  Performed by: Isabel Douglas PA-C  Authorized by: Isabel Douglas PA-C     Indications / Diagnosis:  Epigastric abdominal pain  ECG reviewed by me, the ED Provider: yes (Also reviewed by Dr Preet Nolasco)    Patient location:  ED  Interpretation:     Interpretation: abnormal    Rate:     ECG rate:  89    ECG rate assessment: normal    Rhythm:     Rhythm: sinus rhythm      Rhythm comment:  With arrhythmia  Ectopy:     Ectopy: none    QRS:     QRS intervals:  Normal  Conduction:     Conduction: normal    ST segments:     ST segments:  Normal  T waves:     T waves: inverted      Inverted:  AVR, aVL, V1 and V2             ED Course                                             MDM  Number of Diagnoses or Management Options  Adrenal nodule [facility]): new and requires workup  Hepatic lesion: new and requires workup  Hypercarbia: new and requires workup  Hypochloremia: new and requires workup  Hypokalemia: new and requires workup  Hyponatremia: new and requires workup  Left inguinal hernia: new and requires workup  Pancreatic lesion: new and requires workup  Right nephrolithiasis: new and requires workup  Thoracic compression fracture [facility]): new and requires workup  Diagnosis management comments: DDX consists of but not limited to: pancreatitis, obstruction, cancer, cholecystitis    Will check labs and imaging  Sodium 130  Will recheck after fluids  Sodium 129 but sodium ranges from 129-121 normally  Informed patient of lab and imaging findings  Patient reporting improvement in symptoms  Will discharge with outpatient follow up  Patient agreeable  At discharge, I instructed the patient to:  -follow up with pcp  -take medication as prescribed  -follow up with GI and spine specialist  -rest and drink plenty of fluids  -return to the ER if symptoms worsened or new symptoms arose  Patient agreed to this plan and was stable at time of discharge         Amount and/or Complexity of Data Reviewed  Clinical lab tests: ordered and reviewed  Tests in the radiology section of CPT®: ordered and reviewed  Discuss the patient with other providers: yes    Patient Progress  Patient progress: stable        Disposition  Final diagnoses:   Pancreatic lesion   Hyponatremia   Hypokalemia   Hypochloremia   Hypercarbia   Hepatic lesion   Adrenal nodule ([address] )   Right nephrolithiasis   Left inguinal hernia   Thoracic compression fracture ([facility] [address] ) - vertebral plana   Epigastric pain     Time reflects when diagnosis was documented in both MDM as applicable and the Disposition within this note     Time User Action Codes Description Comment    7/19/2020  2:13 PM Lynne MEDRANO Add [K86 9] Pancreatic lesion     7/19/2020  2:13 PM Lynne MEDRANO Add [E87 1] Hyponatremia     7/19/2020  2:13 PM Lynne MEDRANO Add [E87 6] Hypokalemia 7/19/2020  2:13 PM Ethelda Boehringer A Add [E87 8] Hypochloremia     7/19/2020  2:14 PM Ethelda Boehringer A Add [R06 89] Hypercarbia     7/19/2020  2:14 PM Ethelda Boehringer A Add [K76 9] Hepatic lesion     7/19/2020  2:14 PM Ethelda Boehringer A Add [E27 8] Adrenal nodule ([address] )     7/19/2020  2:14 PM Ethelda Boehringer A Add [N20 0] Right nephrolithiasis     7/19/2020  2:14 PM Ethelda Boehringer A Add [K40 90] Left inguinal hernia     7/19/2020  2:15 PM Ethelda Boehringer A Add [W22 818Y] Thoracic compression fracture ([address] )     7/19/2020  2:15 PM [place], [employer] [employer] [S76 746M] Thoracic compression fracture ([address] ) vertebral plana    7/19/2020  2:35 PM Ethelda Boehringer A Add [R10 13] Epigastric pain       ED Disposition     ED Disposition Condition Date/Time Comment    Discharge Stable Sun Jul 19, 2020  2:34 PM Charlane Riding discharge to home/self care              Follow-up Information     Follow up With Specialties Details Why Contact Info Additional Information    Shandra Cartagena MD Family Medicine Schedule an appointment as soon as possible for a visit   [address] [place] [id number]  [phone]       Louise Tirado [facility] Schedule an appointment as soon as possible for a visit in 1 day pancreatic lesion, hepatic lesion, adrenal nodule [address]  [address] [facility] [address]  [address] [facility], [address], [address], [place], [place], [address] [address] [facility] [place] Schedule an appointment as soon as possible for a visit in 1 day vertebral plana of T12 [facility] [address]  [address], [address], [address] [place], [place], [address] [phone]          Discharge Medication List as of 7/19/2020  2:36 PM      START taking these medications    Details   sucralfate (CARAFATE) 1 g/10 mL suspension Take 10 mL (1 g total) by mouth 4 (four) times a day, Starting Sun 7/19/2020, Normal         CONTINUE these medications which have NOT CHANGED    Details   acetaminophen (TYLENOL) 500 mg tablet Take 1 tablet (500 mg total) by mouth every 6 (six) hours as needed for mild pain or moderate pain, Starting Sat 5/16/2020, Print      aspirin 81 MG tablet Take 81 mg by mouth daily, Historical Med      atorvastatin (LIPITOR) 10 mg tablet TAKE ONE TABLET BY MOUTH EVERY DAY, Normal      Black Elderberry,Berry-Flower, 575 MG CAPS Take by mouth daily, Historical Med      Cholecalciferol (VITAMIN D3) 2000 units capsule Take 4,000 Units by mouth daily , Historical Med      cloNIDine (CATAPRES) 0 1 mg tablet 1 po bid prn if BP greater than 150, Normal      esomeprazole (NexIUM) 20 mg capsule Take 20 mg by mouth daily in the early morning, Historical Med      famotidine (PEPCID) 20 mg tablet Take 20 mg by mouth daily, Historical Med      Formoterol Fumarate (FORADIL AEROLIZER IN) Foradil Aerolizer 12 mcg capsule with inhalation device, Historical Med      hydrochlorothiazide (HYDRODIURIL) 25 mg tablet TAKE ONE TABLET BY MOUTH EVERY DAY, Normal      ibuprofen (MOTRIN) 400 mg tablet Take 1 tablet (400 mg total) by mouth every 6 (six) hours as needed for mild pain or moderate pain, Starting Sat 5/16/2020, Print      levothyroxine 50 mcg tablet Take 1 tablet (50 mcg total) by mouth daily, Starting u 2/6/2020, Until Sun 7/19/2020, Print      lidocaine (LIDODERM) 5 % Apply 1 patch topically daily Remove & Discard patch within 12 hours or as directed by MD, Starting Sat 5/16/2020, Print      losartan (COZAAR) 100 MG tablet Take 25 mg by mouth daily, Historical Med      methocarbamol (ROBAXIN) 500 mg tablet Take 1 tablet (500 mg total) by mouth 2 (two) times a day, Starting Sat 5/16/2020, Print      Omega-3 Fatty Acids (FISH OIL PO) Take 1,200 mg by mouth 2 (two) times a day, Historical Med      traMADol (ULTRAM) 50 mg tablet Take 1 tablet (50 mg total) by mouth every 6 (six) hours as needed for moderate pain, Starting Fri 5/29/2020, Normal      umeclidinium-vilanterol (Anoro Ellipta) 62 5-25 MCG/INH inhaler Inhale 1 puff daily, Starting Thu 3/26/2020, Normal      meclizine (ANTIVERT) 12 5 MG tablet 1/2-1  Po tid prn for dizziness, Normal           No discharge procedures on file      PDMP Review       Value Time User    PDMP Reviewed  Yes 5/29/2020  9:51 AM Alexis Malcolm MD          ED Provider  Electronically Signed by           Bud Trevino PA-C  07/19/20 9378

## 2020-07-20 LAB
ATRIAL RATE: 89 BPM
P AXIS: 74 DEGREES
PR INTERVAL: 168 MS
QRS AXIS: 75 DEGREES
QRSD INTERVAL: 72 MS
QT INTERVAL: 336 MS
QTC INTERVAL: 408 MS
T WAVE AXIS: 78 DEGREES
VENTRICULAR RATE: 89 BPM

## 2020-07-20 PROCEDURE — 93010 ELECTROCARDIOGRAM REPORT: CPT | Performed by: INTERNAL MEDICINE

## 2020-07-22 ENCOUNTER — TELEPHONE (OUTPATIENT)
Dept: FAMILY MEDICINE CLINIC | Facility: CLINIC | Age: 80
End: 2020-07-22

## 2020-07-22 NOTE — TELEPHONE ENCOUNTER
Pt called in because she has an appt on 7/29/20 with Gastro Dr Toan Boothe and wants to know if this is a good Dr Jones Gonzalez if there is someone else Dr Gavi Marcus will recommend

## 2020-07-23 NOTE — TELEPHONE ENCOUNTER
Just to confirm  Juliet Carrera are you ok with her seeing him? Or do you have another recommendation?

## 2020-07-28 ENCOUNTER — OFFICE VISIT (OUTPATIENT)
Dept: FAMILY MEDICINE CLINIC | Facility: CLINIC | Age: 80
End: 2020-07-28
Payer: COMMERCIAL

## 2020-07-28 VITALS
RESPIRATION RATE: 24 BRPM | HEART RATE: 88 BPM | TEMPERATURE: 99.4 F | SYSTOLIC BLOOD PRESSURE: 144 MMHG | HEIGHT: 67 IN | BODY MASS INDEX: 23.86 KG/M2 | DIASTOLIC BLOOD PRESSURE: 88 MMHG | WEIGHT: 152 LBS

## 2020-07-28 DIAGNOSIS — K86.2 PANCREATIC CYST: Primary | ICD-10-CM

## 2020-07-28 DIAGNOSIS — E78.2 MIXED HYPERLIPIDEMIA: ICD-10-CM

## 2020-07-28 DIAGNOSIS — I10 ESSENTIAL HYPERTENSION: ICD-10-CM

## 2020-07-28 PROCEDURE — 3077F SYST BP >= 140 MM HG: CPT | Performed by: FAMILY MEDICINE

## 2020-07-28 PROCEDURE — 3008F BODY MASS INDEX DOCD: CPT | Performed by: FAMILY MEDICINE

## 2020-07-28 PROCEDURE — 1160F RVW MEDS BY RX/DR IN RCRD: CPT | Performed by: FAMILY MEDICINE

## 2020-07-28 PROCEDURE — 3079F DIAST BP 80-89 MM HG: CPT | Performed by: FAMILY MEDICINE

## 2020-07-28 PROCEDURE — 99214 OFFICE O/P EST MOD 30 MIN: CPT | Performed by: FAMILY MEDICINE

## 2020-07-28 PROCEDURE — 4040F PNEUMOC VAC/ADMIN/RCVD: CPT | Performed by: FAMILY MEDICINE

## 2020-07-28 PROCEDURE — 1036F TOBACCO NON-USER: CPT | Performed by: FAMILY MEDICINE

## 2020-07-28 RX ORDER — ATORVASTATIN CALCIUM 10 MG/1
10 TABLET, FILM COATED ORAL DAILY
Qty: 90 TABLET | Refills: 1 | Status: SHIPPED | OUTPATIENT
Start: 2020-07-28 | End: 2020-10-06

## 2020-07-28 RX ORDER — OLMESARTAN MEDOXOMIL 40 MG/1
40 TABLET ORAL DAILY
COMMUNITY
Start: 2020-07-02 | End: 2020-07-28 | Stop reason: CLARIF

## 2020-07-28 RX ORDER — LEVOTHYROXINE SODIUM 0.05 MG/1
50 TABLET ORAL DAILY
Qty: 90 TABLET | Refills: 1 | Status: SHIPPED | OUTPATIENT
Start: 2020-07-28 | End: 2020-09-21

## 2020-07-28 RX ORDER — LOSARTAN POTASSIUM 100 MG/1
100 TABLET ORAL DAILY
Qty: 90 TABLET | Refills: 1 | Status: SHIPPED | OUTPATIENT
Start: 2020-07-28 | End: 2020-10-29 | Stop reason: SDUPTHER

## 2020-07-28 RX ORDER — HYDROCHLOROTHIAZIDE 25 MG/1
25 TABLET ORAL DAILY
Qty: 90 TABLET | Refills: 1 | Status: SHIPPED | OUTPATIENT
Start: 2020-07-28 | End: 2020-10-06

## 2020-07-28 NOTE — PROGRESS NOTES
Assessment and Plan:    Problem List Items Addressed This Visit        Digestive    Pancreatic cyst - Primary     Follow up every 6 months with CT or MRI, await GI eval            Cardiovascular and Mediastinum    Essential hypertension     BP stable         Relevant Medications    losartan (COZAAR) 100 MG tablet    hydrochlorothiazide (HYDRODIURIL) 25 mg tablet    levothyroxine 50 mcg tablet       Other    Mixed hyperlipidemia     Lab stable         Relevant Medications    atorvastatin (LIPITOR) 10 mg tablet                 Diagnoses and all orders for this visit:    Pancreatic cyst    Essential hypertension  Comments:  BP stable  Orders:  -     losartan (COZAAR) 100 MG tablet; Take 1 tablet (100 mg total) by mouth daily  -     hydrochlorothiazide (HYDRODIURIL) 25 mg tablet; Take 1 tablet (25 mg total) by mouth daily  -     levothyroxine 50 mcg tablet; Take 1 tablet (50 mcg total) by mouth daily    Mixed hyperlipidemia  -     atorvastatin (LIPITOR) 10 mg tablet; Take 1 tablet (10 mg total) by mouth daily    Other orders  -     Discontinue: olmesartan (BENICAR) 40 mg tablet; Take 40 mg by mouth daily              Subjective:      Patient ID: Safia Tracey is a 79 y o  female  CC:    Chief Complaint   Patient presents with    Abdominal Pain     Patient present today for abdominal pain for the past 2 months  Patient does have an appointment with gastro tomorrow  Pt also mentioned the pharmacy filled her Olmesartan but she no longer takes it  she is supposed to take Losartan Potassium 100 mg  Please discuss         HPI:    Here for BP check, seeing GI for abd pain and pancreatic cyst      The following portions of the patient's history were reviewed and updated as appropriate: allergies, current medications, past family history, past medical history, past social history, past surgical history and problem list      Review of Systems   Constitutional: Negative for activity change, appetite change, fatigue and unexpected weight change  Respiratory: Negative for shortness of breath  Cardiovascular: Negative for chest pain  Gastrointestinal: Positive for abdominal pain  Negative for abdominal distention  Neurological: Negative for dizziness and headaches  Data to review:       Objective:    Vitals:    07/28/20 1505   BP: 144/88   BP Location: Left arm   Patient Position: Sitting   Cuff Size: Standard   Pulse: 88   Resp: (!) 24   Temp: 99 4 °F (37 4 °C)   TempSrc: Temporal   Weight: 68 9 kg (152 lb)   Height: 5' 7" (1 702 m)        Physical Exam   Constitutional: She appears well-developed and well-nourished  Cardiovascular: Normal rate, regular rhythm and normal heart sounds  Pulmonary/Chest: Effort normal    Decreased breath sounds   Abdominal: Soft  Normal appearance and bowel sounds are normal  There is tenderness in the epigastric area  Musculoskeletal: She exhibits no edema  Vitals reviewed

## 2020-07-28 NOTE — PATIENT INSTRUCTIONS
Await ADÁN marcos, refill meds,rec tetanus shot with whopping cough at [facility]

## 2020-07-29 ENCOUNTER — OFFICE VISIT (OUTPATIENT)
Dept: GASTROENTEROLOGY | Facility: CLINIC | Age: 80
End: 2020-07-29
Payer: COMMERCIAL

## 2020-07-29 VITALS
HEART RATE: 74 BPM | BODY MASS INDEX: 23.54 KG/M2 | HEIGHT: 67 IN | WEIGHT: 150 LBS | SYSTOLIC BLOOD PRESSURE: 170 MMHG | TEMPERATURE: 99.3 F | DIASTOLIC BLOOD PRESSURE: 90 MMHG

## 2020-07-29 DIAGNOSIS — E87.8 ELECTROLYTE ABNORMALITY: ICD-10-CM

## 2020-07-29 DIAGNOSIS — K86.2 PANCREATIC CYST: ICD-10-CM

## 2020-07-29 DIAGNOSIS — R10.13 EPIGASTRIC ABDOMINAL PAIN: Primary | ICD-10-CM

## 2020-07-29 PROCEDURE — 3080F DIAST BP >= 90 MM HG: CPT | Performed by: INTERNAL MEDICINE

## 2020-07-29 PROCEDURE — 99204 OFFICE O/P NEW MOD 45 MIN: CPT | Performed by: INTERNAL MEDICINE

## 2020-07-29 PROCEDURE — 1160F RVW MEDS BY RX/DR IN RCRD: CPT | Performed by: INTERNAL MEDICINE

## 2020-07-29 PROCEDURE — 3077F SYST BP >= 140 MM HG: CPT | Performed by: INTERNAL MEDICINE

## 2020-07-29 RX ORDER — OMEPRAZOLE 40 MG/1
CAPSULE, DELAYED RELEASE ORAL
Qty: 90 CAPSULE | Refills: 0 | Status: SHIPPED | OUTPATIENT
Start: 2020-07-29 | End: 2020-09-16

## 2020-07-29 NOTE — PROGRESS NOTES
[facility] - Outpatient Consultation  Jaime Zazueta 79 y o  female MRN: [id number]  Encounter: [id number]          ASSESSMENT AND PLAN:      1  Epigastric abdominal pain  -differential diagnosis includes GERD, PUD, pancreatic malignancy, IBS  -patient remains high risk for sedation and would not want to undergo endoscopic evaluation at this time  -recommend aggressive acid management with high-dose PPI x1 month followed by reduction to once daily and re-evaluate symptoms in 4-6 weeks   -small frequent meals, nutrition supplements  -if no improvement and with continued weight loss/decreased appetite will re-evaluate possibility of EGD+/-EUS versus MRI/MRCP  - omeprazole (PriLOSEC) 40 MG capsule; Take 1 capsule (40 mg total) by mouth 2 (two) times a day before meals for 30 days, THEN 1 capsule (40 mg total) daily  Dispense: 90 capsule; Refill: 0    2  Pancreatic cyst  -1 6 cm cystic lesion in pancreatic head without PD dilation or CBD dilation in the setting of normal liver enzymes  -unlikely a cyst this size would be producing symptoms  -could consider imaging with MRI/MRCP  -she is not a surgical candidate given her severe emphysema and advanced age, given this would not recommend continued surveillance    3  Electrolyte abnormalities  -management as per PCP    RTC in 4-6 weeks  ______________________________________________________________________    HPI:  79-year-old female seen in consultation for epigastric pain with incidental finding of 1 6 cm pancreatic head cyst   Other past medical history includes hypertension, hypothyroidism, severe emphysema on chronic oxygen  Patient presents with 2 month history of persistent/progressive epigastric abdominal pain that radiates up the chest but not to back or shoulders  Denies associated nausea, vomiting, heartburn/reflux, changes in bowel habits, blood or melena in stool    Family however does endorse decreased appetite with an associated 11 lb weight loss over the past 2 months  Patient has been tried on low doses of PPI, H2 blockers and Carafate however only with mild improvement in her symptoms but still exhibits continuous abdominal pain  Surprising  Patient has never undergone endoscopic evaluation  REVIEW OF SYSTEMS:    CONSTITUTIONAL: Denies any fever, chills, rigors, +weight loss  HEENT: No earache or tinnitus  Denies hearing loss or visual disturbances  CARDIOVASCULAR: No chest pain or palpitations  RESPIRATORY: Denies any cough, hemoptysis, shortness of breath or dyspnea on exertion  GASTROINTESTINAL: As noted in the History of Present Illness  GENITOURINARY: No problems with urination  Denies any hematuria or dysuria  NEUROLOGIC: No dizziness or vertigo, denies headaches  MUSCULOSKELETAL: Denies any muscle or joint pain  SKIN: Denies skin rashes or itching  ENDOCRINE: Denies excessive thirst  Denies intolerance to heat or cold  PSYCHOSOCIAL: Denies depression or anxiety  Denies any recent memory loss         Historical Information   Past Medical History:   Diagnosis Date    Anxiety     Back pain     Cancer ([facility] )     skin    Cataract     Emphysema lung ([facility] )     Geographic tongue     last assessed: 2014    GERD (gastroesophageal reflux disease)     Hyperlipidemia     Hypertension     Hypothyroidism (acquired)     Mild intermittent asthma     Sciatica     Seasonal allergies      Past Surgical History:   Procedure Laterality Date    BACK SURGERY       Social History   Social History     Substance and Sexual Activity   Alcohol Use No     Social History     Substance and Sexual Activity   Drug Use No     Social History     Tobacco Use   Smoking Status Former Smoker    Packs/day: 2 00    Years: 36 00    Pack years: 72 00    Types: Cigarettes    Last attempt to quit:     Years since quittin 5   Smokeless Tobacco Never Used     Family History   Problem Relation Age of Onset    Stroke Mother     Cirrhosis Father         alcoholic    Cancer Sister     Cancer Daughter        Meds/Allergies       Current Outpatient Medications:     aspirin 81 MG tablet    atorvastatin (LIPITOR) 10 mg tablet    Black Elderberry,Berry-Flower, 575 MG CAPS    Cholecalciferol (VITAMIN D3) 2000 units capsule    hydrochlorothiazide (HYDRODIURIL) 25 mg tablet    levothyroxine 50 mcg tablet    losartan (COZAAR) 100 MG tablet    Omega-3 Fatty Acids (FISH OIL PO)    umeclidinium-vilanterol (Anoro Ellipta) 62 5-25 MCG/INH inhaler    acetaminophen (TYLENOL) 500 mg tablet    cloNIDine (CATAPRES) 0 1 mg tablet    Formoterol Fumarate (FORADIL AEROLIZER IN)    omeprazole (PriLOSEC) 40 MG capsule    Allergies   Allergen Reactions    Other Other (See Comments)     Steroids, it effects her eyes           Objective     Blood pressure 170/90, pulse 74, temperature 99 3 °F (37 4 °C), temperature source Tympanic, height 5' 7" (1 702 m), weight 68 kg (150 lb), not currently breastfeeding  Body mass index is 23 49 kg/m²  PHYSICAL EXAM:      General Appearance:   Alert, cooperative, no distress, on O2 via NC   HEENT:   Normocephalic, atraumatic, anicteric      Neck:  Supple, symmetrical, trachea midline   Lungs:   Clear to auscultation bilaterally; no rales, rhonchi or wheezing; respirations unlabored    Heart[de-identified]   Regular rate and rhythm; no murmur, rub, or gallop  Abdomen:   Soft, non-tender, non-distended; normal bowel sounds; no masses, no organomegaly    Genitalia:   Deferred    Rectal:   Deferred    Extremities:  No cyanosis, clubbing or edema        Skin:  No jaundice, rashes, or lesions    Lymph nodes:  No palpable cervical lymphadenopathy        Lab Results:   No visits with results within 1 Day(s) from this visit     Latest known visit with results is:   Admission on 07/19/2020, Discharged on 07/19/2020   Component Date Value    WBC 07/19/2020 5 39     RBC 07/19/2020 4 58     Hemoglobin 07/19/2020 13 7     Hematocrit 07/19/2020 40 9     MCV 07/19/2020 89     MCH 07/19/2020 29 9     MCHC 07/19/2020 33 5     RDW 07/19/2020 13 4     MPV 07/19/2020 9 1     Platelets 68/45/6096 323     nRBC 07/19/2020 0     Neutrophils Relative 07/19/2020 64     Immat GRANS % 07/19/2020 0     Lymphocytes Relative 07/19/2020 24     Monocytes Relative 07/19/2020 10     Eosinophils Relative 07/19/2020 1     Basophils Relative 07/19/2020 1     Neutrophils Absolute 07/19/2020 3 46     Immature Grans Absolute 07/19/2020 0 02     Lymphocytes Absolute 07/19/2020 1 29     Monocytes Absolute 07/19/2020 0 53     Eosinophils Absolute 07/19/2020 0 06     Basophils Absolute 07/19/2020 0 03     Sodium 07/19/2020 130*    Potassium 07/19/2020 3 0*    Chloride 07/19/2020 88*    CO2 07/19/2020 38*    ANION GAP 07/19/2020 4     BUN 07/19/2020 8     Creatinine 07/19/2020 0 73     Glucose 07/19/2020 118     Calcium 07/19/2020 8 9     AST 07/19/2020 27     ALT 07/19/2020 29     Alkaline Phosphatase 07/19/2020 94     Total Protein 07/19/2020 7 3     Albumin 07/19/2020 3 7     Total Bilirubin 07/19/2020 0 50     eGFR 07/19/2020 77     Lipase 07/19/2020 154     Troponin I 07/19/2020 <0 02     Color, UA 07/19/2020 Yellow     Clarity, UA 07/19/2020 Clear     pH, UA 07/19/2020 8 5*    Leukocytes, UA 07/19/2020 Negative     Nitrite, UA 07/19/2020 Negative     Protein, UA 07/19/2020 Negative     Glucose, UA 07/19/2020 Negative     Ketones, UA 07/19/2020 Negative     Urobilinogen, UA 07/19/2020 0 2     Bilirubin, UA 07/19/2020 Negative     Blood, UA 07/19/2020 Trace*    Specific [place], UA 07/19/2020 1 020     RBC, UA 07/19/2020 None Seen     WBC, UA 07/19/2020 None Seen     Epithelial Cells 07/19/2020 Occasional     Bacteria, UA 07/19/2020 None Seen     AMORPH PHOSPATES 07/19/2020 Occasional     Sodium 07/19/2020 129*    Ventricular Rate 07/19/2020 89     Atrial Rate 07/19/2020 89     IN Interval 07/19/2020 168     QRSD Interval 07/19/2020 72     QT Interval 07/19/2020 336     QTC Interval 07/19/2020 408     P Axis 07/19/2020 74     QRS Axis 07/19/2020 75     T Wave Axis 07/19/2020 78          Radiology Results:   Ct Abdomen Pelvis With Contrast    Result Date: 7/19/2020  Narrative: CT ABDOMEN AND PELVIS WITH IV CONTRAST INDICATION:   Epigastric abdominal pain  COMPARISON:  CT chest 4/24/2017 and additional priors TECHNIQUE:  CT examination of the abdomen and pelvis was performed  Axial, sagittal, and coronal 2D reformatted images were created from the source data and submitted for interpretation  Radiation dose length product (DLP) for this visit:  277 mGy-cm   This examination, like all CT scans performed in the [facility], was performed utilizing techniques to minimize radiation dose exposure, including the use of iterative reconstruction and automated exposure control  IV Contrast:  100 mL of iohexol (OMNIPAQUE) Enteric Contrast:  Enteric contrast was not administered  FINDINGS: ABDOMEN LOWER CHEST:  No clinically significant abnormality identified in the visualized lower chest  LIVER/BILIARY TREE:  One or more subcentimeter sharply circumscribed low-density hepatic lesion(s) are noted, too small to accurately characterize, but statistically most likely to represent subcentimeter hepatic cysts  No suspicious solid hepatic lesion is identified  Hepatic contours are normal   No biliary dilatation  GALLBLADDER:  No calcified gallstones  No pericholecystic inflammatory change  SPLEEN:  Unremarkable  PANCREAS:  Pancreatic cystic lesion at the head measures 1 6 x 1 5 cm image 19 series 2  No pancreatic ductal dilatation  ADRENAL GLANDS: 1 8 cm left adrenal nodule, indeterminate  Adrenal glands appear thickened bilaterally  This was previously characterized as adrenal adenoma  KIDNEYS/URETERS:  No hydronephrosis  5 mm calculus in the right kidney lower pole   A few subcentimeter hypodensities, too small to characterize  STOMACH AND BOWEL:  Limited evaluation without enteric contrast  No bowel obstruction  Mild-to-moderate fecal retention in the colon  APPENDIX:  No findings to suggest appendicitis  ABDOMINOPELVIC CAVITY:  No ascites  No pneumoperitoneum  No lymphadenopathy  VESSELS:  Dense wall calcification of the abdominal aorta which is ectatic measuring up to 2 1 cm in diameter  PELVIS REPRODUCTIVE ORGANS:  Incidentally noted are pelvic varices with prominence of the bilateral ovarian veins  URINARY BLADDER:  Underdistended limiting evaluation  ABDOMINAL WALL/INGUINAL REGIONS:  Small fat-containing left inguinal hernia  OSSEOUS STRUCTURES:  Deformity of the left pubic symphysis may be related to old healed fracture  Multilevel degenerative spurring of the spine  Vertebral plana of the T12 vertebral body with bony retropulsion of approximately 11 mm and moderate to severe spinal canal narrowing  This has progressed from the prior CT lumbar spine examination where there was previously mild compression deformity with only slight bony retropulsion  Mild anterior wedging at T11  Impression: 1  No acute findings in the abdomen or pelvis  2   There is now vertebral plana of the T12 vertebral body with bony retropulsion of approximately 11 mm  This has progressed from the prior CT lumbar spine examination of 5/16/2020  Mild anterior wedging of T11 of indeterminate age  Follow-up with MRI as warranted  3   Pancreatic cystic lesion at the head measuring up to 1 6 cm  For simple cyst(s) between 1 5 to 2 0 cm, recommend followup every 6 months for 4 times, then every 1 year for 2 times, then every 2 years for 3 times  If stable after 10 years, then no more followups  (If patient reaches age [de-identified], then can switch to age [de-identified] algorithm ) Recommend next followup in 6 months   Preferred imaging modality: abdomen MRI and MRCP with and without IV contrast, or triple phase abdomen CT with IV contrast, or abdomen  MRI and MRCP without IV contrast  The recommendations regarding pancreatic findings assumes that patient does not have family history of pancreatic cancer nor have any symptoms potentially attributable to pancreatic cystic lesions (hyperamylasemia, recent-onset diabetes, severe epigastric pain, weight loss, steatorrhea, or jaundice ) If these conditions are not true, then management should be deferred to judgement of specialists such as gastroenterologists or oncologic surgeons  Recommendations are based on recent consensus statements on management of pancreatic cystic lesions from [address] [place] [address] Gastroenterology Association, [employer], the journal Pancreatology, and our own institutional consensus  REFERENCES: [address] [facility] Guidelines on the Diagnosis and Management of Asymptomatic Neoplastic Pancreatic Cysts  Gastroenterology 2015; [phone] (AGA GUIDELINES) International Consensus Guidelines for Management of Intraductal Papillary Mucinous Neoplasm and Mucinous Cystic Neoplasms of the Pancreas  Pancreatology 12 (2012) 183-197 Garry Pedritoo paper) [employer]: White Paper: Managing Incidental Findings on Abdominal CT, JACR, October 2011  (ACR GUIDELINES ) The study was marked in EPIC for immediate notification                    Workstation performed: [id number]

## 2020-07-31 LAB
BUN SERPL-MCNC: 12 MG/DL (ref 7–25)
BUN/CREAT SERPL: ABNORMAL (CALC) (ref 6–22)
CALCIUM SERPL-MCNC: 9 MG/DL (ref 8.6–10.4)
CHLORIDE SERPL-SCNC: 90 MMOL/L (ref 98–110)
CO2 SERPL-SCNC: 36 MMOL/L (ref 20–32)
CREAT SERPL-MCNC: 0.61 MG/DL (ref 0.6–0.88)
GLUCOSE SERPL-MCNC: 93 MG/DL (ref 65–99)
POTASSIUM SERPL-SCNC: 3.5 MMOL/L (ref 3.5–5.3)
SL AMB EGFR AFRICAN AMERICAN: 99 ML/MIN/1.73M2
SL AMB EGFR NON AFRICAN AMERICAN: 85 ML/MIN/1.73M2
SODIUM SERPL-SCNC: 132 MMOL/L (ref 135–146)

## 2020-08-17 ENCOUNTER — TELEPHONE (OUTPATIENT)
Dept: GASTROENTEROLOGY | Facility: AMBULARY SURGERY CENTER | Age: 80
End: 2020-08-17

## 2020-08-17 NOTE — TELEPHONE ENCOUNTER
Patient of Dr Delia Hooper, last seen 7/29/20 with follow up scheduled for 9/2    History of epigastric abdominal pain, pancreatic cyst    Called patient's daughter, who reports that patient is still having significant pain  Pt was prescribed omeprazole 40 mg BID at last office visit to see if there was any improvement, and there has been none  She has been on it for almost 3 weeks now  It appears given her age and history, endoscopic ultrasound was declined at last visit  Patient's daughter wants to know what their options are and if you have any recommendations, given patient is still in significant pain  Patient's daughter also requesting to reschedule appointment on 9/2, will await recommendations before rescheduling  Please advise

## 2020-08-17 NOTE — TELEPHONE ENCOUNTER
Patients GI provider:  Dr Santos Jaeger    Number to return call: [phone]    Reason for call: Pt dtr tavo  calling because since her mother was last seen she has been in extreme pain and the medications the  prescribed is not working     Scheduled procedure/appointment date if applicable: 9/2

## 2020-08-17 NOTE — TELEPHONE ENCOUNTER
I spoke with Deanne Mcneal who states her mother continues with constant epigastric pain despite taking pantoprazole 40mg BID  She states the pain is not affected by eating  She does say she has difficulty moving her bowels typically going every 3 days  She recently started prune juice but so far no improvement  She does say the pain started before the constipation but I suggested starting Miralax anyway to see if some improvement  Given no improvement with pantoprazole can decrease to daily & d/c  We also discussed EGD/ EUS but given she is on 3L O2 they are very hesitant to proceed  She has a f/u on 9/2 & will call back before if needed     Adriel Fox

## 2020-09-02 ENCOUNTER — OFFICE VISIT (OUTPATIENT)
Dept: GASTROENTEROLOGY | Facility: CLINIC | Age: 80
End: 2020-09-02
Payer: COMMERCIAL

## 2020-09-02 VITALS
SYSTOLIC BLOOD PRESSURE: 161 MMHG | HEART RATE: 78 BPM | BODY MASS INDEX: 23.65 KG/M2 | TEMPERATURE: 99.4 F | WEIGHT: 151 LBS | DIASTOLIC BLOOD PRESSURE: 86 MMHG

## 2020-09-02 DIAGNOSIS — R10.13 EPIGASTRIC PAIN: ICD-10-CM

## 2020-09-02 DIAGNOSIS — K59.00 CONSTIPATION, UNSPECIFIED CONSTIPATION TYPE: ICD-10-CM

## 2020-09-02 DIAGNOSIS — K86.2 PANCREATIC CYST: ICD-10-CM

## 2020-09-02 DIAGNOSIS — R10.84 GENERALIZED ABDOMINAL PAIN: Primary | ICD-10-CM

## 2020-09-02 PROCEDURE — 99214 OFFICE O/P EST MOD 30 MIN: CPT | Performed by: INTERNAL MEDICINE

## 2020-09-02 RX ORDER — ALUMINUM HYDROXIDE, MAGNESIUM HYDROXIDE, SIMETHICONE 400; 400; 40 MG/10ML; MG/10ML; MG/10ML
15 SUSPENSION ORAL
Qty: 710 ML | Refills: 1 | Status: SHIPPED | OUTPATIENT
Start: 2020-09-02 | End: 2021-03-30 | Stop reason: ALTCHOICE

## 2020-09-02 RX ORDER — DICYCLOMINE HYDROCHLORIDE 10 MG/1
10 CAPSULE ORAL
Qty: 120 CAPSULE | Refills: 3 | Status: SHIPPED | OUTPATIENT
Start: 2020-09-02 | End: 2021-03-30 | Stop reason: ALTCHOICE

## 2020-09-02 NOTE — PROGRESS NOTES
[facility] - Outpatient Follow-up Note  Lesvia Ball 79 y o  female MRN: [id number]  Encounter: [id number]          ASSESSMENT AND PLAN:        1  Generalized abdominal pain  2  Epigastric pain  -mild improvement with omeprazole 40 mg twice daily  -differential continues to include GERD, PUD, IBS, H pylori less likely pancreatic malignancy, celiac disease  -continues to be extremely hesitant to undergo sedation for endoscopic evaluation and meets with Dr Vero Seaman at the end of this month  -I explained to her that endoscopy would likely only be a diagnostic evaluation  -check celiac and H pylori serology and if positive treat  -continue omeprazole 40 mg twice daily, add Maalox, add Bentyl  -check complete ultrasound  -if no improvement consider pain management evaluation   - US abdomen complete; Future  - Celiac Disease Antibody Profile; Future  - dicyclomine (BENTYL) 10 mg capsule; Take 1 capsule (10 mg total) by mouth 4 (four) times a day (before meals and at bedtime)  Dispense: 120 capsule; Refill: 3  - aluminum-magnesium hydroxide-simethicone (MAALOX) 381-252-53 MG/5ML SUSP; Take 15 mL by mouth 4 (four) times a day (before meals and at bedtime)  Dispense: 710 mL; Refill: 1  - H  pylori antibody, IgG; Future    3   Pancreatic cyst  -1 6 cm in the pancreatic head without PD dilation or CBD dilation on CT with normal liver enzymes  -unlikely to be causing her symptoms  -she is not a surgical candidate given her severe emphysema advanced age  -can consider an MRI/MRCP    4  Constipation, unspecified constipation type  -titrate MiraLax to allow for at least 1 on strained bowel movement every other day    ______________________________________________________________________    SUBJECTIVE:  26-year-old female seen in follow-up for persistent generalized abdominal pain with history of pancreatic cyst   Since last visit patient was placed on omeprazole 40 mg twice daily with mild improvement in her symptoms  Denies heartburn symptoms, difficulty swallowing, blood or melena in stool  She continues to have epigastric and upper abdominal pain  She is still concerned about her respiratory status and undergoing endoscopy with anesthesia  Her weight has been stable  REVIEW OF SYSTEMS IS OTHERWISE NEGATIVE        Historical Information   Past Medical History:   Diagnosis Date    Anxiety     Back pain     Cancer ([facility] )     skin    Cataract     Emphysema lung ([facility] )     Geographic tongue     last assessed: 2014    GERD (gastroesophageal reflux disease)     Hyperlipidemia     Hypertension     Hypothyroidism (acquired)     Mild intermittent asthma     Sciatica     Seasonal allergies      Past Surgical History:   Procedure Laterality Date    BACK SURGERY       Social History   Social History     Substance and Sexual Activity   Alcohol Use No     Social History     Substance and Sexual Activity   Drug Use No     Social History     Tobacco Use   Smoking Status Former Smoker    Packs/day: 2 00    Years: 36 00    Pack years: 72 00    Types: Cigarettes    Last attempt to quit:     Years since quittin 6   Smokeless Tobacco Never Used     Family History   Problem Relation Age of Onset    Stroke Mother     Cirrhosis Father         alcoholic    Cancer Sister     Cancer Daughter        Meds/Allergies       Current Outpatient Medications:     acetaminophen (TYLENOL) 500 mg tablet    aspirin 81 MG tablet    atorvastatin (LIPITOR) 10 mg tablet    Black Elderberry,Berry-Flower, 575 MG CAPS    Cholecalciferol (VITAMIN D3) 2000 units capsule    cloNIDine (CATAPRES) 0 1 mg tablet    Formoterol Fumarate (FORADIL AEROLIZER IN)    hydrochlorothiazide (HYDRODIURIL) 25 mg tablet    levothyroxine 50 mcg tablet    losartan (COZAAR) 100 MG tablet    Omega-3 Fatty Acids (FISH OIL PO)    omeprazole (PriLOSEC) 40 MG capsule    umeclidinium-vilanterol (Anoro Ellipta) 62 5-25 MCG/INH inhaler    aluminum-magnesium hydroxide-simethicone (MAALOX) 200-200-20 MG/5ML SUSP    dicyclomine (BENTYL) 10 mg capsule    Allergies   Allergen Reactions    Other Other (See Comments)     Steroids, it effects her eyes           Objective     Blood pressure 161/86, pulse 78, temperature 99 4 °F (37 4 °C), temperature source Tympanic, weight 68 5 kg (151 lb), not currently breastfeeding  Body mass index is 23 65 kg/m²  PHYSICAL EXAM:      General Appearance:   Alert, cooperative, no distress   HEENT:   Normocephalic, atraumatic, anicteric      Neck:  Supple, symmetrical, trachea midline   Lungs:   Clear to auscultation bilaterally; no rales, rhonchi or wheezing; respirations unlabored    Heart[de-identified]   Regular rate and rhythm; no murmur, rub, or gallop  Abdomen:   Soft, non-tender, non-distended; normal bowel sounds; no masses, no organomegaly    Genitalia:   Deferred    Rectal:   Deferred    Extremities:  No cyanosis, clubbing or edema        Skin:  No jaundice, rashes, or lesions    Lymph nodes:  No palpable cervical lymphadenopathy        Lab Results:   No visits with results within 1 Day(s) from this visit  Latest known visit with results is:   Orders Only on 07/31/2020   Component Date Value    Glucose, Random 07/31/2020 93     BUN 07/31/2020 12     Creatinine 07/31/2020 0 61     eGFR Non  07/31/2020 85     eGFR  07/31/2020 99     SL AMB BUN/CREATININE RA* 46/14/1000 NOT APPLICABLE     Sodium 41/85/8492 132*    Potassium 07/31/2020 3 5     Chloride 07/31/2020 90*    CO2 07/31/2020 36*    Calcium 07/31/2020 9 0          Radiology Results:   No results found

## 2020-09-05 ENCOUNTER — HOSPITAL ENCOUNTER (OUTPATIENT)
Dept: ULTRASOUND IMAGING | Facility: HOSPITAL | Age: 80
Discharge: HOME | End: 2020-09-05
Attending: INTERNAL MEDICINE
Payer: COMMERCIAL

## 2020-09-05 DIAGNOSIS — R10.84 GENERALIZED ABDOMINAL PAIN: ICD-10-CM

## 2020-09-05 PROCEDURE — 76700 US EXAM ABDOM COMPLETE: CPT

## 2020-09-08 LAB
IGA SERPL-MCNC: 202 MG/DL (ref 70–320)
TTG IGA SER-ACNC: 1 U/ML

## 2020-09-11 ENCOUNTER — TELEPHONE (OUTPATIENT)
Dept: GASTROENTEROLOGY | Facility: CLINIC | Age: 80
End: 2020-09-11

## 2020-09-11 NOTE — TELEPHONE ENCOUNTER
Daisey Dandy,    [facility] offers the H-Pylori blood test   I called her and made her aware  Just a update    Thank you

## 2020-09-11 NOTE — TELEPHONE ENCOUNTER
Received call from [address] [place]   They do not offer  H  Pylori serology; they offer stool study or breath test     I gave verbal to change order to stool study

## 2020-09-11 NOTE — TELEPHONE ENCOUNTER
Attempted to call patient to discuss results of her [address]  No acute pathology to explain her symptoms  She has a small non obstructing kidney stone on the right side  If she is having any urinary symptoms she should contact her PCP  She needs to get blood work for h pylori bacteria if that could explain her abdominal pain  Otherwise she should continue her medications as instructed at our last visit

## 2020-09-12 ENCOUNTER — APPOINTMENT (OUTPATIENT)
Dept: LAB | Facility: HOSPITAL | Age: 80
End: 2020-09-12
Attending: INTERNAL MEDICINE
Payer: COMMERCIAL

## 2020-09-12 DIAGNOSIS — R10.13 EPIGASTRIC PAIN: ICD-10-CM

## 2020-09-12 PROCEDURE — 36415 COLL VENOUS BLD VENIPUNCTURE: CPT

## 2020-09-12 PROCEDURE — 86255 FLUORESCENT ANTIBODY SCREEN: CPT

## 2020-09-12 PROCEDURE — 86677 HELICOBACTER PYLORI ANTIBODY: CPT

## 2020-09-12 PROCEDURE — 82784 ASSAY IGA/IGD/IGG/IGM EACH: CPT

## 2020-09-12 PROCEDURE — 83516 IMMUNOASSAY NONANTIBODY: CPT

## 2020-09-14 LAB
ENDOMYSIUM IGA SER QL: NEGATIVE
GLIADIN PEPTIDE IGA SER-ACNC: 2 UNITS (ref 0–19)
GLIADIN PEPTIDE IGG SER-ACNC: 2 UNITS (ref 0–19)
H PYLORI IGG SER IA-ACNC: 1.07 INDEX VALUE (ref 0–0.79)
IGA SERPL-MCNC: 175 MG/DL (ref 64–422)
TTG IGA SER-ACNC: <2 U/ML (ref 0–3)
TTG IGG SER-ACNC: <2 U/ML (ref 0–5)

## 2020-09-16 DIAGNOSIS — A04.8 H. PYLORI INFECTION: Primary | ICD-10-CM

## 2020-09-16 RX ORDER — CLARITHROMYCIN 500 MG/1
500 TABLET, COATED ORAL EVERY 12 HOURS SCHEDULED
Qty: 28 TABLET | Refills: 0 | Status: SHIPPED | OUTPATIENT
Start: 2020-09-16 | End: 2020-09-30

## 2020-09-16 RX ORDER — OMEPRAZOLE 40 MG/1
40 CAPSULE, DELAYED RELEASE ORAL
Qty: 28 CAPSULE | Refills: 0 | Status: SHIPPED | OUTPATIENT
Start: 2020-09-16 | End: 2020-12-03

## 2020-09-16 RX ORDER — AMOXICILLIN 500 MG/1
1000 CAPSULE ORAL EVERY 12 HOURS SCHEDULED
Qty: 56 CAPSULE | Refills: 0 | Status: SHIPPED | OUTPATIENT
Start: 2020-09-16 | End: 2020-09-30

## 2020-09-21 DIAGNOSIS — I10 ESSENTIAL HYPERTENSION: ICD-10-CM

## 2020-09-21 RX ORDER — LEVOTHYROXINE SODIUM 0.05 MG/1
TABLET ORAL
Qty: 90 TABLET | Refills: 1 | Status: SHIPPED | OUTPATIENT
Start: 2020-09-21 | End: 2021-03-01 | Stop reason: SDUPTHER

## 2020-09-24 ENCOUNTER — DOCUMENTATION (OUTPATIENT)
Dept: PULMONOLOGY | Facility: CLINIC | Age: 80
End: 2020-09-24

## 2020-09-25 ENCOUNTER — OFFICE VISIT (OUTPATIENT)
Dept: PULMONOLOGY | Facility: CLINIC | Age: 80
End: 2020-09-25
Payer: COMMERCIAL

## 2020-09-25 VITALS
BODY MASS INDEX: 24.11 KG/M2 | WEIGHT: 150 LBS | DIASTOLIC BLOOD PRESSURE: 74 MMHG | HEIGHT: 66 IN | SYSTOLIC BLOOD PRESSURE: 122 MMHG | TEMPERATURE: 97.4 F

## 2020-09-25 DIAGNOSIS — J44.9 CHRONIC OBSTRUCTIVE PULMONARY DISEASE, UNSPECIFIED COPD TYPE (HCC): Primary | ICD-10-CM

## 2020-09-25 DIAGNOSIS — K21.9 GASTROESOPHAGEAL REFLUX DISEASE, ESOPHAGITIS PRESENCE NOT SPECIFIED: ICD-10-CM

## 2020-09-25 DIAGNOSIS — J30.2 SEASONAL ALLERGIES: ICD-10-CM

## 2020-09-25 DIAGNOSIS — J43.2 CENTRILOBULAR EMPHYSEMA (HCC): ICD-10-CM

## 2020-09-25 DIAGNOSIS — J96.11 CHRONIC RESPIRATORY FAILURE WITH HYPOXIA (HCC): ICD-10-CM

## 2020-09-25 PROCEDURE — 1036F TOBACCO NON-USER: CPT | Performed by: INTERNAL MEDICINE

## 2020-09-25 PROCEDURE — 99214 OFFICE O/P EST MOD 30 MIN: CPT | Performed by: INTERNAL MEDICINE

## 2020-09-25 PROCEDURE — 1160F RVW MEDS BY RX/DR IN RCRD: CPT | Performed by: INTERNAL MEDICINE

## 2020-09-25 RX ORDER — UMECLIDINIUM BROMIDE AND VILANTEROL TRIFENATATE 62.5; 25 UG/1; UG/1
1 POWDER RESPIRATORY (INHALATION) DAILY
Qty: 3 EACH | Refills: 3 | Status: SHIPPED | OUTPATIENT
Start: 2020-09-25 | End: 2021-03-24

## 2020-09-25 NOTE — ASSESSMENT & PLAN NOTE
· Continue supplemental oxygen  · Currently using 3 L  She does not do as well on the pulse portable supply because she breathes through her mouth with exertion on occasion  I did reinforce with her and her daughter that she must train herself to breathe in through her nose and out through her mouth  · Recent lightheadedness does not seem to be exertional or associated with her oxygen    Certainly low oxygen levels would place her at risk for lightheadedness

## 2020-09-25 NOTE — LETTER
September 25, 2020     Miriam Sullivan, [address]    Patient: Augusta Strickland   YOB: 1940   Date of Visit: 9/25/2020       Dear Dr Clayton Saint:    Thank you for referring Augusta Strickland to me for evaluation  Below are my notes for this consultation  If you have questions, please do not hesitate to call me  I look forward to following your patient along with you  Sincerely,        Марина Richey MD        CC: MD Марина Pappas MD  9/25/2020  9:17 AM  Signed    Progress note - Pulmonary Medicine   Augusta Strickland 79 y o  female MRN: [id number]       Impression & Plan:     Chronic obstructive pulmonary disease ([address] )  · Anoro seems to be effective for her and will be continued  · Remains on supplemental oxygen  · She has had significant anxiety and concerns over possible need for a ventilator  We discussed this at length with her  She is not at imminent risk of requiring a ventilator and should not agree under most circumstances to be on mechanical ventilation  She does not wish to have a ventilator and with certain severe illnesses, would likely have a poor outcome with mechanical ventilation  This was discussed with her and her daughter today  I reinforced with her that she should not worry about this on a daily basis  She has been quite stable with her emphysema and does not pose an imminent risk    Chronic respiratory failure with hypoxia (HCC)  · Continue supplemental oxygen  · Currently using 3 L  She does not do as well on the pulse portable supply because she breathes through her mouth with exertion on occasion  I did reinforce with her and her daughter that she must train herself to breathe in through her nose and out through her mouth  · Recent lightheadedness does not seem to be exertional or associated with her oxygen    Certainly low oxygen levels would place her at risk for lightheadedness      Seasonal allergies  · Continues to have a component of nasal drip  · Singulair was discontinued previously but she still has some at home  She can certainly retry this to see if it is beneficial for her  If no benefit, trial of a nonsedating antihistamine may be beneficial    GERD (gastroesophageal reflux disease)  · Scheduled for EGD with gastroenterology, Dr Lindsay Fox  · Patient does have severe emphysema  Okay to proceed with EGD from my perspective but I have advised that she not have this performed in an endoscopy center and prefer that she would have this performed at the hospital in the less likely event that she would require advanced respiratory interventions  · On omeprazole and currently being treated for H pylori      We discussed potential influenza vaccination today  She is scheduled to have this with her primary care physician  She reports that she had headache and nausea the last time and was told she should not receive the high-dose influenza vaccination  This is not a contraindication and I did explain to her and her daughter that continued vaccination would be recommended  Time spent today with John Robles and her daughter discussing her symptoms, recommendations for treatment, and counseling was 32 minutes  I will have [place] Crimes follow-up with me in 6 months  She will contact me if she has any new or worsening symptoms  ______________________________________________________________________    HPI:    Bethany August presents today for follow-up of emphysema with chronic hypoxemic respiratory failure  She has been doing well with Anoro Ellipta from my standpoint  She is also compliant with her supplemental oxygen and has been using this regularly  She denies any new respiratory symptoms  She has been having some episodes of dizziness/lightheadedness that have been evaluated by ENT and by rehabilitation  This has improved with adjustment in her antihypertensive regimen    She has had hospitalization which showed no evidence of abnormal heart rhythm  She did have a CTA of her head and neck  There was no evidence of significant coronary stenosis on the CTA of her neck  Symptoms do not seem to correlate with exertion or with her oxygen levels  She has found it difficult to maintain her oxygen levels with exertion  She has a tendency when she gets excited or when she has increased activity to start to breathe through her mouth  This does not trigger her oxygen since she is on a conserving device which pulses the flow  We reviewed the appropriate breathing technique with the portable oxygen  In addition, she recently has had a significant amount of anxiety about being on a ventilator  COVID infection has resulted in significant fear for her  She has not had exposures and maintain social isolation  She has been wearing a mask appropriately  I discussed mechanical ventilation at length today with the patient and her daughter  She has had recent stomach symptoms  She is followed by Gastroenterology  She was recently identified as having H pylori antibody positive  She is being treated for H pylori infection and is intended to undergo EGD in a week or 2  No blood in her stool or constipation  Weight and appetite otherwise remains stable  No fever, chills, or infection symptoms  She does have some postnasal drip  It is unclear whether this is related to allergy    It does result in some cough and phlegm production with need to expectorate      Current Medications:    Current Outpatient Medications:     acetaminophen (TYLENOL) 500 mg tablet, Take 1 tablet (500 mg total) by mouth every 6 (six) hours as needed for mild pain or moderate pain, Disp: 30 tablet, Rfl: 0    aluminum-magnesium hydroxide-simethicone (MAALOX) 200-200-20 MG/5ML SUSP, Take 15 mL by mouth 4 (four) times a day (before meals and at bedtime), Disp: 710 mL, Rfl: 1    amoxicillin (AMOXIL) 500 mg capsule, Take 2 capsules (1,000 mg total) by mouth every 12 (twelve) hours for 14 days, Disp: 56 capsule, Rfl: 0    aspirin 81 MG tablet, Take 81 mg by mouth daily, Disp: , Rfl:     atorvastatin (LIPITOR) 10 mg tablet, Take 1 tablet (10 mg total) by mouth daily, Disp: 90 tablet, Rfl: 1    Black Elderberry,Berry-Flower, 575 MG CAPS, Take by mouth daily, Disp: , Rfl:     Cholecalciferol (VITAMIN D3) 2000 units capsule, Take 4,000 Units by mouth daily , Disp: , Rfl:     clarithromycin (BIAXIN) 500 mg tablet, Take 1 tablet (500 mg total) by mouth every 12 (twelve) hours for 14 days, Disp: 28 tablet, Rfl: 0    cloNIDine (CATAPRES) 0 1 mg tablet, 1 po bid prn if BP greater than 150, Disp: 60 tablet, Rfl: 5    dicyclomine (BENTYL) 10 mg capsule, Take 1 capsule (10 mg total) by mouth 4 (four) times a day (before meals and at bedtime), Disp: 120 capsule, Rfl: 3    hydrochlorothiazide (HYDRODIURIL) 25 mg tablet, Take 1 tablet (25 mg total) by mouth daily, Disp: 90 tablet, Rfl: 1    levothyroxine 50 mcg tablet, TAKE ONE TABLET BY MOUTH ONCE DAILY, Disp: 90 tablet, Rfl: 1    losartan (COZAAR) 100 MG tablet, Take 1 tablet (100 mg total) by mouth daily, Disp: 90 tablet, Rfl: 1    Omega-3 Fatty Acids (FISH OIL PO), Take 1,200 mg by mouth 2 (two) times a day, Disp: , Rfl:     omeprazole (PriLOSEC) 40 MG capsule, Take 1 capsule (40 mg total) by mouth 2 (two) times a day before meals for 14 days, Disp: 28 capsule, Rfl: 0    umeclidinium-vilanterol (Anoro Ellipta) 62 5-25 MCG/INH inhaler, Inhale 1 puff daily, Disp: 3 each, Rfl: 3    Review of Systems:  Aside from what is mentioned in the HPI, the review of systems is otherwise negative    Past medical history, surgical history, and family history were reviewed and updated as appropriate    Social history updates:  Social History     Tobacco Use   Smoking Status Former Smoker    Packs/day: 2 00    Years: 36 00    Pack years: 72 00    Types: Cigarettes    Last attempt to quit:     Years since quittin 7   Smokeless Tobacco Never Used       PhysicalExamination:  Vitals:   /74 (BP Location: Left arm, Patient Position: Sitting)   Temp (!) 97 4 °F (36 3 °C)   Ht 5' 6" (1 676 m)   Wt 68 kg (150 lb)   BMI 24 21 kg/m²   Gen:  Slightly anxious and at times tearful today  Otherwise comfortable on nasal cannula without respiratory distress or conversational dyspnea  She is very hard of hearing  HEENT: PERRL  Oropharynx is clear, moist  Neck: Supple  There is no JVD, lymphadenopathy or thyromegaly  Trachea is midline  Chest:  Chest excursion symmetric  Lungs are hyperinflated  Breath sounds are distant bilaterally but otherwise clear  Hyper-resonant to percussion  Cardiac:  Distant heart tones, regular  There are no murmurs  Abdomen: Soft and nontender  Benign  Extremities: No clubbing, cyanosis or edema  Neurologic: No focal deficits  Normal affect  Skin: No appreciable rashes  Diagnostic Data:  Labs: I personally reviewed the most recent laboratory data pertinent to today's visit    Lab Results   Component Value Date    WBC 5 39 07/19/2020    HGB 13 7 07/19/2020    HCT 40 9 07/19/2020    MCV 89 07/19/2020     07/19/2020     Lab Results   Component Value Date    SODIUM 132 (L) 07/31/2020    K 3 5 07/31/2020    CO2 36 (H) 07/31/2020    CL 90 (L) 07/31/2020    BUN 12 07/31/2020    CREATININE 0 61 07/31/2020    CALCIUM 9 0 07/31/2020       Imaging:  I personally reviewed the images on the [facility] system pertinent to today's visit  CT a of the head and neck reviewed and does not demonstrate any significant coronary stenosis  Lung bases from the CT of the abdomen and pelvis from July 2020 show clear lung fields    Other studies:  Echocardiogram February 2020 shows EF of 60% with grade 1 diastolic dysfunction  Normal right ventricular size and function    No evidence of pulmonary hypertension    Tata Posada MD

## 2020-09-25 NOTE — ASSESSMENT & PLAN NOTE
· Continues to have a component of nasal drip  · Singulair was discontinued previously but she still has some at home  She can certainly retry this to see if it is beneficial for her    If no benefit, trial of a nonsedating antihistamine may be beneficial

## 2020-09-25 NOTE — ASSESSMENT & PLAN NOTE
· Scheduled for EGD with gastroenterology, Dr Basilia Peñaloza  · Patient does have severe emphysema  Okay to proceed with EGD from my perspective but I have advised that she not have this performed in an endoscopy center and prefer that she would have this performed at the hospital in the less likely event that she would require advanced respiratory interventions    · On omeprazole and currently being treated for H pylori

## 2020-09-25 NOTE — ASSESSMENT & PLAN NOTE
· Anoro seems to be effective for her and will be continued  · Remains on supplemental oxygen  · She has had significant anxiety and concerns over possible need for a ventilator  We discussed this at length with her  She is not at imminent risk of requiring a ventilator and should not agree under most circumstances to be on mechanical ventilation  She does not wish to have a ventilator and with certain severe illnesses, would likely have a poor outcome with mechanical ventilation  This was discussed with her and her daughter today  I reinforced with her that she should not worry about this on a daily basis    She has been quite stable with her emphysema and does not pose an imminent risk

## 2020-09-25 NOTE — PROGRESS NOTES
Progress note - Pulmonary Medicine   Zainab Wiseman 79 y o  female MRN: [id number]       Impression & Plan:     Chronic obstructive pulmonary disease ([address] )  · Anoro seems to be effective for her and will be continued  · Remains on supplemental oxygen  · She has had significant anxiety and concerns over possible need for a ventilator  We discussed this at length with her  She is not at imminent risk of requiring a ventilator and should not agree under most circumstances to be on mechanical ventilation  She does not wish to have a ventilator and with certain severe illnesses, would likely have a poor outcome with mechanical ventilation  This was discussed with her and her daughter today  I reinforced with her that she should not worry about this on a daily basis  She has been quite stable with her emphysema and does not pose an imminent risk    Chronic respiratory failure with hypoxia (HCC)  · Continue supplemental oxygen  · Currently using 3 L  She does not do as well on the pulse portable supply because she breathes through her mouth with exertion on occasion  I did reinforce with her and her daughter that she must train herself to breathe in through her nose and out through her mouth  · Recent lightheadedness does not seem to be exertional or associated with her oxygen  Certainly low oxygen levels would place her at risk for lightheadedness      Seasonal allergies  · Continues to have a component of nasal drip  · Singulair was discontinued previously but she still has some at home  She can certainly retry this to see if it is beneficial for her  If no benefit, trial of a nonsedating antihistamine may be beneficial    GERD (gastroesophageal reflux disease)  · Scheduled for EGD with gastroenterology, Dr Adan [place]  · Patient does have severe emphysema    Okay to proceed with EGD from my perspective but I have advised that she not have this performed in an endoscopy center and prefer that she would have this performed at the hospital in the less likely event that she would require advanced respiratory interventions  · On omeprazole and currently being treated for H pylori      We discussed potential influenza vaccination today  She is scheduled to have this with her primary care physician  She reports that she had headache and nausea the last time and was told she should not receive the high-dose influenza vaccination  This is not a contraindication and I did explain to her and her daughter that continued vaccination would be recommended  Time spent today with Claire Torres and her daughter discussing her symptoms, recommendations for treatment, and counseling was 32 minutes  I will have Claire Torres follow-up with me in 6 months  She will contact me if she has any new or worsening symptoms  ______________________________________________________________________    HPI:    Leroymelvi McgheeNava presents today for follow-up of emphysema with chronic hypoxemic respiratory failure  She has been doing well with Anoro Ellipta from my standpoint  She is also compliant with her supplemental oxygen and has been using this regularly  She denies any new respiratory symptoms  She has been having some episodes of dizziness/lightheadedness that have been evaluated by ENT and by rehabilitation  This has improved with adjustment in her antihypertensive regimen  She has had hospitalization which showed no evidence of abnormal heart rhythm  She did have a CTA of her head and neck  There was no evidence of significant coronary stenosis on the CTA of her neck  Symptoms do not seem to correlate with exertion or with her oxygen levels  She has found it difficult to maintain her oxygen levels with exertion  She has a tendency when she gets excited or when she has increased activity to start to breathe through her mouth  This does not trigger her oxygen since she is on a conserving device which pulses the flow    We reviewed the appropriate breathing technique with the portable oxygen  In addition, she recently has had a significant amount of anxiety about being on a ventilator  COVID infection has resulted in significant fear for her  She has not had exposures and maintain social isolation  She has been wearing a mask appropriately  I discussed mechanical ventilation at length today with the patient and her daughter  She has had recent stomach symptoms  She is followed by Gastroenterology  She was recently identified as having H pylori antibody positive  She is being treated for H pylori infection and is intended to undergo EGD in a week or 2  No blood in her stool or constipation  Weight and appetite otherwise remains stable  No fever, chills, or infection symptoms  She does have some postnasal drip  It is unclear whether this is related to allergy    It does result in some cough and phlegm production with need to expectorate      Current Medications:    Current Outpatient Medications:     acetaminophen (TYLENOL) 500 mg tablet, Take 1 tablet (500 mg total) by mouth every 6 (six) hours as needed for mild pain or moderate pain, Disp: 30 tablet, Rfl: 0    aluminum-magnesium hydroxide-simethicone (MAALOX) 200-200-20 MG/5ML SUSP, Take 15 mL by mouth 4 (four) times a day (before meals and at bedtime), Disp: 710 mL, Rfl: 1    amoxicillin (AMOXIL) 500 mg capsule, Take 2 capsules (1,000 mg total) by mouth every 12 (twelve) hours for 14 days, Disp: 56 capsule, Rfl: 0    aspirin 81 MG tablet, Take 81 mg by mouth daily, Disp: , Rfl:     atorvastatin (LIPITOR) 10 mg tablet, Take 1 tablet (10 mg total) by mouth daily, Disp: 90 tablet, Rfl: 1    Black Elderberry,Berry-Flower, 575 MG CAPS, Take by mouth daily, Disp: , Rfl:     Cholecalciferol (VITAMIN D3) 2000 units capsule, Take 4,000 Units by mouth daily , Disp: , Rfl:     clarithromycin (BIAXIN) 500 mg tablet, Take 1 tablet (500 mg total) by mouth every 12 (twelve) hours for 14 days, Disp: 28 tablet, Rfl: 0    cloNIDine (CATAPRES) 0 1 mg tablet, 1 po bid prn if BP greater than 150, Disp: 60 tablet, Rfl: 5    dicyclomine (BENTYL) 10 mg capsule, Take 1 capsule (10 mg total) by mouth 4 (four) times a day (before meals and at bedtime), Disp: 120 capsule, Rfl: 3    hydrochlorothiazide (HYDRODIURIL) 25 mg tablet, Take 1 tablet (25 mg total) by mouth daily, Disp: 90 tablet, Rfl: 1    levothyroxine 50 mcg tablet, TAKE ONE TABLET BY MOUTH ONCE DAILY, Disp: 90 tablet, Rfl: 1    losartan (COZAAR) 100 MG tablet, Take 1 tablet (100 mg total) by mouth daily, Disp: 90 tablet, Rfl: 1    Omega-3 Fatty Acids (FISH OIL PO), Take 1,200 mg by mouth 2 (two) times a day, Disp: , Rfl:     omeprazole (PriLOSEC) 40 MG capsule, Take 1 capsule (40 mg total) by mouth 2 (two) times a day before meals for 14 days, Disp: 28 capsule, Rfl: 0    umeclidinium-vilanterol (Anoro Ellipta) 62 5-25 MCG/INH inhaler, Inhale 1 puff daily, Disp: 3 each, Rfl: 3    Review of Systems:  Aside from what is mentioned in the HPI, the review of systems is otherwise negative    Past medical history, surgical history, and family history were reviewed and updated as appropriate    Social history updates:  Social History     Tobacco Use   Smoking Status Former Smoker    Packs/day: 2 00    Years: 36 00    Pack years: 72 00    Types: Cigarettes    Last attempt to quit:     Years since quittin 7   Smokeless Tobacco Never Used       PhysicalExamination:  Vitals:   /74 (BP Location: Left arm, Patient Position: Sitting)   Temp (!) 97 4 °F (36 3 °C)   Ht 5' 6" (1 676 m)   Wt 68 kg (150 lb)   BMI 24 21 kg/m²   Gen:  Slightly anxious and at times tearful today  Otherwise comfortable on nasal cannula without respiratory distress or conversational dyspnea  She is very hard of hearing  HEENT: PERRL  Oropharynx is clear, moist  Neck: Supple  There is no JVD, lymphadenopathy or thyromegaly  Trachea is midline     Chest: Chest excursion symmetric  Lungs are hyperinflated  Breath sounds are distant bilaterally but otherwise clear  Hyper-resonant to percussion  Cardiac:  Distant heart tones, regular  There are no murmurs  Abdomen: Soft and nontender  Benign  Extremities: No clubbing, cyanosis or edema  Neurologic: No focal deficits  Normal affect  Skin: No appreciable rashes  Diagnostic Data:  Labs: I personally reviewed the most recent laboratory data pertinent to today's visit    Lab Results   Component Value Date    WBC 5 39 07/19/2020    HGB 13 7 07/19/2020    HCT 40 9 07/19/2020    MCV 89 07/19/2020     07/19/2020     Lab Results   Component Value Date    SODIUM 132 (L) 07/31/2020    K 3 5 07/31/2020    CO2 36 (H) 07/31/2020    CL 90 (L) 07/31/2020    BUN 12 07/31/2020    CREATININE 0 61 07/31/2020    CALCIUM 9 0 07/31/2020       Imaging:  I personally reviewed the images on the [facility] system pertinent to today's visit  CT a of the head and neck reviewed and does not demonstrate any significant coronary stenosis  Lung bases from the CT of the abdomen and pelvis from July 2020 show clear lung fields    Other studies:  Echocardiogram February 2020 shows EF of 60% with grade 1 diastolic dysfunction  Normal right ventricular size and function    No evidence of pulmonary hypertension    Aj Alcala MD

## 2020-10-06 DIAGNOSIS — E78.2 MIXED HYPERLIPIDEMIA: ICD-10-CM

## 2020-10-06 DIAGNOSIS — I10 ESSENTIAL HYPERTENSION: ICD-10-CM

## 2020-10-06 RX ORDER — OLMESARTAN MEDOXOMIL 40 MG/1
TABLET ORAL
Qty: 90 TABLET | Refills: 1 | Status: SHIPPED | OUTPATIENT
Start: 2020-10-06 | End: 2020-10-29 | Stop reason: CLARIF

## 2020-10-06 RX ORDER — ATORVASTATIN CALCIUM 10 MG/1
TABLET, FILM COATED ORAL
Qty: 90 TABLET | Refills: 1 | Status: SHIPPED | OUTPATIENT
Start: 2020-10-06 | End: 2021-03-01 | Stop reason: SDUPTHER

## 2020-10-06 RX ORDER — HYDROCHLOROTHIAZIDE 25 MG/1
TABLET ORAL
Qty: 90 TABLET | Refills: 1 | Status: SHIPPED | OUTPATIENT
Start: 2020-10-06 | End: 2021-03-01 | Stop reason: SDUPTHER

## 2020-10-09 DIAGNOSIS — A04.8 H. PYLORI INFECTION: ICD-10-CM

## 2020-10-09 DIAGNOSIS — R10.13 EPIGASTRIC ABDOMINAL PAIN: Primary | ICD-10-CM

## 2020-10-09 RX ORDER — OMEPRAZOLE 40 MG/1
40 CAPSULE, DELAYED RELEASE ORAL
Qty: 60 CAPSULE | Refills: 3 | Status: SHIPPED | OUTPATIENT
Start: 2020-10-09 | End: 2021-05-12 | Stop reason: ALTCHOICE

## 2020-10-21 ENCOUNTER — OFFICE VISIT (OUTPATIENT)
Dept: GASTROENTEROLOGY | Facility: CLINIC | Age: 80
End: 2020-10-21
Payer: COMMERCIAL

## 2020-10-21 VITALS
TEMPERATURE: 95 F | SYSTOLIC BLOOD PRESSURE: 174 MMHG | WEIGHT: 150 LBS | HEART RATE: 98 BPM | DIASTOLIC BLOOD PRESSURE: 78 MMHG | HEIGHT: 66 IN | BODY MASS INDEX: 24.11 KG/M2

## 2020-10-21 DIAGNOSIS — R10.84 GENERALIZED ABDOMINAL PAIN: Primary | ICD-10-CM

## 2020-10-21 DIAGNOSIS — K86.2 PANCREATIC CYST: ICD-10-CM

## 2020-10-21 DIAGNOSIS — K59.00 CONSTIPATION, UNSPECIFIED CONSTIPATION TYPE: ICD-10-CM

## 2020-10-21 DIAGNOSIS — R76.8 HELICOBACTER PYLORI ANTIBODY POSITIVE: ICD-10-CM

## 2020-10-21 PROCEDURE — 99214 OFFICE O/P EST MOD 30 MIN: CPT | Performed by: INTERNAL MEDICINE

## 2020-10-29 ENCOUNTER — OFFICE VISIT (OUTPATIENT)
Dept: FAMILY MEDICINE CLINIC | Facility: CLINIC | Age: 80
End: 2020-10-29
Payer: COMMERCIAL

## 2020-10-29 VITALS
SYSTOLIC BLOOD PRESSURE: 142 MMHG | BODY MASS INDEX: 24.99 KG/M2 | RESPIRATION RATE: 18 BRPM | HEART RATE: 88 BPM | DIASTOLIC BLOOD PRESSURE: 80 MMHG | TEMPERATURE: 97.5 F | WEIGHT: 150 LBS | HEIGHT: 65 IN

## 2020-10-29 DIAGNOSIS — Z23 ENCOUNTER FOR IMMUNIZATION: ICD-10-CM

## 2020-10-29 DIAGNOSIS — I10 ESSENTIAL HYPERTENSION: ICD-10-CM

## 2020-10-29 DIAGNOSIS — R60.9 EDEMA, UNSPECIFIED TYPE: ICD-10-CM

## 2020-10-29 DIAGNOSIS — K21.9 GASTROESOPHAGEAL REFLUX DISEASE, UNSPECIFIED WHETHER ESOPHAGITIS PRESENT: ICD-10-CM

## 2020-10-29 DIAGNOSIS — I10 ESSENTIAL HYPERTENSION: Primary | ICD-10-CM

## 2020-10-29 DIAGNOSIS — E03.9 ACQUIRED HYPOTHYROIDISM: ICD-10-CM

## 2020-10-29 DIAGNOSIS — J96.11 CHRONIC RESPIRATORY FAILURE WITH HYPOXIA (HCC): ICD-10-CM

## 2020-10-29 DIAGNOSIS — J44.9 CHRONIC OBSTRUCTIVE PULMONARY DISEASE, UNSPECIFIED COPD TYPE (HCC): ICD-10-CM

## 2020-10-29 PROCEDURE — 1160F RVW MEDS BY RX/DR IN RCRD: CPT | Performed by: FAMILY MEDICINE

## 2020-10-29 PROCEDURE — G0008 ADMIN INFLUENZA VIRUS VAC: HCPCS | Performed by: FAMILY MEDICINE

## 2020-10-29 PROCEDURE — 99214 OFFICE O/P EST MOD 30 MIN: CPT | Performed by: FAMILY MEDICINE

## 2020-10-29 PROCEDURE — 3079F DIAST BP 80-89 MM HG: CPT | Performed by: FAMILY MEDICINE

## 2020-10-29 PROCEDURE — 3077F SYST BP >= 140 MM HG: CPT | Performed by: FAMILY MEDICINE

## 2020-10-29 PROCEDURE — 90682 RIV4 VACC RECOMBINANT DNA IM: CPT | Performed by: FAMILY MEDICINE

## 2020-10-29 RX ORDER — LOSARTAN POTASSIUM 100 MG/1
100 TABLET ORAL DAILY
Qty: 90 TABLET | Refills: 1 | Status: SHIPPED | OUTPATIENT
Start: 2020-10-29 | End: 2021-03-01 | Stop reason: SDUPTHER

## 2020-11-04 LAB
BASOPHILS # BLD AUTO: 59 CELLS/UL (ref 0–200)
BASOPHILS NFR BLD AUTO: 1.2 %
BUN SERPL-MCNC: 18 MG/DL (ref 7–25)
BUN/CREAT SERPL: ABNORMAL (CALC) (ref 6–22)
CALCIUM SERPL-MCNC: 9.4 MG/DL (ref 8.6–10.4)
CHLORIDE SERPL-SCNC: 93 MMOL/L (ref 98–110)
CO2 SERPL-SCNC: 35 MMOL/L (ref 20–32)
CREAT SERPL-MCNC: 0.63 MG/DL (ref 0.6–0.88)
EOSINOPHIL # BLD AUTO: 132 CELLS/UL (ref 15–500)
EOSINOPHIL NFR BLD AUTO: 2.7 %
ERYTHROCYTE [DISTWIDTH] IN BLOOD BY AUTOMATED COUNT: 12.2 % (ref 11–15)
GLUCOSE SERPL-MCNC: 100 MG/DL (ref 65–99)
HCT VFR BLD AUTO: 38.7 % (ref 35–45)
HGB BLD-MCNC: 12.4 G/DL (ref 11.7–15.5)
LYMPHOCYTES # BLD AUTO: 1725 CELLS/UL (ref 850–3900)
LYMPHOCYTES NFR BLD AUTO: 35.2 %
MCH RBC QN AUTO: 28.1 PG (ref 27–33)
MCHC RBC AUTO-ENTMCNC: 32 G/DL (ref 32–36)
MCV RBC AUTO: 87.6 FL (ref 80–100)
MONOCYTES # BLD AUTO: 515 CELLS/UL (ref 200–950)
MONOCYTES NFR BLD AUTO: 10.5 %
NEUTROPHILS # BLD AUTO: 2470 CELLS/UL (ref 1500–7800)
NEUTROPHILS NFR BLD AUTO: 50.4 %
PLATELET # BLD AUTO: 288 THOUSAND/UL (ref 140–400)
PMV BLD REES-ECKER: 10.1 FL (ref 7.5–12.5)
POTASSIUM SERPL-SCNC: 3.7 MMOL/L (ref 3.5–5.3)
RBC # BLD AUTO: 4.42 MILLION/UL (ref 3.8–5.1)
SL AMB EGFR AFRICAN AMERICAN: 97 ML/MIN/1.73M2
SL AMB EGFR NON AFRICAN AMERICAN: 84 ML/MIN/1.73M2
SODIUM SERPL-SCNC: 135 MMOL/L (ref 135–146)
TSH SERPL-ACNC: 1.92 MIU/L (ref 0.4–4.5)
WBC # BLD AUTO: 4.9 THOUSAND/UL (ref 3.8–10.8)

## 2020-11-10 ENCOUNTER — TELEPHONE (OUTPATIENT)
Dept: GASTROENTEROLOGY | Facility: AMBULARY SURGERY CENTER | Age: 80
End: 2020-11-10

## 2020-12-02 ENCOUNTER — ANESTHESIA EVENT (OUTPATIENT)
Dept: GASTROENTEROLOGY | Facility: HOSPITAL | Age: 80
End: 2020-12-02

## 2020-12-02 RX ORDER — SODIUM CHLORIDE 9 MG/ML
125 INJECTION, SOLUTION INTRAVENOUS CONTINUOUS
OUTPATIENT
Start: 2020-12-02

## 2020-12-03 ENCOUNTER — ANESTHESIA (OUTPATIENT)
Dept: GASTROENTEROLOGY | Facility: HOSPITAL | Age: 80
End: 2020-12-03

## 2020-12-03 ENCOUNTER — HOSPITAL ENCOUNTER (OUTPATIENT)
Dept: GASTROENTEROLOGY | Facility: HOSPITAL | Age: 80
Setting detail: OUTPATIENT SURGERY
Discharge: HOME | End: 2020-12-03
Attending: INTERNAL MEDICINE | Admitting: INTERNAL MEDICINE
Payer: COMMERCIAL

## 2020-12-03 VITALS
DIASTOLIC BLOOD PRESSURE: 53 MMHG | HEART RATE: 101 BPM | SYSTOLIC BLOOD PRESSURE: 112 MMHG | BODY MASS INDEX: 24.99 KG/M2 | TEMPERATURE: 98 F | HEIGHT: 65 IN | WEIGHT: 150 LBS | OXYGEN SATURATION: 100 % | RESPIRATION RATE: 16 BRPM

## 2020-12-03 VITALS — HEART RATE: 88 BPM

## 2020-12-03 DIAGNOSIS — K86.2 PANCREATIC CYST: ICD-10-CM

## 2020-12-03 DIAGNOSIS — R10.84 GENERALIZED ABDOMINAL PAIN: ICD-10-CM

## 2020-12-03 PROCEDURE — 43239 EGD BIOPSY SINGLE/MULTIPLE: CPT | Performed by: INTERNAL MEDICINE

## 2020-12-03 PROCEDURE — 88305 TISSUE EXAM BY PATHOLOGIST: CPT | Performed by: PATHOLOGY

## 2020-12-03 PROCEDURE — 43238 EGD US FINE NEEDLE BX/ASPIR: CPT | Performed by: INTERNAL MEDICINE

## 2020-12-03 RX ORDER — PROPOFOL 10 MG/ML
INJECTION, EMULSION INTRAVENOUS AS NEEDED
Status: DISCONTINUED | OUTPATIENT
Start: 2020-12-03 | End: 2020-12-03

## 2020-12-03 RX ORDER — PROPOFOL 10 MG/ML
INJECTION, EMULSION INTRAVENOUS CONTINUOUS PRN
Status: DISCONTINUED | OUTPATIENT
Start: 2020-12-03 | End: 2020-12-03

## 2020-12-03 RX ORDER — SODIUM CHLORIDE 9 MG/ML
INJECTION, SOLUTION INTRAVENOUS CONTINUOUS PRN
Status: DISCONTINUED | OUTPATIENT
Start: 2020-12-03 | End: 2020-12-03

## 2020-12-03 RX ORDER — LIDOCAINE HYDROCHLORIDE 10 MG/ML
INJECTION, SOLUTION EPIDURAL; INFILTRATION; INTRACAUDAL; PERINEURAL AS NEEDED
Status: DISCONTINUED | OUTPATIENT
Start: 2020-12-03 | End: 2020-12-03

## 2020-12-03 RX ADMIN — PROPOFOL 120 MCG/KG/MIN: 10 INJECTION, EMULSION INTRAVENOUS at 14:48

## 2020-12-03 RX ADMIN — LIDOCAINE HYDROCHLORIDE 50 MG: 10 INJECTION, SOLUTION EPIDURAL; INFILTRATION; INTRACAUDAL; PERINEURAL at 14:48

## 2020-12-03 RX ADMIN — PROPOFOL 50 MG: 10 INJECTION, EMULSION INTRAVENOUS at 14:58

## 2020-12-03 RX ADMIN — SODIUM CHLORIDE: 0.9 INJECTION, SOLUTION INTRAVENOUS at 14:40

## 2020-12-03 RX ADMIN — PROPOFOL 50 MG: 10 INJECTION, EMULSION INTRAVENOUS at 15:05

## 2020-12-03 RX ADMIN — PROPOFOL 50 MG: 10 INJECTION, EMULSION INTRAVENOUS at 14:48

## 2020-12-03 RX ADMIN — PROPOFOL 50 MG: 10 INJECTION, EMULSION INTRAVENOUS at 14:49

## 2020-12-08 ENCOUNTER — TELEPHONE (OUTPATIENT)
Dept: GASTROENTEROLOGY | Facility: CLINIC | Age: 80
End: 2020-12-08

## 2020-12-09 ENCOUNTER — TELEMEDICINE (OUTPATIENT)
Dept: GASTROENTEROLOGY | Facility: CLINIC | Age: 80
End: 2020-12-09
Payer: COMMERCIAL

## 2020-12-09 DIAGNOSIS — K59.00 CONSTIPATION, UNSPECIFIED CONSTIPATION TYPE: ICD-10-CM

## 2020-12-09 DIAGNOSIS — K86.2 PANCREATIC CYST: ICD-10-CM

## 2020-12-09 DIAGNOSIS — R10.84 GENERALIZED ABDOMINAL PAIN: Primary | ICD-10-CM

## 2020-12-09 DIAGNOSIS — K21.9 GASTROESOPHAGEAL REFLUX DISEASE WITHOUT ESOPHAGITIS: ICD-10-CM

## 2020-12-09 PROCEDURE — 99213 OFFICE O/P EST LOW 20 MIN: CPT | Performed by: INTERNAL MEDICINE

## 2020-12-09 PROCEDURE — 1036F TOBACCO NON-USER: CPT | Performed by: INTERNAL MEDICINE

## 2020-12-09 PROCEDURE — 1160F RVW MEDS BY RX/DR IN RCRD: CPT | Performed by: INTERNAL MEDICINE

## 2021-02-04 ENCOUNTER — TELEPHONE (OUTPATIENT)
Dept: FAMILY MEDICINE CLINIC | Facility: CLINIC | Age: 81
End: 2021-02-04

## 2021-02-04 NOTE — TELEPHONE ENCOUNTER
PATIENT CALLED IN STATED SHE WOULD LIKE TO KNOW IF DR Suzi Levy FEELS IT IS OKAY FOR HER TO GET THE VACCINE   PLEASE CALL PATIENT BACK [phone] TO FOLLOW UP

## 2021-02-12 ENCOUNTER — IMMUNIZATIONS (OUTPATIENT)
Dept: FAMILY MEDICINE CLINIC | Facility: HOSPITAL | Age: 81
End: 2021-02-12

## 2021-02-12 DIAGNOSIS — Z23 ENCOUNTER FOR IMMUNIZATION: Primary | ICD-10-CM

## 2021-02-12 PROCEDURE — 91301 SARS-COV-2 / COVID-19 MRNA VACCINE (MODERNA) 100 MCG: CPT

## 2021-02-12 PROCEDURE — 0011A SARS-COV-2 / COVID-19 MRNA VACCINE (MODERNA) 100 MCG: CPT

## 2021-03-01 ENCOUNTER — OFFICE VISIT (OUTPATIENT)
Dept: FAMILY MEDICINE CLINIC | Facility: CLINIC | Age: 81
End: 2021-03-01
Payer: COMMERCIAL

## 2021-03-01 VITALS
WEIGHT: 147 LBS | TEMPERATURE: 97.9 F | RESPIRATION RATE: 16 BRPM | HEIGHT: 65 IN | HEART RATE: 72 BPM | BODY MASS INDEX: 24.49 KG/M2 | DIASTOLIC BLOOD PRESSURE: 68 MMHG | SYSTOLIC BLOOD PRESSURE: 144 MMHG

## 2021-03-01 DIAGNOSIS — I10 ESSENTIAL HYPERTENSION: ICD-10-CM

## 2021-03-01 DIAGNOSIS — Z00.00 MEDICARE ANNUAL WELLNESS VISIT, SUBSEQUENT: Primary | ICD-10-CM

## 2021-03-01 DIAGNOSIS — J44.9 CHRONIC OBSTRUCTIVE PULMONARY DISEASE, UNSPECIFIED COPD TYPE (HCC): ICD-10-CM

## 2021-03-01 DIAGNOSIS — Z78.0 POSTMENOPAUSAL: ICD-10-CM

## 2021-03-01 DIAGNOSIS — M54.50 ACUTE LOW BACK PAIN WITHOUT SCIATICA, UNSPECIFIED BACK PAIN LATERALITY: ICD-10-CM

## 2021-03-01 DIAGNOSIS — E78.2 MIXED HYPERLIPIDEMIA: ICD-10-CM

## 2021-03-01 DIAGNOSIS — E03.9 ACQUIRED HYPOTHYROIDISM: ICD-10-CM

## 2021-03-01 PROCEDURE — 3288F FALL RISK ASSESSMENT DOCD: CPT | Performed by: FAMILY MEDICINE

## 2021-03-01 PROCEDURE — 99214 OFFICE O/P EST MOD 30 MIN: CPT | Performed by: FAMILY MEDICINE

## 2021-03-01 PROCEDURE — G0439 PPPS, SUBSEQ VISIT: HCPCS | Performed by: FAMILY MEDICINE

## 2021-03-01 PROCEDURE — 1125F AMNT PAIN NOTED PAIN PRSNT: CPT | Performed by: FAMILY MEDICINE

## 2021-03-01 PROCEDURE — 1170F FXNL STATUS ASSESSED: CPT | Performed by: FAMILY MEDICINE

## 2021-03-01 PROCEDURE — 1101F PT FALLS ASSESS-DOCD LE1/YR: CPT | Performed by: FAMILY MEDICINE

## 2021-03-01 RX ORDER — ATORVASTATIN CALCIUM 10 MG/1
10 TABLET, FILM COATED ORAL DAILY
Qty: 90 TABLET | Refills: 1 | Status: SHIPPED | OUTPATIENT
Start: 2021-03-01 | End: 2022-04-19

## 2021-03-01 RX ORDER — BACLOFEN 10 MG/1
10 TABLET ORAL 3 TIMES DAILY
Qty: 30 TABLET | Refills: 1 | Status: SHIPPED | OUTPATIENT
Start: 2021-03-01 | End: 2022-02-17

## 2021-03-01 RX ORDER — PANTOPRAZOLE SODIUM 40 MG/1
TABLET, DELAYED RELEASE ORAL
COMMUNITY
Start: 2021-02-07 | End: 2021-03-30 | Stop reason: ALTCHOICE

## 2021-03-01 RX ORDER — LEVOTHYROXINE SODIUM 0.05 MG/1
50 TABLET ORAL DAILY
Qty: 90 TABLET | Refills: 1 | Status: SHIPPED | OUTPATIENT
Start: 2021-03-01 | End: 2021-10-14 | Stop reason: SDUPTHER

## 2021-03-01 RX ORDER — LOSARTAN POTASSIUM 100 MG/1
100 TABLET ORAL DAILY
Qty: 90 TABLET | Refills: 1 | Status: SHIPPED | OUTPATIENT
Start: 2021-03-01 | End: 2021-10-14 | Stop reason: SDUPTHER

## 2021-03-01 RX ORDER — HYDROCHLOROTHIAZIDE 25 MG/1
25 TABLET ORAL DAILY
Qty: 90 TABLET | Refills: 1 | Status: SHIPPED | OUTPATIENT
Start: 2021-03-01 | End: 2021-10-14 | Stop reason: SDUPTHER

## 2021-03-01 NOTE — PROGRESS NOTES
Assessment and Plan:    Problem List Items Addressed This Visit        Endocrine    Acquired hypothyroidism    Relevant Medications    levothyroxine 50 mcg tablet    Other Relevant Orders    Lipid panel    TSH, 3rd generation       Cardiovascular and Mediastinum    Essential hypertension    Relevant Medications    losartan (COZAAR) 100 MG tablet    levothyroxine 50 mcg tablet    hydrochlorothiazide (HYDRODIURIL) 25 mg tablet    Other Relevant Orders    Comprehensive metabolic panel       Other    Acute low back pain without sciatica    Relevant Medications    baclofen 10 mg tablet    Mixed hyperlipidemia    Relevant Medications    atorvastatin (LIPITOR) 10 mg tablet      Other Visit Diagnoses     Medicare annual wellness visit, subsequent    -  Primary    Postmenopausal        Relevant Orders    DXA bone density spine hip and pelvis                 Diagnoses and all orders for this visit:    Medicare annual wellness visit, subsequent    Essential hypertension  Comments:  BP stable  Orders:  -     losartan (COZAAR) 100 MG tablet; Take 1 tablet (100 mg total) by mouth daily  -     levothyroxine 50 mcg tablet; Take 1 tablet (50 mcg total) by mouth daily  -     hydrochlorothiazide (HYDRODIURIL) 25 mg tablet; Take 1 tablet (25 mg total) by mouth daily  -     Comprehensive metabolic panel; Future    Mixed hyperlipidemia  -     atorvastatin (LIPITOR) 10 mg tablet; Take 1 tablet (10 mg total) by mouth daily    Postmenopausal  -     DXA bone density spine hip and pelvis; Future    Acquired hypothyroidism  -     Lipid panel; Future  -     TSH, 3rd generation; Future    Acute low back pain without sciatica, unspecified back pain laterality  -     baclofen 10 mg tablet; Take 1 tablet (10 mg total) by mouth 3 (three) times a day    Other orders  -     pantoprazole (PROTONIX) 40 mg tablet; TAKE ONE TABLET BY MOUTH EVERY DAY BEFORE BREAKFAST              Subjective:      Patient ID: Lelo Rdz is a 79 y o  female  CC:    Chief Complaint   Patient presents with    Follow-up     Patient present today for her 4 month follow up   Medicare Wellness Visit     Pt due       HPI:    F/u HTN, lipids,feels well, no cp, has shortness of bretah due to CP      The following portions of the patient's history were reviewed and updated as appropriate: allergies, current medications, past family history, past medical history, past social history, past surgical history and problem list         Review of Systems   Constitutional: Negative for activity change, appetite change and fatigue  Respiratory: Positive for shortness of breath  Negative for cough  Cardiovascular: Positive for leg swelling  Negative for chest pain  R foot swells occ   Neurological: Positive for dizziness  Negative for headaches  Hematological: Negative for adenopathy  Data to review:       Objective:    Vitals:    03/01/21 0907   BP: 144/68   BP Location: Left arm   Patient Position: Sitting   Cuff Size: Large   Pulse: 72   Resp: 16   Temp: 97 9 °F (36 6 °C)   TempSrc: Tympanic   Weight: 66 7 kg (147 lb)   Height: 5' 5" (1 651 m)        Physical Exam  Vitals signs reviewed  Constitutional:       Appearance: Normal appearance  Neck:      Vascular: No carotid bruit  Cardiovascular:      Rate and Rhythm: Normal rate and regular rhythm  Pulses: Normal pulses  Heart sounds: Normal heart sounds  Pulmonary:      Effort: Pulmonary effort is normal    Musculoskeletal:      Right lower leg: No edema  Left lower leg: Edema present  Comments: Trace edema   Lymphadenopathy:      Cervical: No cervical adenopathy  Neurological:      Mental Status: She is alert

## 2021-03-01 NOTE — PROGRESS NOTES
Assessment and Plan:     Problem List Items Addressed This Visit        Endocrine    Acquired hypothyroidism    Relevant Medications    levothyroxine 50 mcg tablet    Other Relevant Orders    Lipid panel    TSH, 3rd generation       Cardiovascular and Mediastinum    Essential hypertension    Relevant Medications    losartan (COZAAR) 100 MG tablet    levothyroxine 50 mcg tablet    hydrochlorothiazide (HYDRODIURIL) 25 mg tablet    Other Relevant Orders    Comprehensive metabolic panel       Other    Acute low back pain without sciatica    Relevant Medications    baclofen 10 mg tablet    Mixed hyperlipidemia    Relevant Medications    atorvastatin (LIPITOR) 10 mg tablet      Other Visit Diagnoses     Medicare annual wellness visit, subsequent    -  Primary    Postmenopausal        Relevant Orders    DXA bone density spine hip and pelvis           Preventive health issues were discussed with patient, and age appropriate screening tests were ordered as noted in patient's After Visit Summary  Personalized health advice and appropriate referrals for health education or preventive services given if needed, as noted in patient's After Visit Summary       History of Present Illness:     Patient presents for Medicare Annual Wellness visit    Patient Care Team:  Roxana Pope MD as PCP - General (Family Medicine)  MD Urszula Vasques MD     Problem List:     Patient Active Problem List   Diagnosis    Seasonal allergies    Essential hypertension    Mixed hyperlipidemia    Sciatica    GERD (gastroesophageal reflux disease)    Anxiety    Acquired hypothyroidism    Chronic respiratory failure with hypoxia ([address] )    Chronic obstructive pulmonary disease [address] [address]    Multiple pulmonary nodules    Tremor    Vitamin D deficiency disease    Burning with urination    Female stress incontinence    OAB (overactive bladder)    Vaginal atrophy    Dizziness    Urinary frequency    Acute low back pain without sciatica    Pancreatic cyst      Past Medical and Surgical History:     Past Medical History:   Diagnosis Date    Anxiety     Back pain     Cancer ([place] [address] )     skin    Cataract     COPD (chronic obstructive pulmonary disease) ([place] [address] )     Disease of thyroid gland     Emphysema lung (HCC)     Emphysema of lung ([place] [address] )     Geographic tongue     last assessed: 2014    GERD (gastroesophageal reflux disease)     Hyperlipidemia     Hypertension     Hypothyroidism (acquired)     Mild intermittent asthma     Sciatica     Seasonal allergies      Past Surgical History:   Procedure Laterality Date    BACK SURGERY      SKIN LESION EXCISION        Family History:     Family History   Problem Relation Age of Onset    Stroke Mother     Cirrhosis Father         alcoholic    Cancer Sister     Cancer Daughter       Social History:     E-Cigarette/Vaping    E-Cigarette Use Never User      E-Cigarette/Vaping Substances    Nicotine No     THC No     CBD No     Flavoring No     Other No     Unknown No      Social History     Socioeconomic History    Marital status: /Civil Union     Spouse name: None    Number of children: 3    Years of education: None    Highest education level: None   Occupational History    Occupation: Retired   Social Needs    Financial resource strain: None    Food insecurity     Worry: None     Inability: None    Transportation needs     Medical: None     Non-medical: None   Tobacco Use    Smoking status: Former Smoker     Packs/day: 2 00     Years: 36 00     Pack years: 72 00     Types: Cigarettes     Quit date:      Years since quittin 1    Smokeless tobacco: Never Used   Substance and Sexual Activity    Alcohol use: No    Drug use: No    Sexual activity: Never     Birth control/protection: None   Lifestyle    Physical activity     Days per week: None     Minutes per session: None    Stress: None   Relationships    Social connections     Talks on phone: None     Gets together: None     Attends [employer] service: None     Active member of club or organization: None     Attends meetings of clubs or organizations: None     Relationship status: None    Intimate partner violence     Fear of current or ex partner: None     Emotionally abused: None     Physically abused: None     Forced sexual activity: None   Other Topics Concern    None   Social History Narrative    Denied history of active advance directive     Always uses a seat belt    Daily caffeine consumption: 2-3 servings/day     Lack physical exercise     No living will    Denied history of substances, toxic environmental    Supportive and safe      Medications and Allergies:     Current Outpatient Medications   Medication Sig Dispense Refill    acetaminophen (TYLENOL) 500 mg tablet Take 1 tablet (500 mg total) by mouth every 6 (six) hours as needed for mild pain or moderate pain 30 tablet 0    aluminum-magnesium hydroxide-simethicone (MAALOX) 200-200-20 MG/5ML SUSP Take 15 mL by mouth 4 (four) times a day (before meals and at bedtime) 710 mL 1    aspirin 81 MG tablet Take 81 mg by mouth daily      atorvastatin (LIPITOR) 10 mg tablet Take 1 tablet (10 mg total) by mouth daily 90 tablet 1    Cholecalciferol (VITAMIN D3) 2000 units capsule Take 4,000 Units by mouth daily       cloNIDine (CATAPRES) 0 1 mg tablet 1 po bid prn if BP greater than 150 60 tablet 5    hydrochlorothiazide (HYDRODIURIL) 25 mg tablet Take 1 tablet (25 mg total) by mouth daily 90 tablet 1    levothyroxine 50 mcg tablet Take 1 tablet (50 mcg total) by mouth daily 90 tablet 1    Omega-3 Fatty Acids (FISH OIL PO) Take 1,200 mg by mouth 2 (two) times a day      pantoprazole (PROTONIX) 40 mg tablet TAKE ONE TABLET BY MOUTH EVERY DAY BEFORE BREAKFAST      umeclidinium-vilanterol (Anoro Ellipta) 62 5-25 MCG/INH inhaler Inhale 1 puff daily 3 each 3    baclofen 10 mg tablet Take 1 tablet (10 mg total) by mouth 3 (three) times a day 30 tablet 1    Black Elderberry,Berry-Flower, 575 MG CAPS Take by mouth daily      dicyclomine (BENTYL) 10 mg capsule Take 1 capsule (10 mg total) by mouth 4 (four) times a day (before meals and at bedtime) (Patient not taking: Reported on 3/1/2021) 120 capsule 3    losartan (COZAAR) 100 MG tablet Take 1 tablet (100 mg total) by mouth daily 90 tablet 1    Montelukast Sodium (SINGULAIR PO) Take by mouth      omeprazole (PriLOSEC) 40 MG capsule Take 1 capsule (40 mg total) by mouth 2 (two) times a day before meals 60 capsule 3     No current facility-administered medications for this visit  Allergies   Allergen Reactions    Other Other (See Comments)     Steroids, it effects her eyes      Immunizations:     Immunization History   Administered Date(s) Administered    Influenza Split High Dose Preservative Free IM 09/08/2017    Influenza, high dose seasonal 0 7 mL 10/26/2018    Influenza, recombinant, quadrivalent,injectable, preservative free 10/29/2020    Pneumococcal Conjugate 13-Valent 02/18/2016, 06/06/2017    SARS-CoV-2 / COVID-19 mRNA IM (Moderna) 02/12/2021    Td (adult), adsorbed 07/13/2007      Health Maintenance: There are no preventive care reminders to display for this patient  Topic Date Due    Pneumococcal Vaccine: 65+ Years (2 of 2 - PPSV23) 06/06/2018      Medicare Health Risk Assessment:     /68 (BP Location: Left arm, Patient Position: Sitting, Cuff Size: Large)   Pulse 72   Temp 97 9 °F (36 6 °C) (Tympanic)   Resp 16   Ht 5' 5" (1 651 m)   Wt 66 7 kg (147 lb)   BMI 24 46 kg/m²      Miryam Montes is here for her Subsequent Wellness visit  Health Risk Assessment:   Patient rates overall health as fair  Patient feels that their physical health rating is same  Eyesight was rated as same  Hearing was rated as slightly worse  Patient feels that their emotional and mental health rating is same  Pain experienced in the last 7 days has been some   Patient's pain rating has been 3/10  Patient states that she has experienced no weight loss or gain in last 6 months  Where she got her covid shot  Left upper arm  Fall Risk Screening: In the past year, patient has experienced: no history of falling in past year      Urinary Incontinence Screening:   Patient has not leaked urine accidently in the last six months  Pt only has on and off problems with bowel movement  Home Safety:  Patient has trouble with stairs inside or outside of their home  Patient has working smoke alarms and has working carbon monoxide detector  Home safety hazards include: none  Nutrition:   Current diet is Regular  Medications:   Patient is currently taking over-the-counter supplements  OTC medications include: see medication list  Patient is able to manage medications  Activities of Daily Living (ADLs)/Instrumental Activities of Daily Living (IADLs):   Walk and transfer into and out of bed and chair?: Yes  Dress and groom yourself?: Yes    Bathe or shower yourself?: Yes    Feed yourself? Yes  Do your laundry/housekeeping?: Yes  Manage your money, pay your bills and track your expenses?: Yes  Make your own meals?: Yes    Do your own shopping?: Yes    ADL comments: Laundry and shopping Everything with her husbands assistance  Previous Hospitalizations:   Any hospitalizations or ED visits within the last 12 months?: No      Advance Care Planning:   Living will: Yes    Durable POA for healthcare:  Yes    Advanced directive: Yes      Cognitive Screening:   Provider or family/friend/caregiver concerned regarding cognition?: No    PREVENTIVE SCREENINGS      Cardiovascular Screening:    General: History Lipid Disorder and Risks and Benefits Discussed      Diabetes Screening:     General: Screening Current      Colorectal Cancer Screening:     General: Screening Not Indicated      Breast Cancer Screening:     General: Screening Current      Cervical Cancer Screening:    General: Screening Not Indicated      Osteoporosis Screening:    General: Risks and Benefits Discussed    Due for: DXA Axial      Abdominal Aortic Aneurysm (AAA) Screening:        General: Screening Not Indicated      Lung Cancer Screening:     General: Screening Not Indicated      Hepatitis C Screening:    General: Screening Not Indicated      Melvin Anglin MD

## 2021-03-05 LAB
ALBUMIN SERPL-MCNC: 4.1 G/DL (ref 3.6–5.1)
ALBUMIN/GLOB SERPL: 1.8 (CALC) (ref 1–2.5)
ALP SERPL-CCNC: 76 U/L (ref 37–153)
ALT SERPL-CCNC: 19 U/L (ref 6–29)
AST SERPL-CCNC: 22 U/L (ref 10–35)
BILIRUB SERPL-MCNC: 0.5 MG/DL (ref 0.2–1.2)
BUN SERPL-MCNC: 11 MG/DL (ref 7–25)
BUN/CREAT SERPL: 20 (CALC) (ref 6–22)
CALCIUM SERPL-MCNC: 9.3 MG/DL (ref 8.6–10.4)
CHLORIDE SERPL-SCNC: 89 MMOL/L (ref 98–110)
CHOLEST SERPL-MCNC: 200 MG/DL
CHOLEST/HDLC SERPL: 2.1 (CALC)
CO2 SERPL-SCNC: 35 MMOL/L (ref 20–32)
CREAT SERPL-MCNC: 0.55 MG/DL (ref 0.6–0.88)
GLOBULIN SER CALC-MCNC: 2.3 G/DL (CALC) (ref 1.9–3.7)
GLUCOSE SERPL-MCNC: 101 MG/DL (ref 65–99)
HDLC SERPL-MCNC: 96 MG/DL
LDLC SERPL CALC-MCNC: 92 MG/DL (CALC)
NONHDLC SERPL-MCNC: 104 MG/DL (CALC)
POTASSIUM SERPL-SCNC: 3.9 MMOL/L (ref 3.5–5.3)
PROT SERPL-MCNC: 6.4 G/DL (ref 6.1–8.1)
SL AMB EGFR AFRICAN AMERICAN: 101 ML/MIN/1.73M2
SL AMB EGFR NON AFRICAN AMERICAN: 87 ML/MIN/1.73M2
SODIUM SERPL-SCNC: 129 MMOL/L (ref 135–146)
TRIGL SERPL-MCNC: 42 MG/DL
TSH SERPL-ACNC: 1.81 MIU/L (ref 0.4–4.5)

## 2021-03-10 ENCOUNTER — IMMUNIZATIONS (OUTPATIENT)
Dept: FAMILY MEDICINE CLINIC | Facility: HOSPITAL | Age: 81
End: 2021-03-10

## 2021-03-10 DIAGNOSIS — Z23 ENCOUNTER FOR IMMUNIZATION: Primary | ICD-10-CM

## 2021-03-10 PROCEDURE — 0012A SARS-COV-2 / COVID-19 MRNA VACCINE (MODERNA) 100 MCG: CPT

## 2021-03-10 PROCEDURE — 91301 SARS-COV-2 / COVID-19 MRNA VACCINE (MODERNA) 100 MCG: CPT

## 2021-03-13 NOTE — ASSESSMENT & PLAN NOTE
· These have been radiographically stable and do not require any additional follow-up The patient is a 70y Female complaining of altered mental status.

## 2021-03-24 DIAGNOSIS — J43.2 CENTRILOBULAR EMPHYSEMA (HCC): ICD-10-CM

## 2021-03-24 RX ORDER — UMECLIDINIUM BROMIDE AND VILANTEROL TRIFENATATE 62.5; 25 UG/1; UG/1
POWDER RESPIRATORY (INHALATION)
Qty: 180 EACH | Refills: 3 | Status: SHIPPED | OUTPATIENT
Start: 2021-03-24 | End: 2022-04-19

## 2021-03-30 ENCOUNTER — OFFICE VISIT (OUTPATIENT)
Dept: PULMONOLOGY | Facility: CLINIC | Age: 81
End: 2021-03-30
Payer: COMMERCIAL

## 2021-03-30 VITALS
DIASTOLIC BLOOD PRESSURE: 70 MMHG | BODY MASS INDEX: 23.82 KG/M2 | HEIGHT: 65 IN | HEART RATE: 81 BPM | TEMPERATURE: 97.6 F | OXYGEN SATURATION: 93 % | WEIGHT: 143 LBS | SYSTOLIC BLOOD PRESSURE: 146 MMHG

## 2021-03-30 DIAGNOSIS — J44.9 CHRONIC OBSTRUCTIVE PULMONARY DISEASE, UNSPECIFIED COPD TYPE (HCC): Primary | ICD-10-CM

## 2021-03-30 DIAGNOSIS — J96.11 CHRONIC RESPIRATORY FAILURE WITH HYPOXIA (HCC): ICD-10-CM

## 2021-03-30 PROCEDURE — 99213 OFFICE O/P EST LOW 20 MIN: CPT | Performed by: INTERNAL MEDICINE

## 2021-03-30 PROCEDURE — 3077F SYST BP >= 140 MM HG: CPT | Performed by: INTERNAL MEDICINE

## 2021-03-30 PROCEDURE — 1160F RVW MEDS BY RX/DR IN RCRD: CPT | Performed by: INTERNAL MEDICINE

## 2021-03-30 PROCEDURE — 3078F DIAST BP <80 MM HG: CPT | Performed by: INTERNAL MEDICINE

## 2021-03-30 PROCEDURE — 1036F TOBACCO NON-USER: CPT | Performed by: INTERNAL MEDICINE

## 2021-03-30 RX ORDER — ALBUTEROL SULFATE 90 UG/1
2 AEROSOL, METERED RESPIRATORY (INHALATION) EVERY 6 HOURS PRN
Qty: 1 INHALER | Refills: 6 | Status: SHIPPED | OUTPATIENT
Start: 2021-03-30 | End: 2022-08-09 | Stop reason: SDUPTHER

## 2021-03-30 NOTE — PROGRESS NOTES
Progress note - Pulmonary Medicine   Venice Merida 79 y o  female MRN: [id number]       Impression & Plan:     Chronic obstructive pulmonary disease ([address] )  ·  Stable on Anoro Ellipta   · Has periods of time when she might benefit from a rescue inhaler and this was ordered for her  · No benefit from Singulair and will remain off this  If allergy symptoms increase and she feels like she may benefit from use during allergy season, she will restart    Chronic respiratory failure with hypoxia (HCC)  ·  Using supplemental oxygen appropriately   · Continue current use       follow-up will continue in 6 months     she has received her COVID-19 vaccination series  ______________________________________________________________________    HPI:    Venice Merida presents today for follow-up of  Emphysema  She continues to do well on Anoro Ellipta  She still has periods of time when she has episodes of shortness of breath which may be associated with some lightheadedness  She does use her supplemental oxygen appropriately  At time she feels like she is not getting enough air with certain activities  This is somewhat sporadic  No cough  No chest pain  She did have a pancreatic cyst evaluated with an unremarkable EUS  but no aspiration could be performed  She is following up with gastroenterology  She does not have signs or symptoms however of pancreatic malignancy  No fever or chills  No sick contacts  She has otherwise been doing well  No abdominal pain  No weight loss  No change in appetite      Current Medications:    Current Outpatient Medications:     acetaminophen (TYLENOL) 500 mg tablet, Take 1 tablet (500 mg total) by mouth every 6 (six) hours as needed for mild pain or moderate pain, Disp: 30 tablet, Rfl: 0    Anoro Ellipta 62 5-25 MCG/INH inhaler, INHALE ONE PUFF BY MOUTH EVERY DAY, Disp: 180 each, Rfl: 3    aspirin 81 MG tablet, Take 81 mg by mouth daily, Disp: , Rfl:     atorvastatin (LIPITOR) 10 mg tablet, Take 1 tablet (10 mg total) by mouth daily, Disp: 90 tablet, Rfl: 1    baclofen 10 mg tablet, Take 1 tablet (10 mg total) by mouth 3 (three) times a day, Disp: 30 tablet, Rfl: 1    Cholecalciferol (VITAMIN D3) 2000 units capsule, Take 4,000 Units by mouth daily , Disp: , Rfl:     cloNIDine (CATAPRES) 0 1 mg tablet, 1 po bid prn if BP greater than 150, Disp: 60 tablet, Rfl: 5    hydrochlorothiazide (HYDRODIURIL) 25 mg tablet, Take 1 tablet (25 mg total) by mouth daily, Disp: 90 tablet, Rfl: 1    levothyroxine 50 mcg tablet, Take 1 tablet (50 mcg total) by mouth daily, Disp: 90 tablet, Rfl: 1    losartan (COZAAR) 100 MG tablet, Take 1 tablet (100 mg total) by mouth daily, Disp: 90 tablet, Rfl: 1    Omega-3 Fatty Acids (FISH OIL PO), Take 1,200 mg by mouth 2 (two) times a day, Disp: , Rfl:     omeprazole (PriLOSEC) 40 MG capsule, Take 1 capsule (40 mg total) by mouth 2 (two) times a day before meals, Disp: 60 capsule, Rfl: 3    albuterol (PROVENTIL HFA,VENTOLIN HFA) 90 mcg/act inhaler, Inhale 2 puffs every 6 (six) hours as needed for wheezing, Disp: 1 Inhaler, Rfl: 6    Black Elderberry,Berry-Flower, 575 MG CAPS, Take by mouth daily, Disp: , Rfl:     Montelukast Sodium (SINGULAIR PO), Take by mouth, Disp: , Rfl:     Review of Systems:  Aside from what is mentioned in the HPI, the review of systems is otherwise negative    Past medical history, surgical history, and family history were reviewed and updated as appropriate    Social history updates:  Social History     Tobacco Use   Smoking Status Former Smoker    Packs/day: 2 00    Years: 36 00    Pack years: 72 00    Types: Cigarettes    Start date: 11    Quit date: 45    Years since quittin 2   Smokeless Tobacco Never Used       PhysicalExamination:  Vitals:   /70 (BP Location: Left arm, Patient Position: Sitting, Cuff Size: Standard)   Pulse 81   Temp 97 6 °F (36 4 °C) (Tympanic)   Ht 5' 5" (1 651 m)   Wt 64 9 kg (143 lb)   SpO2 93%   BMI 23 80 kg/m²     Gen: Comfortable  Non-labored  HEENT: PERRL  O/P: clear  moist  Neck: Trachea is midline  No JVD  No adenopathy  Chest:  Symmetric chest wall excursion  Slightly distant breath sounds  Clear to auscultation  Cardiac:  regular  no murmur  Abdomen: Soft and nontender  Bowel sounds are present  Extremities: No edema    Diagnostic Data:  Labs:   I personally reviewed the most recent laboratory data pertinent to today's visit    Lab Results   Component Value Date    WBC 4 9 11/03/2020    HGB 12 4 11/03/2020    HCT 38 7 11/03/2020    MCV 87 6 11/03/2020     11/03/2020     Lab Results   Component Value Date    SODIUM 129 (L) 03/04/2021    K 3 9 03/04/2021    CO2 35 (H) 03/04/2021    CL 89 (L) 03/04/2021    BUN 11 03/04/2021    CREATININE 0 55 (L) 03/04/2021    CALCIUM 9 3 03/04/2021     Imaging:   no new dedicated pulmonary imaging    Melanie Pollock MD

## 2021-03-30 NOTE — ASSESSMENT & PLAN NOTE
·  Stable on Anoro Ellipta   · Has periods of time when she might benefit from a rescue inhaler and this was ordered for her  · No benefit from Singulair and will remain off this    If allergy symptoms increase and she feels like she may benefit from use during allergy season, she will restart

## 2021-04-21 ENCOUNTER — HOSPITAL ENCOUNTER (OUTPATIENT)
Dept: BONE DENSITY | Facility: MEDICAL CENTER | Age: 81
Discharge: HOME | End: 2021-04-21
Payer: COMMERCIAL

## 2021-04-21 DIAGNOSIS — Z78.0 POSTMENOPAUSAL: ICD-10-CM

## 2021-04-21 PROCEDURE — 77080 DXA BONE DENSITY AXIAL: CPT

## 2021-04-25 DIAGNOSIS — M81.0 AGE-RELATED OSTEOPOROSIS WITHOUT CURRENT PATHOLOGICAL FRACTURE: ICD-10-CM

## 2021-04-25 DIAGNOSIS — J44.1 CHRONIC OBSTRUCTIVE PULMONARY DISEASE WITH ACUTE EXACERBATION (HCC): Primary | ICD-10-CM

## 2021-05-04 LAB
25(OH)D3 SERPL-MCNC: 70 NG/ML (ref 30–100)
CALCIUM SERPL-MCNC: 9 MG/DL (ref 8.6–10.4)
PTH-INTACT SERPL-MCNC: 28 PG/ML (ref 14–64)

## 2021-05-12 ENCOUNTER — OFFICE VISIT (OUTPATIENT)
Dept: GASTROENTEROLOGY | Facility: CLINIC | Age: 81
End: 2021-05-12
Payer: COMMERCIAL

## 2021-05-12 VITALS
HEIGHT: 65 IN | SYSTOLIC BLOOD PRESSURE: 138 MMHG | BODY MASS INDEX: 24.46 KG/M2 | HEART RATE: 78 BPM | WEIGHT: 146.8 LBS | DIASTOLIC BLOOD PRESSURE: 72 MMHG

## 2021-05-12 DIAGNOSIS — K86.2 PANCREATIC CYST: ICD-10-CM

## 2021-05-12 DIAGNOSIS — R10.84 GENERALIZED ABDOMINAL PAIN: ICD-10-CM

## 2021-05-12 DIAGNOSIS — K59.04 CHRONIC IDIOPATHIC CONSTIPATION: ICD-10-CM

## 2021-05-12 DIAGNOSIS — K21.9 GASTROESOPHAGEAL REFLUX DISEASE WITHOUT ESOPHAGITIS: ICD-10-CM

## 2021-05-12 DIAGNOSIS — R76.8 HELICOBACTER PYLORI AB+: Primary | ICD-10-CM

## 2021-05-12 PROCEDURE — 1160F RVW MEDS BY RX/DR IN RCRD: CPT | Performed by: INTERNAL MEDICINE

## 2021-05-12 PROCEDURE — 3078F DIAST BP <80 MM HG: CPT | Performed by: INTERNAL MEDICINE

## 2021-05-12 PROCEDURE — 99214 OFFICE O/P EST MOD 30 MIN: CPT | Performed by: INTERNAL MEDICINE

## 2021-05-12 PROCEDURE — 1036F TOBACCO NON-USER: CPT | Performed by: INTERNAL MEDICINE

## 2021-05-12 PROCEDURE — 3075F SYST BP GE 130 - 139MM HG: CPT | Performed by: INTERNAL MEDICINE

## 2021-05-12 RX ORDER — FAMOTIDINE 40 MG/1
40 TABLET, FILM COATED ORAL DAILY PRN
Qty: 30 TABLET | Refills: 5 | Status: SHIPPED | OUTPATIENT
Start: 2021-05-12 | End: 2021-07-02 | Stop reason: SDUPTHER

## 2021-05-12 NOTE — PROGRESS NOTES
[facility] - Outpatient Follow-up Note  Maridee Hamman 79 y o  female MRN: [id number]  Encounter: [id number]          ASSESSMENT AND PLAN:      1  Helicobacter pylori ab+  -  Status post triple therapy treatment, confirm eradication with stool antigen testing when off of PPI for a total of 2 weeks  - H  pylori antigen, stool; Future    2  Generalized abdominal pain  3  Gastroesophageal reflux disease without esophagitis  - okay to stop PPI and may use Pepcid on an as-needed basis especially in setting of her osteoporosis  - famotidine (PEPCID) 40 MG tablet; Take 1 tablet (40 mg total) by mouth daily as needed for indigestion or heartburn  Dispense: 30 tablet; Refill: 5    4  Constipation  - advised on daily use of MiraLax and adjustment of dose to promote 1 bowel movement daily    5  Pancreatic cyst  - status post EUS with unsuccessful aspiration  - in the absence of alarm features, her advanced age and being a poor surgical candidate do not recommend any further surveillance    6  Malnutrition of mild degree  - down 3 lb since October;ikely due to hypermetabolic state from underlying emphysema  - advised on daily use of Ensure high-protein to promote weight maintenance    Return to clinic in 3-6 months and after that can see us on an as-needed basis    ______________________________________________________________________    SUBJECTIVE:    Age year old female seen in follow-up for GERD, abdominal pain, constipation and pancreatic cyst    In the interim patient appears to be doing quite well and her symptoms are under control  She endorses some constipation and increased abdominal bloating  She was previously on PPI b i d  and she has self weaned to  1 pill every 3 days without any worsening of her symptoms  She was treated with triple therapy for a positive H pylori antibody    She had undergone a EUS for further evaluation of her small pancreatic cyst- however aspiration was not able to be performed due to small size  She has a bowel movement every 2 days with the use of MiraLax 3 times per week  She wishes to minimize her medication  REVIEW OF SYSTEMS IS OTHERWISE NEGATIVE        Historical Information   Past Medical History:   Diagnosis Date    Anxiety     Back pain     Cancer ([place] [address] )     skin    Cataract     COPD (chronic obstructive pulmonary disease) ([place] [address] )     Disease of thyroid gland     Emphysema lung (HCC)     Emphysema of lung ([place] [address] )     Geographic tongue     last assessed: 2014    GERD (gastroesophageal reflux disease)     Hyperlipidemia     Hypertension     Hypothyroidism (acquired)     Mild intermittent asthma     Sciatica     Seasonal allergies      Past Surgical History:   Procedure Laterality Date    BACK SURGERY      ENDOSCOPIC ULTRASOUND (UPPER)  2020    SKIN LESION EXCISION       Social History   Social History     Substance and Sexual Activity   Alcohol Use No     Social History     Substance and Sexual Activity   Drug Use No     Social History     Tobacco Use   Smoking Status Former Smoker    Packs/day: 2 00    Years: 36 00    Pack years: 72 00    Types: Cigarettes    Start date: 12    Quit date: 45    Years since quittin 3   Smokeless Tobacco Never Used     Family History   Problem Relation Age of Onset    Stroke Mother     Cirrhosis Father         alcoholic    Cancer Sister     Cancer Daughter        Meds/Allergies       Current Outpatient Medications:     acetaminophen (TYLENOL) 500 mg tablet    albuterol (PROVENTIL HFA,VENTOLIN HFA) 90 mcg/act inhaler    Anoro Ellipta 62 5-25 MCG/INH inhaler    aspirin 81 MG tablet    atorvastatin (LIPITOR) 10 mg tablet    baclofen 10 mg tablet    Black Elderberry,Berry-Flower, 575 MG CAPS    Cholecalciferol (VITAMIN D3) 2000 units capsule    cloNIDine (CATAPRES) 0 1 mg tablet    hydrochlorothiazide (HYDRODIURIL) 25 mg tablet    levothyroxine 50 mcg tablet    losartan (COZAAR) 100 MG tablet    Montelukast Sodium (SINGULAIR PO)    Omega-3 Fatty Acids (FISH OIL PO)    famotidine (PEPCID) 40 MG tablet    Allergies   Allergen Reactions    Other Other (See Comments)     Steroids, it effects her eyes           Objective     Blood pressure 138/72, pulse 78, height 5' 5" (1 651 m), weight 66 6 kg (146 lb 12 8 oz), not currently breastfeeding  Body mass index is 24 43 kg/m²  PHYSICAL EXAM:      General Appearance:   Alert, cooperative, no distress   HEENT:   Normocephalic, atraumatic, anicteric      Neck:  Supple, symmetrical, trachea midline   Lungs:   Clear to auscultation bilaterally; no rales, rhonchi or wheezing; respirations unlabored    Heart[de-identified]   Regular rate and rhythm; no murmur, rub, or gallop  Abdomen:   Soft, non-tender, non-distended; normal bowel sounds; no masses, no organomegaly    Genitalia:   Deferred    Rectal:   Deferred    Extremities:  No cyanosis, clubbing or edema        Skin:  No jaundice, rashes, or lesions    Lymph nodes:  No palpable cervical lymphadenopathy        Lab Results:   No visits with results within 1 Day(s) from this visit  Latest known visit with results is:   Orders Only on 05/03/2021   Component Date Value    PTH, Intact 05/03/2021 28     Calcium 05/03/2021 9 0     Vitamin D, 25-Hydroxy, S* 05/03/2021 70          Radiology Results:   Dxa Bone Density Spine Hip And Pelvis    Result Date: 4/24/2021  Narrative: CENTRAL  DXA SCAN CLINICAL HISTORY:   79year old post-menopausal  female risk factors include personal history compound fracture T10 in 2020  TECHNIQUE: Bone densitometry was performed using a [employer] W bone densitometer  Regions of interest appear properly placed  There are   other confounding variables which could limit the study    Significant degenerative or osteoarthritic changes appear to be present on the spine image often seen in this age group and likely invalidating the true bone density result and eliminating L4 from evaluation  COMPARISON:  Baseline at this center  Prior study at a different location unavailable at this time  RESULTS: LUMBAR SPINE:  L1-L3: BMD 0 751 gm/cm2 T-score below normal, -2 4 Z-score 0 3 LEFT TOTAL HIP: BMD 0 648 gm/cm2 T-score below normal, -2 4 Z-score -0 2 LEFT FEMORAL NECK: BMD 0 545 gm/cm2 T-score below normal, -2 7, osteoporosis  Z-score -0 3 The left forearm BMD is 0 602 and the T score is below normal, -1 5   Z score is +1 9  A 25-hydroxy vitamin D level, an intact PTH, and a comprehensive metabolic panel are suggested in this patient  Treatment is a consideration if appropriate to total clinical health evaluation  Impression: 1  Based on the [facility] classification, this study identifies a diagnosis of osteoporosis, notable at the femoral neck area and the patient is considered at increased risk for fracture  2  A daily intake of calcium of at least 1200 mg and vitamin D, 800-1000 IU, as well as weight bearing and muscle strengthening exercise, fall prevention and avoidance of tobacco and excessive alcohol intake as basic preventive measures are recommended  3  Repeat DXA scan on the same equipment in 18-24 months as clinically indicated  The 10 year risk of hip fracture is 9 9%, with the 10 year risk of major osteoporotic fracture being 28%, as calculated by the [facility] fracture risk assessment tool (FRAX)  The current NOF guidelines recommend treating patients with FRAX 10 year risk score of >3% for hip fracture and >20% for major osteoporotic fracture  Fracture risks exceeds treatment thresholds  WHO CLASSIFICATION: Normal (a T-score of -1 0 or higher) Low bone mineral density (a T-score of less than -1 0 but higher than -2 5) Osteoporosis (a T-score of -2 5 or less) Severe osteoporosis (a T-score of -2 5 or less with a fragility fracture) Thank you for allowing us the opportunity to participate in your patient care   The expanded DEXA report will no longer be arriving in your mail  If you desire to view the full report please contact [facility] or access the PACS system  Workstation performed: [id number]     Dxa Follow Up    Result Date: 5/5/2021  Narrative: CENTRAL  DXA SCAN CLINICAL HISTORY:   79year old post-menopausal  female risk factors include personal history compound fracture T10 in 2020  TECHNIQUE: Bone densitometry was performed using a [employer] W bone densitometer  Regions of interest appear properly placed  There are   other confounding variables which could limit the study  Significant degenerative or osteoarthritic changes appear to be present on the spine image often seen in this age group and likely invalidating the true bone density result and eliminating L4 from evaluation  COMPARISON:  Baseline at this center  Prior study at a different location unavailable at this time  RESULTS: LUMBAR SPINE:  L1-L3: BMD 0 751 gm/cm2 T-score below normal, -2 4 Z-score 0 3 LEFT TOTAL HIP: BMD 0 648 gm/cm2 T-score below normal, -2 4 Z-score -0 2 LEFT FEMORAL NECK: BMD 0 545 gm/cm2 T-score below normal, -2 7, osteoporosis  Z-score -0 3 The left forearm BMD is 0 602 and the T score is below normal, -1 5   Z score is +1 9  A 25-hydroxy vitamin D level, an intact PTH, and a comprehensive metabolic panel are suggested in this patient  Treatment is a consideration if appropriate to total clinical health evaluation  Impression: 1  Based on the [facility] classification, this study identifies a diagnosis of osteoporosis, notable at the femoral neck area and the patient is considered at increased risk for fracture  2  A daily intake of calcium of at least 1200 mg and vitamin D, 800-1000 IU, as well as weight bearing and muscle strengthening exercise, fall prevention and avoidance of tobacco and excessive alcohol intake as basic preventive measures are recommended  3  Repeat DXA scan on the same equipment in 18-24 months as clinically indicated   The 10 year risk of hip fracture is 9 9%, with the 10 year risk of major osteoporotic fracture being 28%, as calculated by the [facility] fracture risk assessment tool (FRAX)  The current NOF guidelines recommend treating patients with FRAX 10 year risk score of >3% for hip fracture and >20% for major osteoporotic fracture  Fracture risks exceeds treatment thresholds  WHO CLASSIFICATION: Normal (a T-score of -1 0 or higher) Low bone mineral density (a T-score of less than -1 0 but higher than -2 5) Osteoporosis (a T-score of -2 5 or less) Severe osteoporosis (a T-score of -2 5 or less with a fragility fracture) Thank you for allowing us the opportunity to participate in your patient care  The expanded DEXA report will no longer be arriving in your mail  If you desire to view the full report please contact [address] [place] [address] or access the PACS system   Workstation performed: [id number]

## 2021-05-12 NOTE — PATIENT INSTRUCTIONS
H Pylori:  Submit stool sample after being off of your Prilosec for a total of 14 days    Abdominal pain/GERD:  Okay to stay off of the Prilosec and will prescribe you Pepcid 40 mg to take on an as-needed basis  Constipation:  Take MiraLax 1 cap full daily to promote an on strained bowel movement on a daily basis okay to increase the dose to meet this goal  Nutrition: Take Ensure high-protein once daily    RTC in  3 mos

## 2021-06-18 DIAGNOSIS — I10 ESSENTIAL HYPERTENSION: ICD-10-CM

## 2021-06-18 RX ORDER — CLONIDINE HYDROCHLORIDE 0.1 MG/1
TABLET ORAL
Qty: 60 TABLET | Refills: 5 | Status: SHIPPED | OUTPATIENT
Start: 2021-06-18 | End: 2022-06-21

## 2021-07-02 ENCOUNTER — OFFICE VISIT (OUTPATIENT)
Dept: FAMILY MEDICINE CLINIC | Facility: CLINIC | Age: 81
End: 2021-07-02
Payer: COMMERCIAL

## 2021-07-02 VITALS
WEIGHT: 144 LBS | HEART RATE: 76 BPM | BODY MASS INDEX: 23.99 KG/M2 | DIASTOLIC BLOOD PRESSURE: 72 MMHG | HEIGHT: 65 IN | SYSTOLIC BLOOD PRESSURE: 146 MMHG | RESPIRATION RATE: 20 BRPM

## 2021-07-02 DIAGNOSIS — R20.2 PARESTHESIA OF BOTH FEET: ICD-10-CM

## 2021-07-02 DIAGNOSIS — R10.84 GENERALIZED ABDOMINAL PAIN: ICD-10-CM

## 2021-07-02 DIAGNOSIS — K21.9 GASTROESOPHAGEAL REFLUX DISEASE WITHOUT ESOPHAGITIS: ICD-10-CM

## 2021-07-02 DIAGNOSIS — E78.2 MIXED HYPERLIPIDEMIA: ICD-10-CM

## 2021-07-02 DIAGNOSIS — R42 DIZZINESS: ICD-10-CM

## 2021-07-02 DIAGNOSIS — M81.0 AGE-RELATED OSTEOPOROSIS WITHOUT CURRENT PATHOLOGICAL FRACTURE: ICD-10-CM

## 2021-07-02 DIAGNOSIS — I10 ESSENTIAL HYPERTENSION: Primary | ICD-10-CM

## 2021-07-02 PROBLEM — E27.9 ADRENAL NODULE (HCC): Status: ACTIVE | Noted: 2021-07-02

## 2021-07-02 PROBLEM — E27.8 ADRENAL NODULE (HCC): Status: ACTIVE | Noted: 2021-07-02

## 2021-07-02 PROCEDURE — 3078F DIAST BP <80 MM HG: CPT | Performed by: FAMILY MEDICINE

## 2021-07-02 PROCEDURE — 99214 OFFICE O/P EST MOD 30 MIN: CPT | Performed by: FAMILY MEDICINE

## 2021-07-02 PROCEDURE — 3077F SYST BP >= 140 MM HG: CPT | Performed by: FAMILY MEDICINE

## 2021-07-02 PROCEDURE — 1160F RVW MEDS BY RX/DR IN RCRD: CPT | Performed by: FAMILY MEDICINE

## 2021-07-02 PROCEDURE — 1036F TOBACCO NON-USER: CPT | Performed by: FAMILY MEDICINE

## 2021-07-02 PROCEDURE — 3725F SCREEN DEPRESSION PERFORMED: CPT | Performed by: FAMILY MEDICINE

## 2021-07-02 RX ORDER — FAMOTIDINE 40 MG/1
40 TABLET, FILM COATED ORAL DAILY
Qty: 90 TABLET | Refills: 1 | Status: SHIPPED | OUTPATIENT
Start: 2021-07-02 | End: 2022-02-17 | Stop reason: SDUPTHER

## 2021-07-02 NOTE — PROGRESS NOTES
Assessment and Plan:    Problem List Items Addressed This Visit        Digestive    GERD (gastroesophageal reflux disease)     rec taking Famotidine daily due to osteoporosis, vitamin D level adequate         Relevant Medications    famotidine (PEPCID) 40 MG tablet       Cardiovascular and Mediastinum    Essential hypertension - Primary    Relevant Orders    Comprehensive metabolic panel       Musculoskeletal and Integument    Age-related osteoporosis without current pathological fracture     Increase vit D to 4000 units/day         Relevant Orders    Ambulatory referral to Rheumatology       Other    Dizziness    Relevant Orders    CBC and differential    Mixed hyperlipidemia    Relevant Orders    Lipid panel      Other Visit Diagnoses     Generalized abdominal pain        Relevant Medications    famotidine (PEPCID) 40 MG tablet    Paresthesia of both feet        Relevant Orders    Vitamin B12                 Diagnoses and all orders for this visit:    Essential hypertension  -     Comprehensive metabolic panel; Future    Mixed hyperlipidemia  -     Lipid panel; Future    Dizziness  -     CBC and differential; Future    Age-related osteoporosis without current pathological fracture  -     Ambulatory referral to Rheumatology; Future    Gastroesophageal reflux disease without esophagitis    Generalized abdominal pain  -     famotidine (PEPCID) 40 MG tablet; Take 1 tablet (40 mg total) by mouth daily    Paresthesia of both feet  -     Vitamin B12; Future    Other orders  -     Calcium Carbonate (CALCIUM 600 PO); Take by mouth  -     Ascorbic Acid (VITAMIN C PO); Take 600 mg by mouth  -     Cancel: Vitamin B12; Future              Subjective:      Patient ID: Domitila Swan is a 79 y o  female      CC:    Chief Complaint   Patient presents with    Follow-up     multiple medical complaints       HPI:    Here for f/u of lightheadedness , no cp, stable sob, , worried about bones with abnl DExa, daughter on Reclast, worried about GERD with bones, due for lab, also has numbness of feet, worse at night      The following portions of the patient's history were reviewed and updated as appropriate: allergies, current medications, past family history, past medical history, past social history, past surgical history and problem list       Review of Systems   Constitutional: Positive for fatigue  Negative for activity change and appetite change  Respiratory: Negative for shortness of breath  Cardiovascular: Negative for chest pain  Gastrointestinal:        Heartburn   Neurological: Positive for light-headedness and numbness  Negative for dizziness and headaches  Occ numbness of feet   Psychiatric/Behavioral: The patient is not nervous/anxious  Data to review:       Objective:    Vitals:    07/02/21 0859   BP: 146/72   BP Location: Right arm   Patient Position: Sitting   Cuff Size: Large   Pulse: 76   Resp: 20   Weight: 65 3 kg (144 lb)   Height: 5' 5" (1 651 m)        Physical Exam  Vitals reviewed  Constitutional:       Appearance: Normal appearance  Neck:      Vascular: No carotid bruit  Cardiovascular:      Rate and Rhythm: Normal rate and regular rhythm  Pulses: Normal pulses  Heart sounds: Normal heart sounds  Pulmonary:      Effort: Pulmonary effort is normal       Comments: Decreased bs bases  Musculoskeletal:      Right lower leg: No edema  Left lower leg: No edema  Lymphadenopathy:      Cervical: No cervical adenopathy  Neurological:      Mental Status: She is alert     Psychiatric:         Mood and Affect: Mood normal

## 2021-07-04 LAB
ALBUMIN SERPL-MCNC: 4 G/DL (ref 3.6–5.1)
ALBUMIN/GLOB SERPL: 1.8 (CALC) (ref 1–2.5)
ALP SERPL-CCNC: 75 U/L (ref 37–153)
ALT SERPL-CCNC: 19 U/L (ref 6–29)
AST SERPL-CCNC: 20 U/L (ref 10–35)
BASOPHILS # BLD AUTO: 70 CELLS/UL (ref 0–200)
BASOPHILS NFR BLD AUTO: 1.2 %
BILIRUB SERPL-MCNC: 0.4 MG/DL (ref 0.2–1.2)
BUN SERPL-MCNC: 21 MG/DL (ref 7–25)
BUN/CREAT SERPL: ABNORMAL (CALC) (ref 6–22)
CALCIUM SERPL-MCNC: 9.4 MG/DL (ref 8.6–10.4)
CHLORIDE SERPL-SCNC: 93 MMOL/L (ref 98–110)
CHOLEST SERPL-MCNC: 197 MG/DL
CHOLEST/HDLC SERPL: 2 (CALC)
CO2 SERPL-SCNC: 37 MMOL/L (ref 20–32)
CREAT SERPL-MCNC: 0.65 MG/DL (ref 0.6–0.88)
EOSINOPHIL # BLD AUTO: 151 CELLS/UL (ref 15–500)
EOSINOPHIL NFR BLD AUTO: 2.6 %
ERYTHROCYTE [DISTWIDTH] IN BLOOD BY AUTOMATED COUNT: 14.4 % (ref 11–15)
GLOBULIN SER CALC-MCNC: 2.2 G/DL (CALC) (ref 1.9–3.7)
GLUCOSE SERPL-MCNC: 103 MG/DL (ref 65–99)
HCT VFR BLD AUTO: 38.6 % (ref 35–45)
HDLC SERPL-MCNC: 97 MG/DL
HGB BLD-MCNC: 12.3 G/DL (ref 11.7–15.5)
LDLC SERPL CALC-MCNC: 86 MG/DL (CALC)
LYMPHOCYTES # BLD AUTO: 2129 CELLS/UL (ref 850–3900)
LYMPHOCYTES NFR BLD AUTO: 36.7 %
MCH RBC QN AUTO: 28 PG (ref 27–33)
MCHC RBC AUTO-ENTMCNC: 31.9 G/DL (ref 32–36)
MCV RBC AUTO: 87.7 FL (ref 80–100)
MONOCYTES # BLD AUTO: 638 CELLS/UL (ref 200–950)
MONOCYTES NFR BLD AUTO: 11 %
NEUTROPHILS # BLD AUTO: 2813 CELLS/UL (ref 1500–7800)
NEUTROPHILS NFR BLD AUTO: 48.5 %
NONHDLC SERPL-MCNC: 100 MG/DL (CALC)
PLATELET # BLD AUTO: 271 THOUSAND/UL (ref 140–400)
PMV BLD REES-ECKER: 10.3 FL (ref 7.5–12.5)
POTASSIUM SERPL-SCNC: 3.7 MMOL/L (ref 3.5–5.3)
PROT SERPL-MCNC: 6.2 G/DL (ref 6.1–8.1)
RBC # BLD AUTO: 4.4 MILLION/UL (ref 3.8–5.1)
SL AMB EGFR AFRICAN AMERICAN: 96 ML/MIN/1.73M2
SL AMB EGFR NON AFRICAN AMERICAN: 83 ML/MIN/1.73M2
SODIUM SERPL-SCNC: 136 MMOL/L (ref 135–146)
TRIGL SERPL-MCNC: 46 MG/DL
VIT B12 SERPL-MCNC: 428 PG/ML (ref 200–1100)
WBC # BLD AUTO: 5.8 THOUSAND/UL (ref 3.8–10.8)

## 2021-08-05 ENCOUNTER — OFFICE VISIT (OUTPATIENT)
Dept: FAMILY MEDICINE CLINIC | Facility: CLINIC | Age: 81
End: 2021-08-05
Payer: COMMERCIAL

## 2021-08-05 VITALS
BODY MASS INDEX: 24.03 KG/M2 | OXYGEN SATURATION: 96 % | DIASTOLIC BLOOD PRESSURE: 80 MMHG | HEIGHT: 65 IN | SYSTOLIC BLOOD PRESSURE: 118 MMHG | WEIGHT: 144.2 LBS | HEART RATE: 77 BPM

## 2021-08-05 DIAGNOSIS — R22.41 LOCALIZED SWELLING OF RIGHT LOWER LEG: Primary | ICD-10-CM

## 2021-08-05 DIAGNOSIS — R22.41 FOOT MASS, RIGHT: ICD-10-CM

## 2021-08-05 PROCEDURE — 1036F TOBACCO NON-USER: CPT | Performed by: NURSE PRACTITIONER

## 2021-08-05 PROCEDURE — 3074F SYST BP LT 130 MM HG: CPT | Performed by: NURSE PRACTITIONER

## 2021-08-05 PROCEDURE — 99214 OFFICE O/P EST MOD 30 MIN: CPT | Performed by: NURSE PRACTITIONER

## 2021-08-05 PROCEDURE — 1160F RVW MEDS BY RX/DR IN RCRD: CPT | Performed by: NURSE PRACTITIONER

## 2021-08-05 PROCEDURE — 3079F DIAST BP 80-89 MM HG: CPT | Performed by: NURSE PRACTITIONER

## 2021-08-05 NOTE — PROGRESS NOTES
Assessment and Plan:    Problem List Items Addressed This Visit     None      Visit Diagnoses     Localized swelling of right lower leg    -  Primary    Relevant Orders    US extremity soft tissue    VAS lower limb venous duplex study, unilateral/limited    Foot mass, right        Relevant Orders    US extremity soft tissue                 Diagnoses and all orders for this visit:    Localized swelling of right lower leg  -     US extremity soft tissue; Future  -     VAS lower limb venous duplex study, unilateral/limited; Future    Foot mass, right  -     US extremity soft tissue; Future              Subjective:      Patient ID: Rogelio Ruiz is a 79 y o  female  CC:    Chief Complaint   Patient presents with    Foot Swelling     Right foot swelling on and offand a large lump on the top of the foot  kw       HPI:    Patient reports she was seen by her PCP earlier this onth with some foot swelling at the time she did not have lump  The last 3 days a lump has formed and she had some more swelling  She does have occasional burning  She denies any history of blood clot in the past        The following portions of the patient's history were reviewed and updated as appropriate: allergies, current medications, past family history, past medical history, past social history, past surgical history and problem list       Review of Systems   Constitutional: Negative  Respiratory: Negative  Cardiovascular: Negative  Skin: Positive for color change  lump         Data to review:       Objective:    Vitals:    08/05/21 1444   BP: 118/80   BP Location: Left arm   Patient Position: Sitting   Pulse: 77   SpO2: 96%   Weight: 65 4 kg (144 lb 3 2 oz)   Height: 5' 5" (1 651 m)        Physical Exam  Vitals and nursing note reviewed  Constitutional:       General: She is not in acute distress  Appearance: Normal appearance  She is not ill-appearing or diaphoretic     HENT:      Head: Normocephalic and atraumatic  Right Ear: External ear normal       Left Ear: External ear normal    Eyes:      Extraocular Movements: Extraocular movements intact  Conjunctiva/sclera: Conjunctivae normal    Cardiovascular:      Rate and Rhythm: Normal rate and regular rhythm  Pulses:           Dorsalis pedis pulses are 1+ on the right side and 1+ on the left side  Posterior tibial pulses are 1+ on the right side and 1+ on the left side  Heart sounds: Normal heart sounds, S1 normal and S2 normal  No murmur heard  Pulmonary:      Effort: Pulmonary effort is normal       Breath sounds: Examination of the right-lower field reveals decreased breath sounds  Examination of the left-lower field reveals decreased breath sounds  Decreased breath sounds present  Comments: Patient on supplemental oxygen 3L   Feet:      Comments: Abnormal color to both lower extremities right greater than left  Toes are colder but perfusion intact cap refill less than 3 seconds bilaterally  Skin:     Coloration: Skin is not pale  Neurological:      Mental Status: She is alert and oriented to person, place, and time  Psychiatric:         Mood and Affect: Mood normal          Behavior: Behavior normal          Thought Content:  Thought content normal          Judgment: Judgment normal

## 2021-08-06 ENCOUNTER — HOSPITAL ENCOUNTER (OUTPATIENT)
Dept: ULTRASOUND IMAGING | Facility: HOSPITAL | Age: 81
Discharge: HOME | End: 2021-08-06
Payer: COMMERCIAL

## 2021-08-06 ENCOUNTER — HOSPITAL ENCOUNTER (OUTPATIENT)
Dept: NON INVASIVE DIAGNOSTICS | Facility: HOSPITAL | Age: 81
Discharge: HOME | End: 2021-08-06
Payer: COMMERCIAL

## 2021-08-06 DIAGNOSIS — R22.41 FOOT MASS, RIGHT: ICD-10-CM

## 2021-08-06 DIAGNOSIS — R22.41 LOCALIZED SWELLING OF RIGHT LOWER LEG: ICD-10-CM

## 2021-08-06 PROCEDURE — 76882 US LMTD JT/FCL EVL NVASC XTR: CPT

## 2021-08-06 PROCEDURE — 93971 EXTREMITY STUDY: CPT

## 2021-08-06 PROCEDURE — 93971 EXTREMITY STUDY: CPT | Performed by: SURGERY

## 2021-08-12 ENCOUNTER — TELEPHONE (OUTPATIENT)
Dept: FAMILY MEDICINE CLINIC | Facility: CLINIC | Age: 81
End: 2021-08-12

## 2021-08-12 NOTE — TELEPHONE ENCOUNTER
Radiology department called with abnormal findings for us, please review so we can call pt  Thank you!

## 2021-08-16 ENCOUNTER — CONSULT (OUTPATIENT)
Dept: RHEUMATOLOGY | Facility: CLINIC | Age: 81
End: 2021-08-16
Payer: COMMERCIAL

## 2021-08-16 VITALS
DIASTOLIC BLOOD PRESSURE: 72 MMHG | SYSTOLIC BLOOD PRESSURE: 116 MMHG | TEMPERATURE: 98.2 F | HEIGHT: 65 IN | HEART RATE: 79 BPM | BODY MASS INDEX: 22.16 KG/M2 | WEIGHT: 133 LBS

## 2021-08-16 DIAGNOSIS — M81.0 AGE-RELATED OSTEOPOROSIS WITHOUT CURRENT PATHOLOGICAL FRACTURE: Primary | ICD-10-CM

## 2021-08-16 DIAGNOSIS — K21.9 GASTROESOPHAGEAL REFLUX DISEASE WITHOUT ESOPHAGITIS: ICD-10-CM

## 2021-08-16 PROCEDURE — 3078F DIAST BP <80 MM HG: CPT | Performed by: INTERNAL MEDICINE

## 2021-08-16 PROCEDURE — 3074F SYST BP LT 130 MM HG: CPT | Performed by: INTERNAL MEDICINE

## 2021-08-16 PROCEDURE — 99204 OFFICE O/P NEW MOD 45 MIN: CPT | Performed by: INTERNAL MEDICINE

## 2021-08-16 RX ORDER — ALENDRONATE SODIUM 70 MG/1
70 TABLET ORAL
Qty: 4 TABLET | Refills: 6 | Status: SHIPPED | OUTPATIENT
Start: 2021-08-16 | End: 2022-02-17

## 2021-08-16 NOTE — PATIENT INSTRUCTIONS
Continue daily calcium and Vit  D  Start taking alendronate once a week - Take with a full glass of water, on an empty stomach, and do not lie down got 30 minutes afterwards    Return to clinic in 6 months    Osteoporosis   AMBULATORY CARE:   Osteoporosis  is a long-term medical condition that causes your bones to become weak, brittle, and more likely to fracture  Osteoporosis occurs when your body absorbs more bone than it makes  It is also caused by a lack of calcium and estrogen (female hormone)  Common symptoms include the following: You may not have any signs or symptoms  You may break a bone after a muscle strain, bump, or fall  A break usually occurs in the hip, spine, or wrist  A collapsed vertebra (bone in your spine) may cause severe back pain or loss of height from bent posture  Call your doctor if:   · You have severe pain  · You have increasing pain after a fall  · You have pain when you do your daily activities  · You have questions or concerns about your condition or care  Diagnosis of osteoporosis:   · Blood and urine tests  measure your calcium, vitamin D, and estrogen levels  · An x-ray or CT  may show thinned bones or a fracture  You may be given contrast liquid to help the bones show up better in the pictures  Tell the healthcare provider if you have ever had an allergic reaction to contrast liquid  Do not enter the MRI room with anything metal  Metal can cause serious injury  Tell the healthcare provider if you have any metal in or on your body  · A bone density test  compares your bone thickness with what is expected for someone of your age, gender, and ethnicity  Treatment for osteoporosis  may include medicines to prevent bone loss, build new bone, and increase estrogen  These medicines help prevent fractures and may be given as a pill or injection  Ask your healthcare provider for more information on these medicines    Prevent bone loss:       · Eat healthy foods that are high in calcium  This helps keep your bones strong  Good sources of calcium are milk, cheese, broccoli, tofu, almonds, and canned salmon and sardines  · Increase your vitamin D intake  Vitamin D is in fish oils, some vegetables, and fortified milk, cereal, and bread  Vitamin D is also formed in the skin when it is exposed to the sun  Ask your healthcare provider how much sunlight is safe for you  · Drink liquids as directed  Ask your healthcare provider how much liquid to drink each day and which liquids are best for you  Do not have alcohol or caffeine  They decrease bone mineral density, which can weaken your bones  · Exercise regularly  Ask your healthcare provider about the best exercise plan for you  Weight bearing exercise for 30 minutes, 3 times a week can help build and strengthen bone  · Do not smoke  Nicotine and other chemicals in cigarettes and cigars can cause lung damage  Ask your healthcare provider for information if you currently smoke and need help to quit  E-cigarettes or smokeless tobacco still contain nicotine  Talk to your healthcare provider before you use these products  · Go to physical therapy as directed  A physical therapist teaches you exercises to help improve movement and muscle strength  Follow up with your doctor as directed:  Write down your questions so you remember to ask them during your visits  © Copyright [employer] 2021 Information is for End User's use only and may not be sold, redistributed or otherwise used for commercial purposes  All illustrations and images included in CareNotes® are the copyrighted property of [employer]  or Sarah Huertas  The above information is an  only  It is not intended as medical advice for individual conditions or treatments  Talk to your doctor, nurse or pharmacist before following any medical regimen to see if it is safe and effective for you

## 2021-08-16 NOTE — PROGRESS NOTES
Assessment and Plan:   Niraj Francois is a 79 y o   female who presents as a Rheumatology consult referred by her PCP Juno Mejia MD for evaluation of osteoporosis  Since this is the 1st time patient has been diagnosed with osteoporosis, and she has no history of fragility fractures, it is worth at least trying her on alendronate 70 mg p o  weekly as initial therapy  Her lowest T-score is  -2 7 at the left femoral neck, so her osteoporosis is not severe, and oral bisphosphonate therapy may be adequate  If patient is unable to tolerate alendronate due to esophagitis side effects, then can consider yearly IV Reclast infusions for 3 years  She has good renal function  Patient is supposed to continue daily calcium and vitamin-D  Advised patient how to appropriately take alendronate 70mg po once a week to help prevent heartburn/esophagitis symptoms; take with a full glass of water, on an empty stomach, and do not lie down for 30 minutes afterwards  Plan:  Diagnoses and all orders for this visit:    Age-related osteoporosis without current pathological fracture  -     Ambulatory referral to Rheumatology  -     alendronate (FOSAMAX) 70 mg tablet; Take 1 tablet (70 mg total) by mouth every 7 days Take with a full glass of water, on an empty stomach, and do not lie down got 30 minutes afterwards    Gastroesophageal reflux disease without esophagitis    Follow-up plan: Return to clinic in 6 months        HPI  Niraj Francois is a 79 y o   female who presents as a Rheumatology consult referred by Juno Mejia MD for evaluation of osteoporosis  This is the 1st time patient has been diagnosed with osteoporosis  She has been taking daily calcium and vitamin-D  She has a history of COPD; quit smoking 25 years ago  She is oxygen dependent  Patient was referred to me for consideration of possible Reclast infusions    Her daughter was on Reclast for several years, then restarted it last year; daughter has cancer history  Patient admits to some heartburn symptoms  Review of Systems  Review of Systems   Constitutional: Negative for chills, fatigue, fever and unexpected weight change  HENT: Negative for mouth sores and trouble swallowing  Eyes: Negative for pain and visual disturbance  Respiratory: Positive for cough, shortness of breath and wheezing  Cardiovascular: Negative for chest pain and leg swelling  Gastrointestinal: Negative for abdominal pain, blood in stool, constipation, diarrhea and nausea  Indigestion/heartburn   Musculoskeletal: Positive for back pain  Negative for arthralgias, joint swelling and myalgias  Skin: Negative for color change and rash  Neurological: Negative for weakness and numbness  Hematological: Negative for adenopathy  Psychiatric/Behavioral: Negative for sleep disturbance  Reviewed and agree      Allergies  Allergies   Allergen Reactions    Other Other (See Comments)     Steroids, it effects her eyes       Home Medications    Current Outpatient Medications:     acetaminophen (TYLENOL) 500 mg tablet, Take 1 tablet (500 mg total) by mouth every 6 (six) hours as needed for mild pain or moderate pain, Disp: 30 tablet, Rfl: 0    albuterol (PROVENTIL HFA,VENTOLIN HFA) 90 mcg/act inhaler, Inhale 2 puffs every 6 (six) hours as needed for wheezing, Disp: 1 Inhaler, Rfl: 6    Anoro Ellipta 62 5-25 MCG/INH inhaler, INHALE ONE PUFF BY MOUTH EVERY DAY, Disp: 180 each, Rfl: 3    Ascorbic Acid (VITAMIN C PO), Take 600 mg by mouth, Disp: , Rfl:     aspirin 81 MG tablet, Take 81 mg by mouth daily, Disp: , Rfl:     atorvastatin (LIPITOR) 10 mg tablet, Take 1 tablet (10 mg total) by mouth daily, Disp: 90 tablet, Rfl: 1    baclofen 10 mg tablet, Take 1 tablet (10 mg total) by mouth 3 (three) times a day, Disp: 30 tablet, Rfl: 1    Calcium Carbonate (CALCIUM 600 PO), Take by mouth, Disp: , Rfl:     Cholecalciferol (VITAMIN D3) 2000 units capsule, Take 4,000 Units by mouth daily , Disp: , Rfl:     cloNIDine (CATAPRES) 0 1 mg tablet, TAKE ONE TABLET BY MOUTH TWICE A DAY AS NEEDED IF BLOOD PRESSURE GREATER THAN 150, Disp: 60 tablet, Rfl: 5    famotidine (PEPCID) 40 MG tablet, Take 1 tablet (40 mg total) by mouth daily, Disp: 90 tablet, Rfl: 1    hydrochlorothiazide (HYDRODIURIL) 25 mg tablet, Take 1 tablet (25 mg total) by mouth daily, Disp: 90 tablet, Rfl: 1    levothyroxine 50 mcg tablet, Take 1 tablet (50 mcg total) by mouth daily, Disp: 90 tablet, Rfl: 1    losartan (COZAAR) 100 MG tablet, Take 1 tablet (100 mg total) by mouth daily, Disp: 90 tablet, Rfl: 1    Montelukast Sodium (SINGULAIR PO), Take by mouth, Disp: , Rfl:     Omega-3 Fatty Acids (FISH OIL PO), Take 1,200 mg by mouth 2 (two) times a day, Disp: , Rfl:     alendronate (FOSAMAX) 70 mg tablet, Take 1 tablet (70 mg total) by mouth every 7 days Take with a full glass of water, on an empty stomach, and do not lie down got 30 minutes afterwards, Disp: 4 tablet, Rfl: 6    Past Medical History  Past Medical History:   Diagnosis Date    Anxiety     Back pain     Cancer (HCC)     skin    Cataract     COPD (chronic obstructive pulmonary disease) (HCC)     Disease of thyroid gland     [facility] lung [facility] [address] [facility]     Emphysema of lung ([facility] [address] [facility]     Geographic tongue     last assessed: 07/25/2014    GERD (gastroesophageal reflux disease)     Hyperlipidemia     Hypertension     Hypothyroidism (acquired)     Mild intermittent asthma     Sciatica     Seasonal allergies        Past Surgical History   Past Surgical History:   Procedure Laterality Date    BACK SURGERY      ENDOSCOPIC ULTRASOUND (UPPER)  12/2020    SKIN LESION EXCISION         Family History    Family History   Problem Relation Age of Onset    Stroke Mother     Cirrhosis Father         alcoholic    Cancer Sister     Cancer Daughter    mother - hip fracture  Daughter - osteoporosis, osteoarthritis      Social History  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, seamstress  Social History     Substance and Sexual Activity   Alcohol Use No     Social History     Substance and Sexual Activity   Drug Use No     Social History     Tobacco Use   Smoking Status Former Smoker    Packs/day: 2 00    Years: 36 00    Pack years: 72 00    Types: Cigarettes    Start date: 11    Quit date: 45    Years since quittin 6   Smokeless Tobacco Never Used       Objective:  Vitals:    21 1336   BP: 116/72   Pulse: 79   Temp: 98 2 °F (36 8 °C)   Weight: 60 3 kg (133 lb)   Height: 5' 5" (1 651 m)       Physical Exam  Constitutional:       General: She is not in acute distress  Appearance: She is well-developed  HENT:      Head: Normocephalic and atraumatic  Eyes:      General: Lids are normal  No scleral icterus  Conjunctiva/sclera: Conjunctivae normal    Neck:      Thyroid: No thyromegaly  Cardiovascular:      Rate and Rhythm: Normal rate and regular rhythm  Heart sounds: S1 normal and S2 normal  No murmur heard  No friction rub  Pulmonary:      Effort: Pulmonary effort is normal  No tachypnea or respiratory distress  Breath sounds: No wheezing, rhonchi or rales  Comments: Decreased breath sounds bilaterally; on oxygen via nasal cannula  Musculoskeletal:         General: No tenderness  Cervical back: Neck supple  No muscular tenderness  Right lower leg: Edema present  Left lower leg: Edema present  Lymphadenopathy:      Head:      Right side of head: No submental or submandibular adenopathy  Left side of head: No submental or submandibular adenopathy  Cervical: No cervical adenopathy  Skin:     General: Skin is warm and dry  Findings: No rash  Nails: There is no clubbing  Neurological:      Mental Status: She is alert  Sensory: No sensory deficit  Psychiatric:         Behavior: Behavior normal  Behavior is cooperative  Reviewed labs and imaging      Imaging:     DEXA scan 04/21/2021  RESULTS:   LUMBAR SPINE:  L1-L3:  BMD 0 751 gm/cm2  T-score below normal, -2 4  Z-score 0 3     LEFT TOTAL HIP:  BMD 0 648 gm/cm2  T-score below normal, -2 4  Z-score -0 2     LEFT FEMORAL NECK:  BMD 0 545 gm/cm2  T-score below normal, -2 7, osteoporosis  Z-score -0 3     The left forearm BMD is 0 602 and the T score is below normal, -1 5   Z score is +1 9      A 25-hydroxy vitamin D level, an intact PTH, and a comprehensive metabolic panel are suggested in this patient      Treatment is a consideration if appropriate to total clinical health evaluation      IMPRESSION:  1  Based on the [facility] classification, this study identifies a diagnosis of osteoporosis, notable at the femoral neck area and the patient is considered at increased risk for fracture  Labs:   Orders Only on 07/03/2021   Component Date Value Ref Range Status    Total Cholesterol 07/03/2021 197  <200 mg/dL Final    HDL 07/03/2021 97  > OR = 50 mg/dL Final    Triglycerides 07/03/2021 46  <150 mg/dL Final    LDL Calculated 07/03/2021 86  mg/dL (calc) Final    Comment: Reference range: <100     Desirable range <100 mg/dL for primary prevention;    <70 mg/dL for patients with CHD or diabetic patients   with > or = 2 CHD risk factors  LDL-C is now calculated using the Brian-Arias   calculation, which is a validated novel method providing   better accuracy than the Friedewald equation in the   estimation of LDL-C  Cecilia Gonzalez al  [facility]  1129;394(55): [phone]   (http://[employer]/faq/GJZ275)      Chol HDLC Ratio 07/03/2021 2 0  <5 0 (calc) Final    Non-HDL Cholesterol 07/03/2021 100  <130 mg/dL (calc) Final    Comment: For patients with diabetes plus 1 major ASCVD risk   factor, treating to a non-HDL-C goal of <100 mg/dL   (LDL-C of <70 mg/dL) is considered a therapeutic   option        Glucose, Random 07/03/2021 103* 65 - 99 mg/dL Final    Comment:               Fasting reference interval     For someone without known diabetes, a glucose value  between 100 and 125 mg/dL is consistent with  prediabetes and should be confirmed with a  follow-up test          BUN 07/03/2021 21  7 - 25 mg/dL Final    Creatinine 07/03/2021 0 65  0 60 - 0 88 mg/dL Final    Comment: For patients >52years of age, the reference limit  for Creatinine is approximately 13% higher for people  identified as -American           eGFR Non  07/03/2021 83  > OR = 60 mL/min/1 73m2 Final    eGFR  07/03/2021 96  > OR = 60 mL/min/1 73m2 Final    SL AMB BUN/CREATININE RATIO 04/44/7107 NOT APPLICABLE  6 - 22 (calc) Final    Sodium 07/03/2021 136  135 - 146 mmol/L Final    Potassium 07/03/2021 3 7  3 5 - 5 3 mmol/L Final    Chloride 07/03/2021 93* 98 - 110 mmol/L Final    CO2 07/03/2021 37* 20 - 32 mmol/L Final    Calcium 07/03/2021 9 4  8 6 - 10 4 mg/dL Final    Protein, Total 07/03/2021 6 2  6 1 - 8 1 g/dL Final    Albumin 07/03/2021 4 0  3 6 - 5 1 g/dL Final    Globulin 07/03/2021 2 2  1 9 - 3 7 g/dL (calc) Final    Albumin/Globulin Ratio 07/03/2021 1 8  1 0 - 2 5 (calc) Final    TOTAL BILIRUBIN 07/03/2021 0 4  0 2 - 1 2 mg/dL Final    Alkaline Phosphatase 07/03/2021 75  37 - 153 U/L Final    AST 07/03/2021 20  10 - 35 U/L Final    ALT 07/03/2021 19  6 - 29 U/L Final    White Blood Cell Count 07/03/2021 5 8  3 8 - 10 8 Thousand/uL Final    Red Blood Cell Count 07/03/2021 4 40  3 80 - 5 10 Million/uL Final    Hemoglobin 07/03/2021 12 3  11 7 - 15 5 g/dL Final    HCT 07/03/2021 38 6  35 0 - 45 0 % Final    MCV 07/03/2021 87 7  80 0 - 100 0 fL Final    MCH 07/03/2021 28 0  27 0 - 33 0 pg Final    MCHC 07/03/2021 31 9* 32 0 - 36 0 g/dL Final    RDW 07/03/2021 14 4  11 0 - 15 0 % Final    Platelet Count 49/26/1415 271  140 - 400 Thousand/uL Final    SL AMB MPV 07/03/2021 10 3  7 5 - 12 5 fL Final    Neutrophils (Absolute) 07/03/2021 2,813  1,500 - 7,800 cells/uL Final    Lymphocytes (Absolute) 07/03/2021 2,129  850 - 3,900 cells/uL Final    Monocytes (Absolute) 07/03/2021 638  200 - 950 cells/uL Final    Eosinophils (Absolute) 07/03/2021 151  15 - 500 cells/uL Final    Basophils ABS 07/03/2021 70  0 - 200 cells/uL Final    Neutrophils 07/03/2021 48 5  % Final    Lymphocytes 07/03/2021 36 7  % Final    Monocytes 07/03/2021 11 0  % Final    Eosinophils 07/03/2021 2 6  % Final    Basophils PCT 07/03/2021 1 2  % Final    Vitamin B-12 07/03/2021 428  200 - 1,100 pg/mL Final   Orders Only on 05/27/2021   Component Date Value Ref Range Status    H  Pylori Ag, EIA, Stool 05/27/2021    Final    Comment:   HELICOBACTER PYLORI AG, EIA, STOOL         Micro Number:      [id number]    Test Status:       Final    Specimen Source:   STOOL    Specimen Quality:  Adequate    H pylori Ag:       Not Detected                                               Antimicrobials, proton pump inhibitors, and                       bismuth preparations inhibit H  pylori and                       ingestion up to two weeks prior to testing may                       cause false negative results  If clinically                       indicated the test should be repeated on a new                       specimen obtained two weeks after discontinuing                       treatment      Reference Range:   Not Detected        Orders Only on 05/03/2021   Component Date Value Ref Range Status    PTH, Intact 05/03/2021 28  14 - 64 pg/mL Final    Comment:    Interpretive Guide    Intact PTH           Calcium  ------------------    ----------           -------  Normal Parathyroid    Normal               Normal  Hypoparathyroidism    Low or Low Normal    Low  Hyperparathyroidism     Primary            Normal or High       High     Secondary          High                 Normal or Low     Tertiary           High                 High  Non-Parathyroid     Hypercalcemia      Low or Low Normal    High         Calcium 05/03/2021 9 0  8 6 - 10 4 mg/dL Final    Vitamin D, 25-Hydroxy, Serum 05/03/2021 70  30 - 100 ng/mL Final    Comment: Vitamin D Status         25-OH Vitamin D:     Deficiency:                    <20 ng/mL  Insufficiency:             20 - 29 ng/mL  Optimal:                 > or = 30 ng/mL     For 25-OH Vitamin D testing on patients on   D2-supplementation and patients for whom quantitation   of D2 and D3 fractions is required, the QuestAssureD(TM)  25-OH VIT D, (D2,D3), LC/MS/MS is recommended: order   code 22418 (patients >2yrs)  See Note 1     Note 1     For additional information, please refer to   http://[employer]/faq/PQR302   (This link is being provided for informational/  educational purposes only )     Orders Only on 03/04/2021   Component Date Value Ref Range Status    Total Cholesterol 03/04/2021 200* <200 mg/dL Final    HDL 03/04/2021 96  > OR = 50 mg/dL Final    Triglycerides 03/04/2021 42  <150 mg/dL Final    LDL Calculated 03/04/2021 92  mg/dL (calc) Final    Comment: Reference range: <100     Desirable range <100 mg/dL for primary prevention;    <70 mg/dL for patients with CHD or diabetic patients   with > or = 2 CHD risk factors  LDL-C is now calculated using the Brian-Arias   calculation, which is a validated novel method providing   better accuracy than the Friedewald equation in the   estimation of LDL-C  Hannah Burciaga  9105;361): [phone]   (http://[employer]/faq/NLQ800)      Chol HDLC Ratio 03/04/2021 2 1  <5 0 (calc) Final    Non-HDL Cholesterol 03/04/2021 104  <130 mg/dL (calc) Final    Comment: For patients with diabetes plus 1 major ASCVD risk   factor, treating to a non-HDL-C goal of <100 mg/dL   (LDL-C of <70 mg/dL) is considered a therapeutic   option        Glucose, Random 03/04/2021 101* 65 - 99 mg/dL Final    Comment:               Fasting reference interval     For someone without known diabetes, a glucose value  between 100 and 125 mg/dL is consistent with  prediabetes and should be confirmed with a  follow-up test          BUN 03/04/2021 11  7 - 25 mg/dL Final    Creatinine 03/04/2021 0 55* 0 60 - 0 88 mg/dL Final    Comment: For patients >52years of age, the reference limit  for Creatinine is approximately 13% higher for people  identified as -American           eGFR Non African American 03/04/2021 87  > OR = 60 mL/min/1 73m2 Final    eGFR African American 03/04/2021 101  > OR = 60 mL/min/1 73m2 Final    SL AMB BUN/CREATININE RATIO 03/04/2021 20  6 - 22 (calc) Final    Sodium 03/04/2021 129* 135 - 146 mmol/L Final    Potassium 03/04/2021 3 9  3 5 - 5 3 mmol/L Final    Chloride 03/04/2021 89* 98 - 110 mmol/L Final    CO2 03/04/2021 35* 20 - 32 mmol/L Final    Calcium 03/04/2021 9 3  8 6 - 10 4 mg/dL Final    Protein, Total 03/04/2021 6 4  6 1 - 8 1 g/dL Final    Albumin 03/04/2021 4 1  3 6 - 5 1 g/dL Final    Globulin 03/04/2021 2 3  1 9 - 3 7 g/dL (calc) Final    Albumin/Globulin Ratio 03/04/2021 1 8  1 0 - 2 5 (calc) Final    TOTAL BILIRUBIN 03/04/2021 0 5  0 2 - 1 2 mg/dL Final    Alkaline Phosphatase 03/04/2021 76  37 - 153 U/L Final    AST 03/04/2021 22  10 - 35 U/L Final    ALT 03/04/2021 19  6 - 29 U/L Final    TSH 03/04/2021 1 81  0 40 - 4 50 mIU/L Final

## 2021-08-18 ENCOUNTER — OFFICE VISIT (OUTPATIENT)
Dept: GASTROENTEROLOGY | Facility: CLINIC | Age: 81
End: 2021-08-18
Payer: COMMERCIAL

## 2021-08-18 VITALS
SYSTOLIC BLOOD PRESSURE: 102 MMHG | BODY MASS INDEX: 23.96 KG/M2 | HEART RATE: 71 BPM | HEIGHT: 65 IN | DIASTOLIC BLOOD PRESSURE: 68 MMHG | WEIGHT: 143.8 LBS

## 2021-08-18 DIAGNOSIS — K86.2 PANCREATIC CYST: ICD-10-CM

## 2021-08-18 DIAGNOSIS — K21.9 GASTROESOPHAGEAL REFLUX DISEASE WITHOUT ESOPHAGITIS: ICD-10-CM

## 2021-08-18 DIAGNOSIS — A04.8 H. PYLORI INFECTION: Primary | ICD-10-CM

## 2021-08-18 DIAGNOSIS — K59.04 CHRONIC IDIOPATHIC CONSTIPATION: ICD-10-CM

## 2021-08-18 PROCEDURE — 1160F RVW MEDS BY RX/DR IN RCRD: CPT | Performed by: STUDENT IN AN ORGANIZED HEALTH CARE EDUCATION/TRAINING PROGRAM

## 2021-08-18 PROCEDURE — 99214 OFFICE O/P EST MOD 30 MIN: CPT | Performed by: STUDENT IN AN ORGANIZED HEALTH CARE EDUCATION/TRAINING PROGRAM

## 2021-08-18 PROCEDURE — 1036F TOBACCO NON-USER: CPT | Performed by: STUDENT IN AN ORGANIZED HEALTH CARE EDUCATION/TRAINING PROGRAM

## 2021-08-18 RX ORDER — MULTIVITAMIN
1 CAPSULE ORAL DAILY
COMMUNITY

## 2021-08-18 NOTE — PROGRESS NOTES
[facility] - Outpatient Consultation  Alec Ulrich 79 y o  female MRN: [id number]  Encounter: [id number]          ASSESSMENT AND PLAN:      Gastroesophageal reflux disease without esophagitis  Patient has had longstanding problems with GERD and her EGD in 11/2020 showed non-obstructing Schatzki ring  She was recently switched from PPI to Pepcid when she was diagnosed with osteoporosis  -   Counseled her that the Pepcid should only be used as needed  When it is scheduled there is risk of tachyphylaxis and it loses its effectiveness  -  She can continue using Mylanta as needed  -  We do understand that given her recent osteoporosis diagmosis PPI is not ideal, however, if she has uncontrolled  acid reflux symptoms it is okay to restart her on PPI for symptom control as she did have a Scha  tzki ring seen on EGD 12/2020    H  pylori infection  Patient's stool antigen is negative which means she has been appropriately treated    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  Patient's EUS on 12/3/2020 which showed a lobulated multilocular and cyst measuring 16 mm x 12 mm with well-defined margins  No fluid was aspirated from the cyst      -  Her abdominal pain is unlikely from the pancreatic cyst however if it persists we can evaluate with further imaging    Chronic idiopathic constipation  Well controlled on MiraLax daily    Patient seen and discussed with Dr Magno Fuentes MD   Gastroenterology Fellow     ___________________________________________________________    HPI:    19-year-old female with past medical history of GERD,  osteoporosis,  pancreatic cyst, COPD on chronic oxygen who presents to clinic for follow-up  Patient states that since her diagnosis osteoporosis her PPI was stopped and she was switched to Pepcid  She has been taking Pepcid 40 mg a day and states that her reflux has been well controlled  She also uses Mylanta once a day    Occasionally has some abdominal pain  Her constipation is well controlled on MiraLax once daily  REVIEW OF SYSTEMS:    CONSTITUTIONAL: Denies any fever, chills, rigors, and weight loss  HEENT: No earache or tinnitus  Denies hearing loss or visual disturbances  CARDIOVASCULAR: No chest pain or palpitations  RESPIRATORY: Denies any cough, hemoptysis, shortness of breath or dyspnea on exertion  GASTROINTESTINAL: As noted in the History of Present Illness  GENITOURINARY: No problems with urination  Denies any hematuria or dysuria  NEUROLOGIC: No dizziness or vertigo, denies headaches  MUSCULOSKELETAL: Denies any muscle or joint pain  SKIN: Denies skin rashes or itching  ENDOCRINE: Denies excessive thirst  Denies intolerance to heat or cold  PSYCHOSOCIAL: Denies depression or anxiety  Denies any recent memory loss         Historical Information   Past Medical History:   Diagnosis Date    Anxiety     Back pain     Cancer ([facility] )     skin    Cataract     COPD (chronic obstructive pulmonary disease) (HCC)     Disease of thyroid gland     Emphysema lung (HCC)     Emphysema of lung ([facility] )     Geographic tongue     last assessed: 2014    GERD (gastroesophageal reflux disease)     Hyperlipidemia     Hypertension     Hypothyroidism (acquired)     Mild intermittent asthma     Sciatica     Seasonal allergies      Past Surgical History:   Procedure Laterality Date    BACK SURGERY      ENDOSCOPIC ULTRASOUND (UPPER)  2020    SKIN LESION EXCISION       Social History   Social History     Substance and Sexual Activity   Alcohol Use No     Social History     Substance and Sexual Activity   Drug Use No     Social History     Tobacco Use   Smoking Status Former Smoker    Packs/day: 2 00    Years: 36 00    Pack years: 72 00    Types: Cigarettes    Start date: 12    Quit date: 45    Years since quittin 6   Smokeless Tobacco Never Used     Family History   Problem Relation Age of Onset    Stroke Mother     Cirrhosis Father         alcoholic    Cancer Sister     Cancer Daughter        Meds/Allergies       Current Outpatient Medications:     alendronate (FOSAMAX) 70 mg tablet    Anoro Ellipta 62 5-25 MCG/INH inhaler    Ascorbic Acid (VITAMIN C PO)    aspirin 81 MG tablet    atorvastatin (LIPITOR) 10 mg tablet    baclofen 10 mg tablet    Calcium Carbonate (CALCIUM 600 PO)    Cholecalciferol (VITAMIN D3) 2000 units capsule    cloNIDine (CATAPRES) 0 1 mg tablet    famotidine (PEPCID) 40 MG tablet    hydrochlorothiazide (HYDRODIURIL) 25 mg tablet    levothyroxine 50 mcg tablet    losartan (COZAAR) 100 MG tablet    Montelukast Sodium (SINGULAIR PO)    Multiple Vitamin (multivitamin) capsule    Omega-3 Fatty Acids (FISH OIL PO)    acetaminophen (TYLENOL) 500 mg tablet    albuterol (PROVENTIL HFA,VENTOLIN HFA) 90 mcg/act inhaler    Allergies   Allergen Reactions    Other Other (See Comments)     Steroids, it effects her eyes           Objective     Blood pressure 102/68, pulse 71, height 5' 5" (1 651 m), weight 65 2 kg (143 lb 12 8 oz), not currently breastfeeding  Body mass index is 23 93 kg/m²  PHYSICAL EXAM:      General Appearance:   Alert, cooperative, no distress   HEENT:   Normocephalic, atraumatic, anicteric      Neck:  Supple, symmetrical, trachea midline   Lungs:   Clear to auscultation bilaterally; no rales, rhonchi or wheezing; respirations unlabored    Heart[de-identified]   Regular rate and rhythm; no murmur, rub, or gallop  Abdomen:   Soft, non-tender, non-distended; normal bowel sounds; no masses, no organomegaly    Genitalia:   Deferred    Rectal:   Deferred    Extremities:  No cyanosis, clubbing or edema    Pulses:  2+ and symmetric    Skin:  No jaundice, rashes, or lesions    Lymph nodes:  No palpable cervical lymphadenopathy        Lab Results:   No visits with results within 1 Day(s) from this visit     Latest known visit with results is:   Orders Only on 07/03/2021   Component Date Value    Total Cholesterol 07/03/2021 197     HDL 07/03/2021 97     Triglycerides 07/03/2021 46     LDL Calculated 07/03/2021 86     Chol HDLC Ratio 07/03/2021 2 0     Non-HDL Cholesterol 07/03/2021 100     Glucose, Random 07/03/2021 103*    BUN 07/03/2021 21     Creatinine 07/03/2021 0 65     eGFR Non  07/03/2021 83     eGFR  07/03/2021 96     SL AMB BUN/CREATININE RA* 12/78/1121 NOT APPLICABLE     Sodium 74/22/2266 136     Potassium 07/03/2021 3 7     Chloride 07/03/2021 93*    CO2 07/03/2021 37*    Calcium 07/03/2021 9 4     Protein, Total 07/03/2021 6 2     Albumin 07/03/2021 4 0     Globulin 07/03/2021 2 2     Albumin/Globulin Ratio 07/03/2021 1 8     TOTAL BILIRUBIN 07/03/2021 0 4     Alkaline Phosphatase 07/03/2021 75     AST 07/03/2021 20     ALT 07/03/2021 19     White Blood Cell Count 07/03/2021 5 8     Red Blood Cell Count 07/03/2021 4 40     Hemoglobin 07/03/2021 12 3     HCT 07/03/2021 38 6     MCV 07/03/2021 87 7     MCH 07/03/2021 28 0     MCHC 07/03/2021 31 9*    RDW 07/03/2021 14 4     Platelet Count 51/12/9102 271     SL AMB MPV 07/03/2021 10 3     Neutrophils (Absolute) 07/03/2021 2,813     Lymphocytes (Absolute) 07/03/2021 2,129     Monocytes (Absolute) 07/03/2021 638     Eosinophils (Absolute) 07/03/2021 151     Basophils ABS 07/03/2021 70     Neutrophils 07/03/2021 48 5     Lymphocytes 07/03/2021 36 7     Monocytes 07/03/2021 11 0     Eosinophils 07/03/2021 2 6     Basophils PCT 07/03/2021 1 2     Vitamin B-12 07/03/2021 428          Radiology Results:   US extremity soft tissue    Result Date: 8/12/2021  Narrative: SUPERFICIAL SOFT TISSUE ULTRASOUND INDICATION:   R22 41: Localized swelling, mass and lump, right lower limb     Anterior foot swelling x3 days COMPARISON:  None TECHNIQUE:   Real-time ultrasound of the dorsal aspect of the right foot was performed with a linear transducer with both volumetric sweeps and still imaging techniques  FINDINGS:  There is a 3 7 x 3 8 x 0 8 cm cyst with septations present in the subcutaneous tissues of the foot and appears superficial to the musculature/tendon tissues and suggests a complex ganglion cyst   There is associated skin thickening in the area  This could also be a sequela of trauma or infection depending on the presentation  Impression: Superficial complex cyst in the dorsum of the foot with differential as described above  Correlation a clinical evidence of infection is advised  If there is concern for infection, a short-term follow-up ultrasound could be performed to ensure resolution or alternatively MRI could be performed  The study was marked in EPIC for significant notification  Workstation performed: [id number]     VAS lower limb venous duplex study, unilateral/limited    Result Date: 8/6/2021  Narrative:  [facility] REPORT CLINICAL: Indications: Patient presents with right foot swelling x 3 days  Operative History: Denies Any Cardiovascular Surgeries Risk Factors The patient has history of HTN, HLD, COPD  and previous smoking (quit >10yrs ago)  FINDINGS:  Segment       Right            Left                        Impression       Impression       CFV           Normal (Patent)  Normal (Patent)  GSV Inguinal  Normal (Patent)                      CONCLUSION:  Impression: RIGHT LOWER LIMB No evidence of acute or chronic deep vein thrombosis   No evidence of superficial thrombophlebitis noted  Doppler evaluation shows a normal response to augmentation maneuvers  Popliteal, posterior tibial and anterior tibial arterial Doppler waveforms are triphasic/biphasic  LEFT LOWER LIMB LIMITED Evaluation shows no evidence of thrombus in the common femoral vein  Doppler evaluation shows a normal response to augmentation maneuvers  Technical findings shared with JE Swann and posted in chart    SIGNATURE: Electronically Signed by: Chriss Ceja on 2021-08-06 11:36:26 AM

## 2021-09-15 ENCOUNTER — TELEPHONE (OUTPATIENT)
Dept: PALLIATIVE MEDICINE | Facility: CLINIC | Age: 81
End: 2021-09-15

## 2021-09-22 ENCOUNTER — TELEPHONE (OUTPATIENT)
Dept: OBGYN CLINIC | Facility: HOSPITAL | Age: 81
End: 2021-09-22

## 2021-09-22 NOTE — TELEPHONE ENCOUNTER
Patient did not take any of RX because she is afraid of stomach problems  alendronate (FOSAMAX) 70 mg tablet [[id number]]     Order Details  Dose: 70 mg Route: Oral Frequency: Every 7 days   Dispense Quantity: 4 tablet Refills: 6          Sig: Take 1 tablet (70 mg total) by mouth every 7 days Take with a full glass of water, on an empty stomach, and do not lie down got 30 minutes afterwards           She we like to get injection ReClass discussed at last office visit  Please return call

## 2021-09-23 DIAGNOSIS — M81.0 AGE-RELATED OSTEOPOROSIS WITHOUT CURRENT PATHOLOGICAL FRACTURE: Primary | ICD-10-CM

## 2021-09-23 RX ORDER — SODIUM CHLORIDE 9 MG/ML
20 INJECTION, SOLUTION INTRAVENOUS ONCE
Status: CANCELLED | OUTPATIENT
Start: 2021-09-30

## 2021-09-23 RX ORDER — ZOLEDRONIC ACID 5 MG/100ML
5 INJECTION, SOLUTION INTRAVENOUS ONCE
Status: CANCELLED | OUTPATIENT
Start: 2021-09-30

## 2021-09-23 NOTE — TELEPHONE ENCOUNTER
I spoke with the patient  She is quite adamant about not taking the alendronate   She "already has stomach issues, she just doesn't want to take it "  She's requesting to proceed with reclast

## 2021-09-23 NOTE — TELEPHONE ENCOUNTER
I attempted to obtain authorization, as per [facility] Medicare's automated system @ [phone], no Chioma Rincon is required for participating providers       Ref #: [id number]

## 2021-09-23 NOTE — TELEPHONE ENCOUNTER
Please ask her to at least try the alendronate once, and see how she does  If she takes it as prescribed, she shouldn't have any problems  If she still does not want to try it, we can work on getting her once a year IV Reclast infusions instead for her osteoporosis  Let me know what patient says  Thanks

## 2021-09-23 NOTE — TELEPHONE ENCOUNTER
Great, I just placed the order for the [facility] infusion center  Please schedule and confirm and let me know the date so that I can change the order accordingly

## 2021-09-23 NOTE — TELEPHONE ENCOUNTER
Thanks, please pursue prior auth for yearly IV reclast infusions for patient's osteoporosis  Oral bisphosphonates such as alendronate would be contraindicated since she has GI issues  I think the closest campus for her would be the [facility]  Let me know once auth obtained so I can put the infusion order in the system

## 2021-09-24 NOTE — TELEPHONE ENCOUNTER
Spoke with [facility] @ [facility], appt scheduled for 9/30 @ 130pm    Spoke with patient's , appt info provided along with infusion center address and phone number

## 2021-09-28 ENCOUNTER — TELEPHONE (OUTPATIENT)
Dept: OBGYN CLINIC | Facility: HOSPITAL | Age: 81
End: 2021-09-28

## 2021-09-28 ENCOUNTER — TELEPHONE (OUTPATIENT)
Dept: PULMONOLOGY | Facility: CLINIC | Age: 81
End: 2021-09-28

## 2021-09-28 NOTE — TELEPHONE ENCOUNTER
Patient is calling for the spelling of reclast  Patient would like someone to call her to tell her what it is used for & she has COPD & wants to make sure it is ok that she takes it       [phone]

## 2021-09-28 NOTE — TELEPHONE ENCOUNTER
"57 yo female s/p mechanical fall with superior L2 compression fracture.  After presentation to ED, c/o of urinary retnetion w/ bladder scan significant for >1L retained urine and UOP w/ mera approx 1200cc.  Pt denies previous issue with urinary of bowel incontinence or retention.   Potential etiologies of acute retention include spinal compression, pain, pain medication received upon admission, UTI.   Per NSGY, "degree of canal compression not typical for development of acute severe polyradiculopathy."  UTI negative for infection.   Will remove mera and attempt voiding trial.   Continue pain control.   Neuro-checks q4 hours.  " Please let her know that Reclast or zoledronic acid works in the same way that alendronate does (which I discussed in clinic) but it doesn't have the GI issues side effect since it is IV instead of pill, and there is not contraindication with COPD  It is also only once a year

## 2021-09-28 NOTE — TELEPHONE ENCOUNTER
Pt is calling, she is wondering if it is alright for her to get the Reclast infusion from from Rheumatology  Her Dr Jackson with this stated in the notes it would not contradict her COPD  Pt wanted to clarify with you and make sure it was okay for her to get this with her conditions   Please advise

## 2021-09-30 ENCOUNTER — HOSPITAL ENCOUNTER (OUTPATIENT)
Dept: INFUSION CENTER | Facility: CLINIC | Age: 81
Discharge: HOME | End: 2021-09-30
Payer: COMMERCIAL

## 2021-09-30 VITALS
TEMPERATURE: 98.5 F | HEART RATE: 77 BPM | SYSTOLIC BLOOD PRESSURE: 169 MMHG | WEIGHT: 146.16 LBS | BODY MASS INDEX: 24.32 KG/M2 | RESPIRATION RATE: 20 BRPM | DIASTOLIC BLOOD PRESSURE: 84 MMHG

## 2021-09-30 DIAGNOSIS — M81.0 AGE-RELATED OSTEOPOROSIS WITHOUT CURRENT PATHOLOGICAL FRACTURE: Primary | ICD-10-CM

## 2021-09-30 PROCEDURE — 96374 THER/PROPH/DIAG INJ IV PUSH: CPT

## 2021-09-30 RX ORDER — SODIUM CHLORIDE 9 MG/ML
20 INJECTION, SOLUTION INTRAVENOUS ONCE
Status: COMPLETED | OUTPATIENT
Start: 2021-09-30 | End: 2021-09-30

## 2021-09-30 RX ORDER — SODIUM CHLORIDE 9 MG/ML
20 INJECTION, SOLUTION INTRAVENOUS ONCE
OUTPATIENT
Start: 2022-09-30

## 2021-09-30 RX ORDER — ZOLEDRONIC ACID 5 MG/100ML
5 INJECTION, SOLUTION INTRAVENOUS ONCE
OUTPATIENT
Start: 2022-09-30

## 2021-09-30 RX ORDER — ZOLEDRONIC ACID 5 MG/100ML
5 INJECTION, SOLUTION INTRAVENOUS ONCE
Status: COMPLETED | OUTPATIENT
Start: 2021-09-30 | End: 2021-09-30

## 2021-09-30 RX ADMIN — SODIUM CHLORIDE 20 ML/HR: 0.9 INJECTION, SOLUTION INTRAVENOUS at 14:40

## 2021-09-30 RX ADMIN — ZOLEDRONIC ACID 5 MG: 5 INJECTION, SOLUTION INTRAVENOUS at 14:44

## 2021-10-07 ENCOUNTER — OFFICE VISIT (OUTPATIENT)
Dept: PULMONOLOGY | Facility: CLINIC | Age: 81
End: 2021-10-07
Payer: COMMERCIAL

## 2021-10-07 VITALS
SYSTOLIC BLOOD PRESSURE: 126 MMHG | TEMPERATURE: 97.2 F | BODY MASS INDEX: 24.16 KG/M2 | DIASTOLIC BLOOD PRESSURE: 78 MMHG | HEIGHT: 65 IN | OXYGEN SATURATION: 99 % | HEART RATE: 72 BPM | WEIGHT: 145 LBS

## 2021-10-07 DIAGNOSIS — J44.9 CHRONIC OBSTRUCTIVE PULMONARY DISEASE, UNSPECIFIED COPD TYPE (HCC): Primary | ICD-10-CM

## 2021-10-07 DIAGNOSIS — F41.9 ANXIETY: ICD-10-CM

## 2021-10-07 DIAGNOSIS — J96.11 CHRONIC RESPIRATORY FAILURE WITH HYPOXIA (HCC): ICD-10-CM

## 2021-10-07 PROCEDURE — 3078F DIAST BP <80 MM HG: CPT | Performed by: INTERNAL MEDICINE

## 2021-10-07 PROCEDURE — 99214 OFFICE O/P EST MOD 30 MIN: CPT | Performed by: INTERNAL MEDICINE

## 2021-10-07 PROCEDURE — 3074F SYST BP LT 130 MM HG: CPT | Performed by: INTERNAL MEDICINE

## 2021-10-07 RX ORDER — DICYCLOMINE HYDROCHLORIDE 10 MG/1
CAPSULE ORAL
COMMUNITY
End: 2021-10-14

## 2021-10-07 RX ORDER — OMEPRAZOLE 40 MG/1
CAPSULE, DELAYED RELEASE ORAL
COMMUNITY
End: 2021-12-07 | Stop reason: SDUPTHER

## 2021-10-07 RX ORDER — PANTOPRAZOLE SODIUM 40 MG/1
TABLET, DELAYED RELEASE ORAL
COMMUNITY
End: 2021-10-07 | Stop reason: SDUPTHER

## 2021-10-07 RX ORDER — MONTELUKAST SODIUM 10 MG/1
TABLET ORAL
COMMUNITY
End: 2021-10-14

## 2021-10-14 ENCOUNTER — OFFICE VISIT (OUTPATIENT)
Dept: FAMILY MEDICINE CLINIC | Facility: CLINIC | Age: 81
End: 2021-10-14
Payer: COMMERCIAL

## 2021-10-14 VITALS
TEMPERATURE: 97.4 F | BODY MASS INDEX: 24.16 KG/M2 | SYSTOLIC BLOOD PRESSURE: 142 MMHG | DIASTOLIC BLOOD PRESSURE: 82 MMHG | WEIGHT: 145 LBS | HEART RATE: 88 BPM | HEIGHT: 65 IN

## 2021-10-14 DIAGNOSIS — E03.9 ACQUIRED HYPOTHYROIDISM: ICD-10-CM

## 2021-10-14 DIAGNOSIS — I10 ESSENTIAL HYPERTENSION: ICD-10-CM

## 2021-10-14 DIAGNOSIS — M54.6 CHRONIC MIDLINE THORACIC BACK PAIN: ICD-10-CM

## 2021-10-14 DIAGNOSIS — J96.11 CHRONIC RESPIRATORY FAILURE WITH HYPOXIA (HCC): ICD-10-CM

## 2021-10-14 DIAGNOSIS — Z23 NEED FOR PNEUMOCOCCAL VACCINATION: ICD-10-CM

## 2021-10-14 DIAGNOSIS — K86.2 PANCREATIC CYST: ICD-10-CM

## 2021-10-14 DIAGNOSIS — Z23 NEED FOR INFLUENZA VACCINATION: Primary | ICD-10-CM

## 2021-10-14 DIAGNOSIS — G89.29 CHRONIC MIDLINE THORACIC BACK PAIN: ICD-10-CM

## 2021-10-14 PROCEDURE — 4040F PNEUMOC VAC/ADMIN/RCVD: CPT

## 2021-10-14 PROCEDURE — G0009 ADMIN PNEUMOCOCCAL VACCINE: HCPCS

## 2021-10-14 PROCEDURE — 90732 PPSV23 VACC 2 YRS+ SUBQ/IM: CPT

## 2021-10-14 PROCEDURE — G0008 ADMIN INFLUENZA VIRUS VAC: HCPCS

## 2021-10-14 PROCEDURE — 1160F RVW MEDS BY RX/DR IN RCRD: CPT

## 2021-10-14 PROCEDURE — 90682 RIV4 VACC RECOMBINANT DNA IM: CPT

## 2021-10-14 PROCEDURE — 99214 OFFICE O/P EST MOD 30 MIN: CPT

## 2021-10-14 PROCEDURE — 1036F TOBACCO NON-USER: CPT

## 2021-10-14 RX ORDER — LEVOTHYROXINE SODIUM 0.05 MG/1
50 TABLET ORAL DAILY
Qty: 90 TABLET | Refills: 1 | Status: SHIPPED | OUTPATIENT
Start: 2021-10-14 | End: 2022-02-17 | Stop reason: SDUPTHER

## 2021-10-14 RX ORDER — HYDROCHLOROTHIAZIDE 25 MG/1
25 TABLET ORAL DAILY
Qty: 90 TABLET | Refills: 1 | Status: SHIPPED | OUTPATIENT
Start: 2021-10-14 | End: 2022-02-17 | Stop reason: SDUPTHER

## 2021-10-14 RX ORDER — LOSARTAN POTASSIUM 100 MG/1
100 TABLET ORAL DAILY
Qty: 90 TABLET | Refills: 1 | Status: SHIPPED | OUTPATIENT
Start: 2021-10-14 | End: 2022-02-17 | Stop reason: SDUPTHER

## 2021-10-19 LAB — TSH SERPL-ACNC: 1.31 MIU/L (ref 0.4–4.5)

## 2021-11-22 ENCOUNTER — TELEPHONE (OUTPATIENT)
Dept: FAMILY MEDICINE CLINIC | Facility: CLINIC | Age: 81
End: 2021-11-22

## 2021-12-02 DIAGNOSIS — K86.2 PANCREATIC CYST: Primary | ICD-10-CM

## 2021-12-07 DIAGNOSIS — K21.9 GASTROESOPHAGEAL REFLUX DISEASE WITHOUT ESOPHAGITIS: Primary | ICD-10-CM

## 2021-12-07 RX ORDER — OMEPRAZOLE 40 MG/1
40 CAPSULE, DELAYED RELEASE ORAL
Qty: 60 CAPSULE | Refills: 3 | Status: SHIPPED | OUTPATIENT
Start: 2021-12-07 | End: 2022-06-20

## 2022-01-24 ENCOUNTER — TELEPHONE (OUTPATIENT)
Dept: PULMONOLOGY | Facility: CLINIC | Age: 82
End: 2022-01-24

## 2022-01-24 NOTE — TELEPHONE ENCOUNTER
Patients  is calling in regards to [employer] at home  He says it only goes up to 5L but she needs at least 6 or 7 Liters  He is hoping we can contact [employer] and have them sent a concentrator that goes up high enough   Please advise

## 2022-01-24 NOTE — TELEPHONE ENCOUNTER
Patient is scheduled for 1/26/21 with Dr Tanisha Fox new testing need to be done  She is due for her 5 year re-certification  New orders can be placed at time of visit

## 2022-01-26 ENCOUNTER — OFFICE VISIT (OUTPATIENT)
Dept: PULMONOLOGY | Facility: CLINIC | Age: 82
End: 2022-01-26
Payer: COMMERCIAL

## 2022-01-26 VITALS
SYSTOLIC BLOOD PRESSURE: 128 MMHG | RESPIRATION RATE: 18 BRPM | HEIGHT: 65 IN | BODY MASS INDEX: 23.66 KG/M2 | HEART RATE: 79 BPM | TEMPERATURE: 98 F | DIASTOLIC BLOOD PRESSURE: 80 MMHG | WEIGHT: 142 LBS | OXYGEN SATURATION: 95 %

## 2022-01-26 DIAGNOSIS — J96.11 CHRONIC RESPIRATORY FAILURE WITH HYPOXIA (HCC): ICD-10-CM

## 2022-01-26 DIAGNOSIS — J44.9 CHRONIC OBSTRUCTIVE PULMONARY DISEASE, UNSPECIFIED COPD TYPE (HCC): Primary | ICD-10-CM

## 2022-01-26 PROCEDURE — 3074F SYST BP LT 130 MM HG: CPT | Performed by: INTERNAL MEDICINE

## 2022-01-26 PROCEDURE — 1160F RVW MEDS BY RX/DR IN RCRD: CPT | Performed by: INTERNAL MEDICINE

## 2022-01-26 PROCEDURE — 94618 PULMONARY STRESS TESTING: CPT | Performed by: INTERNAL MEDICINE

## 2022-01-26 PROCEDURE — 99213 OFFICE O/P EST LOW 20 MIN: CPT | Performed by: INTERNAL MEDICINE

## 2022-01-26 PROCEDURE — 1036F TOBACCO NON-USER: CPT | Performed by: INTERNAL MEDICINE

## 2022-01-26 PROCEDURE — 3079F DIAST BP 80-89 MM HG: CPT | Performed by: INTERNAL MEDICINE

## 2022-01-26 NOTE — PROGRESS NOTES
Progress note - Pulmonary Medicine   Elliot Samuel 79 y o  female MRN: [id number]       Impression & Plan:     Chronic obstructive pulmonary disease (HCC)  · Continue Anoro Ellipta   · Rescue albuterol or albuterol nebulizer as needed    Chronic respiratory failure with hypoxia ([address] )  · Discussed her oxygen therapy in detail  · New orders for certification purposes placed   · 6 minute walk today does show desaturation with ambulation but no resting hypoxia  We discussed use of her oxygen at 3 L with exertion  · She is very concerned about her home concentrator  She notices degradation in flow as a result of the long hose and the humidifier bottle  Unfortunately, she does not qualify for a high L flow concentrator  · Her  was present as well and understands  · For now will remain with tank base portable oxygen but wants to consider portable oxygen concentrator  I did explain to her that she will need to let me know fairly promptly to change this under her oxygen contract    She will follow-up with me in September  She would like to avoid visits in the winter possible  ______________________________________________________________________    HPI:    Elliot Samuel presents today for follow-up of severe COPD and chronic hypoxemic respiratory failure  She is here today for qualification for supplemental oxygen  She is at the end of her 5 year contract  She denies any new symptoms  She has no chest pain  No worsening shortness of breath  She is using her Anoro Ellipta daily and rarely requires rescue bronchodilation  She has been using her supplemental oxygen at 4 L continuously  We did discuss that she does not need this when sitting and resting  No other  She is fully vaccinated  she has not gone out except to do some grocery shopping or for short trips to the [employer] store for miscellaneous items    Her  does believe that she has lost a little bit of weight but she denies any weight loss  Appetite is unchanged      Current Medications:    Current Outpatient Medications:     acetaminophen (TYLENOL) 500 mg tablet, Take 1 tablet (500 mg total) by mouth every 6 (six) hours as needed for mild pain or moderate pain, Disp: 30 tablet, Rfl: 0    albuterol (PROVENTIL HFA,VENTOLIN HFA) 90 mcg/act inhaler, Inhale 2 puffs every 6 (six) hours as needed for wheezing, Disp: 1 Inhaler, Rfl: 6    alendronate (FOSAMAX) 70 mg tablet, Take 1 tablet (70 mg total) by mouth every 7 days Take with a full glass of water, on an empty stomach, and do not lie down got 30 minutes afterwards, Disp: 4 tablet, Rfl: 6    Anoro Ellipta 62 5-25 MCG/INH inhaler, INHALE ONE PUFF BY MOUTH EVERY DAY, Disp: 180 each, Rfl: 3    Ascorbic Acid (VITAMIN C PO), Take 600 mg by mouth, Disp: , Rfl:     aspirin 81 MG tablet, Take 81 mg by mouth daily, Disp: , Rfl:     atorvastatin (LIPITOR) 10 mg tablet, Take 1 tablet (10 mg total) by mouth daily, Disp: 90 tablet, Rfl: 1    baclofen 10 mg tablet, Take 1 tablet (10 mg total) by mouth 3 (three) times a day, Disp: 30 tablet, Rfl: 1    Calcium Carbonate (CALCIUM 600 PO), Take by mouth, Disp: , Rfl:     Cholecalciferol (VITAMIN D3) 2000 units capsule, Take 4,000 Units by mouth daily , Disp: , Rfl:     cloNIDine (CATAPRES) 0 1 mg tablet, TAKE ONE TABLET BY MOUTH TWICE A DAY AS NEEDED IF BLOOD PRESSURE GREATER THAN 150, Disp: 60 tablet, Rfl: 5    famotidine (PEPCID) 40 MG tablet, Take 1 tablet (40 mg total) by mouth daily, Disp: 90 tablet, Rfl: 1    hydrochlorothiazide (HYDRODIURIL) 25 mg tablet, Take 1 tablet (25 mg total) by mouth daily, Disp: 90 tablet, Rfl: 1    levothyroxine 50 mcg tablet, Take 1 tablet (50 mcg total) by mouth daily, Disp: 90 tablet, Rfl: 1    Multiple Vitamin (multivitamin) capsule, Take 1 capsule by mouth daily, Disp: , Rfl:     Multiple Vitamins-Minerals (ZINC PO), Take by mouth, Disp: , Rfl:     Omega-3 Fatty Acids (FISH OIL PO), Take 1,200 mg by mouth 2 (two) times a day, Disp: , Rfl:     losartan (COZAAR) 100 MG tablet, Take 1 tablet (100 mg total) by mouth daily, Disp: 90 tablet, Rfl: 1    omeprazole (PriLOSEC) 40 MG capsule, Take 1 capsule (40 mg total) by mouth 2 (two) times a day before meals, Disp: 60 capsule, Rfl: 3    Review of Systems:  Aside from what is mentioned in the HPI, the review of systems is otherwise negative    Past medical history, surgical history, and family history were reviewed and updated as appropriate    Social history updates:  Social History     Tobacco Use   Smoking Status Former Smoker    Packs/day: 2 00    Years: 36 00    Pack years: 72 00    Types: Cigarettes    Start date: 12    Quit date: 46    Years since quittin 0   Smokeless Tobacco Never Used       PhysicalExamination:  Vitals:   /80   Pulse 79   Temp 98 °F (36 7 °C)   Resp 18   Ht 5' 5" (1 651 m)   Wt 64 4 kg (142 lb)   SpO2 95%   BMI 23 63 kg/m²   Gen:  Appears comfortable on nasal cannula  She is quite anxious but this is her baseline  HEENT:  Conjugate gaze  Sclera anicteric   Oropharynx is clear, moist  Neck: Supple  There is no JVD, lymphadenopathy or thyromegaly  Trachea is midline  Chest:  Chest excursion symmetric  Lungs are hyperinflated  Breath sounds are distant bilaterally but otherwise clear  Hyper-resonant to percussion  Cardiac:  Regular  There are no murmurs  Abdomen:  Benign  Extremities: No clubbing, cyanosis or edema  Neurologic:  Normal speech and mentation  Skin: No appreciable rashes  Diagnostic Data:  Labs:   I personally reviewed the most recent laboratory data pertinent to today's visit    Lab Results   Component Value Date    WBC 5 8 2021    HGB 12 3 2021    HCT 38 6 2021    MCV 87 7 2021     2021     Lab Results   Component Value Date    SODIUM 136 2021    K 3 7 2021    CO2 37 (H) 2021    CL 93 (L) 2021    BUN 21 2021 CREATININE 0 65 07/03/2021    CALCIUM 9 4 07/03/2021       6 minute walk:  Patient performed ambulatory oximetry today  She did demonstrate desaturation with ambulation    She was placed on supplemental oxygen and requires 3 L for maintenance of saturations      Jes Renee MD

## 2022-01-26 NOTE — ASSESSMENT & PLAN NOTE
· Discussed her oxygen therapy in detail  · New orders for certification purposes placed   · 6 minute walk today does show desaturation with ambulation but no resting hypoxia  We discussed use of her oxygen at 3 L with exertion  · She is very concerned about her home concentrator  She notices degradation in flow as a result of the long hose and the humidifier bottle  Unfortunately, she does not qualify for a high L flow concentrator  · Her  was present as well and understands  · For now will remain with tank base portable oxygen but wants to consider portable oxygen concentrator    I did explain to her that she will need to let me know fairly promptly to change this under her oxygen contract

## 2022-02-11 ENCOUNTER — RA CDI HCC (OUTPATIENT)
Dept: OTHER | Facility: HOSPITAL | Age: 82
End: 2022-02-11

## 2022-02-11 NOTE — PROGRESS NOTES
[facility]  coding opportunities       Chart reviewed, no opportunity found: CHART REVIEWED, NO OPPORTUNITY FOUND                        Patients insurance company: [employer] (Medicare Advantage and Commercial)

## 2022-02-14 ENCOUNTER — OFFICE VISIT (OUTPATIENT)
Dept: RHEUMATOLOGY | Facility: CLINIC | Age: 82
End: 2022-02-14
Payer: COMMERCIAL

## 2022-02-14 VITALS
SYSTOLIC BLOOD PRESSURE: 136 MMHG | WEIGHT: 143 LBS | BODY MASS INDEX: 23.82 KG/M2 | TEMPERATURE: 97.6 F | HEIGHT: 65 IN | DIASTOLIC BLOOD PRESSURE: 75 MMHG | HEART RATE: 69 BPM

## 2022-02-14 DIAGNOSIS — K21.9 GASTROESOPHAGEAL REFLUX DISEASE WITHOUT ESOPHAGITIS: ICD-10-CM

## 2022-02-14 DIAGNOSIS — M81.0 AGE-RELATED OSTEOPOROSIS WITHOUT CURRENT PATHOLOGICAL FRACTURE: Primary | ICD-10-CM

## 2022-02-14 PROCEDURE — 99214 OFFICE O/P EST MOD 30 MIN: CPT | Performed by: INTERNAL MEDICINE

## 2022-02-14 NOTE — PATIENT INSTRUCTIONS
Continue Reclast infusions once a year, next infusion due 9/30/22  Continue calcium 600mg daily  Continue Vit  D 4,000 units a day  Next DEXA scan due in 4/2023    Return to clinic end of April 2023    Osteoporosis   AMBULATORY CARE:   Osteoporosis  is a long-term medical condition that causes your bones to become weak, brittle, and more likely to fracture  Osteoporosis occurs when your body absorbs more bone than it makes  It is also caused by a lack of calcium and estrogen (female hormone)  Common symptoms include the following: You may not have any signs or symptoms  You may break a bone after a muscle strain, bump, or fall  A break usually occurs in the hip, spine, or wrist  A collapsed vertebra (bone in your spine) may cause severe back pain or loss of height from bent posture  Call your doctor if:   · You have severe pain  · You have increasing pain after a fall  · You have pain when you do your daily activities  · You have questions or concerns about your condition or care  Diagnosis of osteoporosis:   · Blood and urine tests  measure your calcium, vitamin D, and estrogen levels  · An x-ray or CT  may show thinned bones or a fracture  You may be given contrast liquid to help the bones show up better in the pictures  Tell the healthcare provider if you have ever had an allergic reaction to contrast liquid  Do not enter the MRI room with anything metal  Metal can cause serious injury  Tell the healthcare provider if you have any metal in or on your body  · A bone density test  compares your bone thickness with what is expected for someone of your age, gender, and ethnicity  Treatment for osteoporosis  may include medicines to prevent bone loss, build new bone, and increase estrogen  These medicines help prevent fractures and may be given as a pill or injection  Ask your healthcare provider for more information on these medicines    Prevent bone loss:       · Eat healthy foods that are high in calcium  This helps keep your bones strong  Good sources of calcium are milk, cheese, broccoli, tofu, almonds, and canned salmon and sardines  · Increase your vitamin D intake  Vitamin D is in fish oils, some vegetables, and fortified milk, cereal, and bread  Vitamin D is also formed in the skin when it is exposed to the sun  Ask your healthcare provider how much sunlight is safe for you  · Drink liquids as directed  Ask your healthcare provider how much liquid to drink each day and which liquids are best for you  Do not have alcohol or caffeine  They decrease bone mineral density, which can weaken your bones  · Exercise regularly  Ask your healthcare provider about the best exercise plan for you  Weight bearing exercise for 30 minutes, 3 times a week can help build and strengthen bone  · Do not smoke  Nicotine and other chemicals in cigarettes and cigars can cause lung damage  Ask your healthcare provider for information if you currently smoke and need help to quit  E-cigarettes or smokeless tobacco still contain nicotine  Talk to your healthcare provider before you use these products  · Go to physical therapy as directed  A physical therapist teaches you exercises to help improve movement and muscle strength  Follow up with your doctor as directed:  Write down your questions so you remember to ask them during your visits  © Copyright [address] 2021 Information is for End User's use only and may not be sold, redistributed or otherwise used for commercial purposes  All illustrations and images included in CareNotes® are the copyrighted property of A D [employer] M [employer]  or Sarah Lau   The above information is an  only  It is not intended as medical advice for individual conditions or treatments  Talk to your doctor, nurse or pharmacist before following any medical regimen to see if it is safe and effective for you

## 2022-02-14 NOTE — PROGRESS NOTES
Assessment and Plan:   Maria Del Rosario Ribera is a 79 y o   female who presents for follow up for osteoporosis  Tolerated her first IV Reclast infusion on 9/30/21 well, is due for next infusion on 9/30/22 this year, will order and arrange closer to day  Her next DEXA scan is due in 4/2023; will order closer to date  No recent falls and fractures  Patient denies joint pain  Patient never tried Alendronate because of potential side effects  Latest calcium and Vit D levels are WNR  Continue Reclast infusions once a year for a max of 3 years, next infusion due 9/30/22  Continue calcium 600mg daily  Continue Vit  D 4,000 units a day  Next DEXA scan due in 4/2023    Plan:  Diagnoses and all orders for this visit:    Age-related osteoporosis without current pathological fracture    Gastroesophageal reflux disease without esophagitis    Follow-up plan: Return to clinic end of April 2023, after next DEXA scan        Rheumatic Disease Summary  Initial visit 8/16/21: Maria Del Rosario Ribera is a 79 y o   female who presented as a Rheumatology consult referred by her PCP Ovidio Dean MD for evaluation of osteoporosis  Since this was the 1st time patient had been diagnosed with osteoporosis, and she had no history of fragility fractures, it is worth at least trying her on alendronate 70 mg p o  weekly as initial therapy  Her lowest T-score was  -2 7 at the left femoral neck, so her osteoporosis was not severe, and oral bisphosphonate therapy may be adequate  If patient was unable to tolerate alendronate due to esophagitis side effects, then could consider yearly IV Reclast infusions for 3 years  She had good renal function  Patient was supposed to continue daily calcium and vitamin-D  Advised patient how to appropriately take alendronate 70mg po once a week to help prevent heartburn/esophagitis symptoms; take with a full glass of water, on an empty stomach, and do not lie down for 30 minutes afterwards      HPI  Maria Del Rosario Ribera is a 79 y o   female who presents for follow up  Last clinic visit was 8/16/21 which was her initial visit  Patient was instructed to take Alendronate  But patient never tried Alendronate  pateint is Taking vitD 4000IU and calcium 600 mg  Patient uses 3-4L of oxygen via NC 24 hours, which is at her baseline  No joint pain  No recent falls in the home  Uses walker to ambulate  Review of Systems:   Review of Systems   Constitutional: Negative for chills and fever  HENT: Negative for congestion, rhinorrhea, sneezing and sore throat  Eyes: Negative for pain and discharge  Respiratory: Positive for cough, shortness of breath and wheezing  Negative for chest tightness  Cardiovascular: Negative for chest pain and leg swelling  Gastrointestinal: Negative for abdominal pain, nausea and vomiting  Endocrine: Negative for polydipsia, polyphagia and polyuria  Genitourinary: Negative for flank pain, frequency and urgency  Musculoskeletal: Positive for back pain  Negative for arthralgias and joint swelling  Skin: Negative for color change and pallor  Neurological: Positive for weakness  Negative for dizziness, light-headedness and headaches  Psychiatric/Behavioral: Negative for agitation and confusion         Home Medications:    Current Outpatient Medications:     acetaminophen (TYLENOL) 500 mg tablet, Take 1 tablet (500 mg total) by mouth every 6 (six) hours as needed for mild pain or moderate pain, Disp: 30 tablet, Rfl: 0    albuterol (PROVENTIL HFA,VENTOLIN HFA) 90 mcg/act inhaler, Inhale 2 puffs every 6 (six) hours as needed for wheezing, Disp: 1 Inhaler, Rfl: 6    alendronate (FOSAMAX) 70 mg tablet, Take 1 tablet (70 mg total) by mouth every 7 days Take with a full glass of water, on an empty stomach, and do not lie down got 30 minutes afterwards, Disp: 4 tablet, Rfl: 6    Anoro Ellipta 62 5-25 MCG/INH inhaler, INHALE ONE PUFF BY MOUTH EVERY DAY, Disp: 180 each, Rfl: 3    Ascorbic Acid (VITAMIN C PO), Take 600 mg by mouth, Disp: , Rfl:     aspirin 81 MG tablet, Take 81 mg by mouth daily, Disp: , Rfl:     atorvastatin (LIPITOR) 10 mg tablet, Take 1 tablet (10 mg total) by mouth daily, Disp: 90 tablet, Rfl: 1    baclofen 10 mg tablet, Take 1 tablet (10 mg total) by mouth 3 (three) times a day, Disp: 30 tablet, Rfl: 1    Calcium Carbonate (CALCIUM 600 PO), Take by mouth, Disp: , Rfl:     Cholecalciferol (VITAMIN D3) 2000 units capsule, Take 4,000 Units by mouth daily , Disp: , Rfl:     cloNIDine (CATAPRES) 0 1 mg tablet, TAKE ONE TABLET BY MOUTH TWICE A DAY AS NEEDED IF BLOOD PRESSURE GREATER THAN 150, Disp: 60 tablet, Rfl: 5    famotidine (PEPCID) 40 MG tablet, Take 1 tablet (40 mg total) by mouth daily, Disp: 90 tablet, Rfl: 1    hydrochlorothiazide (HYDRODIURIL) 25 mg tablet, Take 1 tablet (25 mg total) by mouth daily, Disp: 90 tablet, Rfl: 1    levothyroxine 50 mcg tablet, Take 1 tablet (50 mcg total) by mouth daily, Disp: 90 tablet, Rfl: 1    losartan (COZAAR) 100 MG tablet, Take 1 tablet (100 mg total) by mouth daily, Disp: 90 tablet, Rfl: 1    Multiple Vitamin (multivitamin) capsule, Take 1 capsule by mouth daily, Disp: , Rfl:     Multiple Vitamins-Minerals (ZINC PO), Take by mouth, Disp: , Rfl:     Omega-3 Fatty Acids (FISH OIL PO), Take 1,200 mg by mouth 2 (two) times a day, Disp: , Rfl:     omeprazole (PriLOSEC) 40 MG capsule, Take 1 capsule (40 mg total) by mouth 2 (two) times a day before meals, Disp: 60 capsule, Rfl: 3    Objective:    Vitals:    02/14/22 0947   BP: 136/75   Pulse: 69   Temp: 97 6 °F (36 4 °C)   Weight: 64 9 kg (143 lb)   Height: 5' 5" (1 651 m)       Physical Exam  Constitutional:       General: She is not in acute distress  Appearance: She is well-developed  She is not diaphoretic  HENT:      Head: Normocephalic  Mouth/Throat:      Pharynx: No oropharyngeal exudate  Eyes:      Pupils: Pupils are equal, round, and reactive to light     Cardiovascular: Rate and Rhythm: Normal rate and regular rhythm  Heart sounds: No murmur heard  Pulmonary:      Effort: Pulmonary effort is normal  No respiratory distress  Breath sounds: No wheezing or rales  Comments: On 4L of NC  Abdominal:      General: Bowel sounds are normal       Palpations: Abdomen is soft  Tenderness: There is no abdominal tenderness  Musculoskeletal:         General: No tenderness  Normal range of motion  Cervical back: Normal range of motion  Skin:     General: Skin is warm  Neurological:      Mental Status: She is alert and oriented to person, place, and time  Gait: Gait abnormal (walk with a walker)  Reviewed labs and imaging  Imaging:   DEXA scan 04/21/2021  RESULTS:   LUMBAR SPINE:  L1-L3:  BMD 0 751 gm/cm2  T-score below normal, -2 4  Z-score 0 3     LEFT TOTAL HIP:  BMD 0 648 gm/cm2  T-score below normal, -2 4  Z-score -0 2     LEFT FEMORAL NECK:  BMD 0 545 gm/cm2  T-score below normal, -2 7, osteoporosis  Z-score -0 3     The left forearm BMD is 0 602 and the T score is below normal, -1 5   Z score is +1 9      A 25-hydroxy vitamin D level, an intact PTH, and a comprehensive metabolic panel are suggested in this patient      Treatment is a consideration if appropriate to total clinical health evaluation      IMPRESSION:  1  Based on the [facility] classification, this study identifies a diagnosis of osteoporosis, notable at the femoral neck area and the patient is considered at increased risk for fracture       Labs:   Orders Only on 10/18/2021   Component Date Value Ref Range Status    TSH 10/18/2021 1 31  0 40 - 4 50 mIU/L Final

## 2022-02-17 ENCOUNTER — OFFICE VISIT (OUTPATIENT)
Dept: FAMILY MEDICINE CLINIC | Facility: CLINIC | Age: 82
End: 2022-02-17
Payer: COMMERCIAL

## 2022-02-17 VITALS
HEIGHT: 65 IN | BODY MASS INDEX: 23.82 KG/M2 | SYSTOLIC BLOOD PRESSURE: 158 MMHG | WEIGHT: 143 LBS | OXYGEN SATURATION: 94 % | DIASTOLIC BLOOD PRESSURE: 82 MMHG | HEART RATE: 84 BPM

## 2022-02-17 DIAGNOSIS — I10 ESSENTIAL HYPERTENSION: ICD-10-CM

## 2022-02-17 DIAGNOSIS — R10.84 GENERALIZED ABDOMINAL PAIN: ICD-10-CM

## 2022-02-17 DIAGNOSIS — R07.9 CHEST PAIN, UNSPECIFIED TYPE: Primary | ICD-10-CM

## 2022-02-17 DIAGNOSIS — K86.2 PANCREATIC CYST: ICD-10-CM

## 2022-02-17 DIAGNOSIS — E03.9 ACQUIRED HYPOTHYROIDISM: ICD-10-CM

## 2022-02-17 PROCEDURE — 3079F DIAST BP 80-89 MM HG: CPT | Performed by: FAMILY MEDICINE

## 2022-02-17 PROCEDURE — 1160F RVW MEDS BY RX/DR IN RCRD: CPT | Performed by: FAMILY MEDICINE

## 2022-02-17 PROCEDURE — 1036F TOBACCO NON-USER: CPT | Performed by: FAMILY MEDICINE

## 2022-02-17 PROCEDURE — 99213 OFFICE O/P EST LOW 20 MIN: CPT | Performed by: FAMILY MEDICINE

## 2022-02-17 PROCEDURE — 3077F SYST BP >= 140 MM HG: CPT | Performed by: FAMILY MEDICINE

## 2022-02-17 RX ORDER — LEVOTHYROXINE SODIUM 0.05 MG/1
50 TABLET ORAL DAILY
Qty: 90 TABLET | Refills: 1
Start: 2022-02-17 | End: 2022-05-02

## 2022-02-17 RX ORDER — FAMOTIDINE 40 MG/1
40 TABLET, FILM COATED ORAL DAILY
Qty: 90 TABLET | Refills: 1 | Status: SHIPPED | OUTPATIENT
Start: 2022-02-17

## 2022-02-17 RX ORDER — HYDROCHLOROTHIAZIDE 25 MG/1
25 TABLET ORAL DAILY
Qty: 90 TABLET | Refills: 1 | Status: SHIPPED | OUTPATIENT
Start: 2022-02-17 | End: 2022-07-30 | Stop reason: SDUPTHER

## 2022-02-17 RX ORDER — NITROGLYCERIN 0.4 MG/1
0.4 TABLET SUBLINGUAL
Qty: 25 TABLET | Refills: 3 | Status: SHIPPED | OUTPATIENT
Start: 2022-02-17

## 2022-02-17 RX ORDER — LOSARTAN POTASSIUM 100 MG/1
100 TABLET ORAL DAILY
Qty: 90 TABLET | Refills: 1 | Status: SHIPPED | OUTPATIENT
Start: 2022-02-17 | End: 2022-07-21

## 2022-02-17 NOTE — PROGRESS NOTES
Assessment and Plan:    Problem List Items Addressed This Visit        Cardiovascular and Mediastinum    Essential hypertension      Other Visit Diagnoses     Generalized abdominal pain                     Diagnoses and all orders for this visit:    Chest pain, unspecified type  -     nitroglycerin (NITROSTAT) 0 4 mg SL tablet; Place 1 tablet (0 4 mg total) under the tongue every 5 (five) minutes as needed for chest pain (esophageal spasm)    Essential hypertension  Comments:  BP stable  Orders:  -     losartan (COZAAR) 100 MG tablet; Take 1 tablet (100 mg total) by mouth daily  -     hydrochlorothiazide (HYDRODIURIL) 25 mg tablet; Take 1 tablet (25 mg total) by mouth daily    Generalized abdominal pain  -     famotidine (PEPCID) 40 MG tablet; Take 1 tablet (40 mg total) by mouth daily    Pancreatic cyst              Subjective:      Patient ID: Rishi Alvarado is a 79 y o  female  CC:    Chief Complaint   Patient presents with    Follow-up     Patient present today for her 4 month follow up  Pt states her stomach is always upset  HPI:    Still issues with stomach, not sure if anxiety, feels like  "angina"" , pepcid doesn't always work, stressed with 's back issues      The following portions of the patient's history were reviewed and updated as appropriate: allergies, current medications, past family history, past medical history, past social history, past surgical history and problem list       Review of Systems   Constitutional: Negative for activity change, appetite change and fatigue  Respiratory: Positive for cough and shortness of breath  Cardiovascular: Positive for chest pain  Gastrointestinal: Positive for abdominal pain  Negative for diarrhea and nausea  Neurological: Negative for dizziness and headaches           Data to review:       Objective:    Vitals:    02/17/22 0945   BP: 158/82   BP Location: Right arm   Patient Position: Sitting   Cuff Size: Standard   Pulse: 84   SpO2: 94%   Weight: 64 9 kg (143 lb)   Height: 5' 5" (1 651 m)        Physical Exam  Vitals reviewed  Constitutional:       Appearance: Normal appearance  Cardiovascular:      Rate and Rhythm: Normal rate and regular rhythm  Pulses: Normal pulses  Heart sounds: Normal heart sounds  Pulmonary:      Effort: Pulmonary effort is normal       Comments: Decreased bs  Musculoskeletal:      Right lower leg: No edema  Left lower leg: No edema  Neurological:      Mental Status: She is alert  Psychiatric:         Mood and Affect: Mood is anxious

## 2022-02-18 LAB
ALBUMIN SERPL-MCNC: 4.1 G/DL (ref 3.6–5.1)
ALBUMIN/GLOB SERPL: 1.7 (CALC) (ref 1–2.5)
ALP SERPL-CCNC: 60 U/L (ref 37–153)
ALT SERPL-CCNC: 20 U/L (ref 6–29)
AST SERPL-CCNC: 20 U/L (ref 10–35)
BILIRUB SERPL-MCNC: 0.6 MG/DL (ref 0.2–1.2)
BUN SERPL-MCNC: 18 MG/DL (ref 7–25)
BUN/CREAT SERPL: ABNORMAL (CALC) (ref 6–22)
CALCIUM SERPL-MCNC: 9.4 MG/DL (ref 8.6–10.4)
CHLORIDE SERPL-SCNC: 88 MMOL/L (ref 98–110)
CHOLEST SERPL-MCNC: 207 MG/DL
CHOLEST/HDLC SERPL: 2.1 (CALC)
CO2 SERPL-SCNC: 38 MMOL/L (ref 20–32)
CREAT SERPL-MCNC: 0.65 MG/DL (ref 0.6–0.88)
GLOBULIN SER CALC-MCNC: 2.4 G/DL (CALC) (ref 1.9–3.7)
GLUCOSE SERPL-MCNC: 95 MG/DL (ref 65–99)
HDLC SERPL-MCNC: 97 MG/DL
LDLC SERPL CALC-MCNC: 97 MG/DL (CALC)
NONHDLC SERPL-MCNC: 110 MG/DL (CALC)
POTASSIUM SERPL-SCNC: 4.4 MMOL/L (ref 3.5–5.3)
PROT SERPL-MCNC: 6.5 G/DL (ref 6.1–8.1)
SL AMB EGFR AFRICAN AMERICAN: 96 ML/MIN/1.73M2
SL AMB EGFR NON AFRICAN AMERICAN: 83 ML/MIN/1.73M2
SODIUM SERPL-SCNC: 131 MMOL/L (ref 135–146)
TRIGL SERPL-MCNC: 50 MG/DL

## 2022-03-13 ENCOUNTER — HOSPITAL ENCOUNTER (OUTPATIENT)
Dept: CT IMAGING | Facility: HOSPITAL | Age: 82
Discharge: HOME | End: 2022-03-13
Payer: COMMERCIAL

## 2022-03-13 DIAGNOSIS — K86.2 PANCREATIC CYST: ICD-10-CM

## 2022-03-13 PROCEDURE — 74177 CT ABD & PELVIS W/CONTRAST: CPT

## 2022-03-13 RX ADMIN — IOHEXOL 100 ML: 350 INJECTION, SOLUTION INTRAVENOUS at 12:13

## 2022-03-17 DIAGNOSIS — R93.5 ABNORMAL ABDOMINAL CT SCAN: Primary | ICD-10-CM

## 2022-04-19 DIAGNOSIS — E78.2 MIXED HYPERLIPIDEMIA: ICD-10-CM

## 2022-04-19 DIAGNOSIS — J43.2 CENTRILOBULAR EMPHYSEMA (HCC): ICD-10-CM

## 2022-04-19 RX ORDER — UMECLIDINIUM BROMIDE AND VILANTEROL TRIFENATATE 62.5; 25 UG/1; UG/1
POWDER RESPIRATORY (INHALATION)
Qty: 60 BLISTER | Refills: 3 | Status: SHIPPED | OUTPATIENT
Start: 2022-04-19 | End: 2022-08-09 | Stop reason: SDUPTHER

## 2022-04-19 RX ORDER — ATORVASTATIN CALCIUM 10 MG/1
TABLET, FILM COATED ORAL
Qty: 90 TABLET | Refills: 1 | Status: SHIPPED | OUTPATIENT
Start: 2022-04-19 | End: 2022-07-21

## 2022-04-29 ENCOUNTER — TELEPHONE (OUTPATIENT)
Dept: PULMONOLOGY | Facility: CLINIC | Age: 82
End: 2022-04-29

## 2022-04-29 DIAGNOSIS — J30.2 SEASONAL ALLERGIES: Primary | ICD-10-CM

## 2022-04-29 DIAGNOSIS — E03.9 ACQUIRED HYPOTHYROIDISM: ICD-10-CM

## 2022-04-29 RX ORDER — MONTELUKAST SODIUM 10 MG/1
10 TABLET ORAL
Qty: 30 TABLET | Refills: 3 | Status: SHIPPED | OUTPATIENT
Start: 2022-04-29

## 2022-04-29 NOTE — TELEPHONE ENCOUNTER
Patient Alejandra Tran is requesting a refill for     Medication(s)  name Singular     Dose 10mg    Last office Visit 1/26/22    Ph[facility]     Quantity (30 day or 90 day) 90d    SHE WOULD LIKE TO RESTART THIS MEDICATION BECAUSE SHE DID BETTER WHILE ON IT

## 2022-05-02 RX ORDER — LEVOTHYROXINE SODIUM 0.05 MG/1
TABLET ORAL
Qty: 90 TABLET | Refills: 1 | Status: SHIPPED | OUTPATIENT
Start: 2022-05-02

## 2022-05-03 ENCOUNTER — OFFICE VISIT (OUTPATIENT)
Dept: GASTROENTEROLOGY | Facility: MEDICAL CENTER | Age: 82
End: 2022-05-03
Payer: COMMERCIAL

## 2022-05-03 VITALS
WEIGHT: 139.6 LBS | TEMPERATURE: 98.5 F | BODY MASS INDEX: 23.23 KG/M2 | HEART RATE: 92 BPM | SYSTOLIC BLOOD PRESSURE: 188 MMHG | DIASTOLIC BLOOD PRESSURE: 92 MMHG

## 2022-05-03 DIAGNOSIS — K86.2 PANCREATIC CYST: ICD-10-CM

## 2022-05-03 DIAGNOSIS — R10.10 UPPER ABDOMINAL PAIN: Primary | ICD-10-CM

## 2022-05-03 DIAGNOSIS — R93.5 ABNORMAL ABDOMINAL CT SCAN: ICD-10-CM

## 2022-05-03 PROCEDURE — 3080F DIAST BP >= 90 MM HG: CPT | Performed by: INTERNAL MEDICINE

## 2022-05-03 PROCEDURE — 1036F TOBACCO NON-USER: CPT | Performed by: INTERNAL MEDICINE

## 2022-05-03 PROCEDURE — 99214 OFFICE O/P EST MOD 30 MIN: CPT | Performed by: INTERNAL MEDICINE

## 2022-05-03 PROCEDURE — 1160F RVW MEDS BY RX/DR IN RCRD: CPT | Performed by: INTERNAL MEDICINE

## 2022-05-03 PROCEDURE — 3077F SYST BP >= 140 MM HG: CPT | Performed by: INTERNAL MEDICINE

## 2022-05-03 NOTE — PROGRESS NOTES
[facility] - Outpatient Consultation  Juan Luis Smoker 79 y o  female MRN: [id number]  Encounter: [id number]    HPI:    Juan Luis Smoker is a 79 y o  female who presents for follow-up of upper abdominal pain, pancreatic cysts, GERD, and prior H pylori infection  She is in the office with her   Chief complaint today is ongoing episodic epigastric pain  The pain is unrelated to eating  It happens daily it can last all day  It is not severe  She takes omeprazole 40 mg twice daily, as well as p r n  Famotidine and Mylanta  She says these medications control her GERD but not the pain  She was previously treated for H pylori infection and is stool antigen negative  She has not had recent right upper quadrant ultrasound  No nausea  No weight loss  She has one BM every other day, sometimes no BM for 3-4 days  If she takes Miralax daily, can be incontinent  She feels that her abdominal pain is  not affected by BMs  She has a pancreatic cyst which is 16 x 12 mm in size  It has been evaluated by EUS and recent EGD, showing that the size is stable  The cyst is multilobulated and there is no pancreatic duct dilation  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  She had recent CT scan which showed stable size of the pancreatic cyst   It also suggested possible thickening of the pyloric channel  I personally reviewed the images  Of note, she had EGD in 11/2020 which did not show gastric pathology  She is on 4 L of home oxygen for COPD and is high risk for endoscopic procedures  CT 3/13/2022  1  No significant change in the size of cystic pancreatic head lesion /lesions which are 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  Recommend follow-up exam in approximately 2 years  2   Circumferential smooth thickening of the gastric pylorus with no definite mass or adjacent lymphadenopathy  Consider further evaluation with endoscopy      REVIEW OF SYSTEMS:  CONSTITUTIONAL: Denies any fever, chills, rigors, and weight loss  HEENT: No earache or tinnitus  Denies hearing loss or visual disturbances  CARDIOVASCULAR: No chest pain or palpitations  RESPIRATORY: Denies any cough, hemoptysis, shortness of breath or dyspnea on exertion  GASTROINTESTINAL: As noted in the History of Present Illness  GENITOURINARY: No problems with urination  Denies any hematuria or dysuria  NEUROLOGIC: No dizziness or vertigo, denies headaches  MUSCULOSKELETAL: Denies any muscle or joint pain  SKIN: Denies skin rashes or itching  ENDOCRINE: Denies excessive thirst  Denies intolerance to heat or cold  PSYCHOSOCIAL: Denies depression or anxiety  Denies any recent memory loss       Historical Information   Past Medical History:   Diagnosis Date    Anxiety     Back pain     Cancer ([facility] )     skin    Cataract     COPD (chronic obstructive pulmonary disease) (HCC)     Disease of thyroid gland     Emphysema lung (HCC)     Emphysema of lung ([facility] )     Geographic tongue     last assessed: 2014    GERD (gastroesophageal reflux disease)     Hyperlipidemia     Hypertension     Hypothyroidism (acquired)     Mild intermittent asthma     Sciatica     Seasonal allergies      Past Surgical History:   Procedure Laterality Date    BACK SURGERY      ENDOSCOPIC ULTRASOUND (UPPER)  2020    SKIN LESION EXCISION       Social History   Social History     Substance and Sexual Activity   Alcohol Use No     Social History     Substance and Sexual Activity   Drug Use No     Social History     Tobacco Use   Smoking Status Former Smoker    Packs/day: 2 00    Years: 36 00    Pack years: 72 00    Types: Cigarettes    Start date:     Quit date: 45    Years since quittin 3   Smokeless Tobacco Never Used     Family History   Problem Relation Age of Onset    Stroke Mother     Cirrhosis Father         alcoholic    Cancer Sister     Cancer Daughter Meds/Allergies       Current Outpatient Medications:     Anoro Ellipta 62 5-25 MCG/INH inhaler    Ascorbic Acid (VITAMIN C PO)    aspirin 81 MG tablet    atorvastatin (LIPITOR) 10 mg tablet    Calcium Carbonate (CALCIUM 600 PO)    Cholecalciferol (VITAMIN D3) 2000 units capsule    cloNIDine (CATAPRES) 0 1 mg tablet    famotidine (PEPCID) 40 MG tablet    hydrochlorothiazide (HYDRODIURIL) 25 mg tablet    levothyroxine 50 mcg tablet    losartan (COZAAR) 100 MG tablet    montelukast (SINGULAIR) 10 mg tablet    Multiple Vitamin (multivitamin) capsule    Multiple Vitamins-Minerals (ZINC PO)    nitroglycerin (NITROSTAT) 0 4 mg SL tablet    Omega-3 Fatty Acids (FISH OIL PO)    acetaminophen (TYLENOL) 500 mg tablet    albuterol (PROVENTIL HFA,VENTOLIN HFA) 90 mcg/act inhaler    omeprazole (PriLOSEC) 40 MG capsule    Allergies   Allergen Reactions    Other Other (See Comments)     Steroids, it effects her eyes       Objective   Blood pressure (!) 188/92, pulse 92, temperature 98 5 °F (36 9 °C), temperature source Probe, weight 63 3 kg (139 lb 9 6 oz), not currently breastfeeding  Body mass index is 23 23 kg/m²  PHYSICAL EXAM:    General Appearance:   Alert, cooperative, no distress   HEENT:   Normocephalic, atraumatic, anicteric  Neck:  Supple, symmetrical, trachea midline   Lungs:   Clear to auscultation bilaterally; no rales, rhonchi or wheezing; respirations unlabored    Heart[de-identified]   Regular rate and rhythm; no murmur, rub, or gallop  Abdomen:   Soft, non-tender, non-distended; normal bowel sounds; no masses, no organomegaly    Genitalia:   Deferred    Rectal:   Deferred    Extremities:  No cyanosis, clubbing or edema    Pulses:  2+ and symmetric    Skin:  No jaundice, rashes, or lesions    Lymph nodes:  No palpable cervical lymphadenopathy        Lab Results:   No visits with results within 1 Day(s) from this visit     Latest known visit with results is:   Orders Only on 02/18/2022   Component Date Value    Total Cholesterol 02/18/2022 207*    HDL 02/18/2022 97     Triglycerides 02/18/2022 50     LDL Calculated 02/18/2022 97     Chol HDLC Ratio 02/18/2022 2 1     Non-HDL Cholesterol 02/18/2022 110     Glucose, Random 02/18/2022 95     BUN 02/18/2022 18     Creatinine 02/18/2022 0 65     eGFR Non  02/18/2022 83     eGFR  02/18/2022 96     SL AMB BUN/CREATININE RA* 29/41/2329 NOT APPLICABLE     Sodium 93/48/4559 131*    Potassium 02/18/2022 4 4     Chloride 02/18/2022 88*    CO2 02/18/2022 38*    Calcium 02/18/2022 9 4     Protein, Total 02/18/2022 6 5     Albumin 02/18/2022 4 1     Globulin 02/18/2022 2 4     Albumin/Globulin Ratio 02/18/2022 1 7     TOTAL BILIRUBIN 02/18/2022 0 6     Alkaline Phosphatase 02/18/2022 60     AST 02/18/2022 20     ALT 02/18/2022 20        Lab Results   Component Value Date    WBC 5 8 07/03/2021    HGB 12 3 07/03/2021    HCT 38 6 07/03/2021    MCV 87 7 07/03/2021     07/03/2021       Lab Results   Component Value Date    SODIUM 131 (L) 02/18/2022    K 4 4 02/18/2022    CL 88 (L) 02/18/2022    CO2 38 (H) 02/18/2022    AGAP 4 07/19/2020    BUN 18 02/18/2022    CREATININE 0 65 02/18/2022    GLUC 95 02/18/2022    CALCIUM 9 4 02/18/2022    AST 20 02/18/2022    ALT 20 02/18/2022    ALKPHOS 60 02/18/2022    TP 6 5 02/18/2022    TBILI 0 6 02/18/2022    EGFR 77 07/19/2020       Lab Results   Component Value Date    CRP 0 4 03/03/2020       Lab Results   Component Value Date    ZQL5SMNEZHUV 1 73 02/11/2017    TSH 1 31 10/18/2021       No results found for: IRON, TIBC, FERRITIN    Radiology Results:   No results found  ______________________________________________________________________  ASSESSMENT AND PLAN:    Nabil Tipton is a 79 y o  female with COPD on home oxygen, stable pancreatic cysts, constipation, and chronic epigastric pain  Her workup has been fairly extensive including EGD, EUS, CT    She has previously been treated for H pylori infection  She is on high-dose antiacid medications which are controlling her GERD but not the abdominal pain  She is a high-risk patient for endoscopic or surgical interventions  I reviewed her studies and had a long discussion with her and her   1  We are going to schedule a right upper quadrant ultrasound to rule out gallstone disease which may have been missed on her CT scan  2  I think that repeating EGD will be a high risk and low yield procedure  I am not overly concerned about the findings on her recent CT scan and to me, the stomach appeared under distended  3  I recommend better management of her constipation which could be contributing  to her abdominal pain  I have asked her to take MiraLax daily and control the dose is needed  For example, she can take half a capful if she feels that a full does causes her to have diarrhea  4  I would like to see her back in 3 months  1  Upper abdominal pain    2  Abnormal abdominal CT scan    3   Pancreatic cyst        Orders Placed This Encounter   Procedures    US right upper quadrant

## 2022-05-22 ENCOUNTER — HOSPITAL ENCOUNTER (OUTPATIENT)
Dept: ULTRASOUND IMAGING | Facility: HOSPITAL | Age: 82
Discharge: HOME | End: 2022-05-22
Attending: INTERNAL MEDICINE
Payer: COMMERCIAL

## 2022-05-22 DIAGNOSIS — R10.10 UPPER ABDOMINAL PAIN: ICD-10-CM

## 2022-05-22 PROCEDURE — 76705 ECHO EXAM OF ABDOMEN: CPT

## 2022-05-31 ENCOUNTER — TELEPHONE (OUTPATIENT)
Dept: GASTROENTEROLOGY | Facility: MEDICAL CENTER | Age: 82
End: 2022-05-31

## 2022-06-20 ENCOUNTER — OFFICE VISIT (OUTPATIENT)
Dept: FAMILY MEDICINE CLINIC | Facility: CLINIC | Age: 82
End: 2022-06-20
Payer: COMMERCIAL

## 2022-06-20 VITALS
OXYGEN SATURATION: 87 % | RESPIRATION RATE: 18 BRPM | HEIGHT: 65 IN | HEART RATE: 111 BPM | TEMPERATURE: 98.7 F | WEIGHT: 141.8 LBS | DIASTOLIC BLOOD PRESSURE: 70 MMHG | BODY MASS INDEX: 23.63 KG/M2 | SYSTOLIC BLOOD PRESSURE: 140 MMHG

## 2022-06-20 DIAGNOSIS — E27.8 ADRENAL NODULE (HCC): ICD-10-CM

## 2022-06-20 DIAGNOSIS — R05.9 COUGH: ICD-10-CM

## 2022-06-20 DIAGNOSIS — J18.9 COMMUNITY ACQUIRED PNEUMONIA OF RIGHT LOWER LOBE OF LUNG: Primary | ICD-10-CM

## 2022-06-20 DIAGNOSIS — R09.89 CHEST CONGESTION: ICD-10-CM

## 2022-06-20 DIAGNOSIS — K21.9 GASTROESOPHAGEAL REFLUX DISEASE WITHOUT ESOPHAGITIS: ICD-10-CM

## 2022-06-20 DIAGNOSIS — J96.11 CHRONIC RESPIRATORY FAILURE WITH HYPOXIA (HCC): ICD-10-CM

## 2022-06-20 DIAGNOSIS — R06.02 SOB (SHORTNESS OF BREATH): ICD-10-CM

## 2022-06-20 DIAGNOSIS — J44.9 CHRONIC OBSTRUCTIVE PULMONARY DISEASE, UNSPECIFIED COPD TYPE (HCC): ICD-10-CM

## 2022-06-20 DIAGNOSIS — I10 ESSENTIAL HYPERTENSION: ICD-10-CM

## 2022-06-20 LAB
SARS-COV-2 AG UPPER RESP QL IA: NEGATIVE
VALID CONTROL: NORMAL

## 2022-06-20 PROCEDURE — 1101F PT FALLS ASSESS-DOCD LE1/YR: CPT | Performed by: FAMILY MEDICINE

## 2022-06-20 PROCEDURE — 99214 OFFICE O/P EST MOD 30 MIN: CPT | Performed by: FAMILY MEDICINE

## 2022-06-20 PROCEDURE — 3078F DIAST BP <80 MM HG: CPT | Performed by: FAMILY MEDICINE

## 2022-06-20 PROCEDURE — 3077F SYST BP >= 140 MM HG: CPT | Performed by: FAMILY MEDICINE

## 2022-06-20 PROCEDURE — 87811 SARS-COV-2 COVID19 W/OPTIC: CPT | Performed by: FAMILY MEDICINE

## 2022-06-20 PROCEDURE — 3725F SCREEN DEPRESSION PERFORMED: CPT | Performed by: FAMILY MEDICINE

## 2022-06-20 RX ORDER — AZITHROMYCIN 250 MG/1
TABLET, FILM COATED ORAL
Qty: 6 TABLET | Refills: 0 | Status: SHIPPED | OUTPATIENT
Start: 2022-06-20 | End: 2022-06-25

## 2022-06-20 RX ORDER — FLUTICASONE FUROATE, UMECLIDINIUM BROMIDE AND VILANTEROL TRIFENATATE 200; 62.5; 25 UG/1; UG/1; UG/1
1 POWDER RESPIRATORY (INHALATION) DAILY
Qty: 60 BLISTER | Refills: 0 | Status: SHIPPED | OUTPATIENT
Start: 2022-06-20 | End: 2022-08-09 | Stop reason: ALTCHOICE

## 2022-06-20 NOTE — ASSESSMENT & PLAN NOTE
Blood pressure today is at goal for geriatrics  No changes needed at the moment  Will follow in the future

## 2022-06-20 NOTE — ASSESSMENT & PLAN NOTE
Patient does have COPD  Following with pulmonology  As was I do feel she has a slight exacerbation  Because of that, would recommend changing the Anoro to Trelegy for 1 inhaler only, and then afterwards going back to Anoro  She has had problems in the past with using steroids, i e  Blurry vision, but I do feel that at this point it is warranted  She should also increase the use of albuterol to every 4 hours while awake  Chest x-ray stat

## 2022-06-20 NOTE — PROGRESS NOTES
Assessment and Plan:    Problem List Items Addressed This Visit     Adren[facility] nodule ([facility] )    CAP (community acquired pneumonia) - Primary     Patient does appear to have a community-acquired pneumonia  Would recommend start antibiotics, increase the Anoro for short-term to Trelegy (use 1 inhaler of Trelegy completely, and then switch back to Anoro), and increase her use of albuterol to every 4 hours while awake  Would also recommend chest x-ray stat  If she has no improvement in her symptoms, or it seems to be getting worse, she can certainly contact this office, or go directly to the emergency room by ambulance, i e  Call 911  Relevant Medications    fluticasone-umeclidinium-vilanterol (Trelegy Ellipta) 200-62 5-25 MCG/INH AEPB inhaler    azithromycin (ZITHROMAX) 250 mg tablet    Other Relevant Orders    XR chest pa & lateral    Chronic obstructive pulmonary disease ([facility] )     Patient does have COPD  Following with pulmonology  As was I do feel she has a slight exacerbation  Because of that, would recommend changing the Anoro to Trelegy for 1 inhaler only, and then afterwards going back to Anoro  She has had problems in the past with using steroids, i e  Blurry vision, but I do feel that at this point it is warranted  She should also increase the use of albuterol to every 4 hours while awake  Chest x-ray stat  Relevant Medications    fluticasone-umeclidinium-vilanterol (Trelegy Ellipta) 200-62 5-25 MCG/INH AEPB inhaler    azithromycin (ZITHROMAX) 250 mg tablet    Other Relevant Orders    XR chest pa & lateral    Chronic respiratory failure with hypoxia (HCC)     As per discussion of COPD, as well as community-acquired pneumonia             Relevant Medications    fluticasone-umeclidinium-vilanterol (Trelegy Ellipta) 200-62 5-25 MCG/INH AEPB inhaler    azithromycin (ZITHROMAX) 250 mg tablet    Other Relevant Orders    XR chest pa & lateral    Essential hypertension     Blood pressure today is at goal for geriatrics  No changes needed at the moment  Will follow in the future  GERD (gastroesophageal reflux disease)     Patient does have reflux  Has been a longer-term problem  She is currently on famotidine only, though there is still omeprazole on the problem list   I will make that in active as of the moment, but will not remove it from the problem list until she has a regular visit after this acute episode is resolved  On further discussion the patient presented med list, which included omeprazole daily  Other Visit Diagnoses     SOB (shortness of breath)        Relevant Medications    fluticasone-umeclidinium-vilanterol (Trelegy Ellipta) 200-62 5-25 MCG/INH AEPB inhaler    azithromycin (ZITHROMAX) 250 mg tablet    Other Relevant Orders    POCT Rapid Covid Ag (Completed)    XR chest pa & lateral    Cough        Relevant Orders    POCT Rapid Covid Ag (Completed)    Chest congestion        Relevant Medications    fluticasone-umeclidinium-vilanterol (Trelegy Ellipta) 200-62 5-25 MCG/INH AEPB inhaler    Other Relevant Orders    POCT Rapid Covid Ag (Completed)                 Diagnoses and all orders for this visit:    Community acquired pneumonia of right lower lobe of lung  -     fluticasone-umeclidinium-vilanterol (Trelegy Ellipta) 200-62 5-25 MCG/INH AEPB inhaler; Inhale 1 puff daily Rinse mouth after use  -     azithromycin (ZITHROMAX) 250 mg tablet; Take 2 tablets on day 1, then 1 tablet daily days 2 through 5  -     XR chest pa & lateral; Future    SOB (shortness of breath)  -     POCT Rapid Covid Ag  -     fluticasone-umeclidinium-vilanterol (Trelegy Ellipta) 200-62 5-25 MCG/INH AEPB inhaler; Inhale 1 puff daily Rinse mouth after use  -     azithromycin (ZITHROMAX) 250 mg tablet;  Take 2 tablets on day 1, then 1 tablet daily days 2 through 5  -     XR chest pa & lateral; Future    Cough  -     POCT Rapid Covid Ag    Chest congestion  -     POCT Rapid Covid Ag    Chronic obstructive pulmonary disease, unspecified COPD type ([facility] )  -     fluticasone-umeclidinium-vilanterol (Trelegy Ellipta) 200-62 5-25 MCG/INH AEPB inhaler; Inhale 1 puff daily Rinse mouth after use  -     azithromycin (ZITHROMAX) 250 mg tablet; Take 2 tablets on day 1, then 1 tablet daily days 2 through 5  -     XR chest pa & lateral; Future    Chronic respiratory failure with hypoxia (HCC)  -     fluticasone-umeclidinium-vilanterol (Trelegy Ellipta) 200-62 5-25 MCG/INH AEPB inhaler; Inhale 1 puff daily Rinse mouth after use  -     azithromycin (ZITHROMAX) 250 mg tablet; Take 2 tablets on day 1, then 1 tablet daily days 2 through 5  -     XR chest pa & lateral; Future    Essential hypertension    Gastroesophageal reflux disease without esophagitis    Adrenal nodule (HCC)            Subjective:      Patient ID: Anais Huerta is a 79 y o  female  CC:    Chief Complaint   Patient presents with   Charissa Flakes Like Symptoms     Patient in office for possible bronchitis, current sxs  congestion, cough with yellowish mucus, SOB, chest pain due to cough, sore throat  Home COVID Test NEG on Saturday       HPI:    Noted for since Saturday  Patient is here because she is having increasing problems with shortness of breath  She did do a COVID test at home, but was negative  Patient has been having increasing coughing, productive cough as well  Oxygen level was a bit low today  Patient does use an oxygen concentrator at home, and that seems to be better for her than using the bottles with conserving device in the office  When she is out, that seems to be worse  For COPD, the patient is currently on Anoro, as well as albuterol  Hypertension:  Patient is on several medications for this  Has been doing slightly off recently, but today her blood pressure was reviewed and was reasonable            The following portions of the patient's history were reviewed and updated as appropriate: allergies, current medications, past family history, past medical history, past social history, past surgical history and problem list       Review of Systems   Constitutional: Positive for fatigue  Negative for appetite change, chills, diaphoresis and fever  HENT: Positive for congestion and sore throat  Negative for ear pain, facial swelling, postnasal drip, rhinorrhea, sinus pressure, sinus pain, trouble swallowing and voice change  Respiratory: Positive for cough and shortness of breath  Negative for chest tightness and wheezing  Cardiovascular: Positive for chest pain (due to cough)  Gastrointestinal: Negative for abdominal pain, diarrhea, nausea and vomiting  Genitourinary: Negative  Musculoskeletal: Negative for myalgias  Neurological: Negative for headaches  Data to review:       Objective:    Vitals:    06/20/22 1500   BP: 140/70   BP Location: Right arm   Patient Position: Sitting   Cuff Size: Adult   Pulse: (!) 111   Resp: 18   Temp: 98 7 °F (37 1 °C)   TempSrc: Temporal   SpO2: (!) 87%   Weight: 64 3 kg (141 lb 12 8 oz)   Height: 5' 5" (1 651 m)        Physical Exam  Vitals and nursing note reviewed  Constitutional:       Appearance: Normal appearance  HENT:      Head: Normocephalic  Cardiovascular:      Rate and Rhythm: Normal rate and regular rhythm  Pulses: Normal pulses  Carotid pulses are 2+ on the right side and 2+ on the left side  Heart sounds: Normal heart sounds  No murmur heard  No friction rub  No gallop  Pulmonary:      Effort: Respiratory distress present  Breath sounds: No stridor  Rhonchi present  No wheezing or rales  Chest:      Chest wall: No tenderness  Neurological:      Mental Status: She is alert

## 2022-06-20 NOTE — ASSESSMENT & PLAN NOTE
Patient does have reflux  Has been a longer-term problem  She is currently on famotidine only, though there is still omeprazole on the problem list   I will make that in active as of the moment, but will not remove it from the problem list until she has a regular visit after this acute episode is resolved  On further discussion the patient presented med list, which included omeprazole daily

## 2022-06-20 NOTE — ASSESSMENT & PLAN NOTE
Patient does appear to have a community-acquired pneumonia  Would recommend start antibiotics, increase the Anoro for short-term to Trelegy (use 1 inhaler of Trelegy completely, and then switch back to Anoro), and increase her use of albuterol to every 4 hours while awake  Would also recommend chest x-ray stat  If she has no improvement in her symptoms, or it seems to be getting worse, she can certainly contact this office, or go directly to the emergency room by ambulance, i e  Call 911

## 2022-06-20 NOTE — PATIENT INSTRUCTIONS
Problem List Items Addressed This Visit       [facility] ([facility] )    CAP (community acquired pneumonia) - Primary     Patient does appear to have a community-acquired pneumonia  Would recommend start antibiotics, increase the Anoro for short-term to Trelegy (use 1 inhaler of Trelegy completely, and then switch back to Anoro), and increase her use of albuterol to every 4 hours while awake  Would also recommend chest x-ray stat  Relevant Medications    fluticasone-umeclidinium-vilanterol (Trelegy Ellipta) 200-62 5-25 MCG/INH AEPB inhaler    azithromycin (ZITHROMAX) 250 mg tablet    Other Relevant Orders    XR chest pa & lateral    Chronic obstructive pulmonary disease ([facility] )     Patient does have COPD  Following with pulmonology  As was I do feel she has a slight exacerbation  Because of that, would recommend changing the Anoro to Trelegy for 1 inhaler only, and then afterwards going back to Anoro  She has had problems in the past with using steroids, i e  Blurry vision, but I do feel that at this point it is warranted  She should also increase the use of albuterol to every 4 hours while awake  Chest x-ray stat  Relevant Medications    fluticasone-umeclidinium-vilanterol (Trelegy Ellipta) 200-62 5-25 MCG/INH AEPB inhaler    azithromycin (ZITHROMAX) 250 mg tablet    Other Relevant Orders    XR chest pa & lateral    Chronic respiratory failure with hypoxia (HCC)     As per discussion of COPD, as well as community-acquired pneumonia  Relevant Medications    fluticasone-umeclidinium-vilanterol (Trelegy Ellipta) 200-62 5-25 MCG/INH AEPB inhaler    azithromycin (ZITHROMAX) 250 mg tablet    Other Relevant Orders    XR chest pa & lateral    Essential hypertension     Blood pressure today is at goal for geriatrics  No changes needed at the moment  Will follow in the future  GERD (gastroesophageal reflux disease)     Patient does have reflux    Has been a longer-term problem  She is currently on famotidine only, though there is still omeprazole on the problem list   I will make that in active as of the moment, but will not remove it from the problem list until she has a regular visit after this acute episode is resolved  On further discussion the patient presented med list, which included omeprazole daily  Other Visit Diagnoses       SOB (shortness of breath)        Relevant Medications    fluticasone-umeclidinium-vilanterol (Trelegy Ellipta) 200-62 5-25 MCG/INH AEPB inhaler    azithromycin (ZITHROMAX) 250 mg tablet    Other Relevant Orders    POCT Rapid Covid Ag (Completed)    XR chest pa & lateral    Cough        Relevant Orders    POCT Rapid Covid Ag (Completed)    Chest congestion        Relevant Medications    fluticasone-umeclidinium-vilanterol (Trelegy Ellipta) 200-62 5-25 MCG/INH AEPB inhaler    Other Relevant Orders    POCT Rapid Covid Ag (Completed)            COVID 19 Instructions    Anais Huerta was advised to limit contact with others to essential tasks such as getting food, medications, and medical care  Proper handwashing reviewed, and Hand sanitzer when washing is not available  If the patient develops symptoms of COVID 19, the patient should call the office as soon as possible  For 5300-2971 Flu season, it is strongly recommended that Flu Vaccinations be obtained  Virtual Visits:  Juliana: This works on smart phones (any phone with Internet browsing capability)  You should get a text message when the provider is ready to see you  Click on the link in the text message, and the call should start  You will need to type in your name, and allow camera and microphone access  This is HIPPA compliant, and secure  If you have not already done so, get immunized to COVID 19        We are committed to getting you vaccinated as soon as possible and will be closely following CDC and [facility]  guidelines as they are released and revised  Please refer to our COVID-19 vaccine webpage for the most up to date information on the vaccine and our distribution efforts  This site will also have the most up to date recommendations for COVID booster vaccine  Mariano burgos    Call [phone] ([phone]), option 7    OUR NEW LOCATION:    [address], [facility] [place], [place], [address]  Fax: [phone]    Lab services and OB/GYN are at this location as well

## 2022-06-21 ENCOUNTER — APPOINTMENT (OUTPATIENT)
Dept: RADIOLOGY | Facility: MEDICAL CENTER | Age: 82
End: 2022-06-21
Payer: COMMERCIAL

## 2022-06-21 DIAGNOSIS — J44.9 CHRONIC OBSTRUCTIVE PULMONARY DISEASE, UNSPECIFIED COPD TYPE (HCC): ICD-10-CM

## 2022-06-21 DIAGNOSIS — J18.9 COMMUNITY ACQUIRED PNEUMONIA OF RIGHT LOWER LOBE OF LUNG: ICD-10-CM

## 2022-06-21 DIAGNOSIS — R06.02 SOB (SHORTNESS OF BREATH): ICD-10-CM

## 2022-06-21 DIAGNOSIS — J96.11 CHRONIC RESPIRATORY FAILURE WITH HYPOXIA (HCC): ICD-10-CM

## 2022-06-21 DIAGNOSIS — I10 ESSENTIAL HYPERTENSION: ICD-10-CM

## 2022-06-21 PROCEDURE — 71046 X-RAY EXAM CHEST 2 VIEWS: CPT

## 2022-06-21 RX ORDER — CLONIDINE HYDROCHLORIDE 0.1 MG/1
TABLET ORAL
Qty: 60 TABLET | Refills: 5 | Status: SHIPPED | OUTPATIENT
Start: 2022-06-21

## 2022-06-27 ENCOUNTER — OFFICE VISIT (OUTPATIENT)
Dept: FAMILY MEDICINE CLINIC | Facility: CLINIC | Age: 82
End: 2022-06-27
Payer: COMMERCIAL

## 2022-06-27 VITALS
HEART RATE: 64 BPM | WEIGHT: 142 LBS | OXYGEN SATURATION: 98 % | DIASTOLIC BLOOD PRESSURE: 70 MMHG | HEIGHT: 65 IN | SYSTOLIC BLOOD PRESSURE: 128 MMHG | BODY MASS INDEX: 23.66 KG/M2 | RESPIRATION RATE: 16 BRPM

## 2022-06-27 DIAGNOSIS — Z12.31 ENCOUNTER FOR SCREENING MAMMOGRAM FOR BREAST CANCER: ICD-10-CM

## 2022-06-27 DIAGNOSIS — M81.0 AGE-RELATED OSTEOPOROSIS WITHOUT CURRENT PATHOLOGICAL FRACTURE: ICD-10-CM

## 2022-06-27 DIAGNOSIS — Z00.00 MEDICARE ANNUAL WELLNESS VISIT, SUBSEQUENT: Primary | ICD-10-CM

## 2022-06-27 DIAGNOSIS — I10 ESSENTIAL HYPERTENSION: ICD-10-CM

## 2022-06-27 DIAGNOSIS — J18.9 PNEUMONIA DUE TO INFECTIOUS ORGANISM, UNSPECIFIED LATERALITY, UNSPECIFIED PART OF LUNG: ICD-10-CM

## 2022-06-27 PROCEDURE — 1160F RVW MEDS BY RX/DR IN RCRD: CPT | Performed by: FAMILY MEDICINE

## 2022-06-27 PROCEDURE — 99214 OFFICE O/P EST MOD 30 MIN: CPT | Performed by: FAMILY MEDICINE

## 2022-06-27 PROCEDURE — 3288F FALL RISK ASSESSMENT DOCD: CPT | Performed by: FAMILY MEDICINE

## 2022-06-27 PROCEDURE — 1170F FXNL STATUS ASSESSED: CPT | Performed by: FAMILY MEDICINE

## 2022-06-27 PROCEDURE — 1090F PRES/ABSN URINE INCON ASSESS: CPT | Performed by: FAMILY MEDICINE

## 2022-06-27 PROCEDURE — 1036F TOBACCO NON-USER: CPT | Performed by: FAMILY MEDICINE

## 2022-06-27 PROCEDURE — G0439 PPPS, SUBSEQ VISIT: HCPCS | Performed by: FAMILY MEDICINE

## 2022-06-27 PROCEDURE — 1125F AMNT PAIN NOTED PAIN PRSNT: CPT | Performed by: FAMILY MEDICINE

## 2022-06-27 RX ORDER — AZITHROMYCIN 250 MG/1
TABLET, FILM COATED ORAL
Qty: 6 TABLET | Refills: 0 | Status: SHIPPED | OUTPATIENT
Start: 2022-06-29 | End: 2022-07-04

## 2022-06-27 NOTE — PROGRESS NOTES
Assessment and Plan:     Problem List Items Addressed This Visit        Cardiovascular and Mediastinum    Essential hypertension     BP stable              Musculoskeletal and Integument    Age-related osteoporosis without current pathological fracture     On Reclast, next DEXa  Due 4/23             Other Visit Diagnoses     Medicare annual wellness visit, subsequent    -  Primary    Pneumonia due to infectious organism, unspecified laterality, unspecified part of lung        Relevant Medications    azithromycin (ZITHROMAX) 250 mg tablet (Start on 6/29/2022)    Other Relevant Orders    XR chest pa & lateral    Encounter for screening mammogram for breast cancer        Relevant Orders    Mammo screening bilateral w 3d & cad           Preventive health issues were discussed with patient, and age appropriate screening tests were ordered as noted in patient's After Visit Summary  Personalized health advice and appropriate referrals for health education or preventive services given if needed, as noted in patient's After Visit Summary  History of Present Illness:     Patient presents for a Medicare Wellness Visit    F/u pneumonia, feeling better but still tired, had endoscopyn and us for finding seen on CT abd-all ok, Miralax rec for constipation, still shortness of breath but better, no cp     Patient Care Team:  Estefania Peñaloza MD as PCP - General (Family Medicine)  MD Juma Deshpande MD     Review of Systems:     Review of Systems   Constitutional: Positive for fatigue  Negative for activity change, appetite change, chills and fever  Respiratory: Positive for cough and shortness of breath  Negative for chest tightness and wheezing  Cardiovascular: Negative for chest pain  Gastrointestinal: Negative for abdominal pain  Neurological: Negative for dizziness and headaches          Problem List:     Patient Active Problem List   Diagnosis    Seasonal allergies    Essential hypertension    Mixed hyperlipidemia    Sciatica    GERD (gastroesophageal reflux disease)    Anxiety    Acquired hypothyroidism    Chronic respiratory failure with hypoxia (HCC)    Chronic obstructive pulmonary disease (HCC)    Multiple pulmonary nodules    Tremor    Burning with urination    Female stress incontinence    OAB (overactive bladder)    Vaginal atrophy    Dizziness    Urinary frequency    Acute low back pain without sciatica    Pancreatic cyst    Adrenal nodule (HCC)    Age-related osteoporosis without current pathological fracture    Chronic midline thoracic back pain    CAP (community acquired pneumonia)      Past Medical and Surgical History:     Past Medical History:   Diagnosis Date    Anxiety     Back pain     Cancer ([facility] )     skin    Cataract     COPD (chronic obstructive pulmonary disease) ([facility] )     Disease of thyroid gland     Emphysema lung (HCC)     Emphysema of lung ([facility] )     Geographic tongue     last assessed: 2014    GERD (gastroesophageal reflux disease)     Hyperlipidemia     Hypertension     Hypothyroidism (acquired)     Mild intermittent asthma     Sciatica     Seasonal allergies      Past Surgical History:   Procedure Laterality Date    BACK SURGERY      ENDOSCOPIC ULTRASOUND (UPPER)  2020    SKIN LESION EXCISION        Family History:     Family History   Problem Relation Age of Onset    Stroke Mother     Cirrhosis Father         alcoholic    Cancer Sister     Cancer Daughter       Social History:     Social History     Socioeconomic History    Marital status: /Civil Union     Spouse name: None    Number of children: 3    Years of education: None    Highest education level: None   Occupational History    Occupation: Retired   Tobacco Use    Smoking status: Former Smoker     Packs/day: 2 00     Years: 36 00     Pack years: 72 00     Types: Cigarettes     Start date:      Quit date:      Years since quittin 5    Smokeless tobacco: Never Used   Vaping Use    Vaping Use: Never used   Substance and Sexual Activity    Alcohol use: No    Drug use: No    Sexual activity: Never     Birth control/protection: None   Other Topics Concern    None   Social History Narrative    Denied history of active advance directive     Always uses a seat belt    Daily caffeine consumption: 2-3 servings/day     Lack physical exercise     No living will    Denied history of substances, toxic environmental    Supportive and safe     Social Determinants of Health     Financial Resource Strain: Not on file   Food Insecurity: Not on file   Transportation Needs: Not on file   Physical Activity: Not on file   Stress: Not on file   Social Connections: Not on file   Intimate Partner Violence: Not on file   Housing Stability: Not on file      Medications and Allergies:     Current Outpatient Medications   Medication Sig Dispense Refill    albuterol (PROVENTIL HFA,VENTOLIN HFA) 90 mcg/act inhaler Inhale 2 puffs every 6 (six) hours as needed for wheezing 1 Inhaler 6    Anoro Ellipta 62 5-25 MCG/INH inhaler INHALE ONE PUFF BY MOUTH EVERY DAY 60 blister 3    Ascorbic Acid (VITAMIN C PO) Take 600 mg by mouth      aspirin 81 MG tablet Take 81 mg by mouth daily      atorvastatin (LIPITOR) 10 mg tablet TAKE ONE TABLET BY MOUTH EVERY DAY 90 tablet 1    [START ON 6/29/2022] azithromycin (ZITHROMAX) 250 mg tablet 2 1st day, 1/d for 4 days 6 tablet 0    Calcium Carbonate (CALCIUM 600 PO) Take by mouth      Cholecalciferol (VITAMIN D3) 2000 units capsule Take 4,000 Units by mouth daily       cloNIDine (CATAPRES) 0 1 mg tablet TAKE ONE TABLET BY MOUTH TWO TIMES A DAY AS NEEDED IF BLOOD PRESSURE IS GREATER THAN 150 60 tablet 5    famotidine (PEPCID) 40 MG tablet Take 1 tablet (40 mg total) by mouth daily 90 tablet 1    fluticasone-umeclidinium-vilanterol (Trelegy Ellipta) 200-62 5-25 MCG/INH AEPB inhaler Inhale 1 puff daily Rinse mouth after use   60 blister 0    hydrochlorothiazide (HYDRODIURIL) 25 mg tablet Take 1 tablet (25 mg total) by mouth daily 90 tablet 1    levothyroxine 50 mcg tablet TAKE ONE TABLET BY MOUTH EVERY DAY 90 tablet 1    montelukast (SINGULAIR) 10 mg tablet Take 1 tablet (10 mg total) by mouth daily at bedtime 30 tablet 3    Multiple Vitamin (multivitamin) capsule Take 1 capsule by mouth daily      Multiple Vitamins-Minerals (ZINC PO) Take by mouth      nitroglycerin (NITROSTAT) 0 4 mg SL tablet Place 1 tablet (0 4 mg total) under the tongue every 5 (five) minutes as needed for chest pain (esophageal spasm) 25 tablet 3    Omega-3 Fatty Acids (FISH OIL PO) Take 1,200 mg by mouth 2 (two) times a day      losartan (COZAAR) 100 MG tablet Take 1 tablet (100 mg total) by mouth daily 90 tablet 1    omeprazole (PriLOSEC) 40 MG capsule Take 1 capsule (40 mg total) by mouth 2 (two) times a day before meals 60 capsule 3     No current facility-administered medications for this visit  Allergies   Allergen Reactions    Other Other (See Comments)     Steroids, it effects her eyes      Immunizations:     Immunization History   Administered Date(s) Administered    COVID-19 MODERNA VACC 0 5 ML IM 02/12/2021, 03/10/2021, 11/03/2021    INFLUENZA 03/10/2021    Influenza Split High Dose Preservative Free IM 09/08/2017    Influenza, high dose seasonal 0 7 mL 10/26/2018    Influenza, recombinant, quadrivalent,injectable, preservative free 10/29/2020, 10/14/2021    Pneumococcal Conjugate 13-Valent 02/18/2016, 06/06/2017    Pneumococcal Polysaccharide PPV23 10/14/2021    Td (adult), adsorbed 07/13/2007      Health Maintenance:         Topic Date Due    Breast Cancer Screening: Mammogram  06/11/2022         Topic Date Due    COVID-19 Vaccine (4 - Booster for Moderna series) 03/03/2022      Medicare Screening Tests and Risk Assessments:     Radha Yen is here for her Subsequent Wellness visit       Health Risk Assessment:   Patient rates overall health as good  Patient feels that their physical health rating is same  Patient is satisfied with their life  Eyesight was rated as slightly worse  Hearing was rated as same  Patient feels that their emotional and mental health rating is much better  Patients states they are sometimes angry  Patient states they are often unusually tired/fatigued  Pain experienced in the last 7 days has been none  Patient states that she has experienced weight loss or gain in last 6 months  Depression Screening:   PHQ-2 Score: 0      Fall Risk Screening: In the past year, patient has experienced: no history of falling in past year      Urinary Incontinence Screening:   Patient has not leaked urine accidently in the last six months  Home Safety:  Patient has trouble with stairs inside or outside of their home  Patient has working smoke alarms and has working carbon monoxide detector  Home safety hazards include: none  Nutrition:   Current diet is Regular  Medications:   Patient is currently taking over-the-counter supplements  OTC medications include: see medication list  Patient is able to manage medications  Activities of Daily Living (ADLs)/Instrumental Activities of Daily Living (IADLs):   Walk and transfer into and out of bed and chair?: Yes  Dress and groom yourself?: Yes    Bathe or shower yourself?: Yes    Feed yourself? Yes  Do your laundry/housekeeping?: No  Manage your money, pay your bills and track your expenses?: No  Make your own meals?: No    Do your own shopping?: Yes    Previous Hospitalizations:   Any hospitalizations or ED visits within the last 12 months?: No      Advance Care Planning:   Living will: Yes    Durable POA for healthcare:  Yes    Advanced directive: Yes      Cognitive Screening:   Provider or family/friend/caregiver concerned regarding cognition?: No    PREVENTIVE SCREENINGS      Cardiovascular Screening:    General: History Lipid Disorder and Screening Current      Diabetes Screening: General: Screening Current      Colorectal Cancer Screening:     General: Screening Not Indicated      Breast Cancer Screening:     General: Screening Current and Patient Declines    Due for: Mammogram        Cervical Cancer Screening:    General: Screening Not Indicated      Osteoporosis Screening:    General: History Osteoporosis and Screening Current      Abdominal Aortic Aneurysm (AAA) Screening:        General: Screening Not Indicated      Lung Cancer Screening:     General: Screening Not Indicated      Hepatitis C Screening:    General: Screening Not Indicated    Screening, Brief Intervention, and Referral to Treatment (SBIRT)    Screening  Typical number of drinks in a day: 0  Typical number of drinks in a week: 0  Interpretation: Low risk drinking behavior  AUDIT-C Screenin) How often did you have a drink containing alcohol in the past year? never  2) How many drinks did you have on a typical day when you were drinking in the past year? 0  3) How often did you have 6 or more drinks on one occasion in the past year? never    AUDIT-C Score: 0  Interpretation: Score 0-2 (female): Negative screen for alcohol misuse    Single Item Drug Screening:  How often have you used an illegal drug (including marijuana) or a prescription medication for non-medical reasons in the past year? never    Single Item Drug Screen Score: 0  Interpretation: Negative screen for possible drug use disorder    No exam data present     Physical Exam:     /70 (BP Location: Right arm, Patient Position: Sitting, Cuff Size: Standard)   Pulse 64   Resp 16   Ht 5' 5" (1 651 m)   Wt 64 4 kg (142 lb)   SpO2 98%   BMI 23 63 kg/m²     Physical Exam  Vitals reviewed  Constitutional:       Appearance: Normal appearance  HENT:      Right Ear: Tympanic membrane normal       Left Ear: Tympanic membrane normal       Nose: No congestion or rhinorrhea        Mouth/Throat:      Pharynx: No oropharyngeal exudate or posterior oropharyngeal erythema  Cardiovascular:      Rate and Rhythm: Normal rate and regular rhythm  Pulses: Normal pulses  Heart sounds: Normal heart sounds  Pulmonary:      Effort: Pulmonary effort is normal  No respiratory distress  Comments: Mild rhonchi at bases  Lymphadenopathy:      Cervical: No cervical adenopathy  Neurological:      Mental Status: She is alert            Dustin Bonilla MD

## 2022-06-29 ENCOUNTER — APPOINTMENT (OUTPATIENT)
Dept: RADIOLOGY | Facility: MEDICAL CENTER | Age: 82
End: 2022-06-29
Payer: COMMERCIAL

## 2022-06-29 DIAGNOSIS — J18.9 PNEUMONIA DUE TO INFECTIOUS ORGANISM, UNSPECIFIED LATERALITY, UNSPECIFIED PART OF LUNG: ICD-10-CM

## 2022-06-29 PROCEDURE — 71046 X-RAY EXAM CHEST 2 VIEWS: CPT

## 2022-07-05 ENCOUNTER — TELEPHONE (OUTPATIENT)
Dept: GASTROENTEROLOGY | Facility: CLINIC | Age: 82
End: 2022-07-05

## 2022-07-05 NOTE — TELEPHONE ENCOUNTER
Patients GI provider:  Dr Nury Olson    Number to return call: [phone]    Reason for call: Family calling to move up appt  With Dr Nury Olson re; continued pain post US  Nothing sooner than her 8/17/22 appt  They only wish her to see him  Would like to know if there is anything else she can do for pain  She is taking Protonix and they are giving her baking soda/water      Scheduled procedure/appointment date if applicable: Apt/procedure 5/3/22

## 2022-07-05 NOTE — TELEPHONE ENCOUNTER
Returned call to Annette  Reports pt with constant complaints of burning pain in esophagus down to stomach  Pt declining to participate in family functions  Pt taking Prilosec 40 mg once daily & Baking soda in water  +Nausea w/o vomiting, +flatus, +BM w/ straining  Advised to discontinue baking soda & water  Increase Prilosec to BID and Pepcid at bedtime  Miralax 1-2 scoops until +BM w/o straining  Call back if symptoms persist after recommendations

## 2022-07-06 NOTE — TELEPHONE ENCOUNTER
Thank you   I spoke to Ms Harp and her daughter today  She increased omeprazole to 40 mg bid and also started famotidine at night as instructed  She says that she thinks this is helping but it is too early to say for sure  She does not have dysphagia or odynophagia  She says that her symptoms feel like reflux  I have asked her to update me in a couple of weeks  If her symptoms persist, we will have to schedule EGD at [facility] to r/o candida esophagitis  She has COPD and is on home O2, so I had hoped to avoid EGD  She did have EGD/EUS in 2020 and did fine

## 2022-07-13 ENCOUNTER — TELEPHONE (OUTPATIENT)
Dept: FAMILY MEDICINE CLINIC | Facility: CLINIC | Age: 82
End: 2022-07-13

## 2022-07-13 NOTE — TELEPHONE ENCOUNTER
Patient called stated is having side affects to the medication losartan, per patient she is feeling dizzy and light headed, also weakness and its been going on for two weeks, per patient she stopped the medication please call patient regarding this matter

## 2022-07-18 ENCOUNTER — OFFICE VISIT (OUTPATIENT)
Dept: FAMILY MEDICINE CLINIC | Facility: CLINIC | Age: 82
End: 2022-07-18
Payer: COMMERCIAL

## 2022-07-18 VITALS
SYSTOLIC BLOOD PRESSURE: 132 MMHG | RESPIRATION RATE: 16 BRPM | HEART RATE: 80 BPM | OXYGEN SATURATION: 97 % | BODY MASS INDEX: 22.86 KG/M2 | HEIGHT: 65 IN | DIASTOLIC BLOOD PRESSURE: 76 MMHG | WEIGHT: 137.2 LBS

## 2022-07-18 DIAGNOSIS — I10 ESSENTIAL HYPERTENSION: ICD-10-CM

## 2022-07-18 DIAGNOSIS — J44.9 CHRONIC OBSTRUCTIVE PULMONARY DISEASE, UNSPECIFIED COPD TYPE (HCC): ICD-10-CM

## 2022-07-18 DIAGNOSIS — E03.9 ACQUIRED HYPOTHYROIDISM: ICD-10-CM

## 2022-07-18 DIAGNOSIS — K21.9 GASTROESOPHAGEAL REFLUX DISEASE WITHOUT ESOPHAGITIS: Primary | ICD-10-CM

## 2022-07-18 DIAGNOSIS — J96.11 CHRONIC RESPIRATORY FAILURE WITH HYPOXIA (HCC): ICD-10-CM

## 2022-07-18 DIAGNOSIS — R53.1 WEAKNESS: ICD-10-CM

## 2022-07-18 PROBLEM — J18.9 CAP (COMMUNITY ACQUIRED PNEUMONIA): Status: RESOLVED | Noted: 2022-06-20 | Resolved: 2022-07-18

## 2022-07-18 PROCEDURE — 1160F RVW MEDS BY RX/DR IN RCRD: CPT | Performed by: FAMILY MEDICINE

## 2022-07-18 PROCEDURE — 3725F SCREEN DEPRESSION PERFORMED: CPT | Performed by: FAMILY MEDICINE

## 2022-07-18 PROCEDURE — 99214 OFFICE O/P EST MOD 30 MIN: CPT | Performed by: FAMILY MEDICINE

## 2022-07-18 NOTE — PROGRESS NOTES
Assessment/Plan:    Essential hypertension  BP stable    Dizziness  Worse since had pneumonia       Diagnoses and all orders for this visit:    Gastroesophageal reflux disease without esophagitis    Acquired hypothyroidism  -     TSH, 3rd generation; Future  -     Lipid panel; Future    Chronic respiratory failure with hypoxia (HCC)    Chronic obstructive pulmonary disease, unspecified COPD type ([facility] )    Essential hypertension  -     Comprehensive metabolic panel; Future  -     CBC and differential; Future    Weakness  -     Comprehensive metabolic panel; Future  -     CBC and differential; Future        Subjective:      Patient ID: Sonny Velez is a 79 y o  female  F/u weakness, dizziness, perhaps a tad better  Since stopping Losartan, had penumonia about  A month ago so may be still recovering      The following portions of the patient's history were reviewed and updated as appropriate: allergies, current medications, past family history, past medical history, past social history, past surgical history and problem list       Review of Systems   Constitutional: Positive for fatigue  Negative for activity change and appetite change  Respiratory: Positive for shortness of breath  Negative for wheezing  Cardiovascular: Negative for chest pain  Neurological: Positive for dizziness and weakness  Psychiatric/Behavioral: The patient is not nervous/anxious  Objective:      /76 (BP Location: Right arm, Patient Position: Sitting, Cuff Size: Standard)   Pulse 80   Resp 16   Ht 5' 5" (1 651 m)   Wt 62 2 kg (137 lb 3 2 oz)   SpO2 97%   BMI 22 83 kg/m²          Physical Exam  Vitals reviewed  Constitutional:       Appearance: Normal appearance  Comments: Looks tired   Cardiovascular:      Rate and Rhythm: Normal rate and regular rhythm  Pulses: Normal pulses  Heart sounds: Normal heart sounds     Pulmonary:      Effort: Pulmonary effort is normal       Breath sounds: Normal breath sounds  Musculoskeletal:      Right lower leg: No edema  Left lower leg: No edema  Lymphadenopathy:      Cervical: No cervical adenopathy  Neurological:      Mental Status: She is alert

## 2022-07-21 DIAGNOSIS — E78.2 MIXED HYPERLIPIDEMIA: ICD-10-CM

## 2022-07-21 DIAGNOSIS — I10 ESSENTIAL HYPERTENSION: ICD-10-CM

## 2022-07-21 RX ORDER — ATORVASTATIN CALCIUM 10 MG/1
TABLET, FILM COATED ORAL
Qty: 90 TABLET | Refills: 1 | Status: SHIPPED | OUTPATIENT
Start: 2022-07-21

## 2022-07-21 RX ORDER — LOSARTAN POTASSIUM 100 MG/1
TABLET ORAL
Qty: 90 TABLET | Refills: 1 | Status: SHIPPED | OUTPATIENT
Start: 2022-07-21 | End: 2022-07-30

## 2022-07-27 LAB
ALBUMIN SERPL-MCNC: 4.2 G/DL (ref 3.6–5.1)
ALBUMIN/GLOB SERPL: 1.8 (CALC) (ref 1–2.5)
ALP SERPL-CCNC: 59 U/L (ref 37–153)
ALT SERPL-CCNC: 23 U/L (ref 6–29)
AST SERPL-CCNC: 27 U/L (ref 10–35)
BASOPHILS # BLD AUTO: 41 CELLS/UL (ref 0–200)
BASOPHILS NFR BLD AUTO: 0.8 %
BILIRUB SERPL-MCNC: 0.6 MG/DL (ref 0.2–1.2)
BUN SERPL-MCNC: 10 MG/DL (ref 7–25)
BUN/CREAT SERPL: 20 (CALC) (ref 6–22)
CALCIUM SERPL-MCNC: 9.2 MG/DL (ref 8.6–10.4)
CHLORIDE SERPL-SCNC: 81 MMOL/L (ref 98–110)
CHOLEST SERPL-MCNC: 216 MG/DL
CHOLEST/HDLC SERPL: 1.9 (CALC)
CO2 SERPL-SCNC: 40 MMOL/L (ref 20–32)
CREAT SERPL-MCNC: 0.5 MG/DL (ref 0.6–0.95)
EOSINOPHIL # BLD AUTO: 51 CELLS/UL (ref 15–500)
EOSINOPHIL NFR BLD AUTO: 1 %
ERYTHROCYTE [DISTWIDTH] IN BLOOD BY AUTOMATED COUNT: 12.7 % (ref 11–15)
GFR/BSA.PRED SERPLBLD CYS-BASED-ARV: 93 ML/MIN/1.73M2
GLOBULIN SER CALC-MCNC: 2.4 G/DL (CALC) (ref 1.9–3.7)
GLUCOSE SERPL-MCNC: 104 MG/DL (ref 65–99)
HCT VFR BLD AUTO: 38.3 % (ref 35–45)
HDLC SERPL-MCNC: 112 MG/DL
HGB BLD-MCNC: 12.9 G/DL (ref 11.7–15.5)
LDLC SERPL CALC-MCNC: 89 MG/DL (CALC)
LYMPHOCYTES # BLD AUTO: 1280 CELLS/UL (ref 850–3900)
LYMPHOCYTES NFR BLD AUTO: 25.1 %
MCH RBC QN AUTO: 29.2 PG (ref 27–33)
MCHC RBC AUTO-ENTMCNC: 33.7 G/DL (ref 32–36)
MCV RBC AUTO: 86.7 FL (ref 80–100)
MONOCYTES # BLD AUTO: 495 CELLS/UL (ref 200–950)
MONOCYTES NFR BLD AUTO: 9.7 %
NEUTROPHILS # BLD AUTO: 3233 CELLS/UL (ref 1500–7800)
NEUTROPHILS NFR BLD AUTO: 63.4 %
NONHDLC SERPL-MCNC: 104 MG/DL (CALC)
PLATELET # BLD AUTO: 232 THOUSAND/UL (ref 140–400)
PMV BLD REES-ECKER: 9.5 FL (ref 7.5–12.5)
POTASSIUM SERPL-SCNC: 3 MMOL/L (ref 3.5–5.3)
PROT SERPL-MCNC: 6.6 G/DL (ref 6.1–8.1)
RBC # BLD AUTO: 4.42 MILLION/UL (ref 3.8–5.1)
SODIUM SERPL-SCNC: 128 MMOL/L (ref 135–146)
TRIGL SERPL-MCNC: 67 MG/DL
TSH SERPL-ACNC: 1.71 MIU/L (ref 0.4–4.5)
WBC # BLD AUTO: 5.1 THOUSAND/UL (ref 3.8–10.8)

## 2022-07-28 ENCOUNTER — TELEPHONE (OUTPATIENT)
Dept: FAMILY MEDICINE CLINIC | Facility: CLINIC | Age: 82
End: 2022-07-28

## 2022-07-28 NOTE — TELEPHONE ENCOUNTER
Pt left a message on the referral line today 7/28 at 8:30 am that she was around someone on Tuesday that got diagnosed with covid yesterday  Pt is vaccinated and experiencing no symptoms  Pt advised to wear a mask when going out in public, and to call the office right away if she develops any symptoms  Pt agreeable

## 2022-07-30 ENCOUNTER — OFFICE VISIT (OUTPATIENT)
Dept: FAMILY MEDICINE CLINIC | Facility: CLINIC | Age: 82
End: 2022-07-30
Payer: COMMERCIAL

## 2022-07-30 VITALS — SYSTOLIC BLOOD PRESSURE: 132 MMHG | HEART RATE: 120 BPM | DIASTOLIC BLOOD PRESSURE: 78 MMHG

## 2022-07-30 DIAGNOSIS — R06.89 HYPERCAPNIA: Primary | ICD-10-CM

## 2022-07-30 DIAGNOSIS — E87.1 HYPONATREMIA: ICD-10-CM

## 2022-07-30 DIAGNOSIS — E87.6 HYPOKALEMIA: ICD-10-CM

## 2022-07-30 DIAGNOSIS — I10 ESSENTIAL HYPERTENSION: ICD-10-CM

## 2022-07-30 PROCEDURE — 99214 OFFICE O/P EST MOD 30 MIN: CPT | Performed by: FAMILY MEDICINE

## 2022-07-30 RX ORDER — HYDROCHLOROTHIAZIDE 25 MG/1
25 TABLET ORAL 3 TIMES WEEKLY
Qty: 30 TABLET | Refills: 1
Start: 2022-08-01 | End: 2022-10-19

## 2022-07-30 NOTE — PATIENT INSTRUCTIONS
Crease hydrochlorothiazide to M , W   and Friday, eat food shighe rin potassium, may salt  foor, recheck bmp 8/4 and BP check by  Mas on 8/5, incentive spirometry  5-6  times/day

## 2022-07-30 NOTE — PROGRESS NOTES
Assessment and Plan:    Problem List Items Addressed This Visit        Cardiovascular and Mediastinum    Essential hypertension    Relevant Medications    hydrochlorothiazide (HYDRODIURIL) 25 mg tablet (Start on 8/1/2022)    Other Relevant Orders    Basic metabolic panel       Other    Hypercapnia - Primary     Incentive  Spirometry 5 to 6  Times/day         Relevant Orders    Basic metabolic panel    Hypokalemia     Decrease hctz to 3 times/day, increase sodium  And potassium in diet         Relevant Orders    Basic metabolic panel    Hyponatremia     Decrease hctz to 3 times/day, increase sodium  And potassium in diet         Relevant Orders    Basic metabolic panel                 Diagnoses and all orders for this visit:    Hypercapnia  -     Basic metabolic panel; Future    Essential hypertension  Comments:  BP stable  Orders:  -     hydrochlorothiazide (HYDRODIURIL) 25 mg tablet; Take 1 tablet (25 mg total) by mouth 3 (three) times a week  -     Basic metabolic panel; Future    Hypokalemia  -     Basic metabolic panel; Future    Hyponatremia  -     Basic metabolic panel; Future            Subjective:      Patient ID: Olga Mata is a 79 y o  female  CC:    Chief Complaint   Patient presents with    Follow-up     Pt here for f/u to chronic conditions,lab results   Dizziness     Pt states that she feels lightheaded and out of sorts at times  HPI:    On  3 1/2 liters, CO2 up to  40, sodium 128, potassium 3 0, feels fuzzy, dizzy and off balnace, not retaining fluid, BP good off Losartan      The following portions of the patient's history were reviewed and updated as appropriate: allergies, current medications, past family history, past medical history, past social history, past surgical history and problem list       Review of Systems   Constitutional: Negative for activity change, appetite change and fatigue  Respiratory: Positive for shortness of breath      Cardiovascular: Negative for chest pain    Neurological: Positive for dizziness  Negative for headaches  Hematological: Negative for adenopathy  Data to review:       Objective:    Vitals:    07/30/22 1028   BP: 132/78   Pulse: (!) 120        Physical Exam  Vitals reviewed  Constitutional:       Appearance: Normal appearance  Cardiovascular:      Rate and Rhythm: Normal rate and regular rhythm  Pulses: Normal pulses  Heart sounds: Normal heart sounds  Pulmonary:      Effort: Pulmonary effort is normal       Comments: Decreased bs  Musculoskeletal:      Right lower leg: No edema  Left lower leg: No edema  Lymphadenopathy:      Cervical: No cervical adenopathy  Neurological:      Mental Status: She is alert     Psychiatric:         Mood and Affect: Mood normal

## 2022-08-05 ENCOUNTER — CLINICAL SUPPORT (OUTPATIENT)
Dept: FAMILY MEDICINE CLINIC | Facility: CLINIC | Age: 82
End: 2022-08-05
Payer: COMMERCIAL

## 2022-08-05 VITALS
BODY MASS INDEX: 22.99 KG/M2 | DIASTOLIC BLOOD PRESSURE: 78 MMHG | HEIGHT: 65 IN | SYSTOLIC BLOOD PRESSURE: 142 MMHG | HEART RATE: 88 BPM | WEIGHT: 138 LBS

## 2022-08-05 DIAGNOSIS — I10 ESSENTIAL HYPERTENSION: Primary | ICD-10-CM

## 2022-08-05 LAB
BUN SERPL-MCNC: 11 MG/DL (ref 7–25)
BUN/CREAT SERPL: 20 (CALC) (ref 6–22)
CALCIUM SERPL-MCNC: 9.2 MG/DL (ref 8.6–10.4)
CHLORIDE SERPL-SCNC: 86 MMOL/L (ref 98–110)
CO2 SERPL-SCNC: 38 MMOL/L (ref 20–32)
CREAT SERPL-MCNC: 0.54 MG/DL (ref 0.6–0.95)
GFR/BSA.PRED SERPLBLD CYS-BASED-ARV: 91 ML/MIN/1.73M2
GLUCOSE SERPL-MCNC: 103 MG/DL (ref 65–99)
POTASSIUM SERPL-SCNC: 3.3 MMOL/L (ref 3.5–5.3)
SODIUM SERPL-SCNC: 133 MMOL/L (ref 135–146)

## 2022-08-05 PROCEDURE — 99211 OFF/OP EST MAY X REQ PHY/QHP: CPT

## 2022-08-05 NOTE — PROGRESS NOTES
Pt  Present today for blood pressure check, pt stated she was feeling stable no dizziness or feeling lightheaded  nAna simpson

## 2022-08-09 ENCOUNTER — OFFICE VISIT (OUTPATIENT)
Dept: PULMONOLOGY | Facility: CLINIC | Age: 82
End: 2022-08-09
Payer: COMMERCIAL

## 2022-08-09 VITALS
TEMPERATURE: 98.9 F | OXYGEN SATURATION: 96 % | HEIGHT: 65 IN | DIASTOLIC BLOOD PRESSURE: 88 MMHG | WEIGHT: 127 LBS | BODY MASS INDEX: 21.16 KG/M2 | RESPIRATION RATE: 18 BRPM | HEART RATE: 94 BPM | SYSTOLIC BLOOD PRESSURE: 142 MMHG

## 2022-08-09 DIAGNOSIS — M40.04 POSTURAL KYPHOSIS OF THORACIC REGION: ICD-10-CM

## 2022-08-09 DIAGNOSIS — J44.9 CHRONIC OBSTRUCTIVE PULMONARY DISEASE, UNSPECIFIED COPD TYPE (HCC): Primary | ICD-10-CM

## 2022-08-09 DIAGNOSIS — J43.2 CENTRILOBULAR EMPHYSEMA (HCC): ICD-10-CM

## 2022-08-09 DIAGNOSIS — J96.11 CHRONIC RESPIRATORY FAILURE WITH HYPOXIA (HCC): ICD-10-CM

## 2022-08-09 PROCEDURE — 99214 OFFICE O/P EST MOD 30 MIN: CPT | Performed by: INTERNAL MEDICINE

## 2022-08-09 PROCEDURE — 3079F DIAST BP 80-89 MM HG: CPT | Performed by: INTERNAL MEDICINE

## 2022-08-09 PROCEDURE — 3077F SYST BP >= 140 MM HG: CPT | Performed by: INTERNAL MEDICINE

## 2022-08-09 RX ORDER — ALBUTEROL SULFATE 90 UG/1
2 AEROSOL, METERED RESPIRATORY (INHALATION) EVERY 6 HOURS PRN
Qty: 54 G | Refills: 3 | Status: SHIPPED | OUTPATIENT
Start: 2022-08-09

## 2022-08-09 NOTE — ASSESSMENT & PLAN NOTE
· Doing well on Anoro Ellipta   · Never tried Trelegy Ellipta which was prescribed by her primary  At this point I do not think adding the steroid will add much benefit to her symptoms  · Did recently have elevated carbon dioxide levels with low sodium and low potassium  Hydrochlorothiazide was held and there was slight improvement and she is feeling somewhat better    Carbon dioxide level did drop slightly  · I do suspect that she likely has a element of chronic hypercapnia that is mild

## 2022-08-09 NOTE — ASSESSMENT & PLAN NOTE
· She has progressively become more kyphotic and has lost height   · Likely has a contribution from restrictive lung disease as her chest wall mechanics clearly have changed with her progressive kyphosis

## 2022-08-09 NOTE — ASSESSMENT & PLAN NOTE
· Using 3 5 L at home on continuous flow   · With portable supply uses 4 L pulse but sometimes feels that she needs more and is not getting enough oxygen with the pulse administration  · I did reinforce the importance of inhaling nasally and exhaling orally in order to maximize oxygen flow with a pulse device  · To switch to continuous flow, she would require larger tanks and would have much less mobility    I advised that we defer changing to continuous flow at this time

## 2022-08-09 NOTE — PROGRESS NOTES
Progress note - Pulmonary Medicine   Chris Jin 79 y o  female MRN: [id number]       Impression & Plan:     Chronic obstructive pulmonary disease ([address] )  · Doing well on Anoro Ellipta   · Never tried Trelegy Ellipta which was prescribed by her primary  At this point I do not think adding the steroid will add much benefit to her symptoms  · Did recently have elevated carbon dioxide levels with low sodium and low potassium  Hydrochlorothiazide was held and there was slight improvement and she is feeling somewhat better  Carbon dioxide level did drop slightly  · I do suspect that she likely has a element of chronic hypercapnia that is mild    Chronic respiratory failure with hypoxia (HCC)  · Using 3 5 L at home on continuous flow   · With portable supply uses 4 L pulse but sometimes feels that she needs more and is not getting enough oxygen with the pulse administration  · I did reinforce the importance of inhaling nasally and exhaling orally in order to maximize oxygen flow with a pulse device  · To switch to continuous flow, she would require larger tanks and would have much less mobility  I advised that we defer changing to continuous flow at this time    Postural kyphosis of thoracic region  · She has progressively become more kyphotic and has lost height   · Likely has a contribution from restrictive lung disease as her chest wall mechanics clearly have changed with her progressive kyphosis    She will follow-up with me in 6 months but was instructed to call if she has new or worsening symptoms  ______________________________________________________________________    HPI:    Chris Jin presents today for follow-up of COPD with chronic hypoxemic respiratory failure  She also recently had an elevated carbon dioxide level on her serum chemistry  I discussed with Dr Telly Owen at that time and there was concern that potentially she was dehydrated from her hydrochlorothiazide    This was held and her symptoms did improve somewhat  She still has a little bit of lightheadedness but that has improved somewhat  He is feeling better with the elimination of the hydrochlorothiazide    She was treated for pneumonia in June with resolved symptoms at this time  No residual cough or phlegm  No chest tightness or chest pain no fevers or chills    Reported chest pain  No palpitations  No worsening cough or phlegm production  No wheezing  She is taking Anoro Ellipta consistently without difficulty and does find it beneficial     She is using her oxygen consistently  She uses 3 5 L on her home concentrator which seems adequate for her  She has 4 L pulse flow with a conserving device for her portable supply    At time she feels that this is not adequate and that she does not get enough oxygen with the pulse administration    Current Medications:    Current Outpatient Medications:     albuterol (PROVENTIL HFA,VENTOLIN HFA) 90 mcg/act inhaler, Inhale 2 puffs every 6 (six) hours as needed for wheezing, Disp: 54 g, Rfl: 3    Ascorbic Acid (VITAMIN C PO), Take 600 mg by mouth, Disp: , Rfl:     aspirin 81 MG tablet, Take 81 mg by mouth daily, Disp: , Rfl:     atorvastatin (LIPITOR) 10 mg tablet, TAKE ONE TABLET BY MOUTH EVERY DAY, Disp: 90 tablet, Rfl: 1    Calcium Carbonate (CALCIUM 600 PO), Take by mouth, Disp: , Rfl:     Cholecalciferol (VITAMIN D3) 2000 units capsule, Take 4,000 Units by mouth daily , Disp: , Rfl:     cloNIDine (CATAPRES) 0 1 mg tablet, TAKE ONE TABLET BY MOUTH TWO TIMES A DAY AS NEEDED IF BLOOD PRESSURE IS GREATER THAN 150, Disp: 60 tablet, Rfl: 5    famotidine (PEPCID) 40 MG tablet, Take 1 tablet (40 mg total) by mouth daily, Disp: 90 tablet, Rfl: 1    hydrochlorothiazide (HYDRODIURIL) 25 mg tablet, Take 1 tablet (25 mg total) by mouth 3 (three) times a week, Disp: 30 tablet, Rfl: 1    levothyroxine 50 mcg tablet, TAKE ONE TABLET BY MOUTH EVERY DAY, Disp: 90 tablet, Rfl: 1    montelukast (SINGULAIR) 10 mg tablet, Take 1 tablet (10 mg total) by mouth daily at bedtime, Disp: 30 tablet, Rfl: 3    Multiple Vitamin (multivitamin) capsule, Take 1 capsule by mouth daily, Disp: , Rfl:     Multiple Vitamins-Minerals (ZINC PO), Take by mouth, Disp: , Rfl:     nitroglycerin (NITROSTAT) 0 4 mg SL tablet, Place 1 tablet (0 4 mg total) under the tongue every 5 (five) minutes as needed for chest pain (esophageal spasm), Disp: 25 tablet, Rfl: 3    Omega-3 Fatty Acids (FISH OIL PO), Take 1,200 mg by mouth 2 (two) times a day, Disp: , Rfl:     umeclidinium-vilanterol (Anoro Ellipta) 62 5-25 MCG/INH inhaler, Inhale 1 puff daily, Disp: 180 blister, Rfl: 3    omeprazole (PriLOSEC) 40 MG capsule, Take 1 capsule (40 mg total) by mouth 2 (two) times a day before meals, Disp: 60 capsule, Rfl: 3    Review of Systems:  Aside from what is mentioned in the HPI, the review of systems is otherwise negative    Past medical history, surgical history, and family history were reviewed and updated as appropriate    Social history updates:  Social History     Tobacco Use   Smoking Status Former Smoker    Packs/day: 2 00    Years: 36 00    Pack years: 72 00    Types: Cigarettes    Start date: 12    Quit date: 46    Years since quittin 6   Smokeless Tobacco Never Used       PhysicalExamination:  Vitals:   /88 (BP Location: Right arm, Patient Position: Sitting, Cuff Size: Adult)   Pulse 94   Temp 98 9 °F (37 2 °C) (Tympanic)   Resp 18   Ht 5' 5" (1 651 m)   Wt 57 6 kg (127 lb)   SpO2 96%   BMI 21 13 kg/m²   Gen:  Comfortable on room air  No conversational dyspnea  HEENT:  Conjugate gaze  sclerae anicteric  Oropharynx moist  Neck: Trachea is midline  No JVD  No adenopathy  Chest:  Symmetric chest wall excursion with distant breath sounds  She has thoracic kyphosis which is fairly pronounced  Cardiac:  Regular   no murmur  Abdomen:  benign  Extremities: No edema  Neuro:  Normal speech and mentation    Diagnostic Data:  Labs:   I personally reviewed the most recent laboratory data pertinent to today's visit    Lab Results   Component Value Date    WBC 5 1 07/27/2022    HGB 12 9 07/27/2022    HCT 38 3 07/27/2022    MCV 86 7 07/27/2022     07/27/2022     Lab Results   Component Value Date    SODIUM 133 (L) 08/05/2022    K 3 3 (L) 08/05/2022    CO2 38 (H) 08/05/2022    CL 86 (L) 08/05/2022    BUN 11 08/05/2022    CREATININE 0 54 (L) 08/05/2022    CALCIUM 9 2 08/05/2022         Imaging:  I personally reviewed the images on the [facility] system pertinent to today's visit  Chest x-ray from June 29th shows improving right lower lobe opacity in comparison with study from June 21st   This was consistent with pneumonia the right lower lobe      Matilda Gambino MD

## 2022-08-17 ENCOUNTER — OFFICE VISIT (OUTPATIENT)
Dept: GASTROENTEROLOGY | Facility: CLINIC | Age: 82
End: 2022-08-17
Payer: COMMERCIAL

## 2022-08-17 VITALS
DIASTOLIC BLOOD PRESSURE: 70 MMHG | HEART RATE: 87 BPM | OXYGEN SATURATION: 89 % | SYSTOLIC BLOOD PRESSURE: 140 MMHG | WEIGHT: 132.4 LBS | HEIGHT: 65 IN | BODY MASS INDEX: 22.06 KG/M2

## 2022-08-17 DIAGNOSIS — K21.9 GASTROESOPHAGEAL REFLUX DISEASE WITHOUT ESOPHAGITIS: Primary | ICD-10-CM

## 2022-08-17 DIAGNOSIS — R10.84 GENERALIZED ABDOMINAL PAIN: ICD-10-CM

## 2022-08-17 PROCEDURE — 1160F RVW MEDS BY RX/DR IN RCRD: CPT | Performed by: INTERNAL MEDICINE

## 2022-08-17 PROCEDURE — 99214 OFFICE O/P EST MOD 30 MIN: CPT | Performed by: INTERNAL MEDICINE

## 2022-08-17 RX ORDER — FAMOTIDINE 20 MG/1
20 TABLET, FILM COATED ORAL 2 TIMES DAILY
Qty: 90 TABLET | Refills: 3 | Status: SHIPPED | OUTPATIENT
Start: 2022-08-17

## 2022-08-17 RX ORDER — SUCRALFATE 1 G/1
1 TABLET ORAL 2 TIMES DAILY PRN
Qty: 90 TABLET | Refills: 3 | Status: SHIPPED | OUTPATIENT
Start: 2022-08-17

## 2022-08-17 RX ORDER — SUCRALFATE ORAL 1 G/10ML
1 SUSPENSION ORAL 4 TIMES DAILY
Qty: 414 ML | Refills: 1 | Status: SHIPPED | OUTPATIENT
Start: 2022-08-17 | End: 2022-08-17

## 2022-08-17 NOTE — PROGRESS NOTES
[facility] - Outpatient Follow-up Note  Chris Jin 79 y o  female MRN: [id number]  Encounter: [id number]          ASSESSMENT AND PLAN:    Chris Jin is a 79 y o  female GERD dyspepsia  She also has a stable pancreatic cyst   I remain inclined to treat her symptomatically like to avoid endoscopic procedures given poor respiratory status  She and her  are in agreement with this  1  Change famotidine to 20 mg twice daily as she feels that it is more helpful than her PPI  2  Continue omeprazole 40 mg daily  I instructed her to take it 30 minutes before breakfast, which she was not doing  3  Start Carafate 1 g twice daily as needed  4  Continue MiraLax 17 g once or twice daily  5  Avoid spicy foods and known triggers GERD  She does not eat late at   6  I would like to see her back in 3 months  1  Gastroesophageal reflux disease without esophagitis    2  Generalized abdominal pain        No orders of the defined types were placed in this encounter     ______________________________________________________________________    SUBJECTIVE:    Chris Jin is a 79 y o  female with COPD on home oxygen, stable pancreatic cyst, prior H pylori infection who presents for follow-up of epigastric and constipation  H pylori stool antigen test was negative  Abdominal CT and right upper quadrant ultrasound this shown stable pancreatic cyst and no gallbladder findings  She takes omeprazole 40 mg bid and famotidine qhs  Still has occasional GERD  She describes sensation of mild chest discomfort which improves with eating  Still with epigastric discomfort  She had EGD and EUS in late 2020  Our plan has been to treat her symptomatically and avoid procedures given comorbidities  She is in the office with her  today  She currently takes omeprazole 40 mg twice daily and famotidine 40 mg at night  She feels that famotidine is more helpful omeprazole    She also avoids spicy foods and onions which worsen GERD  She says despite these medications, she still has occasional epigastric lower chest discomfort  This discomfort is not severe and she says that it may be related, at least in part, to anxiety  Pain is unrelated to food or bowel movements  She does not feel constipated and takes MiraLax 17 g daily  She has 1 BM every other day  REVIEW OF SYSTEMS IS OTHERWISE NEGATIVE        Historical Information   Past Medical History:   Diagnosis Date    Anxiety     Back pain     Cancer ([facility] )     skin    Cataract     COPD (chronic obstructive pulmonary disease) (HCC)     Disease of thyroid gland     Emphysema lung (HCC)     Emphysema of lung ([facility] )     Geographic tongue     last assessed: 2014    GERD (gastroesophageal reflux disease)     Hyperlipidemia     Hypertension     Hypothyroidism (acquired)     Mild intermittent asthma     Sciatica     Seasonal allergies      Past Surgical History:   Procedure Laterality Date    BACK SURGERY      ENDOSCOPIC ULTRASOUND (UPPER)  2020    SKIN LESION EXCISION       Social History   Social History     Substance and Sexual Activity   Alcohol Use No     Social History     Substance and Sexual Activity   Drug Use No     Social History     Tobacco Use   Smoking Status Former Smoker    Packs/day: 2 00    Years: 36 00    Pack years: 72 00    Types: Cigarettes    Start date: 12    Quit date: 46    Years since quittin 6   Smokeless Tobacco Never Used     Family History   Problem Relation Age of Onset    Stroke Mother     Cirrhosis Father         alcoholic    Cancer Sister     Cancer Daughter        Meds/Allergies       Current Outpatient Medications:     albuterol (PROVENTIL HFA,VENTOLIN HFA) 90 mcg/act inhaler    Ascorbic Acid (VITAMIN C PO)    aspirin 81 MG tablet    atorvastatin (LIPITOR) 10 mg tablet    Calcium Carbonate (CALCIUM 600 PO)    Cholecalciferol (VITAMIN D3) 2000 units capsule    cloNIDine (CATAPRES) 0 1 mg tablet    famotidine (PEPCID) 20 mg tablet    hydrochlorothiazide (HYDRODIURIL) 25 mg tablet    levothyroxine 50 mcg tablet    montelukast (SINGULAIR) 10 mg tablet    Multiple Vitamin (multivitamin) capsule    Multiple Vitamins-Minerals (ZINC PO)    nitroglycerin (NITROSTAT) 0 4 mg SL tablet    Omega-3 Fatty Acids (FISH OIL PO)    sucralfate (CARAFATE) 1 g tablet    umeclidinium-vilanterol (Anoro Ellipta) 62 5-25 MCG/INH inhaler    omeprazole (PriLOSEC) 40 MG capsule    Allergies   Allergen Reactions    Other Other (See Comments)     Steroids, it effects her eyes           Objective     Blood pressure 140/70, pulse 87, height 5' 5" (1 651 m), weight 60 1 kg (132 lb 6 4 oz), SpO2 (!) 89 %, not currently breastfeeding  Body mass index is 22 03 kg/m²  PHYSICAL EXAM:      General Appearance:   Alert, cooperative, no distress   HEENT:   Normocephalic, atraumatic, anicteric  Neck:  Supple, symmetrical, trachea midline   Lungs: On oxygen, dyspneic  Heart[de-identified]   Regular rate and rhythm; no murmur, rub, or gallop  Abdomen:   Soft, mild epigastric TTP, non-distended; normal bowel sounds; no masses, no organomegaly    Genitalia:   Deferred    Rectal:   Deferred    Extremities:  No cyanosis, clubbing or edema    Pulses:  2+ and symmetric    Skin:  No jaundice, rashes, or lesions    Lymph nodes:  No palpable cervical lymphadenopathy        Lab Results:   No visits with results within 1 Day(s) from this visit     Latest known visit with results is:   Orders Only on 08/05/2022   Component Date Value    Glucose, Random 08/05/2022 103 (A)    BUN 08/05/2022 11     Creatinine 08/05/2022 0 54 (A)    eGFR 08/05/2022 91     SL AMB BUN/CREATININE RA* 08/05/2022 20     Sodium 08/05/2022 133 (A)    Potassium 08/05/2022 3 3 (A)    Chloride 08/05/2022 86 (A)    CO2 08/05/2022 38 (A)    Calcium 08/05/2022 9 2        Lab Results   Component Value Date    WBC 5 1 07/27/2022    HGB 12 9 07/27/2022    HCT 38 3 07/27/2022    MCV 86 7 07/27/2022     07/27/2022       Lab Results   Component Value Date    SODIUM 133 (L) 08/05/2022    K 3 3 (L) 08/05/2022    CL 86 (L) 08/05/2022    CO2 38 (H) 08/05/2022    AGAP 4 07/19/2020    BUN 11 08/05/2022    CREATININE 0 54 (L) 08/05/2022    GLUC 103 (H) 08/05/2022    CALCIUM 9 2 08/05/2022    AST 27 07/27/2022    ALT 23 07/27/2022    ALKPHOS 59 07/27/2022    TP 6 6 07/27/2022    TBILI 0 6 07/27/2022    EGFR 91 08/05/2022       Lab Results   Component Value Date    CRP 0 4 03/03/2020       Lab Results   Component Value Date    XPB0CRHZEBSG 1 73 02/11/2017    TSH 1 71 07/27/2022       No results found for: IRON, TIBC, FERRITIN    Radiology Results:   No results found

## 2022-08-17 NOTE — PATIENT INSTRUCTIONS
Take Pepcid (famotidine) 20 mg twice a day  Take Prilosec (omeprazole) 40 mg in the morning 30 minutes before breakfast   Take Carafate (sucralfate) 2 to 4 times a day as needed  Avoids spicy foods  Do not eat for 3 hours before going to bed

## 2022-08-28 ENCOUNTER — TELEPHONE (OUTPATIENT)
Dept: RHEUMATOLOGY | Facility: CLINIC | Age: 82
End: 2022-08-28

## 2022-08-28 DIAGNOSIS — M81.0 AGE-RELATED OSTEOPOROSIS WITHOUT CURRENT PATHOLOGICAL FRACTURE: Primary | ICD-10-CM

## 2022-08-28 NOTE — TELEPHONE ENCOUNTER
Pt is due for her next reclast infusion on 9/30th at the [facility], please call and schedule then confirm with patient, order is already in the system, no prior auth needed

## 2022-09-20 ENCOUNTER — TELEPHONE (OUTPATIENT)
Dept: FAMILY MEDICINE CLINIC | Facility: CLINIC | Age: 82
End: 2022-09-20

## 2022-09-20 DIAGNOSIS — K21.9 GASTROESOPHAGEAL REFLUX DISEASE WITHOUT ESOPHAGITIS: ICD-10-CM

## 2022-09-20 RX ORDER — OMEPRAZOLE 40 MG/1
40 CAPSULE, DELAYED RELEASE ORAL
Qty: 60 CAPSULE | Refills: 3 | Status: SHIPPED | OUTPATIENT
Start: 2022-09-20 | End: 2022-09-20 | Stop reason: SDUPTHER

## 2022-09-20 RX ORDER — OMEPRAZOLE 40 MG/1
40 CAPSULE, DELAYED RELEASE ORAL
Qty: 60 CAPSULE | Refills: 3 | Status: SHIPPED | OUTPATIENT
Start: 2022-09-20 | End: 2022-10-20

## 2022-09-20 NOTE — TELEPHONE ENCOUNTER
Pt's  left a voicemail on the referral line on 9/20/22 at 10:41 am  He is asking if Dr Alee Enriquez can please order Wiliam Woodsons a walker with a seat  He states that she gets too tired when walking and needs the help  Pt last saw Dr Alee Enriquez on 7/30  Please call pt's  back when the script is created  Thank you

## 2022-09-20 NOTE — TELEPHONE ENCOUNTER
I called and made  aware that this was sent to [facility] and he should hear from them to confirm coverage, etc

## 2022-09-22 LAB
DME PARACHUTE DELIVERY DATE ACTUAL: NORMAL
DME PARACHUTE DELIVERY DATE EXPECTED: NORMAL
DME PARACHUTE DELIVERY DATE REQUESTED: NORMAL
DME PARACHUTE ITEM DESCRIPTION: NORMAL
DME PARACHUTE ITEM DESCRIPTION: NORMAL
DME PARACHUTE ORDER STATUS: NORMAL
DME PARACHUTE SUPPLIER NAME: NORMAL
DME PARACHUTE SUPPLIER PHONE: NORMAL

## 2022-09-29 ENCOUNTER — TELEPHONE (OUTPATIENT)
Dept: OBGYN CLINIC | Facility: HOSPITAL | Age: 82
End: 2022-09-29

## 2022-09-29 NOTE — TELEPHONE ENCOUNTER
Patient sees Dr Neno Warren  Patient is calling because she is confused about where she goes for her infusion  [facility] but then someone called her from the [facility] aand stated there was no answer at the infusion center  Patient would like to know if we can find out from the infusion center where she has to go          CB: [phone]

## 2022-09-30 ENCOUNTER — HOSPITAL ENCOUNTER (OUTPATIENT)
Dept: INFUSION CENTER | Facility: CLINIC | Age: 82
End: 2022-09-30
Payer: COMMERCIAL

## 2022-09-30 VITALS
BODY MASS INDEX: 22.89 KG/M2 | DIASTOLIC BLOOD PRESSURE: 76 MMHG | RESPIRATION RATE: 18 BRPM | TEMPERATURE: 98.3 F | HEART RATE: 76 BPM | WEIGHT: 137.57 LBS | SYSTOLIC BLOOD PRESSURE: 165 MMHG

## 2022-09-30 DIAGNOSIS — M81.0 AGE-RELATED OSTEOPOROSIS WITHOUT CURRENT PATHOLOGICAL FRACTURE: Primary | ICD-10-CM

## 2022-09-30 PROCEDURE — 96365 THER/PROPH/DIAG IV INF INIT: CPT

## 2022-09-30 RX ORDER — ZOLEDRONIC ACID 5 MG/100ML
5 INJECTION, SOLUTION INTRAVENOUS ONCE
OUTPATIENT
Start: 2023-09-30

## 2022-09-30 RX ORDER — SODIUM CHLORIDE 9 MG/ML
20 INJECTION, SOLUTION INTRAVENOUS ONCE
Status: COMPLETED | OUTPATIENT
Start: 2022-09-30 | End: 2022-09-30

## 2022-09-30 RX ORDER — ZOLEDRONIC ACID 5 MG/100ML
5 INJECTION, SOLUTION INTRAVENOUS ONCE
Status: COMPLETED | OUTPATIENT
Start: 2022-09-30 | End: 2022-09-30

## 2022-09-30 RX ORDER — SODIUM CHLORIDE 9 MG/ML
20 INJECTION, SOLUTION INTRAVENOUS ONCE
OUTPATIENT
Start: 2023-09-30

## 2022-09-30 RX ADMIN — ZOLEDRONIC ACID 5 MG: 0.05 INJECTION, SOLUTION INTRAVENOUS at 14:21

## 2022-09-30 RX ADMIN — SODIUM CHLORIDE 20 ML/HR: 0.9 INJECTION, SOLUTION INTRAVENOUS at 14:20

## 2022-10-13 DIAGNOSIS — E03.9 ACQUIRED HYPOTHYROIDISM: ICD-10-CM

## 2022-10-13 NOTE — TELEPHONE ENCOUNTER
Medication: levothyroxine  Day supply: 90  [facility]    Last office visit: 9/20/2022    Upcoming office visit: Visit date not found      Patient also would like to know when Ray herndon wants to see her again

## 2022-10-14 RX ORDER — LEVOTHYROXINE SODIUM 0.05 MG/1
50 TABLET ORAL DAILY
Qty: 90 TABLET | Refills: 1 | Status: SHIPPED | OUTPATIENT
Start: 2022-10-14

## 2022-10-19 DIAGNOSIS — I10 ESSENTIAL HYPERTENSION: ICD-10-CM

## 2022-10-19 RX ORDER — HYDROCHLOROTHIAZIDE 25 MG/1
TABLET ORAL
Qty: 90 TABLET | Refills: 1 | Status: SHIPPED | OUTPATIENT
Start: 2022-10-19

## 2022-11-08 ENCOUNTER — RA CDI HCC (OUTPATIENT)
Dept: OTHER | Facility: HOSPITAL | Age: 82
End: 2022-11-08

## 2022-11-08 NOTE — PROGRESS NOTES
[facility]  coding opportunities       Chart reviewed, no opportunity found:   [address]        Patients Insurance     Medicare Insurance: [employer]age

## 2022-11-11 ENCOUNTER — OFFICE VISIT (OUTPATIENT)
Dept: FAMILY MEDICINE CLINIC | Facility: CLINIC | Age: 82
End: 2022-11-11

## 2022-11-11 VITALS
BODY MASS INDEX: 23.49 KG/M2 | HEART RATE: 78 BPM | HEIGHT: 65 IN | WEIGHT: 141 LBS | SYSTOLIC BLOOD PRESSURE: 130 MMHG | TEMPERATURE: 98.6 F | OXYGEN SATURATION: 94 % | DIASTOLIC BLOOD PRESSURE: 80 MMHG

## 2022-11-11 DIAGNOSIS — E78.2 MIXED HYPERLIPIDEMIA: ICD-10-CM

## 2022-11-11 DIAGNOSIS — E03.9 ACQUIRED HYPOTHYROIDISM: ICD-10-CM

## 2022-11-11 DIAGNOSIS — Z23 NEED FOR INFLUENZA VACCINATION: Primary | ICD-10-CM

## 2022-11-11 DIAGNOSIS — I10 ESSENTIAL HYPERTENSION: ICD-10-CM

## 2022-11-11 DIAGNOSIS — J44.9 CHRONIC OBSTRUCTIVE PULMONARY DISEASE, UNSPECIFIED COPD TYPE (HCC): ICD-10-CM

## 2022-11-11 DIAGNOSIS — E87.1 HYPONATREMIA: ICD-10-CM

## 2022-11-11 DIAGNOSIS — J96.11 CHRONIC RESPIRATORY FAILURE WITH HYPOXIA (HCC): ICD-10-CM

## 2022-11-11 DIAGNOSIS — K21.9 GASTROESOPHAGEAL REFLUX DISEASE WITHOUT ESOPHAGITIS: ICD-10-CM

## 2022-11-11 PROBLEM — R30.0 BURNING WITH URINATION: Status: RESOLVED | Noted: 2018-03-09 | Resolved: 2022-11-11

## 2022-11-11 PROBLEM — E87.6 HYPOKALEMIA: Status: RESOLVED | Noted: 2022-07-30 | Resolved: 2022-11-11

## 2022-11-11 RX ORDER — HYDROCHLOROTHIAZIDE 25 MG/1
25 TABLET ORAL DAILY
Qty: 90 TABLET | Refills: 1 | Status: SHIPPED | OUTPATIENT
Start: 2022-11-11

## 2022-11-11 RX ORDER — ATORVASTATIN CALCIUM 10 MG/1
10 TABLET, FILM COATED ORAL DAILY
Qty: 90 TABLET | Refills: 1 | Status: SHIPPED | OUTPATIENT
Start: 2022-11-11

## 2022-11-11 NOTE — PROGRESS NOTES
Name: Kenyatta Nunes      : 1940      MRN: [id number]  Encounter Provider: Estefania Peñaloza MD  Encounter Date: 2022   Encounter department: [facility] PRIMARY CARE    Assessment & Plan     1  Need for influenza vaccination  -     influenza vaccine, quadrivalent, recombinant, PF, 0 5 mL, for patients 18 yr+ (FLUBLOK)    2  Mixed hyperlipidemia  Assessment & Plan:  Lipids stable    Orders:  -     atorvastatin (LIPITOR) 10 mg tablet; Take 1 tablet (10 mg total) by mouth daily    3  Essential hypertension  Comments:  BP stable  Assessment & Plan:  BP stable    Orders:  -     hydrochlorothiazide (HYDRODIURIL) 25 mg tablet; Take 1 tablet (25 mg total) by mouth daily    4  Chronic obstructive pulmonary disease, unspecified COPD type ([facility] )  Assessment & Plan:  Stable on 4L of O2  Sees pulmonary doc       5  Chronic respiratory failure with hypoxia (HCC)  Assessment & Plan:  Using 4 L of continuous O2  Sees pulmonary doc  6  Gastroesophageal reflux disease without esophagitis  Assessment & Plan:  Stable on meds, states Pepcid and omeprazole helps  7  Acquired hypothyroidism  Assessment & Plan:  stable      8  Hyponatremia  -     Basic metabolic panel; Future           Subjective      F/u HTN, COPD & GERD- does not take Carafate but takes omeprazole and Pepcid, does follows GI who see will see next week  Wears 4L continuous O2 NC  Feels well overall  Unsteady with walking, uses walker  Sob sometimes  No headaches, no chest pain  Eats a well balance diet that includes fruits/veg  Review of Systems   Constitutional: Positive for fatigue  Negative for activity change and appetite change  Fatigue sometimes  Gets about 7 hr sleep at night but sometimes none  HENT: Negative for congestion  Respiratory: Positive for shortness of breath  Wears 4 L of O2   Cardiovascular: Negative for chest pain  Gastrointestinal: Positive for abdominal pain          When takes sucralfate on empty stomach   Musculoskeletal: Positive for gait problem  Neurological: Positive for light-headedness  Negative for dizziness and headaches  Psychiatric/Behavioral: Positive for sleep disturbance         Current Outpatient Medications on File Prior to Visit   Medication Sig   • albuterol (PROVENTIL HFA,VENTOLIN HFA) 90 mcg/act inhaler Inhale 2 puffs every 6 (six) hours as needed for wheezing   • Ascorbic Acid (VITAMIN C PO) Take 600 mg by mouth   • aspirin 81 MG tablet Take 81 mg by mouth daily   • Calcium Carbonate (CALCIUM 600 PO) Take by mouth   • Cholecalciferol (VITAMIN D3) 2000 units capsule Take 4,000 Units by mouth daily    • cloNIDine (CATAPRES) 0 1 mg tablet TAKE ONE TABLET BY MOUTH TWO TIMES A DAY AS NEEDED IF BLOOD PRESSURE IS GREATER THAN 150   • famotidine (PEPCID) 20 mg tablet Take 1 tablet (20 mg total) by mouth 2 (two) times a day   • levothyroxine 50 mcg tablet Take 1 tablet (50 mcg total) by mouth daily   • montelukast (SINGULAIR) 10 mg tablet Take 1 tablet (10 mg total) by mouth daily at bedtime   • Multiple Vitamin (multivitamin) capsule Take 1 capsule by mouth daily   • Multiple Vitamins-Minerals (ZINC PO) Take by mouth   • nitroglycerin (NITROSTAT) 0 4 mg SL tablet Place 1 tablet (0 4 mg total) under the tongue every 5 (five) minutes as needed for chest pain (esophageal spasm)   • Omega-3 Fatty Acids (FISH OIL PO) Take 1,200 mg by mouth 2 (two) times a day   • omeprazole (PriLOSEC) 40 MG capsule Take 1 capsule (40 mg total) by mouth 2 (two) times a day before meals   • sucralfate (CARAFATE) 1 g tablet Take 1 tablet (1 g total) by mouth 2 (two) times a day as needed (GERD)   • [DISCONTINUED] atorvastatin (LIPITOR) 10 mg tablet TAKE ONE TABLET BY MOUTH EVERY DAY   • [DISCONTINUED] hydrochlorothiazide (HYDRODIURIL) 25 mg tablet TAKE ONE TABLET BY MOUTH ONCE DAILY   • umeclidinium-vilanterol (Anoro Ellipta) 62 5-25 MCG/INH inhaler Inhale 1 puff daily       Objective     /80 (BP Location: Right arm, Patient Position: Sitting, Cuff Size: Standard)   Pulse 78   Temp 98 6 °F (37 °C) (Temporal)   Ht 5' 5" (1 651 m)   Wt 64 kg (141 lb)   SpO2 94%   BMI 23 46 kg/m²     Physical Exam  Constitutional:       Appearance: Normal appearance  She is normal weight  Neck:      Vascular: No carotid bruit  Cardiovascular:      Pulses: Normal pulses  Heart sounds: Normal heart sounds  Pulmonary:      Effort: Pulmonary effort is normal       Breath sounds: Normal breath sounds  Comments: Wears NC  Abdominal:      Palpations: Abdomen is soft  Tenderness: There is no abdominal tenderness  There is no guarding  Musculoskeletal:      Right lower leg: Edema present  Left lower leg: Edema present  Comments: Trace,uses walker   Lymphadenopathy:      Cervical: No cervical adenopathy  Skin:     General: Skin is warm and dry  Neurological:      Mental Status: She is alert and oriented to person, place, and time     Psychiatric:         Mood and Affect: Mood normal        Mor Rojas MD

## 2022-11-13 DIAGNOSIS — I10 ESSENTIAL HYPERTENSION: ICD-10-CM

## 2022-11-14 RX ORDER — LOSARTAN POTASSIUM 100 MG/1
TABLET ORAL
Qty: 90 TABLET | Refills: 1 | Status: SHIPPED | OUTPATIENT
Start: 2022-11-14

## 2022-11-15 ENCOUNTER — TELEPHONE (OUTPATIENT)
Dept: FAMILY MEDICINE CLINIC | Facility: CLINIC | Age: 82
End: 2022-11-15

## 2022-11-15 NOTE — TELEPHONE ENCOUNTER
Patient is calling regarding her Losartan  She was taking it Monday, Wednesday, and Friday  However when Dr Marisabel Rodriguez refilled it, those instructions were not on there, and it said just to take it daily  She just wants to confirm which it should be? Monday, Wednesday, & Friday OR every day? Please vargas her at [phone]  Thank you!

## 2022-11-18 ENCOUNTER — OFFICE VISIT (OUTPATIENT)
Dept: GASTROENTEROLOGY | Facility: CLINIC | Age: 82
End: 2022-11-18

## 2022-11-18 VITALS
HEIGHT: 65 IN | WEIGHT: 142.2 LBS | DIASTOLIC BLOOD PRESSURE: 93 MMHG | SYSTOLIC BLOOD PRESSURE: 188 MMHG | TEMPERATURE: 98.2 F | HEART RATE: 93 BPM | OXYGEN SATURATION: 94 % | BODY MASS INDEX: 23.69 KG/M2

## 2022-11-18 DIAGNOSIS — K86.2 PANCREATIC CYST: Primary | ICD-10-CM

## 2022-11-18 DIAGNOSIS — K21.9 GASTROESOPHAGEAL REFLUX DISEASE WITHOUT ESOPHAGITIS: ICD-10-CM

## 2022-11-18 DIAGNOSIS — K59.04 CHRONIC IDIOPATHIC CONSTIPATION: ICD-10-CM

## 2022-11-18 RX ORDER — OMEPRAZOLE 40 MG/1
40 CAPSULE, DELAYED RELEASE ORAL DAILY
Qty: 90 CAPSULE | Refills: 3 | Status: SHIPPED | OUTPATIENT
Start: 2022-11-18

## 2022-11-18 RX ORDER — FAMOTIDINE 20 MG/1
20 TABLET, FILM COATED ORAL 2 TIMES DAILY
Qty: 90 TABLET | Refills: 3 | Status: SHIPPED | OUTPATIENT
Start: 2022-11-18

## 2022-11-18 NOTE — PROGRESS NOTES
[facility] - Outpatient Follow-up Note  Janessa Thayer 79 y o  female MRN: [id number]  Encounter: [id number]          ASSESSMENT AND PLAN:    Janessa Thayer is a 79 y o  female with GERD, constipation, and pancreatic cyst  Her issues are chronic and under control  1  GERD: continue omeprazole and famotidine  Avoid known food triggers (raw onions)  2  Constipation: she wishes to continue taking PEG 17 g daily  3  Polycystic lesion in the HOP: she had EUS in 2020, then follow up CT in 2022  MRCP in 2024  1  Pancreatic cyst    2  Gastroesophageal reflux disease without esophagitis    3  Chronic idiopathic constipation        No orders of the defined types were placed in this encounter     ______________________________________________________________________    SUBJECTIVE:    Janessa Thayer is a 79 y o  female who presents for follow up of GERD  She takes omeprazole 20 mg daily and famotidine 20 mg bid an her symptoms are well controlled  She is not taking sucralfate  She has no GERD unless she eats onions specifically "onion sandwich," which is one of her favorite snacks  Her constipation is also under control with Miralax 17 daily - this allwos her to have 1 BM every there day  She has a stable pancreatic cyst, last visualized 3/2022  REVIEW OF SYSTEMS IS OTHERWISE NEGATIVE        Historical Information   Past Medical History:   Diagnosis Date   • Anxiety    • Back pain    • Cancer (HCC)     skin   • Cataract    • COPD (chronic obstructive pulmonary disease) (HCC)    • Disease of thyroid gland    • Emphysema lung (HCC)    • Emphysema of lung (HCC)    • Geographic tongue     last assessed: 07/25/2014   • GERD (gastroesophageal reflux disease)    • Hyperlipidemia    • Hypertension    • Hypothyroidism (acquired)    • Mild intermittent asthma    • Sciatica    • Seasonal allergies      Past Surgical History:   Procedure Laterality Date   • BACK SURGERY     • ENDOSCOPIC ULTRASOUND (UPPER)  2020   • SKIN LESION EXCISION       Social History   Social History     Substance and Sexual Activity   Alcohol Use No     Social History     Substance and Sexual Activity   Drug Use No     Social History     Tobacco Use   Smoking Status Former   • Packs/day: 2 00   • Years: 36 00   • Pack years: 72 00   • Types: Cigarettes   • Start date: 11   • Quit date: 45   • Years since quittin 9   Smokeless Tobacco Never     Family History   Problem Relation Age of Onset   • Stroke Mother    • Cirrhosis Father         alcoholic   • Cancer Sister    • Cancer Daughter        Meds/Allergies       Current Outpatient Medications:   •  albuterol (PROVENTIL HFA,VENTOLIN HFA) 90 mcg/act inhaler  •  Ascorbic Acid (VITAMIN C PO)  •  aspirin 81 MG tablet  •  atorvastatin (LIPITOR) 10 mg tablet  •  Calcium Carbonate (CALCIUM 600 PO)  •  Cholecalciferol (VITAMIN D3) 2000 units capsule  •  cloNIDine (CATAPRES) 0 1 mg tablet  •  famotidine (PEPCID) 20 mg tablet  •  hydrochlorothiazide (HYDRODIURIL) 25 mg tablet  •  levothyroxine 50 mcg tablet  •  losartan (COZAAR) 100 MG tablet  •  montelukast (SINGULAIR) 10 mg tablet  •  Multiple Vitamin (multivitamin) capsule  •  Multiple Vitamins-Minerals (ZINC PO)  •  nitroglycerin (NITROSTAT) 0 4 mg SL tablet  •  Omega-3 Fatty Acids (FISH OIL PO)  •  omeprazole (PriLOSEC) 40 MG capsule  •  umeclidinium-vilanterol (Anoro Ellipta) 62 5-25 MCG/INH inhaler    Allergies   Allergen Reactions   • Other Other (See Comments)     Steroids, it effects her eyes           Objective     Blood pressure (!) 188/93, pulse 93, temperature 98 2 °F (36 8 °C), temperature source Tympanic, height 5' 5" (1 651 m), weight 64 5 kg (142 lb 3 2 oz), SpO2 94 %, not currently breastfeeding  Body mass index is 23 66 kg/m²  PHYSICAL EXAM:      General Appearance:   Alert, cooperative, no distress   HEENT:   Normocephalic, atraumatic, anicteric       Neck:  Supple, symmetrical, trachea midline Lungs:   Clear to auscultation bilaterally; no rales, rhonchi or wheezing; respirations unlabored    Heart[de-identified]   Regular rate and rhythm; no murmur, rub, or gallop  Abdomen:   Soft, non-tender, non-distended; normal bowel sounds; no masses, no organomegaly    Genitalia:   Deferred    Rectal:   Deferred    Extremities:  No cyanosis, clubbing or edema    Pulses:  2+ and symmetric    Skin:  No jaundice, rashes, or lesions    Lymph nodes:  No palpable cervical lymphadenopathy        Lab Results:   No visits with results within 1 Day(s) from this visit  Latest known visit with results is:   Telephone on 09/20/2022   Component Date Value   • Supplier Name 09/20/2022 [employer]    • Supplier Phone Number 09/20/2022 [phone]    • Order Status 09/20/2022 Delivery Successful    • Delivery Request Date 09/20/2022 09/20/2022    • Date Delivered  09/20/2022 09/21/2022    • Supplier Name 09/20/2022 09/21/2022    • Item Description 09/20/2022 Patonur Campbellll, Adult    • Item Description 09/20/2022 Seat Attachment        Lab Results   Component Value Date    WBC 5 1 07/27/2022    HGB 12 9 07/27/2022    HCT 38 3 07/27/2022    MCV 86 7 07/27/2022     07/27/2022       Lab Results   Component Value Date    SODIUM 133 (L) 08/05/2022    K 3 3 (L) 08/05/2022    CL 86 (L) 08/05/2022    CO2 38 (H) 08/05/2022    AGAP 4 07/19/2020    BUN 11 08/05/2022    CREATININE 0 54 (L) 08/05/2022    GLUC 103 (H) 08/05/2022    CALCIUM 9 2 08/05/2022    AST 27 07/27/2022    ALT 23 07/27/2022    ALKPHOS 59 07/27/2022    TP 6 6 07/27/2022    TBILI 0 6 07/27/2022    EGFR 91 08/05/2022       Lab Results   Component Value Date    CRP 0 4 03/03/2020       Lab Results   Component Value Date    CAG7LQMSRXWZ 1 73 02/11/2017    TSH 1 71 07/27/2022       No results found for: IRON, TIBC, FERRITIN    Radiology Results:   No results found

## 2022-11-28 LAB
BUN SERPL-MCNC: 19 MG/DL (ref 7–25)
BUN/CREAT SERPL: 33 (CALC) (ref 6–22)
CALCIUM SERPL-MCNC: 9.1 MG/DL (ref 8.6–10.4)
CHLORIDE SERPL-SCNC: 95 MMOL/L (ref 98–110)
CO2 SERPL-SCNC: 36 MMOL/L (ref 20–32)
CREAT SERPL-MCNC: 0.57 MG/DL (ref 0.6–0.95)
GFR/BSA.PRED SERPLBLD CYS-BASED-ARV: 90 ML/MIN/1.73M2
GLUCOSE SERPL-MCNC: 96 MG/DL (ref 65–99)
POTASSIUM SERPL-SCNC: 3.9 MMOL/L (ref 3.5–5.3)
SODIUM SERPL-SCNC: 139 MMOL/L (ref 135–146)

## 2022-12-09 ENCOUNTER — OFFICE VISIT (OUTPATIENT)
Dept: FAMILY MEDICINE CLINIC | Facility: CLINIC | Age: 82
End: 2022-12-09

## 2022-12-09 VITALS
BODY MASS INDEX: 23.37 KG/M2 | HEIGHT: 65 IN | WEIGHT: 140.25 LBS | DIASTOLIC BLOOD PRESSURE: 78 MMHG | TEMPERATURE: 96.9 F | OXYGEN SATURATION: 92 % | SYSTOLIC BLOOD PRESSURE: 144 MMHG | HEART RATE: 84 BPM

## 2022-12-09 DIAGNOSIS — H66.001 NON-RECURRENT ACUTE SUPPURATIVE OTITIS MEDIA OF RIGHT EAR WITHOUT SPONTANEOUS RUPTURE OF TYMPANIC MEMBRANE: Primary | ICD-10-CM

## 2022-12-09 RX ORDER — AZITHROMYCIN 250 MG/1
TABLET, FILM COATED ORAL
Qty: 6 TABLET | Refills: 0 | Status: SHIPPED | OUTPATIENT
Start: 2022-12-09 | End: 2022-12-13

## 2022-12-09 NOTE — PROGRESS NOTES
Name: Elliot Samuel      : 1940      MRN: [id number]  Encounter Provider: Shandra Roman MD  Encounter Date: 2022   Encounter department: [facility] PRIMARY CARE    Assessment & Plan     1  Non-recurrent acute suppurative otitis media of right ear without spontaneous rupture of tympanic membrane  -     azithromycin (ZITHROMAX) 250 mg tablet; 2  1st day, 1/d  For 4  days         Subjective      Uri sx for  3 days, sore throat, r  Jaw  Pain, no ear pain    Review of Systems   Constitutional: Negative for activity change, appetite change and fatigue  HENT: Positive for postnasal drip and sore throat  Negative for congestion, ear pain, rhinorrhea and sinus pain  Respiratory: Negative for shortness of breath  Cardiovascular: Negative for chest pain  Neurological: Positive for headaches  Negative for dizziness         Current Outpatient Medications on File Prior to Visit   Medication Sig   • albuterol (PROVENTIL HFA,VENTOLIN HFA) 90 mcg/act inhaler Inhale 2 puffs every 6 (six) hours as needed for wheezing   • Ascorbic Acid (VITAMIN C PO) Take 600 mg by mouth   • aspirin 81 MG tablet Take 81 mg by mouth daily   • atorvastatin (LIPITOR) 10 mg tablet Take 1 tablet (10 mg total) by mouth daily   • Calcium Carbonate (CALCIUM 600 PO) Take by mouth   • Cholecalciferol (VITAMIN D3) 2000 units capsule Take 4,000 Units by mouth daily    • cloNIDine (CATAPRES) 0 1 mg tablet TAKE ONE TABLET BY MOUTH TWO TIMES A DAY AS NEEDED IF BLOOD PRESSURE IS GREATER THAN 150   • famotidine (PEPCID) 20 mg tablet Take 1 tablet (20 mg total) by mouth 2 (two) times a day   • hydrochlorothiazide (HYDRODIURIL) 25 mg tablet Take 1 tablet (25 mg total) by mouth daily   • levothyroxine 50 mcg tablet Take 1 tablet (50 mcg total) by mouth daily   • losartan (COZAAR) 100 MG tablet TAKE ONE TABLET BY MOUTH ONCE DAILY   • montelukast (SINGULAIR) 10 mg tablet Take 1 tablet (10 mg total) by mouth daily at bedtime   • Multiple Vitamin (multivitamin) capsule Take 1 capsule by mouth daily   • Multiple Vitamins-Minerals (ZINC PO) Take by mouth   • nitroglycerin (NITROSTAT) 0 4 mg SL tablet Place 1 tablet (0 4 mg total) under the tongue every 5 (five) minutes as needed for chest pain (esophageal spasm)   • Omega-3 Fatty Acids (FISH OIL PO) Take 1,200 mg by mouth 2 (two) times a day   • omeprazole (PriLOSEC) 40 MG capsule Take 1 capsule (40 mg total) by mouth daily   • umeclidinium-vilanterol (Anoro Ellipta) 62 5-25 MCG/INH inhaler Inhale 1 puff daily       Objective     /78 (BP Location: Right arm, Patient Position: Sitting, Cuff Size: Standard)   Pulse 84   Temp (!) 96 9 °F (36 1 °C) (Temporal)   Ht 5' 5" (1 651 m)   Wt 63 6 kg (140 lb 4 oz)   SpO2 92%   BMI 23 34 kg/m²     Physical Exam  Vitals reviewed  Constitutional:       Appearance: Normal appearance  HENT:      Right Ear: A middle ear effusion is present  Tympanic membrane is erythematous  Left Ear: Tympanic membrane normal       Nose: No congestion or rhinorrhea  Mouth/Throat:      Pharynx: Posterior oropharyngeal erythema present  No oropharyngeal exudate  Pulmonary:      Effort: Pulmonary effort is normal       Breath sounds: Normal breath sounds  Lymphadenopathy:      Cervical: No cervical adenopathy  Neurological:      Mental Status: She is alert         Anna Kerr MD

## 2022-12-10 ENCOUNTER — OFFICE VISIT (OUTPATIENT)
Dept: URGENT CARE | Facility: MEDICAL CENTER | Age: 82
End: 2022-12-10

## 2022-12-10 VITALS
OXYGEN SATURATION: 93 % | HEART RATE: 83 BPM | WEIGHT: 143 LBS | BODY MASS INDEX: 23.82 KG/M2 | DIASTOLIC BLOOD PRESSURE: 80 MMHG | SYSTOLIC BLOOD PRESSURE: 178 MMHG | TEMPERATURE: 98.6 F | HEIGHT: 65 IN

## 2022-12-10 DIAGNOSIS — U07.1 COVID-19: Primary | ICD-10-CM

## 2022-12-10 RX ORDER — BENZONATATE 100 MG/1
100 CAPSULE ORAL 3 TIMES DAILY PRN
Qty: 20 CAPSULE | Refills: 0 | Status: CANCELLED | OUTPATIENT
Start: 2022-12-10

## 2022-12-10 RX ORDER — AZELASTINE 1 MG/ML
1 SPRAY, METERED NASAL 2 TIMES DAILY
Qty: 1 ML | Refills: 2 | Status: SHIPPED | OUTPATIENT
Start: 2022-12-10

## 2022-12-10 RX ORDER — NIRMATRELVIR AND RITONAVIR 300-100 MG
3 KIT ORAL 2 TIMES DAILY
Qty: 30 TABLET | Refills: 0 | Status: SHIPPED | OUTPATIENT
Start: 2022-12-10 | End: 2022-12-15

## 2022-12-10 NOTE — PROGRESS NOTES
[facility] [facility]        NAME: Augusta Strickland is a 79 y o  female  : 1940    MRN: [id number]  DATE: December 10, 2022  TIME: 3:27 PM    Assessment and Plan   COVID-19 [U07 1]  1  COVID-19  azelastine (ASTELIN) 0 1 % nasal spray    nirmatrelvir & ritonavir (Paxlovid, 300/100,) tablet therapy pack        Pt has mild symptoms but is high risk due to COPD and lack of mobility  Will prescribe Paxlovid  Called pharmacy to confirm they had the medication  Patient Instructions   Medications as prescribed  To soothe sore throat: Tea with honey, salt water gargles 4 times day (1/2 teaspoon of salt in 8ox water), and/or Chloraseptic throat spray  Stop atorvastatin while taking Paxlovid  Inhalers and oxygen as per usual   Over the counter cold medication  Follow up with PCP in 3-5 days  Proceed to ER if symptoms worsen  Chief Complaint     Chief Complaint   Patient presents with   • [phone]     Patient was seen yesterday at PCP for ear pain, cough, headache fatigue  She was placed on zithro  Daughter did a covid test today which was positive         History of Present Illness       This is an 44-year-old female with past medical history of COPD on oxygen  Brought in by her daughter for evaluation for viral symptoms x 4 days  Had 2 positive home COVID tests today  Admits cough, right ear pressure, lightheadedness, headache, fatigue, sore throat and congestion  Pt reports SOB at baseline without oxygen due to COPD, unchanged  Denies chest pain, nausea, vomiting, abdominal pain, fever, or chills  Uses an inhaler as needed  Has been taking OTC Delsym  Has not needed to increase oxygen  Patient seen by PCP yesterday for right ear infection, currently on Azithromycin  Review of Systems   Review of Systems   Constitutional: Positive for fatigue  Negative for activity change, appetite change, chills and fever  HENT: Positive for congestion, ear pain, rhinorrhea, sinus pressure and sore throat   Negative for postnasal drip  Eyes: Negative  Respiratory: Negative  Cardiovascular: Negative  Gastrointestinal: Negative  Endocrine: Negative  Genitourinary: Negative  Musculoskeletal: Negative  Skin: Negative  Allergic/Immunologic: Negative  Neurological: Positive for light-headedness and headaches  Negative for syncope  Hematological: Negative  Psychiatric/Behavioral: Negative            Current Medications       Current Outpatient Medications:   •  azelastine (ASTELIN) 0 1 % nasal spray, 1 spray into each nostril 2 (two) times a day Use in each nostril as directed, Disp: 1 mL, Rfl: 2  •  nirmatrelvir & ritonavir (Paxlovid, 300/100,) tablet therapy pack, Take 3 tablets by mouth 2 (two) times a day for 5 days Take 2 nirmatrelvir tablets + 1 ritonavir tablet together per dose, Disp: 30 tablet, Rfl: 0  •  albuterol (PROVENTIL HFA,VENTOLIN HFA) 90 mcg/act inhaler, Inhale 2 puffs every 6 (six) hours as needed for wheezing, Disp: 54 g, Rfl: 3  •  Ascorbic Acid (VITAMIN C PO), Take 600 mg by mouth, Disp: , Rfl:   •  aspirin 81 MG tablet, Take 81 mg by mouth daily, Disp: , Rfl:   •  atorvastatin (LIPITOR) 10 mg tablet, Take 1 tablet (10 mg total) by mouth daily, Disp: 90 tablet, Rfl: 1  •  azithromycin (ZITHROMAX) 250 mg tablet, 2  1st day, 1/d  For 4  days, Disp: 6 tablet, Rfl: 0  •  Calcium Carbonate (CALCIUM 600 PO), Take by mouth, Disp: , Rfl:   •  Cholecalciferol (VITAMIN D3) 2000 units capsule, Take 4,000 Units by mouth daily , Disp: , Rfl:   •  cloNIDine (CATAPRES) 0 1 mg tablet, TAKE ONE TABLET BY MOUTH TWO TIMES A DAY AS NEEDED IF BLOOD PRESSURE IS GREATER THAN 150, Disp: 60 tablet, Rfl: 5  •  famotidine (PEPCID) 20 mg tablet, Take 1 tablet (20 mg total) by mouth 2 (two) times a day, Disp: 90 tablet, Rfl: 3  •  hydrochlorothiazide (HYDRODIURIL) 25 mg tablet, Take 1 tablet (25 mg total) by mouth daily, Disp: 90 tablet, Rfl: 1  •  levothyroxine 50 mcg tablet, Take 1 tablet (50 mcg total) by mouth daily, Disp: 90 tablet, Rfl: 1  •  losartan (COZAAR) 100 MG tablet, TAKE ONE TABLET BY MOUTH ONCE DAILY, Disp: 90 tablet, Rfl: 1  •  montelukast (SINGULAIR) 10 mg tablet, Take 1 tablet (10 mg total) by mouth daily at bedtime, Disp: 30 tablet, Rfl: 3  •  Multiple Vitamin (multivitamin) capsule, Take 1 capsule by mouth daily, Disp: , Rfl:   •  Multiple Vitamins-Minerals (ZINC PO), Take by mouth, Disp: , Rfl:   •  nitroglycerin (NITROSTAT) 0 4 mg SL tablet, Place 1 tablet (0 4 mg total) under the tongue every 5 (five) minutes as needed for chest pain (esophageal spasm), Disp: 25 tablet, Rfl: 3  •  Omega-3 Fatty Acids (FISH OIL PO), Take 1,200 mg by mouth 2 (two) times a day, Disp: , Rfl:   •  omeprazole (PriLOSEC) 40 MG capsule, Take 1 capsule (40 mg total) by mouth daily, Disp: 90 capsule, Rfl: 3  •  umeclidinium-vilanterol (Anoro Ellipta) 62 5-25 MCG/INH inhaler, Inhale 1 puff daily, Disp: 180 blister, Rfl: 3    Current Allergies     Allergies as of 12/10/2022 - Reviewed 12/10/2022   Allergen Reaction Noted   • Other Other (See Comments) 11/06/2018            The following portions of the patient's history were reviewed and updated as appropriate: allergies, current medications, past family history, past medical history, past social history, past surgical history and problem list      Past Medical History:   Diagnosis Date   • Anxiety    • Back pain    • Cancer ([facility] [address] )     skin   • Cataract    • COPD (chronic obstructive pulmonary disease) (HCC)    • Disease of thyroid gland    • Emphysema lung (HCC)    • Emphysema of lung ([facility] [address] )    • Geographic tongue     last assessed: 07/25/2014   • GERD (gastroesophageal reflux disease)    • Hyperlipidemia    • Hypertension    • Hypothyroidism (acquired)    • Mild intermittent asthma    • Sciatica    • Seasonal allergies        Past Surgical History:   Procedure Laterality Date   • BACK SURGERY     • ENDOSCOPIC ULTRASOUND (UPPER)  12/2020   • SKIN LESION EXCISION Family History   Problem Relation Age of Onset   • Stroke Mother    • Cirrhosis Father         alcoholic   • Cancer Sister    • Cancer Daughter          Medications have been verified  Objective   BP (!) 178/80 (BP Location: Left arm, Patient Position: Sitting)   Pulse 83   Temp 98 6 °F (37 °C)   Ht 5' 5" (1 651 m)   Wt 64 9 kg (143 lb)   SpO2 93% Comment: 3 litters O2  BMI 23 80 kg/m²   No LMP recorded  Patient is postmenopausal        Physical Exam     Physical Exam  Constitutional:       General: She is not in acute distress  Appearance: Normal appearance  She is normal weight  She is not ill-appearing  Comments: In wheelchair  Attentive and responsive  HENT:      Head: Normocephalic and atraumatic  Right Ear: A middle ear effusion is present  Tympanic membrane is not erythematous  Left Ear: Tympanic membrane and ear canal normal       Nose: Congestion and rhinorrhea present  Mouth/Throat:      Mouth: Mucous membranes are moist       Pharynx: Oropharynx is clear  Eyes:      Extraocular Movements: Extraocular movements intact  Conjunctiva/sclera: Conjunctivae normal       Pupils: Pupils are equal, round, and reactive to light  Cardiovascular:      Rate and Rhythm: Normal rate and regular rhythm  Heart sounds: Normal heart sounds  No murmur heard  Pulmonary:      Effort: Pulmonary effort is normal  No respiratory distress  Breath sounds: Normal breath sounds  No stridor  No wheezing, rhonchi or rales  Abdominal:      General: Abdomen is flat  Bowel sounds are normal       Palpations: Abdomen is soft  Musculoskeletal:         General: Normal range of motion  Cervical back: Normal range of motion and neck supple  Right lower leg: No edema  Left lower leg: No edema  Lymphadenopathy:      Cervical: Cervical adenopathy present  Skin:     General: Skin is warm and dry  Neurological:      General: No focal deficit present  Mental Status: She is alert and oriented to person, place, and time  Psychiatric:         Mood and Affect: Mood normal          Behavior: Behavior normal          Thought Content:  Thought content normal          Judgment: Judgment normal        Donna Cox PA-C

## 2022-12-10 NOTE — PATIENT INSTRUCTIONS
Medications as prescribed  To soothe sore throat: Tea with honey, salt water gargles 4 times day (1/2 teaspoon of salt in 8ox water), and/or Chloraseptic throat spray  Stop atorvastatin while taking Paxlovid  Inhalers and oxygen as per usual   Over the counter cold medication  Follow up with PCP in 3-5 days  Proceed to ER if symptoms worsen

## 2022-12-27 ENCOUNTER — TELEPHONE (OUTPATIENT)
Dept: PULMONOLOGY | Facility: CLINIC | Age: 82
End: 2022-12-27

## 2022-12-27 NOTE — TELEPHONE ENCOUNTER
Left a voicemail for patient to schedule a 6 month follow up at our [facility] office with Dr Gopal Olivier or [address] [facility] in February

## 2023-01-26 DIAGNOSIS — J30.2 SEASONAL ALLERGIES: ICD-10-CM

## 2023-01-26 RX ORDER — MONTELUKAST SODIUM 10 MG/1
10 TABLET ORAL
Qty: 30 TABLET | Refills: 3 | Status: SHIPPED | OUTPATIENT
Start: 2023-01-26

## 2023-02-09 ENCOUNTER — RA CDI HCC (OUTPATIENT)
Dept: OTHER | Facility: HOSPITAL | Age: 83
End: 2023-02-09

## 2023-02-09 NOTE — PROGRESS NOTES
[facility]  coding opportunities       Chart reviewed, no opportunity found:   [address]        Patients Insurance     Medicare Insurance: [facility]

## 2023-02-15 ENCOUNTER — OFFICE VISIT (OUTPATIENT)
Dept: FAMILY MEDICINE CLINIC | Facility: CLINIC | Age: 83
End: 2023-02-15

## 2023-02-15 VITALS
SYSTOLIC BLOOD PRESSURE: 172 MMHG | WEIGHT: 143 LBS | BODY MASS INDEX: 25.34 KG/M2 | HEART RATE: 92 BPM | HEIGHT: 63 IN | DIASTOLIC BLOOD PRESSURE: 88 MMHG | OXYGEN SATURATION: 91 %

## 2023-02-15 DIAGNOSIS — B35.1 TOENAIL FUNGUS: ICD-10-CM

## 2023-02-15 DIAGNOSIS — E03.9 ACQUIRED HYPOTHYROIDISM: Primary | ICD-10-CM

## 2023-02-15 DIAGNOSIS — I10 ESSENTIAL HYPERTENSION: ICD-10-CM

## 2023-02-15 DIAGNOSIS — J96.11 CHRONIC RESPIRATORY FAILURE WITH HYPOXIA (HCC): ICD-10-CM

## 2023-02-15 DIAGNOSIS — E27.8 ADRENAL NODULE (HCC): ICD-10-CM

## 2023-02-15 DIAGNOSIS — J44.9 CHRONIC OBSTRUCTIVE PULMONARY DISEASE, UNSPECIFIED COPD TYPE (HCC): ICD-10-CM

## 2023-02-15 RX ORDER — DILTIAZEM HYDROCHLORIDE 180 MG/1
180 CAPSULE, COATED, EXTENDED RELEASE ORAL
Qty: 30 CAPSULE | Refills: 5 | Status: SHIPPED | OUTPATIENT
Start: 2023-02-15

## 2023-02-15 RX ORDER — ZINC GLUCONATE 50 MG
25 TABLET ORAL
COMMUNITY

## 2023-02-20 ENCOUNTER — TELEPHONE (OUTPATIENT)
Dept: FAMILY MEDICINE CLINIC | Facility: CLINIC | Age: 83
End: 2023-02-20

## 2023-02-20 DIAGNOSIS — I10 ESSENTIAL HYPERTENSION: Primary | ICD-10-CM

## 2023-02-20 NOTE — TELEPHONE ENCOUNTER
Patients  called in and stated the new medication Diltiazem is making her feel worse and she would like to know if she can get put back on her old medication Clonidine? Please give patient a call back when possible thank you!

## 2023-02-21 DIAGNOSIS — I10 ESSENTIAL HYPERTENSION: Primary | ICD-10-CM

## 2023-02-21 LAB
ALBUMIN SERPL-MCNC: 4.2 G/DL (ref 3.6–5.1)
ALBUMIN/GLOB SERPL: 1.7 (CALC) (ref 1–2.5)
ALP SERPL-CCNC: 56 U/L (ref 37–153)
ALT SERPL-CCNC: 24 U/L (ref 6–29)
AST SERPL-CCNC: 26 U/L (ref 10–35)
BASOPHILS # BLD AUTO: 52 CELLS/UL (ref 0–200)
BASOPHILS NFR BLD AUTO: 1.1 %
BILIRUB SERPL-MCNC: 0.6 MG/DL (ref 0.2–1.2)
BUN SERPL-MCNC: 15 MG/DL (ref 7–25)
BUN/CREAT SERPL: 25 (CALC) (ref 6–22)
CALCIUM SERPL-MCNC: 9.2 MG/DL (ref 8.6–10.4)
CHLORIDE SERPL-SCNC: 92 MMOL/L (ref 98–110)
CO2 SERPL-SCNC: 40 MMOL/L (ref 20–32)
CREAT SERPL-MCNC: 0.59 MG/DL (ref 0.6–0.95)
EOSINOPHIL # BLD AUTO: 141 CELLS/UL (ref 15–500)
EOSINOPHIL NFR BLD AUTO: 3 %
ERYTHROCYTE [DISTWIDTH] IN BLOOD BY AUTOMATED COUNT: 13.1 % (ref 11–15)
GFR/BSA.PRED SERPLBLD CYS-BASED-ARV: 89 ML/MIN/1.73M2
GLOBULIN SER CALC-MCNC: 2.5 G/DL (CALC) (ref 1.9–3.7)
GLUCOSE SERPL-MCNC: 104 MG/DL (ref 65–99)
HCT VFR BLD AUTO: 39.2 % (ref 35–45)
HGB BLD-MCNC: 13 G/DL (ref 11.7–15.5)
LYMPHOCYTES # BLD AUTO: 1415 CELLS/UL (ref 850–3900)
LYMPHOCYTES NFR BLD AUTO: 30.1 %
MCH RBC QN AUTO: 29.2 PG (ref 27–33)
MCHC RBC AUTO-ENTMCNC: 33.2 G/DL (ref 32–36)
MCV RBC AUTO: 88.1 FL (ref 80–100)
MONOCYTES # BLD AUTO: 395 CELLS/UL (ref 200–950)
MONOCYTES NFR BLD AUTO: 8.4 %
NEUTROPHILS # BLD AUTO: 2698 CELLS/UL (ref 1500–7800)
NEUTROPHILS NFR BLD AUTO: 57.4 %
PLATELET # BLD AUTO: 242 THOUSAND/UL (ref 140–400)
PMV BLD REES-ECKER: 11 FL (ref 7.5–12.5)
POTASSIUM SERPL-SCNC: 3.5 MMOL/L (ref 3.5–5.3)
PROT SERPL-MCNC: 6.7 G/DL (ref 6.1–8.1)
RBC # BLD AUTO: 4.45 MILLION/UL (ref 3.8–5.1)
SODIUM SERPL-SCNC: 139 MMOL/L (ref 135–146)
T3RU NFR SERPL: 31 % (ref 22–35)
T4 FREE SERPL-MCNC: 1.2 NG/DL (ref 0.8–1.8)
TSH SERPL-ACNC: 2.2 MIU/L (ref 0.4–4.5)
WBC # BLD AUTO: 4.7 THOUSAND/UL (ref 3.8–10.8)

## 2023-02-21 RX ORDER — CLONIDINE HYDROCHLORIDE 0.1 MG/1
0.1 TABLET ORAL EVERY 12 HOURS SCHEDULED
Qty: 30 TABLET | Refills: 5 | Status: SHIPPED | OUTPATIENT
Start: 2023-02-21

## 2023-02-21 RX ORDER — CLONIDINE HYDROCHLORIDE 0.1 MG/1
0.1 TABLET ORAL 2 TIMES DAILY
Qty: 60 TABLET | Refills: 3 | Status: CANCELLED | OUTPATIENT
Start: 2023-02-21

## 2023-02-21 NOTE — TELEPHONE ENCOUNTER
Spoke to Sabino Witt, he advises patient is taking the diltiazem at night  Please send clonidine to [facility]

## 2023-03-08 ENCOUNTER — NURSE TRIAGE (OUTPATIENT)
Dept: OTHER | Facility: OTHER | Age: 83
End: 2023-03-08

## 2023-03-08 DIAGNOSIS — K21.9 GASTROESOPHAGEAL REFLUX DISEASE WITHOUT ESOPHAGITIS: Primary | ICD-10-CM

## 2023-03-08 RX ORDER — PANTOPRAZOLE SODIUM 40 MG/1
TABLET, DELAYED RELEASE ORAL
Qty: 30 TABLET | Refills: 2 | Status: SHIPPED | OUTPATIENT
Start: 2023-03-08 | End: 2023-03-31 | Stop reason: SDUPTHER

## 2023-03-08 NOTE — TELEPHONE ENCOUNTER
Regarding: Stomach pain and vomiting  ----- Message from [address] sent at 3/8/2023 12:51 PM EST -----  " The medicine sucralfate (CARAFATE) 1 g tablet is not helping my wife  She keeps having stomach problems and vomiting at the same time

## 2023-03-08 NOTE — TELEPHONE ENCOUNTER
Patient's spouse, Lance Burton called in to report severe constant esophageal burning and stomach pain resulting in decreased PO intake  Patient takes omeprazole daily and famotidine twice a day without relief  Reached out to on call provider  New prescription for Protonix 40 mg daily before breakfast, continue famotidine, and use Maalox or Mylanta to help with esophageal burning  Advised fu OV with provider asap for worsening symptoms  Patient and Lance Burton advised of medication changes and to  Maalox or Mylanta  Please follow up with Pat to schedule OV  Reason for Disposition  • SEVERE abdominal pain (e g , excruciating)    Answer Assessment - Initial Assessment Questions  1  LOCATION: "Where does it hurt?"       Stomach and esophageal pain; O2 4L 24/7  2  RADIATION: "Does the pain shoot anywhere else?" (e g , chest, back)      Herniated disc in back;   3  ONSET: "When did the pain begin?" (e g , minutes, hours or days ago)       One month  4  SUDDEN: "Gradual or sudden onset?"      Gradual  5  PATTERN "Does the pain come and go, or is it constant?"     - If constant: "Is it getting better, staying the same, or worsening?"       (Note: Constant means the pain never goes away completely; most serious pain is constant and it progresses)      - If intermittent: "How long does it last?" "Do you have pain now?"      (Note: Intermittent means the pain goes away completely between bouts)      Constant  6  SEVERITY: "How bad is the pain?"  (e g , Scale 1-10; mild, moderate, or severe)     - MILD (1-3): doesn't interfere with normal activities, abdomen soft and not tender to touch      - MODERATE (4-7): interferes with normal activities or awakens from sleep, tender to touch      - SEVERE (8-10): excruciating pain, doubled over, unable to do any normal activities        Severe (8-9)  7   RECURRENT SYMPTOM: "Have you ever had this type of stomach pain before?" If Yes, ask: "When was the last time?" and "What happened that time?"       Not this bad; Sucralfate tok medication for 5 days has not helped at all; taking Pepcid and Prilosec with mild relief  8  AGGRAVATING FACTORS: "Does anything seem to cause this pain?" (e g , foods, stress, alcohol)      Decreased intake due to esophageal pain; denies any foods aggravate symptom  9  CARDIAC SYMPTOMS: "Do you have any of the following symptoms: chest pain, difficulty breathing, sweating, nausea?"      Nausea all the time; interferes with eating  10  OTHER SYMPTOMS: "Do you have any other symptoms?" (e g , fever, vomiting, diarrhea)        Little constipation; last BM on 3/7 and 3/5; takes Miralax for BM 2-3 days apart  11   PREGNANCY: "Is there any chance you are pregnant?" "When was your last menstrual period?"        Post menopausal    Protocols used: ABDOMINAL PAIN - UPPER-ADULT-OH

## 2023-03-10 NOTE — TELEPHONE ENCOUNTER
I spoke with patient's  and patient was next to phone  Appointment scheduled 3/31/23 with Dr Selam Rodriguez  Pt started taking pantoprazole, instructed if she continues to have symptoms or if symptoms worsen to give our office a call

## 2023-03-14 ENCOUNTER — OFFICE VISIT (OUTPATIENT)
Dept: FAMILY MEDICINE CLINIC | Facility: CLINIC | Age: 83
End: 2023-03-14

## 2023-03-14 VITALS
SYSTOLIC BLOOD PRESSURE: 140 MMHG | HEART RATE: 100 BPM | DIASTOLIC BLOOD PRESSURE: 60 MMHG | WEIGHT: 141 LBS | TEMPERATURE: 97.8 F | HEIGHT: 63 IN | BODY MASS INDEX: 24.98 KG/M2

## 2023-03-14 DIAGNOSIS — J96.11 CHRONIC RESPIRATORY FAILURE WITH HYPOXIA (HCC): ICD-10-CM

## 2023-03-14 DIAGNOSIS — J44.9 CHRONIC OBSTRUCTIVE PULMONARY DISEASE, UNSPECIFIED COPD TYPE (HCC): Primary | ICD-10-CM

## 2023-03-14 DIAGNOSIS — J02.9 PHARYNGITIS, UNSPECIFIED ETIOLOGY: ICD-10-CM

## 2023-03-14 DIAGNOSIS — I10 ESSENTIAL HYPERTENSION: ICD-10-CM

## 2023-03-14 RX ORDER — AZITHROMYCIN 250 MG/1
TABLET, FILM COATED ORAL
Qty: 6 TABLET | Refills: 1 | Status: SHIPPED | OUTPATIENT
Start: 2023-03-14 | End: 2023-03-18

## 2023-03-14 NOTE — PROGRESS NOTES
Name: Rayne Bravo      : 1940      MRN: [id number]  Encounter Provider: Chris Torres MD  Encounter Date: 3/14/2023   Encounter department: [facility] PRIMARY CARE    Assessment & Plan     1  Chronic obstructive pulmonary disease, unspecified COPD type ([facility] [address] )  Assessment & Plan:  Does  Have carbon  Dioxide  Retention, rec incentive  Spirometry  4-6  Times/day, cont O2      2  Chronic respiratory failure with hypoxia (HCC)  Assessment & Plan:  Does  Have carbon  Dioxide  Retention, rec incentive  Spirometry  4-6  Times/day, cont O2      3  Essential hypertension  Assessment & Plan:  BP has settled  Down on current  regimen      4  Pharyngitis, unspecified etiology  -     azithromycin (ZITHROMAX) 250 mg tablet; 2  1st day, 1/d  For  4 days         Subjective      Here for  Lab review and recheck BP, BP has  Settled  Down, some  Mild CO2  Retention, needs to get incentive  Spirometer, started  With cough productive  Of  Gray  sputum, no temp, slight  Sore throat, no ear pain    Review of Systems   Constitutional: Positive for fatigue  Negative for activity change and appetite change  HENT: Positive for sore throat  Negative for ear pain  Respiratory: Positive for cough, shortness of breath and wheezing  Cardiovascular: Negative for chest pain  Neurological: Negative for dizziness and headaches  Psychiatric/Behavioral: Positive for sleep disturbance         Current Outpatient Medications on File Prior to Visit   Medication Sig   • albuterol (PROVENTIL HFA,VENTOLIN HFA) 90 mcg/act inhaler Inhale 2 puffs every 6 (six) hours as needed for wheezing   • Ascorbic Acid (VITAMIN C PO) Take 600 mg by mouth   • aspirin 81 MG tablet Take 81 mg by mouth daily   • atorvastatin (LIPITOR) 10 mg tablet Take 1 tablet (10 mg total) by mouth daily   • azelastine (ASTELIN) 0 1 % nasal spray 1 spray into each nostril 2 (two) times a day Use in each nostril as directed   • Calcium Carbonate (CALCIUM 600 PO) Take by mouth   • Cholecalciferol (VITAMIN D3) 2000 units capsule Take 4,000 Units by mouth daily    • ciclopirox (PENLAC) 8 % solution Apply topically daily at bedtime   • cloNIDine (CATAPRES) 0 1 mg tablet Take 1 tablet (0 1 mg total) by mouth every 12 (twelve) hours   • diltiazem (CARDIZEM CD) 180 mg 24 hr capsule Take 1 capsule (180 mg total) by mouth daily at bedtime   • famotidine (PEPCID) 20 mg tablet Take 1 tablet (20 mg total) by mouth 2 (two) times a day   • hydrochlorothiazide (HYDRODIURIL) 25 mg tablet Take 1 tablet (25 mg total) by mouth daily   • levothyroxine 50 mcg tablet Take 1 tablet (50 mcg total) by mouth daily   • montelukast (SINGULAIR) 10 mg tablet Take 1 tablet (10 mg total) by mouth daily at bedtime   • Multiple Vitamin (multivitamin) capsule Take 1 capsule by mouth daily   • Multiple Vitamins-Minerals (ZINC PO) Take by mouth   • nitroglycerin (NITROSTAT) 0 4 mg SL tablet Place 1 tablet (0 4 mg total) under the tongue every 5 (five) minutes as needed for chest pain (esophageal spasm)   • Omega-3 Fatty Acids (FISH OIL PO) Take 1,200 mg by mouth 2 (two) times a day   • pantoprazole (PROTONIX) 40 mg tablet Take 1 tablet by mouth 30 minutes before breakfast daily  • Zinc 50 MG TABS Take 25 mg by mouth   • umeclidinium-vilanterol (Anoro Ellipta) 62 5-25 MCG/INH inhaler Inhale 1 puff daily       Objective     /60   Pulse 100   Temp 97 8 °F (36 6 °C)   Ht 5' 3 25" (1 607 m)   Wt 64 kg (141 lb)   BMI 24 78 kg/m²     Physical Exam  Vitals reviewed  Constitutional:       Appearance: She is well-developed and normal weight  HENT:      Right Ear: Tympanic membrane normal       Left Ear: Tympanic membrane normal       Nose: Congestion present  No rhinorrhea  Mouth/Throat:      Mouth: Mucous membranes are moist       Pharynx: Posterior oropharyngeal erythema present  No oropharyngeal exudate  Tonsils: No tonsillar exudate     Cardiovascular:      Rate and Rhythm: Normal rate and regular rhythm  Heart sounds: Normal heart sounds  Pulmonary:      Effort: Pulmonary effort is normal       Breath sounds: Rhonchi present  Comments: Decreased bs  Lymphadenopathy:      Cervical: Cervical adenopathy present  Neurological:      Mental Status: She is alert         Tyson Bull MD

## 2023-03-28 ENCOUNTER — DOCUMENTATION (OUTPATIENT)
Dept: PULMONOLOGY | Facility: CLINIC | Age: 83
End: 2023-03-28

## 2023-03-28 ENCOUNTER — OFFICE VISIT (OUTPATIENT)
Dept: PULMONOLOGY | Facility: CLINIC | Age: 83
End: 2023-03-28

## 2023-03-28 VITALS
HEIGHT: 63 IN | DIASTOLIC BLOOD PRESSURE: 82 MMHG | OXYGEN SATURATION: 87 % | BODY MASS INDEX: 24.31 KG/M2 | RESPIRATION RATE: 18 BRPM | TEMPERATURE: 96.8 F | WEIGHT: 137.2 LBS | SYSTOLIC BLOOD PRESSURE: 170 MMHG | HEART RATE: 108 BPM

## 2023-03-28 DIAGNOSIS — F41.9 ANXIETY: ICD-10-CM

## 2023-03-28 DIAGNOSIS — J96.11 CHRONIC RESPIRATORY FAILURE WITH HYPOXIA (HCC): ICD-10-CM

## 2023-03-28 DIAGNOSIS — J44.9 CHRONIC OBSTRUCTIVE PULMONARY DISEASE, UNSPECIFIED COPD TYPE (HCC): Primary | ICD-10-CM

## 2023-03-28 PROBLEM — R63.4 WEIGHT LOSS, UNINTENTIONAL: Status: ACTIVE | Noted: 2023-03-28

## 2023-03-28 PROBLEM — R91.8 MULTIPLE PULMONARY NODULES: Status: RESOLVED | Noted: 2017-07-05 | Resolved: 2023-03-28

## 2023-03-28 PROBLEM — R06.89 HYPERCAPNIA: Status: RESOLVED | Noted: 2022-07-30 | Resolved: 2023-03-28

## 2023-03-28 PROBLEM — E87.1 HYPONATREMIA: Status: RESOLVED | Noted: 2022-07-30 | Resolved: 2023-03-28

## 2023-03-28 RX ORDER — IPRATROPIUM BROMIDE AND ALBUTEROL SULFATE 2.5; .5 MG/3ML; MG/3ML
3 SOLUTION RESPIRATORY (INHALATION) 3 TIMES DAILY
Qty: 270 ML | Refills: 6 | Status: SHIPPED | OUTPATIENT
Start: 2023-03-28 | End: 2023-04-27

## 2023-03-28 NOTE — ASSESSMENT & PLAN NOTE
· Using 4 L with portable concentrator but still desaturates  · Sometimes feels that she is not getting enough air  · Discussed the possibility of going continuous flow oxygen as opposed to pulse with the portable concentrator  This would require return to tank based oxygen    At this time she wishes to stay with her current oxygen supply

## 2023-03-28 NOTE — ASSESSMENT & PLAN NOTE
· Patient has had ongoing symptomatology, particularly with exertion  · Discussed options regarding inhalers    I have prescribed nebulizer with DuoNeb  · Once she initiates the nebulizer, will stop Anoro Ellipta  · Continue rescue albuterol when needed

## 2023-03-28 NOTE — ASSESSMENT & PLAN NOTE
· Suspect that this is multifactorial, has diminished appetite  · Also energy expenditure with advanced COPD is likely the primary cause with pulmonary cachexia

## 2023-03-28 NOTE — PROGRESS NOTES
Progress note - Pulmonary Medicine   Adalberto Cruz 79 y o  female MRN: [id number]       Impression & Plan:     Chronic obstructive pulmonary disease ([address] )  · Patient has had ongoing symptomatology, particularly with exertion  · Discussed options regarding inhalers  I have prescribed nebulizer with DuoNeb  · Once she initiates the nebulizer, will stop Anoro Ellipta  · Continue rescue albuterol when needed    Chronic respiratory failure with hypoxia (HCC)  · Using 4 L with portable concentrator but still desaturates  · Sometimes feels that she is not getting enough air  · Discussed the possibility of going continuous flow oxygen as opposed to pulse with the portable concentrator  This would require return to tank based oxygen  At this time she wishes to stay with her current oxygen supply    Anxiety  · Continues to report a significant component of anxiety  · Advised that she discuss this with her primary care physician  Would avoid benzodiazepines  Might benefit from SSRI or alternate agent for anxiety    Weight loss, unintentional  · Suspect that this is multifactorial, has diminished appetite  · Also energy expenditure with advanced COPD is likely the primary cause with pulmonary cachexia    Continue 6-month follow-up  ______________________________________________________________________    HPI:    Adalberto Cruz presents today for follow-up of emphysema with hypoxemic respiratory failure  She is accompanied by her  who has provided some of the contributory history as well  She has reported stable symptoms but has had ongoing shortness of breath  She tends to be fairly inactive  She does take her Anoro Ellipta as well as the albuterol which she feels are helpful  She does however still get short of breath with exertion and has noticed her oxygen levels have been low with exertion as well    She uses 4 L with her portable oxygen concentrator but does not feel that she is getting the air at times  Additionally, she has reported some anxiety  She has also noticed weight loss  She has an element of pulmonary cachexia most likely    She has not had any extensive work-up of weight loss but if ongoing weight loss continues, this should potentially be considered    Current Medications:    Current Outpatient Medications:   •  albuterol (PROVENTIL HFA,VENTOLIN HFA) 90 mcg/act inhaler, Inhale 2 puffs every 6 (six) hours as needed for wheezing, Disp: 54 g, Rfl: 3  •  Ascorbic Acid (VITAMIN C PO), Take 600 mg by mouth, Disp: , Rfl:   •  aspirin 81 MG tablet, Take 81 mg by mouth daily, Disp: , Rfl:   •  atorvastatin (LIPITOR) 10 mg tablet, Take 1 tablet (10 mg total) by mouth daily, Disp: 90 tablet, Rfl: 1  •  azelastine (ASTELIN) 0 1 % nasal spray, 1 spray into each nostril 2 (two) times a day Use in each nostril as directed, Disp: 1 mL, Rfl: 2  •  Calcium Carbonate (CALCIUM 600 PO), Take by mouth, Disp: , Rfl:   •  Cholecalciferol (VITAMIN D3) 2000 units capsule, Take 4,000 Units by mouth daily , Disp: , Rfl:   •  ciclopirox (PENLAC) 8 % solution, Apply topically daily at bedtime, Disp: 6 6 mL, Rfl: 3  •  cloNIDine (CATAPRES) 0 1 mg tablet, Take 1 tablet (0 1 mg total) by mouth every 12 (twelve) hours, Disp: 30 tablet, Rfl: 5  •  diltiazem (CARDIZEM CD) 180 mg 24 hr capsule, Take 1 capsule (180 mg total) by mouth daily at bedtime, Disp: 30 capsule, Rfl: 5  •  famotidine (PEPCID) 20 mg tablet, Take 1 tablet (20 mg total) by mouth 2 (two) times a day, Disp: 90 tablet, Rfl: 3  •  hydrochlorothiazide (HYDRODIURIL) 25 mg tablet, Take 1 tablet (25 mg total) by mouth daily, Disp: 90 tablet, Rfl: 1  •  ipratropium-albuterol (DUO-NEB) 0 5-2 5 mg/3 mL nebulizer solution, Take 3 mL by nebulization 3 (three) times a day, Disp: 270 mL, Rfl: 6  •  levothyroxine 50 mcg tablet, Take 1 tablet (50 mcg total) by mouth daily, Disp: 90 tablet, Rfl: 1  •  montelukast (SINGULAIR) 10 mg tablet, Take 1 tablet (10 mg total) by mouth "daily at bedtime, Disp: 30 tablet, Rfl: 3  •  Multiple Vitamin (multivitamin) capsule, Take 1 capsule by mouth daily, Disp: , Rfl:   •  Omega-3 Fatty Acids (FISH OIL PO), Take 1,200 mg by mouth 2 (two) times a day, Disp: , Rfl:   •  pantoprazole (PROTONIX) 40 mg tablet, Take 1 tablet by mouth 30 minutes before breakfast daily  , Disp: 30 tablet, Rfl: 2  •  Zinc 50 MG TABS, Take 25 mg by mouth, Disp: , Rfl:   •  nitroglycerin (NITROSTAT) 0 4 mg SL tablet, Place 1 tablet (0 4 mg total) under the tongue every 5 (five) minutes as needed for chest pain (esophageal spasm) (Patient not taking: Reported on 3/28/2023), Disp: 25 tablet, Rfl: 3  •  umeclidinium-vilanterol (Anoro Ellipta) 62 5-25 MCG/INH inhaler, Inhale 1 puff daily, Disp: 180 blister, Rfl: 3    Review of Systems:  Aside from what is mentioned in the HPI, the review of systems is otherwise negative    Past medical history, surgical history, and family history were reviewed and updated as appropriate    Social history updates:  Social History     Tobacco Use   Smoking Status Former   • Packs/day: 2 00   • Years: 36 00   • Pack years: 72 00   • Types: Cigarettes   • Start date:    • Quit date:    • Years since quittin 2   Smokeless Tobacco Never       PhysicalExamination:  Vitals:   /82 (BP Location: Left arm, Patient Position: Sitting, Cuff Size: Adult)   Pulse (!) 108   Temp (!) 96 8 °F (36 °C) (Tympanic)   Resp 18   Ht 5' 3\" (1 6 m)   Wt 62 2 kg (137 lb 3 2 oz)   SpO2 (!) 87% Comment: rechecked after sitting - 4L  BMI 24 30 kg/m²   Gen:  Comfortable on room air  No conversational dyspnea  HEENT:  Conjugate gaze  sclerae anicteric  Oropharynx moist  Neck: Trachea is midline  No JVD  No adenopathy  Chest: Distant breath sounds with symmetric chest wall excursion  No rales or crackles  No wheezes  Cardiac: Distant heart tones, regular   no murmur  Abdomen:  benign  Extremities: No edema  Neuro:  Normal speech and " mentation    Diagnostic Data:  Labs:   I personally reviewed the most recent laboratory data pertinent to today's visit    Lab Results   Component Value Date    WBC 4 7 02/21/2023    HGB 13 0 02/21/2023    HCT 39 2 02/21/2023    MCV 88 1 02/21/2023     02/21/2023     Lab Results   Component Value Date    SODIUM 139 02/21/2023    K 3 5 02/21/2023    CO2 40 (H) 02/21/2023    CL 92 (L) 02/21/2023    BUN 15 02/21/2023    CREATININE 0 59 (L) 02/21/2023    CALCIUM 9 2 02/21/2023       Imaging:  I personally reviewed the images on the [facility] system pertinent to today's visit  Last chest x-ray was from June 2022 which shows resolving right lower lobe pneumonia    Hemant Monge MD

## 2023-03-28 NOTE — ASSESSMENT & PLAN NOTE
· Continues to report a significant component of anxiety  · Advised that she discuss this with her primary care physician  Would avoid benzodiazepines    Might benefit from SSRI or alternate agent for anxiety

## 2023-03-31 ENCOUNTER — OFFICE VISIT (OUTPATIENT)
Dept: GASTROENTEROLOGY | Facility: CLINIC | Age: 83
End: 2023-03-31

## 2023-03-31 VITALS
WEIGHT: 143 LBS | SYSTOLIC BLOOD PRESSURE: 128 MMHG | OXYGEN SATURATION: 84 % | BODY MASS INDEX: 23.82 KG/M2 | TEMPERATURE: 97.8 F | HEIGHT: 65 IN | DIASTOLIC BLOOD PRESSURE: 60 MMHG | HEART RATE: 84 BPM

## 2023-03-31 DIAGNOSIS — K21.9 GASTROESOPHAGEAL REFLUX DISEASE WITHOUT ESOPHAGITIS: Primary | ICD-10-CM

## 2023-03-31 DIAGNOSIS — K59.04 CHRONIC IDIOPATHIC CONSTIPATION: ICD-10-CM

## 2023-03-31 DIAGNOSIS — R15.0 INCOMPLETE DEFECATION: ICD-10-CM

## 2023-03-31 DIAGNOSIS — K86.2 PANCREATIC CYST: ICD-10-CM

## 2023-03-31 RX ORDER — FAMOTIDINE 20 MG/1
20 TABLET, FILM COATED ORAL 2 TIMES DAILY
Qty: 90 TABLET | Refills: 3 | Status: SHIPPED | OUTPATIENT
Start: 2023-03-31

## 2023-03-31 RX ORDER — PANTOPRAZOLE SODIUM 40 MG/1
TABLET, DELAYED RELEASE ORAL
Qty: 90 TABLET | Refills: 3 | Status: SHIPPED | OUTPATIENT
Start: 2023-03-31

## 2023-04-06 ENCOUNTER — TELEPHONE (OUTPATIENT)
Dept: PULMONOLOGY | Facility: CLINIC | Age: 83
End: 2023-04-06

## 2023-04-06 NOTE — PROGRESS NOTES
DuoNeb neb solution medication processed via [facility] to [facility] in [address] [place] [address] Rd

## 2023-04-06 NOTE — TELEPHONE ENCOUNTER
I called and spoke to [facility] can provide this patient with her DuoNeb  Is it ok with you to submit this RX to [facility]? They will receive it in the mail?

## 2023-04-06 NOTE — TELEPHONE ENCOUNTER
Patient calling saying that the pharmacy told her that the DuoNeb medication is on back order and they don't know if they will ever get it again  Patient is asking if the doctor can prescribe her something else  Please advise

## 2023-04-11 NOTE — TELEPHONE ENCOUNTER
Patients  called, they ordered the duo-neb with [facility] pharmacy and going to pick it up there    They don't need it to come in through the mail with [facility]  They are wondering if we can cancel that script with them   Please advise

## 2023-04-25 ENCOUNTER — HOSPITAL ENCOUNTER (OUTPATIENT)
Dept: BONE DENSITY | Facility: MEDICAL CENTER | Age: 83
Discharge: HOME | End: 2023-04-25

## 2023-04-25 DIAGNOSIS — M81.0 AGE-RELATED OSTEOPOROSIS WITHOUT CURRENT PATHOLOGICAL FRACTURE: ICD-10-CM

## 2023-05-03 ENCOUNTER — TELEPHONE (OUTPATIENT)
Dept: FAMILY MEDICINE CLINIC | Facility: CLINIC | Age: 83
End: 2023-05-03

## 2023-05-03 NOTE — TELEPHONE ENCOUNTER
Patient called in and stated that she has not taken her medication doltiazem 180 mg since the end of february, patient stated that they fell behide her microwave and she had forgotten about it  Patient is calling to see if she can continue taking them as directed after not being on them for 2 months  Please advise  thank you

## 2023-05-17 ENCOUNTER — OFFICE VISIT (OUTPATIENT)
Dept: GASTROENTEROLOGY | Facility: CLINIC | Age: 83
End: 2023-05-17

## 2023-05-17 VITALS
DIASTOLIC BLOOD PRESSURE: 68 MMHG | OXYGEN SATURATION: 74 % | SYSTOLIC BLOOD PRESSURE: 166 MMHG | TEMPERATURE: 98 F | BODY MASS INDEX: 22.49 KG/M2 | HEART RATE: 92 BPM | HEIGHT: 65 IN | WEIGHT: 135 LBS

## 2023-05-17 DIAGNOSIS — K86.2 PANCREATIC CYST: ICD-10-CM

## 2023-05-17 DIAGNOSIS — K21.9 GASTROESOPHAGEAL REFLUX DISEASE WITHOUT ESOPHAGITIS: Primary | ICD-10-CM

## 2023-05-17 RX ORDER — IPRATROPIUM BROMIDE AND ALBUTEROL SULFATE 2.5; .5 MG/3ML; MG/3ML
SOLUTION RESPIRATORY (INHALATION)
COMMUNITY
Start: 2023-05-11

## 2023-05-17 NOTE — PROGRESS NOTES
[facility] - Outpatient Follow-up Note  Aiyana Vázquez 79 y o  female MRN: [id number]  Encounter: [id number]          ASSESSMENT AND PLAN:    Aiyana Vázquez is a 79 y o  female with advanced COPD who presents for follow-up of GERD and pancreatic cyst   She is doing okay with PPI and famotidine in terms of GERD symptoms  We discussed her pancreatic cyst   Due to her oxygen issues, she is not a candidate for surgery or endoscopic procedures  She is also unable to tolerate MRI  Given her age, chronic disease, and life expectancy, I do not think that further follow-up for her pancreatic cyst is necessary  The patient and her  are in agreement  She will continue with daily pantoprazole, twice daily famotidine  She will continue with Metamucil and MiraLAX  She will contact me as needed  1  Gastroesophageal reflux disease without esophagitis    2  Pancreatic cyst        No orders of the defined types were placed in this encounter     ______________________________________________________________________    SUBJECTIVE:    Aiyana Vázquez is a 79 y o  female with advanced COPD who presents for follow-up of GERD and pancreatic cyst   She is in the office with her   Overall, she is doing well  She has occasional GERD but for the most part it is under control  She takes pantoprazole 40 mg once daily, famotidine 20 mg twice daily, Mylanta as needed  She takes Metamucil 3 times per week and MiraLAX as needed and has 1 bowel movement every other day  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  This was evaluated by EUS in 2020 but fluid aspiration was unsuccessful  It was last imaged in 2022 and the size was unchanged  We had previously discussed repeat imaging in 2024  REVIEW OF SYSTEMS IS OTHERWISE NEGATIVE        Historical Information   Past Medical History:   Diagnosis Date   • Anxiety    • Back pain    • [facility] skin   • Cataract    • COPD (chronic obstructive pulmonary disease) (HCC)    • Disease of thyroid gland    • Emphysema lung (HCC)    • Emphysema of lung ([address] )    • Geographic tongue     last assessed: 2014   • GERD (gastroesophageal reflux disease)    • Hyperlipidemia    • Hypertension    • Hypothyroidism (acquired)    • Mild intermittent asthma    • Multiple pulmonary nodules 2017    · Last imaging study 2017 shows stable nodules, no further follow-up recommended · Initial CT scan was  at [facility] demonstrating a 6 mm right upper lobe nodule   • Sciatica    • Seasonal allergies      Past Surgical History:   Procedure Laterality Date   • BACK SURGERY     • ENDOSCOPIC ULTRASOUND (UPPER)  2020   • SKIN LESION EXCISION       Social History   Social History     Substance and Sexual Activity   Alcohol Use No     Social History     Substance and Sexual Activity   Drug Use No     Social History     Tobacco Use   Smoking Status Former   • Packs/day: 2 00   • Years: 36 00   • Pack years: 72 00   • Types: Cigarettes   • Start date: 11   • Quit date: 45   • Years since quittin 4   Smokeless Tobacco Never     Family History   Problem Relation Age of Onset   • Stroke Mother    • Cirrhosis Father         alcoholic   • Cancer Sister    • Cancer Daughter        Meds/Allergies       Current Outpatient Medications:   •  albuterol (PROVENTIL HFA,VENTOLIN HFA) 90 mcg/act inhaler  •  Ascorbic Acid (VITAMIN C PO)  •  aspirin 81 MG tablet  •  atorvastatin (LIPITOR) 10 mg tablet  •  azelastine (ASTELIN) 0 1 % nasal spray  •  Calcium Carbonate (CALCIUM 600 PO)  •  Cholecalciferol (VITAMIN D3) 2000 units capsule  •  ciclopirox (PENLAC) 8 % solution  •  cloNIDine (CATAPRES) 0 1 mg tablet  •  diltiazem (CARDIZEM CD) 180 mg 24 hr capsule  •  famotidine (PEPCID) 20 mg tablet  •  hydrochlorothiazide (HYDRODIURIL) 25 mg tablet  •  ipratropium-albuterol (DUO-NEB) 0 5-2 5 mg/3 mL nebulizer "solution  •  levothyroxine 50 mcg tablet  •  montelukast (SINGULAIR) 10 mg tablet  •  Multiple Vitamin (multivitamin) capsule  •  Omega-3 Fatty Acids (FISH OIL PO)  •  pantoprazole (PROTONIX) 40 mg tablet  •  Zinc 50 MG TABS  •  nitroglycerin (NITROSTAT) 0 4 mg SL tablet  •  umeclidinium-vilanterol (Anoro Ellipta) 62 5-25 MCG/INH inhaler    Allergies   Allergen Reactions   • Other Other (See Comments)     Steroids, it effects her eyes           Objective     Blood pressure 166/68, pulse 92, temperature 98 °F (36 7 °C), height 5' 5\" (1 651 m), weight 61 2 kg (135 lb), SpO2 (!) 74 %, not currently breastfeeding  Body mass index is 22 47 kg/m²  PHYSICAL EXAM:      General Appearance:   Alert, cooperative, no distress   HEENT:   Normocephalic, atraumatic, anicteric  Neck:  Supple, symmetrical, trachea midline   Lungs:    Dyspneic on supplemental oxygen  Heart[de-identified]   Regular rate and rhythm; no murmur, rub, or gallop  Abdomen:   Soft, non-tender, non-distended; normal bowel sounds; no masses, no organomegaly    Genitalia:   Deferred    Rectal:   Deferred    Extremities:  No cyanosis, clubbing or edema    Pulses:  2+ and symmetric    Skin:  No jaundice, rashes, or lesions    Lymph nodes:  No palpable cervical lymphadenopathy        Lab Results:   No visits with results within 1 Day(s) from this visit     Latest known visit with results is:   Orders Only on 02/21/2023   Component Date Value   • Glucose, Random 02/21/2023 104 (H)    • BUN 02/21/2023 15    • Creatinine 02/21/2023 0 59 (L)    • eGFR 02/21/2023 89    • SL AMB BUN/CREATININE RA* 02/21/2023 25 (H)    • Sodium 02/21/2023 139    • Potassium 02/21/2023 3 5    • Chloride 02/21/2023 92 (L)    • CO2 02/21/2023 40 (H)    • Calcium 02/21/2023 9 2    • Protein, Total 02/21/2023 6 7    • Albumin 02/21/2023 4 2    • Globulin 02/21/2023 2 5    • Albumin/Globulin Ratio 02/21/2023 1 7    • TOTAL BILIRUBIN 02/21/2023 0 6    • Alkaline Phosphatase 02/21/2023 56    • " AST 02/21/2023 26    • ALT 02/21/2023 24    • White Blood Cell Count 02/21/2023 4 7    • Red Blood Cell Count 02/21/2023 4 45    • Hemoglobin 02/21/2023 13 0    • HCT 02/21/2023 39 2    • MCV 02/21/2023 88 1    • MCH 02/21/2023 29 2    • MCHC 02/21/2023 33 2    • RDW 02/21/2023 13 1    • Platelet Count 25/47/5875 242    • SL AMB MPV 02/21/2023 11 0    • Neutrophils (Absolute) 02/21/2023 2,698    • Lymphocytes (Absolute) 02/21/2023 1,415    • Monocytes (Absolute) 02/21/2023 395    • Eosinophils (Absolute) 02/21/2023 141    • Basophils ABS 02/21/2023 52    • Neutrophils 02/21/2023 57 4    • Lymphocytes 02/21/2023 30 1    • Monocytes 02/21/2023 8 4    • Eosinophils 02/21/2023 3 0    • Basophils PCT 02/21/2023 1 1    • T3 Uptake Ratio 02/21/2023 31    • Free t4 02/21/2023 1 2    • TSH 02/21/2023 2 20        Lab Results   Component Value Date    WBC 4 7 02/21/2023    HGB 13 0 02/21/2023    HCT 39 2 02/21/2023    MCV 88 1 02/21/2023     02/21/2023       Lab Results   Component Value Date    SODIUM 139 02/21/2023    K 3 5 02/21/2023    CL 92 (L) 02/21/2023    CO2 40 (H) 02/21/2023    AGAP 4 07/19/2020    BUN 15 02/21/2023    CREATININE 0 59 (L) 02/21/2023    GLUC 104 (H) 02/21/2023    CALCIUM 9 2 02/21/2023    AST 26 02/21/2023    ALT 24 02/21/2023    ALKPHOS 56 02/21/2023    TP 6 7 02/21/2023    TBILI 0 6 02/21/2023    EGFR 89 02/21/2023       Lab Results   Component Value Date    CRP 0 4 03/03/2020       Lab Results   Component Value Date    TIW8GSBLRNYP 1 73 02/11/2017    TSH 2 20 02/21/2023       No results found for: IRON, TIBC, FERRITIN    Radiology Results:   DXA bone density spine hip and pelvis    Result Date: 4/25/2023  Narrative: CENTRAL  DXA SCAN CLINICAL HISTORY:   79year old post-menopausal  female risk factors include osteoporosis noted on femoral neck, 2021  Additional medical history: Compression fracture T12  COPD   TECHNIQUE: Bone densitometry was performed using a PAK's W bone densitometer  Regions of interest appear properly placed  There are  other confounding variables which could limit the study  Degenerative or osteoarthritic changes are noted on the spine image eliminating L4 from evaluation  COMPARISON:  Follow-up  RESULTS: LUMBAR SPINE:  L1-L3: BMD 0 777 gm/cm2 T-score -2 2 and 3% higher than 2021, statistically significant change  Z-score 0 6 LEFT TOTAL HIP: BMD 0 642 gm/cm2 T-score -2 5, osteoporosis  Z-score -0 2 Left FEMORAL NECK: BMD 0 554 gm/cm2 T-score -2 7, osteoporosis, and 2% higher than 2021  Z-score -0 2 The left forearm BMD is 0 545 and the T score is -2 5, osteoporosis, and 9% less than 2021, statistically significant change  Z score is 1 2  A 25-hydroxy vitamin D level, an intact PTH, and a comprehensive metabolic panel are suggested in this patient  Treatment may be a consideration after excluding secondary causes for osteoporosis and might initially include Prolia for a period of time followed by intravenous Reclast      Impression: 1  Based on the [facility] classification, this study identifies a diagnosis of osteoporosis, notable at the total hip, femoral neck, and forearm areas and the patient is considered at risk for fracture  2  A daily intake of calcium of at least 1200 mg and vitamin D, 800-1000 IU, as well as weight bearing and muscle strengthening exercise, fall prevention and avoidance of tobacco and excessive alcohol intake as basic preventive measures are recommended  3  Repeat DXA scan on the same equipment in 18-24 months as clinically indicated  The 10 year risk of hip fracture is 9 0%, with the 10 year risk of major osteoporotic fracture being 26%, as calculated by the [facility] fracture risk assessment tool (FRAX)  The current NOF guidelines recommend treating patients with FRAX 10 year risk score of >3% for hip fracture and >20% for major osteoporotic fracture  Fracture risks exceed treatment thresholds   WHO CLASSIFICATION: Normal (a T-score of -1 0 or higher) Low bone mineral density (a T-score of less than -1 0 but higher than -2 5) Osteoporosis (a T-score of -2 5 or less) Severe osteoporosis (a T-score of -2 5 or less with a fragility fracture) Thank you for allowing us the opportunity to participate in your patient care  The expanded DEXA report will no longer be arriving in your mail  If you desire to view the full report please contact [facility] or access the PACS system   Workstation performed: [id number]

## 2023-05-21 DIAGNOSIS — E78.2 MIXED HYPERLIPIDEMIA: ICD-10-CM

## 2023-05-22 RX ORDER — ATORVASTATIN CALCIUM 10 MG/1
TABLET, FILM COATED ORAL
Qty: 90 TABLET | Refills: 1 | Status: SHIPPED | OUTPATIENT
Start: 2023-05-22

## 2023-05-24 ENCOUNTER — TELEPHONE (OUTPATIENT)
Dept: RHEUMATOLOGY | Facility: CLINIC | Age: 83
End: 2023-05-24

## 2023-05-24 NOTE — TELEPHONE ENCOUNTER
Please call patient to schedule follow-up appointment for her osteoporosis on Reclast   She has not been seen in over 1 year    Can be scheduled with Dr Maria Esther Jesus or myself

## 2023-06-06 DIAGNOSIS — I10 ESSENTIAL HYPERTENSION: ICD-10-CM

## 2023-06-06 RX ORDER — CLONIDINE HYDROCHLORIDE 0.1 MG/1
TABLET ORAL
Qty: 30 TABLET | Refills: 5 | Status: SHIPPED | OUTPATIENT
Start: 2023-06-06

## 2023-06-12 LAB
BUN SERPL-MCNC: 17 MG/DL (ref 7–25)
BUN/CREAT SERPL: 29 (CALC) (ref 6–22)
CALCIUM SERPL-MCNC: 9.7 MG/DL (ref 8.6–10.4)
CHLORIDE SERPL-SCNC: 90 MMOL/L (ref 98–110)
CO2 SERPL-SCNC: 39 MMOL/L (ref 20–32)
CREAT SERPL-MCNC: 0.58 MG/DL (ref 0.6–0.95)
GFR/BSA.PRED SERPLBLD CYS-BASED-ARV: 89 ML/MIN/1.73M2
GLUCOSE SERPL-MCNC: 104 MG/DL (ref 65–99)
POTASSIUM SERPL-SCNC: 3.5 MMOL/L (ref 3.5–5.3)
SODIUM SERPL-SCNC: 136 MMOL/L (ref 135–146)

## 2023-06-16 ENCOUNTER — OFFICE VISIT (OUTPATIENT)
Dept: RHEUMATOLOGY | Facility: CLINIC | Age: 83
End: 2023-06-16
Payer: COMMERCIAL

## 2023-06-16 VITALS
WEIGHT: 135 LBS | DIASTOLIC BLOOD PRESSURE: 88 MMHG | SYSTOLIC BLOOD PRESSURE: 134 MMHG | BODY MASS INDEX: 22.49 KG/M2 | HEIGHT: 65 IN

## 2023-06-16 DIAGNOSIS — K21.9 GASTROESOPHAGEAL REFLUX DISEASE WITHOUT ESOPHAGITIS: ICD-10-CM

## 2023-06-16 DIAGNOSIS — M81.0 AGE-RELATED OSTEOPOROSIS WITHOUT CURRENT PATHOLOGICAL FRACTURE: Primary | ICD-10-CM

## 2023-06-16 DIAGNOSIS — E78.2 MIXED HYPERLIPIDEMIA: ICD-10-CM

## 2023-06-16 PROCEDURE — 99214 OFFICE O/P EST MOD 30 MIN: CPT | Performed by: INTERNAL MEDICINE

## 2023-06-16 RX ORDER — SODIUM CHLORIDE 9 MG/ML
20 INJECTION, SOLUTION INTRAVENOUS ONCE
Status: CANCELLED | OUTPATIENT
Start: 2023-10-02

## 2023-06-16 RX ORDER — ZOLEDRONIC ACID 5 MG/100ML
5 INJECTION, SOLUTION INTRAVENOUS ONCE
OUTPATIENT
Start: 2023-10-02

## 2023-06-16 RX ORDER — SODIUM CHLORIDE 9 MG/ML
20 INJECTION, SOLUTION INTRAVENOUS ONCE
OUTPATIENT
Start: 2023-10-02

## 2023-06-16 RX ORDER — ZOLEDRONIC ACID 5 MG/100ML
5 INJECTION, SOLUTION INTRAVENOUS ONCE
Status: CANCELLED | OUTPATIENT
Start: 2023-10-02

## 2023-06-16 NOTE — PATIENT INSTRUCTIONS
Continue one more Reclast infusion, due 10/2/23 at 10am  Continue daily calcium; can take 1 Tums daily  Continue Vit   D 4,000 units a day  Do back exercises for strengthening back muscles; let me know if want home physical therapy referral  Next DEXA scan due in 4/2025    Return to clinic in 1 year

## 2023-06-16 NOTE — PROGRESS NOTES
Assessment and Plan:   Omayra Alberto is a 79 y o   female who presents for follow up for osteoporosis  Tolerating Reclast infusions fine  Continue one more Reclast infusion, due 10/2/23  Continue daily calcium; can take 1 Tums daily  Continue Vit  D 4,000 units a day  Do back exercises for strengthening back muscles; let me know if want home physical therapy referral  Next DEXA scan due in 4/2025    Return to clinic in 1 year    Plan:  Diagnoses and all orders for this visit:    Age-related osteoporosis without current pathological fracture    Gastroesophageal reflux disease without esophagitis    Other orders  -     Cancel: sodium chloride 0 9 % infusion  -     Cancel: zoledronic acid (RECLAST) IV infusion (premix) 5 mg  -     sodium chloride 0 9 % infusion  -     zoledronic acid (RECLAST) IV infusion (premix) 5 mg    Follow-up plan: Return to clinic in 1 year        Rheumatic Disease Summary  Initial visit 8/16/21: Omayra Alberto is a 79 y o   female who presented as a Rheumatology consult referred by her PCP Micah Brand MD for evaluation of osteoporosis  Since this was the 1st time patient had been diagnosed with osteoporosis, and she had no history of fragility fractures, it is worth at least trying her on alendronate 70 mg p o  weekly as initial therapy  Her lowest T-score was  -2 7 at the left femoral neck, so her osteoporosis was not severe, and oral bisphosphonate therapy may be adequate  If patient was unable to tolerate alendronate due to esophagitis side effects, then could consider yearly IV Reclast infusions for 3 years  She had good renal function  Patient was supposed to continue daily calcium and vitamin-D  Advised patient how to appropriately take alendronate 70mg po once a week to help prevent heartburn/esophagitis symptoms; take with a full glass of water, on an empty stomach, and do not lie down for 30 minutes afterwards      HPI  Omayra Alberto is a 79 y o   female who presents for follow up  Tolerating Reclast fine  On oxygen, uses walker to ambulate  Review of Systems:   Review of Systems   Constitutional: Negative for chills and fever  HENT: Negative for congestion, rhinorrhea, sneezing and sore throat  Eyes: Negative for pain and discharge  Respiratory: Positive for cough, shortness of breath and wheezing  Negative for chest tightness  Cardiovascular: Negative for chest pain and leg swelling  Gastrointestinal: Negative for abdominal pain, nausea and vomiting  Endocrine: Negative for polydipsia, polyphagia and polyuria  Genitourinary: Negative for flank pain, frequency and urgency  Musculoskeletal: Positive for back pain  Negative for arthralgias and joint swelling  Skin: Negative for color change and pallor  Neurological: Positive for weakness  Negative for dizziness, light-headedness and headaches  Psychiatric/Behavioral: Negative for agitation and confusion         Home Medications:    Current Outpatient Medications:   •  albuterol (PROVENTIL HFA,VENTOLIN HFA) 90 mcg/act inhaler, Inhale 2 puffs every 6 (six) hours as needed for wheezing, Disp: 54 g, Rfl: 3  •  Ascorbic Acid (VITAMIN C PO), Take 600 mg by mouth, Disp: , Rfl:   •  aspirin 81 MG tablet, Take 81 mg by mouth daily, Disp: , Rfl:   •  azelastine (ASTELIN) 0 1 % nasal spray, 1 spray into each nostril 2 (two) times a day Use in each nostril as directed, Disp: 1 mL, Rfl: 2  •  Calcium Carbonate (CALCIUM 600 PO), Take by mouth, Disp: , Rfl:   •  Cholecalciferol (VITAMIN D3) 2000 units capsule, Take 4,000 Units by mouth daily , Disp: , Rfl:   •  ciclopirox (PENLAC) 8 % solution, Apply topically daily at bedtime, Disp: 6 6 mL, Rfl: 3  •  cloNIDine (CATAPRES) 0 1 mg tablet, TAKE ONE TABLET BY MOUTH EVERY 12 HOURS, Disp: 30 tablet, Rfl: 5  •  diltiazem (CARDIZEM CD) 180 mg 24 hr capsule, Take 1 capsule (180 mg total) by mouth daily at bedtime, Disp: 30 capsule, Rfl: 5  •  famotidine (PEPCID) 20 mg tablet, "Take 1 tablet (20 mg total) by mouth 2 (two) times a day, Disp: 90 tablet, Rfl: 3  •  hydrochlorothiazide (HYDRODIURIL) 25 mg tablet, Take 1 tablet (25 mg total) by mouth daily, Disp: 90 tablet, Rfl: 1  •  ipratropium-albuterol (DUO-NEB) 0 5-2 5 mg/3 mL nebulizer solution, TAKE 3ML BY NEBULIZATION 3 TIMES A DAY, Disp: , Rfl:   •  levothyroxine 50 mcg tablet, Take 1 tablet (50 mcg total) by mouth daily, Disp: 90 tablet, Rfl: 1  •  montelukast (SINGULAIR) 10 mg tablet, Take 1 tablet (10 mg total) by mouth daily at bedtime, Disp: 30 tablet, Rfl: 3  •  Multiple Vitamin (multivitamin) capsule, Take 1 capsule by mouth daily, Disp: , Rfl:   •  Omega-3 Fatty Acids (FISH OIL PO), Take 1,200 mg by mouth 2 (two) times a day, Disp: , Rfl:   •  pantoprazole (PROTONIX) 40 mg tablet, Take 1 tablet by mouth 30 minutes before breakfast daily  , Disp: 90 tablet, Rfl: 3  •  Zinc 50 MG TABS, Take 25 mg by mouth, Disp: , Rfl:   •  atorvastatin (LIPITOR) 10 mg tablet, TAKE ONE TABLET BY MOUTH EVERY DAY, Disp: 90 tablet, Rfl: 1  •  nitroglycerin (NITROSTAT) 0 4 mg SL tablet, Place 1 tablet (0 4 mg total) under the tongue every 5 (five) minutes as needed for chest pain (esophageal spasm) (Patient not taking: Reported on 6/16/2023), Disp: 25 tablet, Rfl: 3  •  umeclidinium-vilanterol (Anoro Ellipta) 62 5-25 MCG/INH inhaler, Inhale 1 puff daily, Disp: 180 blister, Rfl: 3    Objective:    Vitals:    06/16/23 1518   BP: 134/88   Weight: 61 2 kg (135 lb)   Height: 5' 5\" (1 651 m)       Physical Exam  Constitutional:       General: She is not in acute distress  Appearance: She is well-developed  She is not diaphoretic  HENT:      Head: Normocephalic  Mouth/Throat:      Pharynx: No oropharyngeal exudate  Eyes:      Pupils: Pupils are equal, round, and reactive to light  Cardiovascular:      Rate and Rhythm: Normal rate and regular rhythm  Heart sounds: No murmur heard    Pulmonary:      Effort: Pulmonary effort is normal  No " respiratory distress  Breath sounds: No wheezing or rales  Comments: On 4L of NC  Abdominal:      General: Bowel sounds are normal       Palpations: Abdomen is soft  Tenderness: There is no abdominal tenderness  Musculoskeletal:         General: No tenderness  Normal range of motion  Cervical back: Normal range of motion  Skin:     General: Skin is warm  Neurological:      Mental Status: She is alert and oriented to person, place, and time  Gait: Gait abnormal (walk with a walker)  Reviewed labs and imaging  Imaging:   DEXA scan 04/21/2021  RESULTS:   LUMBAR SPINE:  L1-L3:  BMD 0 751 gm/cm2  T-score below normal, -2 4  Z-score 0 3     LEFT TOTAL HIP:  BMD 0 648 gm/cm2  T-score below normal, -2 4  Z-score -0 2     LEFT FEMORAL NECK:  BMD 0 545 gm/cm2  T-score below normal, -2 7, osteoporosis  Z-score -0 3     The left forearm BMD is 0 602 and the T score is below normal, -1 5   Z score is +1 9      A 25-hydroxy vitamin D level, an intact PTH, and a comprehensive metabolic panel are suggested in this patient      Treatment is a consideration if appropriate to total clinical health evaluation      IMPRESSION:  1  Based on the [facility] classification, this study identifies a diagnosis of osteoporosis, notable at the femoral neck area and the patient is considered at increased risk for fracture  Labs:   Orders Only on 06/12/2023   Component Date Value Ref Range Status   • Glucose, Random 06/12/2023 104 (H)  65 - 99 mg/dL Final    Comment:               Fasting reference interval     For someone without known diabetes, a glucose value  between 100 and 125 mg/dL is consistent with  prediabetes and should be confirmed with a  follow-up test         • BUN 06/12/2023 17  7 - 25 mg/dL Final   • Creatinine 06/12/2023 0 58 (L)  0 60 - 0 95 mg/dL Final   • eGFR 06/12/2023 89  > OR = 60 mL/min/1 73m2 Final    Comment: The eGFR is based on the CKD-EPI 2021 equation   To calculate   the new eGFR from a previous Creatinine or Cystatin C  result, go to [employer] at  [email or web address]  [email or web address]     • SL AMB BUN/CREATININE RATIO 06/12/2023 29 (H)  6 - 22 (calc) Final   • Sodium 06/12/2023 136  135 - 146 mmol/L Final   • Potassium 06/12/2023 3 5  3 5 - 5 3 mmol/L Final   • Chloride 06/12/2023 90 (L)  98 - 110 mmol/L Final   • CO2 06/12/2023 39 (H)  20 - 32 mmol/L Final   • Calcium 06/12/2023 9 7  8 6 - 10 4 mg/dL Final   Orders Only on 02/21/2023   Component Date Value Ref Range Status   • Glucose, Random 02/21/2023 104 (H)  65 - 99 mg/dL Final    Comment:               Fasting reference interval     For someone without known diabetes, a glucose value  between 100 and 125 mg/dL is consistent with  prediabetes and should be confirmed with a  follow-up test         • BUN 02/21/2023 15  7 - 25 mg/dL Final   • Creatinine 02/21/2023 0 59 (L)  0 60 - 0 95 mg/dL Final   • eGFR 02/21/2023 89  > OR = 60 mL/min/1 73m2 Final    Comment: The eGFR is based on the CKD-EPI 2021 equation  To calculate   the new eGFR from a previous Creatinine or Cystatin C  result, go to [employer] at  [email or web address]  [email or web address]     • SL AMB BUN/CREATININE RATIO 02/21/2023 25 (H)  6 - 22 (calc) Final   • Sodium 02/21/2023 139  135 - 146 mmol/L Final   • Potassium 02/21/2023 3 5  3 5 - 5 3 mmol/L Final   • Chloride 02/21/2023 92 (L)  98 - 110 mmol/L Final   • CO2 02/21/2023 40 (H)  20 - 32 mmol/L Final   • Calcium 02/21/2023 9 2  8 6 - 10 4 mg/dL Final   • Protein, Total 02/21/2023 6 7  6 1 - 8 1 g/dL Final   • Albumin 02/21/2023 4 2  3 6 - 5 1 g/dL Final   • Globulin 02/21/2023 2 5  1 9 - 3 7 g/dL (calc) Final   • Albumin/Globulin Ratio 02/21/2023 1 7  1 0 - 2 5 (calc) Final   • TOTAL BILIRUBIN 02/21/2023 0 6  0 2 - 1 2 mg/dL Final   • Alkaline Phosphatase 02/21/2023 56  37 - 153 U/L Final   • AST 02/21/2023 26  10 - 35 U/L Final   • ALT 02/21/2023 24  6 - 29 U/L Final   • White Blood Cell Count 02/21/2023 4 7  3 8 - 10 8 Thousand/uL Final   • Red Blood Cell Count 02/21/2023 4 45  3 80 - 5 10 Million/uL Final   • Hemoglobin 02/21/2023 13 0  11 7 - 15 5 g/dL Final   • HCT 02/21/2023 39 2  35 0 - 45 0 % Final   • MCV 02/21/2023 88 1  80 0 - 100 0 fL Final   • MCH 02/21/2023 29 2  27 0 - 33 0 pg Final   • MCHC 02/21/2023 33 2  32 0 - 36 0 g/dL Final   • RDW 02/21/2023 13 1  11 0 - 15 0 % Final   • Platelet Count 29/34/3356 242  140 - 400 Thousand/uL Final   • SL AMB MPV 02/21/2023 11 0  7 5 - 12 5 fL Final   • Neutrophils (Absolute) 02/21/2023 2,698  1,500 - 7,800 cells/uL Final   • Lymphocytes (Absolute) 02/21/2023 1,415  850 - 3,900 cells/uL Final   • Monocytes (Absolute) 02/21/2023 395  200 - 950 cells/uL Final   • Eosinophils (Absolute) 02/21/2023 141  15 - 500 cells/uL Final   • Basophils ABS 02/21/2023 52  0 - 200 cells/uL Final   • Neutrophils 02/21/2023 57 4  % Final   • Lymphocytes 02/21/2023 30 1  % Final   • Monocytes 02/21/2023 8 4  % Final   • Eosinophils 02/21/2023 3 0  % Final   • Basophils PCT 02/21/2023 1 1  % Final   • T3 Uptake Ratio 02/21/2023 31  22 - 35 % Final   • Free t4 02/21/2023 1 2  0 8 - 1 8 ng/dL Final   • TSH 02/21/2023 2 20  0 40 - 4 50 mIU/L Final

## 2023-06-19 RX ORDER — ATORVASTATIN CALCIUM 10 MG/1
TABLET, FILM COATED ORAL
Qty: 90 TABLET | Refills: 1 | Status: SHIPPED | OUTPATIENT
Start: 2023-06-19

## 2023-06-28 ENCOUNTER — TELEPHONE (OUTPATIENT)
Dept: PULMONOLOGY | Facility: CLINIC | Age: 83
End: 2023-06-28

## 2023-06-28 NOTE — TELEPHONE ENCOUNTER
Left message for patient to call back and set up follow up appointment with Dr Meghann Gill in the [facility]

## 2023-07-06 ENCOUNTER — OFFICE VISIT (OUTPATIENT)
Dept: FAMILY MEDICINE CLINIC | Facility: CLINIC | Age: 83
End: 2023-07-06
Payer: COMMERCIAL

## 2023-07-06 VITALS
TEMPERATURE: 98.3 F | DIASTOLIC BLOOD PRESSURE: 74 MMHG | HEIGHT: 65 IN | WEIGHT: 139.4 LBS | SYSTOLIC BLOOD PRESSURE: 158 MMHG | BODY MASS INDEX: 23.22 KG/M2 | HEART RATE: 82 BPM

## 2023-07-06 DIAGNOSIS — Z00.00 MEDICARE ANNUAL WELLNESS VISIT, SUBSEQUENT: Primary | ICD-10-CM

## 2023-07-06 DIAGNOSIS — E03.9 ACQUIRED HYPOTHYROIDISM: ICD-10-CM

## 2023-07-06 DIAGNOSIS — I10 ESSENTIAL HYPERTENSION: ICD-10-CM

## 2023-07-06 DIAGNOSIS — J96.11 CHRONIC RESPIRATORY FAILURE WITH HYPOXIA (HCC): ICD-10-CM

## 2023-07-06 DIAGNOSIS — E78.2 MIXED HYPERLIPIDEMIA: ICD-10-CM

## 2023-07-06 PROBLEM — R63.4 WEIGHT LOSS, UNINTENTIONAL: Status: RESOLVED | Noted: 2023-03-28 | Resolved: 2023-07-06

## 2023-07-06 PROCEDURE — 99214 OFFICE O/P EST MOD 30 MIN: CPT | Performed by: FAMILY MEDICINE

## 2023-07-06 PROCEDURE — G0439 PPPS, SUBSEQ VISIT: HCPCS | Performed by: FAMILY MEDICINE

## 2023-07-06 RX ORDER — BACLOFEN 10 MG/1
1 TABLET ORAL 3 TIMES DAILY
COMMUNITY

## 2023-07-06 RX ORDER — ATORVASTATIN CALCIUM 10 MG/1
10 TABLET, FILM COATED ORAL DAILY
Qty: 90 TABLET | Refills: 1 | Status: SHIPPED | OUTPATIENT
Start: 2023-07-06

## 2023-07-06 RX ORDER — CLONIDINE HYDROCHLORIDE 0.1 MG/1
0.1 TABLET ORAL EVERY 12 HOURS
Qty: 90 TABLET | Refills: 1 | Status: SHIPPED | OUTPATIENT
Start: 2023-07-06

## 2023-07-06 RX ORDER — DICYCLOMINE HYDROCHLORIDE 10 MG/1
CAPSULE ORAL
COMMUNITY

## 2023-07-06 RX ORDER — DILTIAZEM HYDROCHLORIDE 180 MG/1
180 CAPSULE, COATED, EXTENDED RELEASE ORAL
Qty: 90 CAPSULE | Refills: 1 | Status: SHIPPED | OUTPATIENT
Start: 2023-07-06

## 2023-07-06 RX ORDER — ALENDRONATE SODIUM 70 MG/1
TABLET ORAL
COMMUNITY

## 2023-07-06 RX ORDER — HYDROCHLOROTHIAZIDE 25 MG/1
25 TABLET ORAL DAILY
Qty: 90 TABLET | Refills: 1 | Status: SHIPPED | OUTPATIENT
Start: 2023-07-06

## 2023-07-06 RX ORDER — OMEPRAZOLE 40 MG/1
CAPSULE, DELAYED RELEASE ORAL
COMMUNITY
End: 2023-07-06

## 2023-07-06 NOTE — PATIENT INSTRUCTIONS
Rec Desitin or  zinc  oxide  for irritation that  happens when has  urinary  issues, refill meds, lab due  8/23

## 2023-07-06 NOTE — PROGRESS NOTES
Assessment and Plan:     Problem List Items Addressed This Visit        Endocrine    Acquired hypothyroidism     Await lab         Relevant Orders    TSH, 3rd generation    T4, free    Lipid panel       Respiratory    Chronic respiratory failure with hypoxia (HCC)     On oxygen            Cardiovascular and Mediastinum    Essential hypertension     BPup, restart Diltiazen         Relevant Medications    hydrochlorothiazide (HYDRODIURIL) 25 mg tablet    cloNIDine (CATAPRES) 0.1 mg tablet    diltiazem (CARDIZEM CD) 180 mg 24 hr capsule    Other Relevant Orders    CBC and differential    Comprehensive metabolic panel       Other    Mixed hyperlipidemia     Await lab         Relevant Medications    atorvastatin (LIPITOR) 10 mg tablet    Other Relevant Orders    Lipid panel   Other Visit Diagnoses     Medicare annual wellness visit, subsequent    -  Primary           Preventive health issues were discussed with patient, and age appropriate screening tests were ordered as noted in patient's After Visit Summary. Personalized health advice and appropriate referrals for health education or preventive services given if needed, as noted in patient's After Visit Summary. History of Present Illness:     Patient presents for a Medicare Wellness Visit    Here for  F/u Htn, lipids, hypothyroid, copd, feels fairly  Well, no cp, stable dyspnea, heartburn okay     Patient Care Team:  Omayra Malcolm MD as PCP - General (Family Medicine)  MD Ronda Fuller MD     Review of Systems:     Review of Systems   Constitutional: Negative for activity change, appetite change and fatigue. Respiratory: Positive for shortness of breath. Cardiovascular: Negative for chest pain. Gastrointestinal: Negative for abdominal pain. Neurological: Negative for dizziness and headaches. Hematological: Negative for adenopathy. Psychiatric/Behavioral: The patient is not nervous/anxious.          Problem List:     Patient Active Problem List   Diagnosis   • Seasonal allergies   • Essential hypertension   • Mixed hyperlipidemia   • Sciatica   • GERD (gastroesophageal reflux disease)   • Anxiety   • Acquired hypothyroidism   • Chronic respiratory failure with hypoxia (HCC)   • Chronic obstructive pulmonary disease (HCC)   • Tremor   • Female stress incontinence   • OAB (overactive bladder)   • Vaginal atrophy   • Dizziness   • Urinary frequency   • Acute low back pain without sciatica   • Pancreatic cyst   • Adrenal nodule (HCC)   • Age-related osteoporosis without current pathological fracture   • Chronic midline thoracic back pain   • Postural kyphosis of thoracic region      Past Medical and Surgical History:     Past Medical History:   Diagnosis Date   • Anxiety    • Back pain    • Cancer ([address])     skin   • Cataract    • COPD (chronic obstructive pulmonary disease) (HCC)    • Disease of thyroid gland    • Emphysema lung (HCC)    • Emphysema of lung ([address])    • Geographic tongue     last assessed: 07/25/2014   • GERD (gastroesophageal reflux disease)    • Hyperlipidemia    • Hypertension    • Hypothyroidism (acquired)    • Mild intermittent asthma    • Multiple pulmonary nodules 7/5/2017    · Last imaging study April 2017 shows stable nodules, no further follow-up recommended · Initial CT scan was 2012 at [facility] demonstrating a 6 mm right upper lobe nodule   • Sciatica    • Seasonal allergies      Past Surgical History:   Procedure Laterality Date   • BACK SURGERY     • ENDOSCOPIC ULTRASOUND (UPPER)  12/2020   • SKIN LESION EXCISION        Family History:     Family History   Problem Relation Age of Onset   • Stroke Mother    • Cirrhosis Father         alcoholic   • Cancer Sister    • Cancer Daughter       Social History:     Social History     Socioeconomic History   • Marital status: /Civil Union     Spouse name: None   • Number of children: 3   • Years of education: None   • Highest education level: None Occupational History   • Occupation: Retired   Tobacco Use   • Smoking status: Former     Packs/day: 2.00     Years: 36.00     Total pack years: 72.00     Types: Cigarettes     Start date:      Quit date:      Years since quittin.5   • Smokeless tobacco: Never   Vaping Use   • Vaping Use: Never used   Substance and Sexual Activity   • Alcohol use: No   • Drug use: No   • Sexual activity: Never     Birth control/protection: None   Other Topics Concern   • None   Social History Narrative    Denied history of active advance directive     Always uses a seat belt    Daily caffeine consumption: 2-3 servings/day     Lack physical exercise     No living will    Denied history of substances, toxic environmental    Supportive and safe     Social Determinants of Health     Financial Resource Strain: Low Risk  (2023)    Overall Financial Resource Strain (CARDIA)    • Difficulty of Paying Living Expenses: Not hard at all   Food Insecurity: Not on file   Transportation Needs: No Transportation Needs (2023)    PRAPARE - Transportation    • Lack of Transportation (Medical): No    • Lack of Transportation (Non-Medical):  No   Physical Activity: Not on file   Stress: Not on file   Social Connections: Not on file   Intimate Partner Violence: Not on file   Housing Stability: Not on file      Medications and Allergies:     Current Outpatient Medications   Medication Sig Dispense Refill   • Ascorbic Acid (VITAMIN C PO) Take 600 mg by mouth     • aspirin 81 MG tablet Take 81 mg by mouth daily     • atorvastatin (LIPITOR) 10 mg tablet Take 1 tablet (10 mg total) by mouth daily 90 tablet 1   • Calcium Carbonate (CALCIUM 600 PO) Take by mouth     • Cholecalciferol (VITAMIN D3) 2000 units capsule Take 4,000 Units by mouth daily      • cloNIDine (CATAPRES) 0.1 mg tablet Take 1 tablet (0.1 mg total) by mouth every 12 (twelve) hours 90 tablet 1   • diltiazem (CARDIZEM CD) 180 mg 24 hr capsule Take 1 capsule (180 mg total) by mouth daily at bedtime 90 capsule 1   • famotidine (PEPCID) 20 mg tablet Take 1 tablet (20 mg total) by mouth 2 (two) times a day 90 tablet 3   • hydrochlorothiazide (HYDRODIURIL) 25 mg tablet Take 1 tablet (25 mg total) by mouth daily 90 tablet 1   • levothyroxine 50 mcg tablet Take 1 tablet (50 mcg total) by mouth daily 90 tablet 1   • Multiple Vitamin (multivitamin) capsule Take 1 capsule by mouth daily     • Omega-3 Fatty Acids (FISH OIL PO) Take 1,200 mg by mouth 2 (two) times a day     • pantoprazole (PROTONIX) 40 mg tablet Take 1 tablet by mouth 30 minutes before breakfast daily. 90 tablet 3   • Zinc 50 MG TABS Take 25 mg by mouth     • albuterol (PROVENTIL HFA,VENTOLIN HFA) 90 mcg/act inhaler Inhale 2 puffs every 6 (six) hours as needed for wheezing (Patient not taking: Reported on 7/6/2023) 54 g 3   • alendronate (FOSAMAX) 70 mg tablet TAKE ONE TABLET BY MOUTH EVERY 7 DAYS. TAKE WITH A FULL GLASS OF WATER, ON AN EMPTY STOMACH AND DO NOT LIE DOWN FOR 30 MINUTES AFTERWARDS     • azelastine (ASTELIN) 0.1 % nasal spray 1 spray into each nostril 2 (two) times a day Use in each nostril as directed (Patient not taking: Reported on 7/6/2023) 1 mL 2   • baclofen 10 mg tablet Take 1 tablet by mouth 3 (three) times a day     • dicyclomine (BENTYL) 10 mg capsule TAKE ONE CAPSULE BY MOUTH FOUR TIMES A DAY BEFORE MEALS AND AT BEDTIME     • ipratropium-albuterol (DUO-NEB) 0.5-2.5 mg/3 mL nebulizer solution TAKE 3ML BY NEBULIZATION 3 TIMES A DAY (Patient not taking: Reported on 7/6/2023)     • montelukast (SINGULAIR) 10 mg tablet Take 1 tablet (10 mg total) by mouth daily at bedtime (Patient not taking: Reported on 7/6/2023) 30 tablet 3   • umeclidinium-vilanterol (Anoro Ellipta) 62.5-25 MCG/INH inhaler Inhale 1 puff daily (Patient not taking: Reported on 7/6/2023) 180 blister 3     No current facility-administered medications for this visit.      Allergies   Allergen Reactions   • Other Other (See Comments) Steroids, it effects her eyes      Immunizations:     Immunization History   Administered Date(s) Administered   • COVID-19 MODERNA VACC 0.5 ML IM 02/12/2021, 03/10/2021, 11/03/2021   • INFLUENZA 03/10/2021, 11/11/2022   • Influenza Split High Dose Preservative Free IM 09/08/2017   • Influenza, high dose seasonal 0.7 mL 10/26/2018   • Influenza, recombinant, quadrivalent,injectable, preservative free 10/29/2020, 10/14/2021, 11/11/2022   • Pneumococcal Conjugate 13-Valent 02/18/2016, 06/06/2017   • Pneumococcal Polysaccharide PPV23 10/14/2021   • Td (adult), adsorbed 07/13/2007      Health Maintenance:         Topic Date Due   • Breast Cancer Screening: Mammogram  07/06/2024 (Originally 6/11/2022)         Topic Date Due   • COVID-19 Vaccine (4 - Guevara Jose D series) 12/29/2021   • Influenza Vaccine (1) 09/01/2023      Medicare Screening Tests and Risk Assessments:     Antonina Mc is here for her Subsequent Wellness visit. Health Risk Assessment:   Patient rates overall health as excellent. Patient feels that their physical health rating is same. Patient is satisfied with their life. Eyesight was rated as same. Hearing was rated as same. Patient feels that their emotional and mental health rating is same. Patients states they are sometimes angry. Patient states they are always unusually tired/fatigued. Pain experienced in the last 7 days has been none. Patient states that she has experienced no weight loss or gain in last 6 months. Fall Risk Screening: In the past year, patient has experienced: no history of falling in past year      Urinary Incontinence Screening:   Patient has not leaked urine accidently in the last six months. Home Safety:  Patient has trouble with stairs inside or outside of their home. Patient has working smoke alarms and has working carbon monoxide detector. Home safety hazards include: none. Nutrition:   Current diet is Regular.      Medications:   Patient is currently taking over-the-counter supplements. OTC medications include: see medication list. Patient is able to manage medications. Activities of Daily Living (ADLs)/Instrumental Activities of Daily Living (IADLs):   Walk and transfer into and out of bed and chair?: Yes  Dress and groom yourself?: Yes    Bathe or shower yourself?: Yes    Feed yourself? Yes  Do your laundry/housekeeping?: Yes  Manage your money, pay your bills and track your expenses?: Yes  Make your own meals?: Yes    Do your own shopping?: No    ADL comments: Difficult due to copd. Being oxygen dependent    Previous Hospitalizations:   Any hospitalizations or ED visits within the last 12 months?: No      Advance Care Planning:   Living will: Yes    Durable POA for healthcare: Yes    Advanced directive: Yes      Comments:   Is poa    Cognitive Screening:   Provider or family/friend/caregiver concerned regarding cognition?: No    PREVENTIVE SCREENINGS      Cardiovascular Screening:    General: History Lipid Disorder and Screening Current      Diabetes Screening:     General: Screening Current      Colorectal Cancer Screening:     General: Screening Not Indicated      Breast Cancer Screening:     General: Screening Not Indicated      Cervical Cancer Screening:    General: Screening Not Indicated      Osteoporosis Screening:    General: History Osteoporosis and Screening Current      Abdominal Aortic Aneurysm (AAA) Screening:        General: Screening Not Indicated      Lung Cancer Screening:     General: Screening Not Indicated      Hepatitis C Screening:    General: Screening Not Indicated    Screening, Brief Intervention, and Referral to Treatment (SBIRT)    Screening  Typical number of drinks in a day: 0  Typical number of drinks in a week: 0  Interpretation: Low risk drinking behavior. Single Item Drug Screening:  How often have you used an illegal drug (including marijuana) or a prescription medication for non-medical reasons in the past year? never    Single Item Drug Screen Score: 0  Interpretation: Negative screen for possible drug use disorder    No results found. Physical Exam:     /74 (BP Location: Right arm, Patient Position: Sitting, Cuff Size: Standard)   Pulse 82   Temp 98.3 °F (36.8 °C) (Temporal)   Ht 5' 5" (1.651 m) Comment: on file  Wt 63.2 kg (139 lb 6.4 oz)   BMI 23.20 kg/m²     Physical Exam  Vitals reviewed. Constitutional:       Appearance: Normal appearance. Neck:      Vascular: No carotid bruit. Cardiovascular:      Rate and Rhythm: Normal rate and regular rhythm. Pulses: Normal pulses. Heart sounds: Normal heart sounds. Pulmonary:      Effort: Pulmonary effort is normal.      Comments: Decreased bs at  abses  Musculoskeletal:      Right lower leg: Edema present. Left lower leg: Edema present. Comments: 2  Plus edema   Lymphadenopathy:      Cervical: No cervical adenopathy. Neurological:      Mental Status: She is alert.           Devera Scheuermann, MD

## 2023-07-07 ENCOUNTER — TELEPHONE (OUTPATIENT)
Dept: FAMILY MEDICINE CLINIC | Facility: CLINIC | Age: 83
End: 2023-07-07

## 2023-07-07 NOTE — TELEPHONE ENCOUNTER
[place] from rheum, Dr. Michelle Logan wants pt to let  Her  Know which durable medical provider she wants to use  For back brace-pt should call  Dr. Remy Clark office

## 2023-07-13 ENCOUNTER — TELEPHONE (OUTPATIENT)
Dept: FAMILY MEDICINE CLINIC | Facility: CLINIC | Age: 83
End: 2023-07-13

## 2023-07-13 NOTE — TELEPHONE ENCOUNTER
Patient called in anf stated that she has been taking her medication wrong. Her clonidine 0.1 mg she has only been taking it once a day instead of twice. Patient then patient also hasnt been taking her medication diltiazem 180 mg at all. Patient is confused on these two and would like call back for help.  Please advise

## 2023-07-24 ENCOUNTER — TELEPHONE (OUTPATIENT)
Dept: FAMILY MEDICINE CLINIC | Facility: CLINIC | Age: 83
End: 2023-07-24

## 2023-07-24 NOTE — TELEPHONE ENCOUNTER
Spoke to pt and daughter and gave her your recommendation. Daughter stated that pt was only taking 1 clonidine a day and was not aware she was suppose to do 2 a day. But they will have her take 2 day as today

## 2023-07-24 NOTE — TELEPHONE ENCOUNTER
I'm calling for Mary Chung. She's taking a new blood pressure medication, diltiazem, and she's also taking the one she was taking, Clonidine, the new prescription is not agreeing with her stomach and she's having a little bit more difficulty breathing on that medicine. So she stopped taking it. But she did realize that the Clonidine that she was on for quite some time, she was only taking one pill a day. So our question is, should she stop it? Should she start taking two of the Clonidine, one every 12 hours, and see if that helps lower her blood pressure? Or does Doctor Pierce have another idea for her? If you could give her a call, it's [phone]. And again, it's Mary Chung and birthday is [phone] Thank you.     triamcinolone (KENALOG) 0.1 % cream      Last Written Prescription Date: 6/30/2017  Last Fill Quantity: 80,  # refills: 0   Last Office Visit with [facility], [facility] or [facility] prescribing provider: 1/9/2015

## 2023-08-08 LAB
ALBUMIN SERPL-MCNC: 4.1 G/DL (ref 3.6–5.1)
ALBUMIN/GLOB SERPL: 1.8 (CALC) (ref 1–2.5)
ALP SERPL-CCNC: 51 U/L (ref 37–153)
ALT SERPL-CCNC: 22 U/L (ref 6–29)
AST SERPL-CCNC: 25 U/L (ref 10–35)
BASOPHILS # BLD AUTO: 53 CELLS/UL (ref 0–200)
BASOPHILS NFR BLD AUTO: 1.1 %
BILIRUB SERPL-MCNC: 0.6 MG/DL (ref 0.2–1.2)
BUN SERPL-MCNC: 16 MG/DL (ref 7–25)
BUN/CREAT SERPL: ABNORMAL (CALC) (ref 6–22)
CALCIUM SERPL-MCNC: 9 MG/DL (ref 8.6–10.4)
CHLORIDE SERPL-SCNC: 90 MMOL/L (ref 98–110)
CHOLEST SERPL-MCNC: 194 MG/DL
CHOLEST/HDLC SERPL: 1.8 (CALC)
CO2 SERPL-SCNC: 40 MMOL/L (ref 20–32)
CREAT SERPL-MCNC: 0.63 MG/DL (ref 0.6–0.95)
EOSINOPHIL # BLD AUTO: 91 CELLS/UL (ref 15–500)
EOSINOPHIL NFR BLD AUTO: 1.9 %
ERYTHROCYTE [DISTWIDTH] IN BLOOD BY AUTOMATED COUNT: 12.7 % (ref 11–15)
GFR/BSA.PRED SERPLBLD CYS-BASED-ARV: 87 ML/MIN/1.73M2
GLOBULIN SER CALC-MCNC: 2.3 G/DL (CALC) (ref 1.9–3.7)
GLUCOSE SERPL-MCNC: 105 MG/DL (ref 65–99)
HCT VFR BLD AUTO: 37.2 % (ref 35–45)
HDLC SERPL-MCNC: 105 MG/DL
HGB BLD-MCNC: 12.5 G/DL (ref 11.7–15.5)
LDLC SERPL CALC-MCNC: 75 MG/DL (CALC)
LYMPHOCYTES # BLD AUTO: 1502 CELLS/UL (ref 850–3900)
LYMPHOCYTES NFR BLD AUTO: 31.3 %
MCH RBC QN AUTO: 28.7 PG (ref 27–33)
MCHC RBC AUTO-ENTMCNC: 33.6 G/DL (ref 32–36)
MCV RBC AUTO: 85.3 FL (ref 80–100)
MONOCYTES # BLD AUTO: 355 CELLS/UL (ref 200–950)
MONOCYTES NFR BLD AUTO: 7.4 %
NEUTROPHILS # BLD AUTO: 2798 CELLS/UL (ref 1500–7800)
NEUTROPHILS NFR BLD AUTO: 58.3 %
NONHDLC SERPL-MCNC: 89 MG/DL (CALC)
PLATELET # BLD AUTO: 255 THOUSAND/UL (ref 140–400)
PMV BLD REES-ECKER: 10.5 FL (ref 7.5–12.5)
POTASSIUM SERPL-SCNC: 3.7 MMOL/L (ref 3.5–5.3)
PROT SERPL-MCNC: 6.4 G/DL (ref 6.1–8.1)
RBC # BLD AUTO: 4.36 MILLION/UL (ref 3.8–5.1)
SODIUM SERPL-SCNC: 135 MMOL/L (ref 135–146)
T4 FREE SERPL-MCNC: 1.3 NG/DL (ref 0.8–1.8)
TRIGL SERPL-MCNC: 49 MG/DL
TSH SERPL-ACNC: 1.5 MIU/L (ref 0.4–4.5)
WBC # BLD AUTO: 4.8 THOUSAND/UL (ref 3.8–10.8)

## 2023-08-11 DIAGNOSIS — J43.2 CENTRILOBULAR EMPHYSEMA (HCC): ICD-10-CM

## 2023-08-12 RX ORDER — UMECLIDINIUM BROMIDE AND VILANTEROL TRIFENATATE 62.5; 25 UG/1; UG/1
1 POWDER RESPIRATORY (INHALATION) DAILY
Qty: 1 BLISTER | Refills: 5 | Status: SHIPPED | OUTPATIENT
Start: 2023-08-12 | End: 2023-08-14 | Stop reason: SDUPTHER

## 2023-08-14 RX ORDER — UMECLIDINIUM BROMIDE AND VILANTEROL TRIFENATATE 62.5; 25 UG/1; UG/1
1 POWDER RESPIRATORY (INHALATION) DAILY
Qty: 60 BLISTER | Refills: 6 | Status: SHIPPED | OUTPATIENT
Start: 2023-08-14

## 2023-09-22 ENCOUNTER — OFFICE VISIT (OUTPATIENT)
Dept: PULMONOLOGY | Facility: CLINIC | Age: 83
End: 2023-09-22
Payer: COMMERCIAL

## 2023-09-22 VITALS
SYSTOLIC BLOOD PRESSURE: 130 MMHG | BODY MASS INDEX: 23.32 KG/M2 | HEART RATE: 85 BPM | DIASTOLIC BLOOD PRESSURE: 80 MMHG | RESPIRATION RATE: 18 BRPM | HEIGHT: 65 IN | OXYGEN SATURATION: 93 % | WEIGHT: 140 LBS

## 2023-09-22 DIAGNOSIS — J44.9 CHRONIC OBSTRUCTIVE PULMONARY DISEASE, UNSPECIFIED COPD TYPE (HCC): Primary | ICD-10-CM

## 2023-09-22 DIAGNOSIS — J96.11 CHRONIC RESPIRATORY FAILURE WITH HYPOXIA (HCC): ICD-10-CM

## 2023-09-22 PROCEDURE — 99215 OFFICE O/P EST HI 40 MIN: CPT | Performed by: INTERNAL MEDICINE

## 2023-09-22 NOTE — PROGRESS NOTES
Progress note - Pulmonary Medicine   Noble James 79 y.o. female MRN: [id number]       Impression & Plan:     Chronic obstructive pulmonary disease ([address])  · She is using Anoro daily  · Using the nebulizer twice daily with DuoNeb  · She is occasionally having some headaches/lightheadedness when she takes the Anoro. I suggested that she try taking DuoNeb 3 times a day without taking Anoro to see if her symptoms remain stable  · If symptoms are stable, she will discontinue the Anoro permanently  · If she feels better with the use of Anoro, we will reorder Anoro but she would need adjustment in her prescription to a 90-day prescription    Chronic respiratory failure with hypoxia (HCC)  · Using oxygen appropriately at 4 L  · Continue present oxygen      Return in about 6 months (around 3/22/2024). We discussed vaccinations today. She is due for influenza vaccination. Also discussed COVID vaccination. Recommendations given. She is scheduled for flu vaccination with her primary care physician. She will inquire about COVID vaccination from her pharmacy    Also discussed bronchoscopic lung volume reduction. Her pulmonary function test would qualify her but given her age, comorbid conditions, and intensity of the work-up and CT of the procedure, she does not wish to pursue. E&M time spent for evaluation and discussions of the above was 43 minutes    ______________________________________________________________________    HPI:    Noble James presents today for follow-up of emphysema and chronic hypoxemic respiratory failure. She is accompanied by her  who also provides some additional history. She has continued to have shortness of breath. She feels this is slowly progressing. She does report that she has become progressively more stooped with her posture and has lost height as well likely contributing to a degree of restriction.   She continues on [employer] but has recently had some headache/lightheadedness associated with taking this. It is short-lived but it is a newer side effect for her. She has been taking the nebulizer consistently. She takes it twice a day. She has not needed it more frequently    No worsening cough or phlegm. No chest pain. No abdominal pain. She has chronic back pain and is going to be fitted for a brace. She walks with a rollator. No fever, chills, or sick contacts. She did have a respiratory infection earlier in the late spring which improved with antibiotics    She did have questions today about vaccinations. Also has a brother who is undergoing VR work-up and she had some questions regarding that    Current Medications:    Current Outpatient Medications:   •  albuterol (PROVENTIL HFA,VENTOLIN HFA) 90 mcg/act inhaler, Inhale 2 puffs every 6 (six) hours as needed for wheezing, Disp: 54 g, Rfl: 3  •  alendronate (FOSAMAX) 70 mg tablet, TAKE ONE TABLET BY MOUTH EVERY 7 DAYS.  TAKE WITH A FULL GLASS OF WATER, ON AN EMPTY STOMACH AND DO NOT LIE DOWN FOR 30 MINUTES AFTERWARDS, Disp: , Rfl:   •  Ascorbic Acid (VITAMIN C PO), Take 600 mg by mouth, Disp: , Rfl:   •  aspirin 81 MG tablet, Take 81 mg by mouth daily, Disp: , Rfl:   •  atorvastatin (LIPITOR) 10 mg tablet, Take 1 tablet (10 mg total) by mouth daily, Disp: 90 tablet, Rfl: 1  •  azelastine (ASTELIN) 0.1 % nasal spray, 1 spray into each nostril 2 (two) times a day Use in each nostril as directed, Disp: 1 mL, Rfl: 2  •  baclofen 10 mg tablet, Take 1 tablet by mouth 3 (three) times a day, Disp: , Rfl:   •  Calcium Carbonate (CALCIUM 600 PO), Take by mouth, Disp: , Rfl:   •  Cholecalciferol (VITAMIN D3) 2000 units capsule, Take 4,000 Units by mouth daily , Disp: , Rfl:   •  cloNIDine (CATAPRES) 0.1 mg tablet, Take 1 tablet (0.1 mg total) by mouth every 12 (twelve) hours, Disp: 90 tablet, Rfl: 1  •  dicyclomine (BENTYL) 10 mg capsule, TAKE ONE CAPSULE BY MOUTH FOUR TIMES A DAY BEFORE MEALS AND AT BEDTIME, Disp: , Rfl:   •  diltiazem (CARDIZEM CD) 180 mg 24 hr capsule, Take 1 capsule (180 mg total) by mouth daily at bedtime, Disp: 90 capsule, Rfl: 1  •  famotidine (PEPCID) 20 mg tablet, Take 1 tablet (20 mg total) by mouth 2 (two) times a day, Disp: 90 tablet, Rfl: 3  •  hydrochlorothiazide (HYDRODIURIL) 25 mg tablet, Take 1 tablet (25 mg total) by mouth daily, Disp: 90 tablet, Rfl: 1  •  ipratropium-albuterol (DUO-NEB) 0.5-2.5 mg/3 mL nebulizer solution, , Disp: , Rfl:   •  levothyroxine 50 mcg tablet, Take 1 tablet (50 mcg total) by mouth daily, Disp: 90 tablet, Rfl: 1  •  montelukast (SINGULAIR) 10 mg tablet, Take 1 tablet (10 mg total) by mouth daily at bedtime, Disp: 30 tablet, Rfl: 3  •  Multiple Vitamin (multivitamin) capsule, Take 1 capsule by mouth daily, Disp: , Rfl:   •  Omega-3 Fatty Acids (FISH OIL PO), Take 1,200 mg by mouth 2 (two) times a day, Disp: , Rfl:   •  pantoprazole (PROTONIX) 40 mg tablet, Take 1 tablet by mouth 30 minutes before breakfast daily. , Disp: 90 tablet, Rfl: 3  •  umeclidinium-vilanterol (Anoro Ellipta) 62.5-25 mcg/actuation inhaler, Inhale 1 puff daily, Disp: 60 blister, Rfl: 6  •  Zinc 50 MG TABS, Take 25 mg by mouth, Disp: , Rfl:     Review of Systems:  Aside from what is mentioned in the HPI, the review of systems is otherwise negative    Past medical history, surgical history, and family history were reviewed and updated as appropriate    Social history updates:  Social History     Tobacco Use   Smoking Status Former   • Packs/day: 2.00   • Years: 36.00   • Total pack years: 72.00   • Types: Cigarettes   • Start date:    • Quit date:    • Years since quittin.7   Smokeless Tobacco Never       PhysicalExamination:  Vitals:   /80 (BP Location: Right arm, Patient Position: Sitting, Cuff Size: Standard)   Pulse 85   Resp 18   Ht 5' 5" (1.651 m)   Wt 63.5 kg (140 lb)   SpO2 93%   BMI 23.30 kg/m²   Gen: Appears comfortable on 4 L cannula.    HEENT: Conjugate gaze. Sclera anicteric . Oropharynx is clear, moist  Neck: Supple. There is no JVD, lymphadenopathy or thyromegaly. Trachea is midline. Chest: Chest excursion symmetric. Lungs are hyperinflated. Breath sounds are distant bilaterally but otherwise clear. Hyper-resonant to percussion. Cardiac: Regular. There are no murmurs  Abdomen: Benign  Extremities: No clubbing, cyanosis or edema. Neurologic: Normal speech and mentation. Skin: No appreciable rashes. Diagnostic Data:  Labs: I personally reviewed the most recent laboratory data pertinent to today's visit    Lab Results   Component Value Date    WBC 4.8 08/08/2023    HGB 12.5 08/08/2023    HCT 37.2 08/08/2023    MCV 85.3 08/08/2023     08/08/2023     Lab Results   Component Value Date    SODIUM 135 08/08/2023    K 3.7 08/08/2023    CO2 40 (H) 08/08/2023    CL 90 (L) 08/08/2023    BUN 16 08/08/2023    CREATININE 0.63 08/08/2023    CALCIUM 9.0 08/08/2023     Imaging:  Has not had recent pulmonary imaging. Last chest x-ray was from June 2022. At that time showed improving pneumonia.     She does not meet lung cancer screening criteria due to age    Elizabet Jiang MD

## 2023-09-22 NOTE — ASSESSMENT & PLAN NOTE
· She is using Anoro daily  · Using the nebulizer twice daily with DuoNeb  · She is occasionally having some headaches/lightheadedness when she takes the Anoro.   I suggested that she try taking DuoNeb 3 times a day without taking Anoro to see if her symptoms remain stable  · If symptoms are stable, she will discontinue the Anoro permanently  · If she feels better with the use of Anoro, we will reorder Anoro but she would need adjustment in her prescription to a 90-day prescription

## 2023-10-02 ENCOUNTER — HOSPITAL ENCOUNTER (OUTPATIENT)
Dept: INFUSION CENTER | Facility: CLINIC | Age: 83
Discharge: HOME | End: 2023-10-02
Payer: COMMERCIAL

## 2023-10-02 VITALS
DIASTOLIC BLOOD PRESSURE: 65 MMHG | SYSTOLIC BLOOD PRESSURE: 169 MMHG | HEART RATE: 86 BPM | TEMPERATURE: 98.6 F | RESPIRATION RATE: 16 BRPM

## 2023-10-02 DIAGNOSIS — M81.0 AGE-RELATED OSTEOPOROSIS WITHOUT CURRENT PATHOLOGICAL FRACTURE: Primary | ICD-10-CM

## 2023-10-02 PROCEDURE — 96365 THER/PROPH/DIAG IV INF INIT: CPT

## 2023-10-02 RX ORDER — SODIUM CHLORIDE 9 MG/ML
20 INJECTION, SOLUTION INTRAVENOUS ONCE
Status: CANCELLED | OUTPATIENT
Start: 2023-10-02

## 2023-10-02 RX ORDER — ZOLEDRONIC ACID 5 MG/100ML
5 INJECTION, SOLUTION INTRAVENOUS ONCE
Status: CANCELLED | OUTPATIENT
Start: 2023-10-02

## 2023-10-02 RX ORDER — SODIUM CHLORIDE 9 MG/ML
20 INJECTION, SOLUTION INTRAVENOUS ONCE
Status: COMPLETED | OUTPATIENT
Start: 2023-10-02 | End: 2023-10-02

## 2023-10-02 RX ORDER — ZOLEDRONIC ACID 5 MG/100ML
5 INJECTION, SOLUTION INTRAVENOUS ONCE
Status: COMPLETED | OUTPATIENT
Start: 2023-10-02 | End: 2023-10-02

## 2023-10-02 RX ADMIN — SODIUM CHLORIDE 20 ML/HR: 0.9 INJECTION, SOLUTION INTRAVENOUS at 10:42

## 2023-10-02 RX ADMIN — ZOLEDRONIC ACID 5 MG: 0.05 INJECTION, SOLUTION INTRAVENOUS at 10:45

## 2023-10-02 NOTE — PROGRESS NOTES
Patient arrived for last reclast infusion. PIV inserted in RFA with good blood return. Infusion tolerated. AVS printed and patient with no appt needs at this time. PIV removed and patient left infusion center in baseline condition.

## 2023-10-02 NOTE — PLAN OF CARE
Problem: Potential for Falls  Goal: Patient will remain free of falls  Description: INTERVENTIONS:  - Educate patient/family on patient safety including physical limitations  - Instruct patient to call for assistance with activity    - Keep Call bell within reach    Outcome: Progressing

## 2023-10-03 DIAGNOSIS — I10 ESSENTIAL HYPERTENSION: ICD-10-CM

## 2023-10-03 RX ORDER — CLONIDINE HYDROCHLORIDE 0.1 MG/1
0.1 TABLET ORAL EVERY 12 HOURS
Qty: 90 TABLET | Refills: 1 | Status: SHIPPED | OUTPATIENT
Start: 2023-10-03

## 2023-10-13 DIAGNOSIS — J43.2 CENTRILOBULAR EMPHYSEMA (HCC): ICD-10-CM

## 2023-10-13 RX ORDER — UMECLIDINIUM BROMIDE AND VILANTEROL TRIFENATATE 62.5; 25 UG/1; UG/1
1 POWDER RESPIRATORY (INHALATION) DAILY
Qty: 180 BLISTER | Refills: 1 | Status: SHIPPED | OUTPATIENT
Start: 2023-10-13

## 2023-10-13 NOTE — TELEPHONE ENCOUNTER
Pt calling in requesting 90D supply for anoro inhaler, be sent to [facility] on file.  Please advise

## 2023-10-24 ENCOUNTER — TELEPHONE (OUTPATIENT)
Dept: FAMILY MEDICINE CLINIC | Facility: CLINIC | Age: 83
End: 2023-10-24

## 2023-10-24 DIAGNOSIS — E03.9 ACQUIRED HYPOTHYROIDISM: ICD-10-CM

## 2023-10-24 RX ORDER — LEVOTHYROXINE SODIUM 0.05 MG/1
50 TABLET ORAL DAILY
Qty: 90 TABLET | Refills: 1 | Status: SHIPPED | OUTPATIENT
Start: 2023-10-24 | End: 2023-10-26 | Stop reason: SDUPTHER

## 2023-10-24 NOTE — TELEPHONE ENCOUNTER
Pt called asking for refill of her thyroid medication. Wasn't sure what it was called, but I believe it is her levothyroxine? States she has 7 pills left. Can be called into the [facility] on [address] [facility].

## 2023-10-26 DIAGNOSIS — E03.9 ACQUIRED HYPOTHYROIDISM: ICD-10-CM

## 2023-10-26 RX ORDER — LEVOTHYROXINE SODIUM 0.05 MG/1
50 TABLET ORAL DAILY
Qty: 90 TABLET | Refills: 1 | Status: SHIPPED | OUTPATIENT
Start: 2023-10-26

## 2023-11-15 ENCOUNTER — OFFICE VISIT (OUTPATIENT)
Dept: FAMILY MEDICINE CLINIC | Facility: CLINIC | Age: 83
End: 2023-11-15
Payer: COMMERCIAL

## 2023-11-15 VITALS
BODY MASS INDEX: 23.36 KG/M2 | WEIGHT: 140.38 LBS | HEART RATE: 81 BPM | SYSTOLIC BLOOD PRESSURE: 158 MMHG | DIASTOLIC BLOOD PRESSURE: 80 MMHG | TEMPERATURE: 97.3 F | OXYGEN SATURATION: 93 %

## 2023-11-15 DIAGNOSIS — E03.9 ACQUIRED HYPOTHYROIDISM: ICD-10-CM

## 2023-11-15 DIAGNOSIS — Z23 ENCOUNTER FOR IMMUNIZATION: ICD-10-CM

## 2023-11-15 DIAGNOSIS — I10 ESSENTIAL HYPERTENSION: Primary | ICD-10-CM

## 2023-11-15 DIAGNOSIS — J44.9 CHRONIC OBSTRUCTIVE PULMONARY DISEASE, UNSPECIFIED COPD TYPE (HCC): ICD-10-CM

## 2023-11-15 DIAGNOSIS — E78.2 MIXED HYPERLIPIDEMIA: ICD-10-CM

## 2023-11-15 PROBLEM — M54.50 ACUTE LOW BACK PAIN WITHOUT SCIATICA: Status: RESOLVED | Noted: 2020-05-16 | Resolved: 2023-11-15

## 2023-11-15 PROCEDURE — G0008 ADMIN INFLUENZA VIRUS VAC: HCPCS | Performed by: FAMILY MEDICINE

## 2023-11-15 PROCEDURE — 90686 IIV4 VACC NO PRSV 0.5 ML IM: CPT | Performed by: FAMILY MEDICINE

## 2023-11-15 PROCEDURE — 99214 OFFICE O/P EST MOD 30 MIN: CPT | Performed by: FAMILY MEDICINE

## 2023-11-15 RX ORDER — HYDROCHLOROTHIAZIDE 25 MG/1
25 TABLET ORAL DAILY
Qty: 90 TABLET | Refills: 1 | Status: SHIPPED | OUTPATIENT
Start: 2023-11-15

## 2023-11-15 NOTE — PROGRESS NOTES
Name: Anibal Cali      : 1940      MRN: [id number]  Encounter Provider: Malathi Sánchez MD  Encounter Date: 11/15/2023   Encounter department: [facility] PRIMARY CARE    Assessment & Plan     1. Essential hypertension  Comments:  BP stable  Assessment & Plan:  BP  stable    Orders:  -     hydrochlorothiazide (HYDRODIURIL) 25 mg tablet; Take 1 tablet (25 mg total) by mouth daily  -     Comprehensive metabolic panel; Future; Expected date: 02/15/2024    2. Acquired hypothyroidism  Assessment & Plan:  Thyroid  stable    Orders:  -     TSH, 3rd generation; Future; Expected date: 02/15/2024    3. Chronic obstructive pulmonary disease, unspecified COPD type ([address])  Assessment & Plan:  On  4.5  liters. Saw  pulmonary  earlier  in fall, shea s have  mild hypercapnia, regular  deep breathing exercises      4. Mixed hyperlipidemia  Assessment & Plan:  Stable lab    Orders:  -     Lipid panel; Future; Expected date: 02/15/2024    5. Encounter for immunization  -     influenza vaccine, quadrivalent, 0.5 mL, preservative-free, for adult and pediatric patients 6 mos+ (AFLURIA, FLUARIX, FLULAVAL, FLUZONE)           Subjective      Here  for  BP check, thyroid, still tired, has  slight scratchy throat, no temp, no change  in color  or thickness of  mucus, slight headache  yesterday      Review of Systems   Constitutional:  Positive for fatigue. Negative for activity change and appetite change. HENT:  Positive for postnasal drip and sore throat. Negative for congestion, ear pain, rhinorrhea, sinus pressure and sinus pain. Respiratory:  Positive for shortness of breath and wheezing. Current Outpatient Medications on File Prior to Visit   Medication Sig    albuterol (PROVENTIL HFA,VENTOLIN HFA) 90 mcg/act inhaler Inhale 2 puffs every 6 (six) hours as needed for wheezing    alendronate (FOSAMAX) 70 mg tablet TAKE ONE TABLET BY MOUTH EVERY 7 DAYS.  TAKE WITH A FULL GLASS OF WATER, ON AN EMPTY STOMACH AND DO NOT LIE DOWN FOR 30 MINUTES AFTERWARDS    Ascorbic Acid (VITAMIN C PO) Take 600 mg by mouth    aspirin 81 MG tablet Take 81 mg by mouth daily    atorvastatin (LIPITOR) 10 mg tablet Take 1 tablet (10 mg total) by mouth daily    azelastine (ASTELIN) 0.1 % nasal spray 1 spray into each nostril 2 (two) times a day Use in each nostril as directed    Calcium Carbonate (CALCIUM 600 PO) Take by mouth    Cholecalciferol (VITAMIN D3) 2000 units capsule Take 4,000 Units by mouth daily     cloNIDine (CATAPRES) 0.1 mg tablet TAKE ONE TABLET BY MOUTH EVERY 12 HOURS (Patient taking differently: Take 0.1 mg by mouth every 12 (twelve) hours Pt states one a day)    famotidine (PEPCID) 20 mg tablet Take 1 tablet (20 mg total) by mouth 2 (two) times a day    ipratropium-albuterol (DUO-NEB) 0.5-2.5 mg/3 mL nebulizer solution     levothyroxine 50 mcg tablet Take 1 tablet (50 mcg total) by mouth daily    montelukast (SINGULAIR) 10 mg tablet Take 1 tablet (10 mg total) by mouth daily at bedtime    Multiple Vitamin (multivitamin) capsule Take 1 capsule by mouth daily    Omega-3 Fatty Acids (FISH OIL PO) Take 1,200 mg by mouth 2 (two) times a day    umeclidinium-vilanterol (Anoro Ellipta) 62.5-25 mcg/actuation inhaler Inhale 1 puff daily    Zinc 50 MG TABS Take 25 mg by mouth    [DISCONTINUED] hydrochlorothiazide (HYDRODIURIL) 25 mg tablet Take 1 tablet (25 mg total) by mouth daily    baclofen 10 mg tablet Take 1 tablet by mouth 3 (three) times a day (Patient not taking: Reported on 11/15/2023)    dicyclomine (BENTYL) 10 mg capsule TAKE ONE CAPSULE BY MOUTH FOUR TIMES A DAY BEFORE MEALS AND AT BEDTIME (Patient not taking: Reported on 11/15/2023)    diltiazem (CARDIZEM CD) 180 mg 24 hr capsule Take 1 capsule (180 mg total) by mouth daily at bedtime (Patient not taking: Reported on 11/15/2023)    pantoprazole (PROTONIX) 40 mg tablet Take 1 tablet by mouth 30 minutes before breakfast daily.  (Patient not taking: Reported on 11/15/2023) Objective     /80 (BP Location: Left arm, Patient Position: Sitting, Cuff Size: Standard)   Pulse 81   Temp (!) 97.3 °F (36.3 °C) (Temporal)   Wt 63.7 kg (140 lb 6 oz)   SpO2 93%   BMI 23.36 kg/m²     Physical Exam  Vitals reviewed. Constitutional:       Appearance: Normal appearance. HENT:      Right Ear: Tympanic membrane normal.      Left Ear: Tympanic membrane normal.      Nose: No congestion or rhinorrhea. Mouth/Throat:      Pharynx: No oropharyngeal exudate or posterior oropharyngeal erythema. Comments: Post nasal drip  Cardiovascular:      Rate and Rhythm: Normal rate and regular rhythm. Pulses: Normal pulses. Heart sounds: Normal heart sounds. Pulmonary:      Effort: Pulmonary effort is normal.      Breath sounds: Wheezing present. Comments: At bases  Musculoskeletal:      Right lower leg: No edema. Left lower leg: No edema. Lymphadenopathy:      Cervical: No cervical adenopathy. Neurological:      Mental Status: She is alert.    Psychiatric:         Mood and Affect: Mood normal.       Lon Mccord MD

## 2023-11-15 NOTE — ASSESSMENT & PLAN NOTE
On  4.5  liters.  Saw  pulmonary  earlier  in fall, shea s have  mild hypercapnia, regular  deep breathing exercises

## 2023-11-17 ENCOUNTER — NURSE TRIAGE (OUTPATIENT)
Dept: OTHER | Facility: OTHER | Age: 83
End: 2023-11-17

## 2023-11-17 NOTE — TELEPHONE ENCOUNTER
Regarding: Covid Positive, on o2. Runny Nose, Weak, Sore Throat  ----- Message from Asael Meyers sent at 11/17/2023  6:48 PM EST -----  " My Wife tested Positive for Covid today. Her Symptoms are a Runny Nose, Very Weak, Sore Throat.  My Wife is on Oxygen all of the time."

## 2023-11-18 ENCOUNTER — TELEMEDICINE (OUTPATIENT)
Dept: FAMILY MEDICINE CLINIC | Facility: CLINIC | Age: 83
End: 2023-11-18
Payer: COMMERCIAL

## 2023-11-18 DIAGNOSIS — E78.2 MIXED HYPERLIPIDEMIA: ICD-10-CM

## 2023-11-18 DIAGNOSIS — U07.1 COVID-19: Primary | ICD-10-CM

## 2023-11-18 DIAGNOSIS — E03.9 ACQUIRED HYPOTHYROIDISM: ICD-10-CM

## 2023-11-18 DIAGNOSIS — J43.9 PULMONARY EMPHYSEMA, UNSPECIFIED EMPHYSEMA TYPE (HCC): ICD-10-CM

## 2023-11-18 PROCEDURE — 99213 OFFICE O/P EST LOW 20 MIN: CPT | Performed by: FAMILY MEDICINE

## 2023-11-18 RX ORDER — NIRMATRELVIR AND RITONAVIR 300-100 MG
3 KIT ORAL 2 TIMES DAILY
Qty: 30 TABLET | Refills: 0 | Status: SHIPPED | OUTPATIENT
Start: 2023-11-18 | End: 2023-11-23

## 2023-11-18 RX ORDER — ATORVASTATIN CALCIUM 10 MG/1
10 TABLET, FILM COATED ORAL DAILY
Qty: 90 TABLET | Refills: 1
Start: 2023-11-18

## 2023-11-18 NOTE — TELEPHONE ENCOUNTER
Reason for Disposition  • HIGH RISK for severe COVID complications (e.g., weak immune system, age > 59 years, obesity with BMI > 25, pregnant, chronic lung disease or other chronic medical condition)  (Exception: Already seen by PCP and no new or worsening symptoms.)    Answer Assessment - Initial Assessment Questions  Were you within 6 feet or less, for up to 15 minutes or more with a person that has a confirmed COVID-19 test?   Patient tested positive for COVID today. What was the date of your exposure? Unsure    Are you experiencing any symptoms attributed to the virus?  (Assess for SOB, cough, fever, difficulty breathing)   Chronic home O2, pulse ox 98%. Nasal congestion. Sore throat. Intermittent SOB at rest. Non-productive cough. HIGH RISK: Do you have any history heart or lung conditions, weakened immune system, diabetes, Asthma, CHF, HIV, COPD, Chemo, renal failure, sickle cell, etc?   COPD, chronic home 4L O2. Prior Covid infection took Paxlovid in the past with good results.     Protocols used: Coronavirus (PLFJO-21) Diagnosed or Suspected-ADULT-

## 2023-11-18 NOTE — PROGRESS NOTES
COVID-19 Outpatient Progress Note    Assessment/Plan:    Spoke with patient's , Jessica Barker. She tested positive for Covid today. Has nasal congestion, fatigue, cough, sore throat for past 2-3 days. No fever, chills. Baseline SOB due to COPD. Did well on Paxlovid in 2022 when she had COVID. Currently on Atorvastatin. 1. COVID-19  Comments:  2nd time. Did well with paxlovid in the past. At high risk due to COPD. HOLD ATORVASTATIN  GFR is 87  Orders:  -     nirmatrelvir & ritonavir (Paxlovid, 300/100,) tablet therapy pack; Take 3 tablets by mouth 2 (two) times a day for 5 days Take 2 nirmatrelvir tablets + 1 ritonavir tablet together per dose    2. Pulmonary emphysema, unspecified emphysema type ([address])  Comments:  Sx are stable at present. Pulse ox 97% on 4L [place]  Continue O2, Duoneb and Anoro Ellipta. 3. Acquired hypothyroidism  Comments:  Cont Levothyroxine    4. Mixed hyperlipidemia  Comments:  Hold atorvastatin while on Paxlovid. Orders:  -     atorvastatin (LIPITOR) 10 mg tablet; Take 1 tablet (10 mg total) by mouth daily    5. Mixed hyperlipidemia  -     atorvastatin (LIPITOR) 10 mg tablet; Take 1 tablet (10 mg total) by mouth daily    Emphasized need to stop atorvastatin when on Paxlovid. Pt to call office or proceed to ER if sx worsen or persist.        Problem List Items Addressed This Visit          Endocrine    Acquired hypothyroidism       Respiratory    Chronic obstructive pulmonary disease ([address])       Other    Mixed hyperlipidemia    Relevant Medications    atorvastatin (LIPITOR) 10 mg tablet     Other Visit Diagnoses       COVID-19    -  Primary    2nd time. Did well with paxlovid in the past. At high risk due to COPD.   HOLD ATORVASTATIN  GFR is 87    Relevant Medications    nirmatrelvir & ritonavir (Paxlovid, 300/100,) tablet therapy pack           Disposition:     Patient meets criteria for Paxlovid and they have been counseled appropriately regarding risks, benefits, side effects, and alternative treatment options. After discussion, patient agrees to treatment. Possible side effects of Paxlovid? Possible side effects of Paxlovid are:  - Liver Problems. Notify us right away if you start to experience loss of appetite, yellowing of your skin and the whites of eyes (jaundice), dark-colored urine, pale colored stools and itchy skin, stomach area (abdominal) pain. - Resistance to HIV Medicines. If you have untreated HIV infection, Paxlovid may lead to some HIV medicines not working as well in the future. - Other possible side effects include: altered sense of taste, diarrhea, high blood pressure, or muscle aches. I have spent a total time of 15 minutes on the day of the encounter for this patient including discussing diagnostic results, discussing prognosis, risks and benefits of treatment options and instructions for management. Encounter provider: Betha Sandifer, MD     Provider located at: [address] [place] [facility] [address] [phone]     Recent Visits  Date Type Provider Dept   11/15/23 Office Visit Sandi Mccarthy MD [facility] Primary Care   Showing recent visits within past 7 days and meeting all other requirements  Today's Visits  Date Type Provider Dept   11/18/23 Wilfredo Razo MD [facility] Primary Care   Showing today's visits and meeting all other requirements  Future Appointments  No visits were found meeting these conditions. Showing future appointments within next 150 days and meeting all other requirements     This virtual check-in was done via telephone and she agrees to proceed. Patient agrees to participate in a virtual check in via telephone or video visit instead of presenting to the office to address urgent/immediate medical needs. Patient is aware this is a billable service. She acknowledged consent and understanding of privacy and security of the video platform.  The patient has agreed to participate and understands they can discontinue the visit at any time. After connecting through Telephone, the patient was identified by name and date of birth. Antwon Hayden was informed that this was a telemedicine visit and that the exam was being conducted confidentially over secure lines. My office door was closed. No one else was in the room. Antwon Hayden acknowledged consent and understanding of privacy and security of the telemedicine visit. I informed the patient that I have reviewed her record in Epic and presented the opportunity for her to ask any questions regarding the visit today. The patient agreed to participate. Verification of patient location:  Patient is located in the following state in which I hold an active license: [place]    Subjective:   Antwon Hayden is a 79 y.o. female who is concerned about COVID-19. Patient's symptoms include fatigue, nasal congestion, sore throat, cough and shortness of breath. Patient denies fever and chills. - Date of symptom onset: 11/15/2023      COVID-19 vaccination status: Fully vaccinated with booster    Lab Results   Component Value Date    SARSCOVAG Negative 06/20/2022       Review of Systems   Constitutional:  Positive for fatigue. Negative for chills and fever. HENT:  Positive for congestion and sore throat. Respiratory:  Positive for cough and shortness of breath. Current Outpatient Medications on File Prior to Visit   Medication Sig    albuterol (PROVENTIL HFA,VENTOLIN HFA) 90 mcg/act inhaler Inhale 2 puffs every 6 (six) hours as needed for wheezing    alendronate (FOSAMAX) 70 mg tablet TAKE ONE TABLET BY MOUTH EVERY 7 DAYS.  TAKE WITH A FULL GLASS OF WATER, ON AN EMPTY STOMACH AND DO NOT LIE DOWN FOR 30 MINUTES AFTERWARDS    aspirin 81 MG tablet Take 81 mg by mouth daily    azelastine (ASTELIN) 0.1 % nasal spray 1 spray into each nostril 2 (two) times a day Use in each nostril as directed    baclofen 10 mg tablet Take 1 tablet by mouth 3 (three) times a day (Patient not taking: Reported on 11/15/2023)    Calcium Carbonate (CALCIUM 600 PO) Take by mouth    Cholecalciferol (VITAMIN D3) 2000 units capsule Take 4,000 Units by mouth daily     cloNIDine (CATAPRES) 0.1 mg tablet TAKE ONE TABLET BY MOUTH EVERY 12 HOURS (Patient taking differently: Take 0.1 mg by mouth every 12 (twelve) hours Pt states one a day)    dicyclomine (BENTYL) 10 mg capsule TAKE ONE CAPSULE BY MOUTH FOUR TIMES A DAY BEFORE MEALS AND AT BEDTIME (Patient not taking: Reported on 11/15/2023)    diltiazem (CARDIZEM CD) 180 mg 24 hr capsule Take 1 capsule (180 mg total) by mouth daily at bedtime (Patient not taking: Reported on 11/15/2023)    famotidine (PEPCID) 20 mg tablet Take 1 tablet (20 mg total) by mouth 2 (two) times a day    hydrochlorothiazide (HYDRODIURIL) 25 mg tablet Take 1 tablet (25 mg total) by mouth daily    ipratropium-albuterol (DUO-NEB) 0.5-2.5 mg/3 mL nebulizer solution     levothyroxine 50 mcg tablet Take 1 tablet (50 mcg total) by mouth daily    montelukast (SINGULAIR) 10 mg tablet Take 1 tablet (10 mg total) by mouth daily at bedtime    Multiple Vitamin (multivitamin) capsule Take 1 capsule by mouth daily    Omega-3 Fatty Acids (FISH OIL PO) Take 1,200 mg by mouth 2 (two) times a day    pantoprazole (PROTONIX) 40 mg tablet Take 1 tablet by mouth 30 minutes before breakfast daily. (Patient not taking: Reported on 11/15/2023)    umeclidinium-vilanterol (Anoro Ellipta) 62.5-25 mcg/actuation inhaler Inhale 1 puff daily    Zinc 50 MG TABS Take 25 mg by mouth    [DISCONTINUED] Ascorbic Acid (VITAMIN C PO) Take 600 mg by mouth    [DISCONTINUED] atorvastatin (LIPITOR) 10 mg tablet Take 1 tablet (10 mg total) by mouth daily       Objective: There were no vitals taken for this visit.        Physical Exam  Flip Greenberg MD

## 2023-11-20 ENCOUNTER — TELEMEDICINE (OUTPATIENT)
Dept: FAMILY MEDICINE CLINIC | Facility: CLINIC | Age: 83
End: 2023-11-20
Payer: COMMERCIAL

## 2023-11-20 DIAGNOSIS — U07.1 COVID-19: Primary | ICD-10-CM

## 2023-11-20 PROCEDURE — G2012 BRIEF CHECK IN BY MD/QHP: HCPCS | Performed by: FAMILY MEDICINE

## 2023-11-20 NOTE — PROGRESS NOTES
COVID-19 Outpatient Progress Note    Assessment/Plan:    Problem List Items Addressed This Visit          Other    COVID-19 - Primary     Day #4  of  sx, rest , fluids,  antiviral, quarantine  through Wednesday, mask through Sunday, vit  c,d  and zinc, watch  for  rebound           Disposition:     I have spent a total time of 4 minutes on the day of the encounter for this patient including       Encounter provider: Yamel Hastings MD     Provider located at: [address] [facility] [address]  [phone]     Recent Visits  Date Type Provider Dept   11/18/23 1600 [place] [address] [place] [address] Primary Care   11/15/23 Office Visit Yamel Hastings MD [address] [place] [address] Primary Care   Showing recent visits within past 7 days and meeting all other requirements  Today's Visits  Date Type Provider Dept   11/20/23 Telemedicine Yamel Hastings MD [facility] Primary Care   Showing today's visits and meeting all other requirements  Future Appointments  No visits were found meeting these conditions. Showing future appointments within next 150 days and meeting all other requirements     This virtual check-in was done via telephone and she agrees to proceed. Patient agrees to participate in a virtual check in via telephone or video visit instead of presenting to the office to address urgent/immediate medical needs. Patient is aware this is a billable service. She acknowledged consent and understanding of privacy and security of the video platform. The patient has agreed to participate and understands they can discontinue the visit at any time. After connecting through Telephone, the patient was identified by name and date of birth. Kyleigh Blanchard was informed that this was a telemedicine visit and that the exam was being conducted confidentially over secure lines. My office door was closed. No one else was in the room.  Kyleigh Blanchard acknowledged consent and understanding of privacy and security of the telemedicine visit. I informed the patient that I have reviewed her record in Epic and presented the opportunity for her to ask any questions regarding the visit today. The patient agreed to participate. It was my intent to perform this visit via video technology but the patient was not able to do a video connection so the visit was completed via audio telephone only. Verification of patient location:  Patient is located in the following state in which I hold an active license: [place]    Subjective:   Davis Darling is a 79 y.o. female who has been screened for COVID-19. Symptom change since last report: improving. Patient's symptoms include fatigue, nasal congestion and cough. Patient denies fever, chills, rhinorrhea, sore throat, anosmia, loss of taste, nausea, vomiting, diarrhea and headaches. - Date of symptom onset: 11/16/2023  - Date of positive COVID-19 test: 11/18/2023. Type of test: Home antigen. Patient with typical symptoms of COVID-19 and they attest that they were positive on home rapid antigen testing. Image of positive result is not able to be uploaded into their chart. COVID-19 vaccination status: Fully vaccinated with booster    Osiel Sarah has been staying home and has isolated themselves in her home. She is taking care to not share personal items and is cleaning all surfaces that are touched often, like counters, tabletops, and doorknobs using household cleaning sprays or wipes. She is wearing a mask when she leaves her room. Lab Results   Component Value Date    SARSCOVAG Negative 06/20/2022       Review of Systems   Constitutional:  Positive for fatigue. Negative for chills and fever. HENT:  Positive for congestion and postnasal drip. Negative for ear pain, rhinorrhea, sinus pressure, sinus pain and sore throat. Respiratory:  Positive for cough. Negative for wheezing. Cardiovascular:  Negative for chest pain. Gastrointestinal: Negative. Negative for diarrhea, nausea and vomiting. Neurological:  Negative for headaches. Current Outpatient Medications on File Prior to Visit   Medication Sig    albuterol (PROVENTIL HFA,VENTOLIN HFA) 90 mcg/act inhaler Inhale 2 puffs every 6 (six) hours as needed for wheezing    alendronate (FOSAMAX) 70 mg tablet TAKE ONE TABLET BY MOUTH EVERY 7 DAYS.  TAKE WITH A FULL GLASS OF WATER, ON AN EMPTY STOMACH AND DO NOT LIE DOWN FOR 30 MINUTES AFTERWARDS    aspirin 81 MG tablet Take 81 mg by mouth daily    atorvastatin (LIPITOR) 10 mg tablet Take 1 tablet (10 mg total) by mouth daily    azelastine (ASTELIN) 0.1 % nasal spray 1 spray into each nostril 2 (two) times a day Use in each nostril as directed    baclofen 10 mg tablet Take 1 tablet by mouth 3 (three) times a day (Patient not taking: Reported on 11/15/2023)    Calcium Carbonate (CALCIUM 600 PO) Take by mouth    Cholecalciferol (VITAMIN D3) 2000 units capsule Take 4,000 Units by mouth daily     cloNIDine (CATAPRES) 0.1 mg tablet TAKE ONE TABLET BY MOUTH EVERY 12 HOURS (Patient taking differently: Take 0.1 mg by mouth every 12 (twelve) hours Pt states one a day)    dicyclomine (BENTYL) 10 mg capsule TAKE ONE CAPSULE BY MOUTH FOUR TIMES A DAY BEFORE MEALS AND AT BEDTIME (Patient not taking: Reported on 11/15/2023)    diltiazem (CARDIZEM CD) 180 mg 24 hr capsule Take 1 capsule (180 mg total) by mouth daily at bedtime (Patient not taking: Reported on 11/15/2023)    famotidine (PEPCID) 20 mg tablet Take 1 tablet (20 mg total) by mouth 2 (two) times a day    hydrochlorothiazide (HYDRODIURIL) 25 mg tablet Take 1 tablet (25 mg total) by mouth daily    ipratropium-albuterol (DUO-NEB) 0.5-2.5 mg/3 mL nebulizer solution     levothyroxine 50 mcg tablet Take 1 tablet (50 mcg total) by mouth daily    montelukast (SINGULAIR) 10 mg tablet Take 1 tablet (10 mg total) by mouth daily at bedtime    Multiple Vitamin (multivitamin) capsule Take 1 capsule by mouth daily    nirmatrelvir & ritonavir (Paxlovid, 300/100,) tablet therapy pack Take 3 tablets by mouth 2 (two) times a day for 5 days Take 2 nirmatrelvir tablets + 1 ritonavir tablet together per dose    Omega-3 Fatty Acids (FISH OIL PO) Take 1,200 mg by mouth 2 (two) times a day    pantoprazole (PROTONIX) 40 mg tablet Take 1 tablet by mouth 30 minutes before breakfast daily. (Patient not taking: Reported on 11/15/2023)    umeclidinium-vilanterol (Anoro Ellipta) 62.5-25 mcg/actuation inhaler Inhale 1 puff daily    Zinc 50 MG TABS Take 25 mg by mouth       Objective: There were no vitals taken for this visit.        Physical Exam  Coby Skaggs MD

## 2023-11-20 NOTE — ASSESSMENT & PLAN NOTE
Day #4  of  sx, rest , fluids,  antiviral, quarantine  through Wednesday, mask through Sunday, vit  c,d  and zinc, watch  for  rebound

## 2023-12-13 ENCOUNTER — TELEPHONE (OUTPATIENT)
Dept: FAMILY MEDICINE CLINIC | Facility: CLINIC | Age: 83
End: 2023-12-13

## 2023-12-13 NOTE — TELEPHONE ENCOUNTER
Patient called in regards of having chest congestion and would like medication for it as patient is unable to come to office and is not feeling well enough to leave, patient would as well like a call back at [phone] as soon as possible on any information or recommendation from doctor. As well if prescription is sent they prefer to medication to be sent to [facility] at [address] [place] [address]. Thank you.

## 2023-12-13 NOTE — TELEPHONE ENCOUNTER
Per pt she is not taking anything for the chest congestion. Pt did do a home covid test about 2 weeks ago.

## 2023-12-28 ENCOUNTER — TELEPHONE (OUTPATIENT)
Dept: PULMONOLOGY | Facility: CLINIC | Age: 83
End: 2023-12-28

## 2023-12-28 DIAGNOSIS — J43.2 CENTRILOBULAR EMPHYSEMA (HCC): ICD-10-CM

## 2023-12-28 RX ORDER — UMECLIDINIUM BROMIDE AND VILANTEROL TRIFENATATE 62.5; 25 UG/1; UG/1
1 POWDER RESPIRATORY (INHALATION) DAILY
Qty: 180 BLISTER | Refills: 3 | Status: SHIPPED | OUTPATIENT
Start: 2023-12-28

## 2023-12-28 NOTE — TELEPHONE ENCOUNTER
Pt calling in requesting a refill of anoro be sent to the [facility] pharmacy on file. Pt would like a call once it has been sent in.

## 2024-01-01 ENCOUNTER — TELEPHONE (OUTPATIENT)
Dept: FAMILY MEDICINE CLINIC | Facility: CLINIC | Age: 84
End: 2024-01-01

## 2024-01-01 ENCOUNTER — HOSPITAL ENCOUNTER (INPATIENT)
Facility: HOSPITAL | Age: 84
LOS: 3 days | DRG: 208 | End: 2024-09-26
Attending: EMERGENCY MEDICINE | Admitting: INTERNAL MEDICINE
Payer: COMMERCIAL

## 2024-01-01 ENCOUNTER — TELEPHONE (OUTPATIENT)
Age: 84
End: 2024-01-01

## 2024-01-01 ENCOUNTER — APPOINTMENT (EMERGENCY)
Dept: RADIOLOGY | Facility: HOSPITAL | Age: 84
DRG: 208 | End: 2024-01-01
Payer: COMMERCIAL

## 2024-01-01 ENCOUNTER — APPOINTMENT (EMERGENCY)
Dept: CT IMAGING | Facility: HOSPITAL | Age: 84
DRG: 208 | End: 2024-01-01
Payer: COMMERCIAL

## 2024-01-01 ENCOUNTER — HOME CARE VISIT (OUTPATIENT)
Dept: HOME HEALTH SERVICES | Facility: HOME HEALTHCARE | Age: 84
End: 2024-01-01

## 2024-01-01 VITALS
HEIGHT: 65 IN | TEMPERATURE: 99.5 F | OXYGEN SATURATION: 56 % | WEIGHT: 136.69 LBS | BODY MASS INDEX: 22.77 KG/M2 | SYSTOLIC BLOOD PRESSURE: 102 MMHG | RESPIRATION RATE: 22 BRPM | HEART RATE: 103 BPM | DIASTOLIC BLOOD PRESSURE: 53 MMHG

## 2024-01-01 DIAGNOSIS — I61.5 INTRAVENTRICULAR HEMORRHAGE (HCC): Primary | ICD-10-CM

## 2024-01-01 DIAGNOSIS — G91.9 HYDROCEPHALUS (HCC): ICD-10-CM

## 2024-01-01 LAB
ALBUMIN SERPL BCG-MCNC: 4.1 G/DL (ref 3.5–5)
ALP SERPL-CCNC: 64 U/L (ref 34–104)
ALT SERPL W P-5'-P-CCNC: 24 U/L (ref 7–52)
ANION GAP SERPL CALCULATED.3IONS-SCNC: 5 MMOL/L (ref 4–13)
APTT PPP: 23 SECONDS (ref 23–34)
AST SERPL W P-5'-P-CCNC: 30 U/L (ref 13–39)
ATRIAL RATE: 95 BPM
BASOPHILS # BLD AUTO: 0.06 THOUSANDS/ΜL (ref 0–0.1)
BASOPHILS NFR BLD AUTO: 1 % (ref 0–1)
BILIRUB SERPL-MCNC: 0.36 MG/DL (ref 0.2–1)
BUN SERPL-MCNC: 17 MG/DL (ref 5–25)
CALCIUM SERPL-MCNC: 9.1 MG/DL (ref 8.4–10.2)
CARDIAC TROPONIN I PNL SERPL HS: 7 NG/L
CHLORIDE SERPL-SCNC: 97 MMOL/L (ref 96–108)
CO2 SERPL-SCNC: 40 MMOL/L (ref 21–32)
CREAT SERPL-MCNC: 0.53 MG/DL (ref 0.6–1.3)
EOSINOPHIL # BLD AUTO: 0.05 THOUSAND/ΜL (ref 0–0.61)
EOSINOPHIL NFR BLD AUTO: 1 % (ref 0–6)
ERYTHROCYTE [DISTWIDTH] IN BLOOD BY AUTOMATED COUNT: 13.4 % (ref 11.6–15.1)
GFR SERPL CREATININE-BSD FRML MDRD: 86 ML/MIN/1.73SQ M
GLUCOSE SERPL-MCNC: 140 MG/DL (ref 65–140)
GLUCOSE SERPL-MCNC: 142 MG/DL (ref 65–140)
HCT VFR BLD AUTO: 39.7 % (ref 34.8–46.1)
HGB BLD-MCNC: 12.4 G/DL (ref 11.5–15.4)
IMM GRANULOCYTES # BLD AUTO: 0.03 THOUSAND/UL (ref 0–0.2)
IMM GRANULOCYTES NFR BLD AUTO: 0 % (ref 0–2)
INR PPP: 0.94 (ref 0.85–1.19)
LIPASE SERPL-CCNC: 27 U/L (ref 11–82)
LYMPHOCYTES # BLD AUTO: 1.91 THOUSANDS/ΜL (ref 0.6–4.47)
LYMPHOCYTES NFR BLD AUTO: 18 % (ref 14–44)
MCH RBC QN AUTO: 29 PG (ref 26.8–34.3)
MCHC RBC AUTO-ENTMCNC: 31.2 G/DL (ref 31.4–37.4)
MCV RBC AUTO: 93 FL (ref 82–98)
MONOCYTES # BLD AUTO: 0.56 THOUSAND/ΜL (ref 0.17–1.22)
MONOCYTES NFR BLD AUTO: 5 % (ref 4–12)
NEUTROPHILS # BLD AUTO: 8.28 THOUSANDS/ΜL (ref 1.85–7.62)
NEUTS SEG NFR BLD AUTO: 75 % (ref 43–75)
NRBC BLD AUTO-RTO: 0 /100 WBCS
P AXIS: 84 DEGREES
PLATELET # BLD AUTO: 230 THOUSANDS/UL (ref 149–390)
PMV BLD AUTO: 10.9 FL (ref 8.9–12.7)
POTASSIUM SERPL-SCNC: 3.8 MMOL/L (ref 3.5–5.3)
PR INTERVAL: 176 MS
PROT SERPL-MCNC: 6.8 G/DL (ref 6.4–8.4)
PROTHROMBIN TIME: 12.8 SECONDS (ref 12.3–15)
QRS AXIS: 78 DEGREES
QRSD INTERVAL: 80 MS
QT INTERVAL: 356 MS
QTC INTERVAL: 447 MS
RBC # BLD AUTO: 4.27 MILLION/UL (ref 3.81–5.12)
SODIUM SERPL-SCNC: 142 MMOL/L (ref 135–147)
T WAVE AXIS: 83 DEGREES
VENTRICULAR RATE: 95 BPM
WBC # BLD AUTO: 10.89 THOUSAND/UL (ref 4.31–10.16)

## 2024-01-01 PROCEDURE — 94760 N-INVAS EAR/PLS OXIMETRY 1: CPT

## 2024-01-01 PROCEDURE — 71045 X-RAY EXAM CHEST 1 VIEW: CPT

## 2024-01-01 PROCEDURE — 5A1935Z RESPIRATORY VENTILATION, LESS THAN 24 CONSECUTIVE HOURS: ICD-10-PCS | Performed by: INTERNAL MEDICINE

## 2024-01-01 PROCEDURE — 99291 CRITICAL CARE FIRST HOUR: CPT | Performed by: EMERGENCY MEDICINE

## 2024-01-01 PROCEDURE — NC001 PR NO CHARGE: Performed by: PHYSICIAN ASSISTANT

## 2024-01-01 PROCEDURE — 93010 ELECTROCARDIOGRAM REPORT: CPT | Performed by: INTERNAL MEDICINE

## 2024-01-01 PROCEDURE — 99285 EMERGENCY DEPT VISIT HI MDM: CPT

## 2024-01-01 PROCEDURE — NC001 PR NO CHARGE: Performed by: NURSE PRACTITIONER

## 2024-01-01 PROCEDURE — 94002 VENT MGMT INPAT INIT DAY: CPT

## 2024-01-01 PROCEDURE — 99291 CRITICAL CARE FIRST HOUR: CPT | Performed by: NURSE PRACTITIONER

## 2024-01-01 PROCEDURE — 36415 COLL VENOUS BLD VENIPUNCTURE: CPT | Performed by: EMERGENCY MEDICINE

## 2024-01-01 PROCEDURE — 93005 ELECTROCARDIOGRAM TRACING: CPT

## 2024-01-01 PROCEDURE — 85025 COMPLETE CBC W/AUTO DIFF WBC: CPT | Performed by: EMERGENCY MEDICINE

## 2024-01-01 PROCEDURE — 96374 THER/PROPH/DIAG INJ IV PUSH: CPT

## 2024-01-01 PROCEDURE — 85730 THROMBOPLASTIN TIME PARTIAL: CPT | Performed by: EMERGENCY MEDICINE

## 2024-01-01 PROCEDURE — 99239 HOSP IP/OBS DSCHRG MGMT >30: CPT | Performed by: INTERNAL MEDICINE

## 2024-01-01 PROCEDURE — 99232 SBSQ HOSP IP/OBS MODERATE 35: CPT | Performed by: INTERNAL MEDICINE

## 2024-01-01 PROCEDURE — 85610 PROTHROMBIN TIME: CPT | Performed by: EMERGENCY MEDICINE

## 2024-01-01 PROCEDURE — 99232 SBSQ HOSP IP/OBS MODERATE 35: CPT | Performed by: PHYSICIAN ASSISTANT

## 2024-01-01 PROCEDURE — 96375 TX/PRO/DX INJ NEW DRUG ADDON: CPT

## 2024-01-01 PROCEDURE — 31500 INSERT EMERGENCY AIRWAY: CPT | Performed by: EMERGENCY MEDICINE

## 2024-01-01 PROCEDURE — 70450 CT HEAD/BRAIN W/O DYE: CPT

## 2024-01-01 PROCEDURE — 84484 ASSAY OF TROPONIN QUANT: CPT | Performed by: EMERGENCY MEDICINE

## 2024-01-01 PROCEDURE — 80053 COMPREHEN METABOLIC PANEL: CPT | Performed by: EMERGENCY MEDICINE

## 2024-01-01 PROCEDURE — 0BH17EZ INSERTION OF ENDOTRACHEAL AIRWAY INTO TRACHEA, VIA NATURAL OR ARTIFICIAL OPENING: ICD-10-PCS | Performed by: INTERNAL MEDICINE

## 2024-01-01 PROCEDURE — 83690 ASSAY OF LIPASE: CPT | Performed by: EMERGENCY MEDICINE

## 2024-01-01 PROCEDURE — 82948 REAGENT STRIP/BLOOD GLUCOSE: CPT

## 2024-01-01 RX ORDER — LORAZEPAM 2 MG/ML
1 INJECTION INTRAMUSCULAR ONCE
Status: COMPLETED | OUTPATIENT
Start: 2024-01-01 | End: 2024-01-01

## 2024-01-01 RX ORDER — LABETALOL HYDROCHLORIDE 5 MG/ML
INJECTION, SOLUTION INTRAVENOUS CODE/TRAUMA/SEDATION MEDICATION
Status: COMPLETED | OUTPATIENT
Start: 2024-01-01 | End: 2024-01-01

## 2024-01-01 RX ORDER — CHLORHEXIDINE GLUCONATE ORAL RINSE 1.2 MG/ML
15 SOLUTION DENTAL EVERY 12 HOURS SCHEDULED
Status: DISCONTINUED | OUTPATIENT
Start: 2024-01-01 | End: 2024-01-01

## 2024-01-01 RX ORDER — LABETALOL HYDROCHLORIDE 5 MG/ML
10 INJECTION, SOLUTION INTRAVENOUS ONCE
Status: DISCONTINUED | OUTPATIENT
Start: 2024-01-01 | End: 2024-01-01

## 2024-01-01 RX ORDER — ONDANSETRON 2 MG/ML
4 INJECTION INTRAMUSCULAR; INTRAVENOUS ONCE
Status: DISCONTINUED | OUTPATIENT
Start: 2024-01-01 | End: 2024-01-01

## 2024-01-01 RX ORDER — FENTANYL CITRATE 50 UG/ML
INJECTION, SOLUTION INTRAMUSCULAR; INTRAVENOUS
Status: COMPLETED
Start: 2024-01-01 | End: 2024-01-01

## 2024-01-01 RX ORDER — ONDANSETRON 2 MG/ML
INJECTION INTRAMUSCULAR; INTRAVENOUS
Status: DISCONTINUED
Start: 2024-01-01 | End: 2024-01-01 | Stop reason: HOSPADM

## 2024-01-01 RX ORDER — ONDANSETRON 2 MG/ML
INJECTION INTRAMUSCULAR; INTRAVENOUS CODE/TRAUMA/SEDATION MEDICATION
Status: COMPLETED | OUTPATIENT
Start: 2024-01-01 | End: 2024-01-01

## 2024-01-01 RX ORDER — LABETALOL HYDROCHLORIDE 5 MG/ML
INJECTION, SOLUTION INTRAVENOUS
Status: DISCONTINUED
Start: 2024-01-01 | End: 2024-01-01 | Stop reason: HOSPADM

## 2024-01-01 RX ORDER — ETOMIDATE 2 MG/ML
20 INJECTION INTRAVENOUS ONCE
Status: COMPLETED | OUTPATIENT
Start: 2024-01-01 | End: 2024-01-01

## 2024-01-01 RX ORDER — PROPOFOL 10 MG/ML
5-50 INJECTION, EMULSION INTRAVENOUS
Status: DISCONTINUED | OUTPATIENT
Start: 2024-01-01 | End: 2024-01-01

## 2024-01-01 RX ORDER — SUCCINYLCHOLINE/SOD CL,ISO/PF 100 MG/5ML
100 SYRINGE (ML) INTRAVENOUS ONCE
Status: COMPLETED | OUTPATIENT
Start: 2024-01-01 | End: 2024-01-01

## 2024-01-01 RX ORDER — SUCCINYLCHOLINE/SOD CL,ISO/PF 100 MG/5ML
SYRINGE (ML) INTRAVENOUS CODE/TRAUMA/SEDATION MEDICATION
Status: COMPLETED | OUTPATIENT
Start: 2024-01-01 | End: 2024-01-01

## 2024-01-01 RX ORDER — ETOMIDATE 2 MG/ML
INJECTION INTRAVENOUS CODE/TRAUMA/SEDATION MEDICATION
Status: COMPLETED | OUTPATIENT
Start: 2024-01-01 | End: 2024-01-01

## 2024-01-01 RX ORDER — GLYCOPYRROLATE 0.2 MG/ML
0.1 INJECTION INTRAMUSCULAR; INTRAVENOUS EVERY 4 HOURS PRN
Status: DISCONTINUED | OUTPATIENT
Start: 2024-01-01 | End: 2024-01-01 | Stop reason: HOSPADM

## 2024-01-01 RX ORDER — LORAZEPAM 2 MG/ML
2 INJECTION INTRAMUSCULAR ONCE
Status: COMPLETED | OUTPATIENT
Start: 2024-01-01 | End: 2024-01-01

## 2024-01-01 RX ORDER — FENTANYL CITRATE 50 UG/ML
100 INJECTION, SOLUTION INTRAMUSCULAR; INTRAVENOUS ONCE
Status: COMPLETED | OUTPATIENT
Start: 2024-01-01 | End: 2024-01-01

## 2024-01-01 RX ORDER — HYDROMORPHONE HCL/PF 1 MG/ML
0.5 SYRINGE (ML) INJECTION
Status: DISCONTINUED | OUTPATIENT
Start: 2024-01-01 | End: 2024-01-01 | Stop reason: HOSPADM

## 2024-01-01 RX ADMIN — PROPOFOL 10 MCG/KG/MIN: 10 INJECTION, EMULSION INTRAVENOUS at 17:03

## 2024-01-01 RX ADMIN — Medication 1.5 MG/HR: at 10:02

## 2024-01-01 RX ADMIN — GLYCOPYRROLATE 0.1 MG: 0.2 INJECTION, SOLUTION INTRAMUSCULAR; INTRAVENOUS at 20:35

## 2024-01-01 RX ADMIN — ETOMIDATE 20 MG: 2 INJECTION INTRAVENOUS at 16:50

## 2024-01-01 RX ADMIN — MORPHINE SULFATE 2 MG: 2 INJECTION, SOLUTION INTRAMUSCULAR; INTRAVENOUS at 20:31

## 2024-01-01 RX ADMIN — HYDROMORPHONE HYDROCHLORIDE 0.5 MG: 1 INJECTION, SOLUTION INTRAMUSCULAR; INTRAVENOUS; SUBCUTANEOUS at 04:41

## 2024-01-01 RX ADMIN — GLYCOPYRROLATE 0.1 MG: 0.2 INJECTION, SOLUTION INTRAMUSCULAR; INTRAVENOUS at 20:51

## 2024-01-01 RX ADMIN — MORPHINE SULFATE 2 MG: 2 INJECTION, SOLUTION INTRAMUSCULAR; INTRAVENOUS at 06:23

## 2024-01-01 RX ADMIN — MORPHINE SULFATE 2 MG: 2 INJECTION, SOLUTION INTRAMUSCULAR; INTRAVENOUS at 05:14

## 2024-01-01 RX ADMIN — LORAZEPAM 2 MG: 2 INJECTION INTRAMUSCULAR; INTRAVENOUS at 18:21

## 2024-01-01 RX ADMIN — MORPHINE SULFATE 2 MG: 2 INJECTION, SOLUTION INTRAMUSCULAR; INTRAVENOUS at 11:37

## 2024-01-01 RX ADMIN — Medication 100 MG: at 17:04

## 2024-01-01 RX ADMIN — MORPHINE SULFATE 2 MG: 2 INJECTION, SOLUTION INTRAMUSCULAR; INTRAVENOUS at 04:03

## 2024-01-01 RX ADMIN — GLYCOPYRROLATE 0.1 MG: 0.2 INJECTION, SOLUTION INTRAMUSCULAR; INTRAVENOUS at 15:32

## 2024-01-01 RX ADMIN — GLYCOPYRROLATE 0.1 MG: 0.2 INJECTION, SOLUTION INTRAMUSCULAR; INTRAVENOUS at 05:31

## 2024-01-01 RX ADMIN — MORPHINE SULFATE 2 MG: 2 INJECTION, SOLUTION INTRAMUSCULAR; INTRAVENOUS at 22:39

## 2024-01-01 RX ADMIN — GLYCOPYRROLATE 0.1 MG: 0.2 INJECTION, SOLUTION INTRAMUSCULAR; INTRAVENOUS at 11:15

## 2024-01-01 RX ADMIN — SODIUM CHLORIDE 1000 ML: 0.9 INJECTION, SOLUTION INTRAVENOUS at 18:22

## 2024-01-01 RX ADMIN — Medication 100 MG: at 16:51

## 2024-01-01 RX ADMIN — GLYCOPYRROLATE 0.1 MG: 0.2 INJECTION, SOLUTION INTRAMUSCULAR; INTRAVENOUS at 19:44

## 2024-01-01 RX ADMIN — GLYCOPYRROLATE 0.1 MG: 0.2 INJECTION, SOLUTION INTRAMUSCULAR; INTRAVENOUS at 05:54

## 2024-01-01 RX ADMIN — MORPHINE SULFATE 2 MG: 2 INJECTION, SOLUTION INTRAMUSCULAR; INTRAVENOUS at 00:47

## 2024-01-01 RX ADMIN — Medication 10 MG: at 16:50

## 2024-01-01 RX ADMIN — GLYCOPYRROLATE 0.1 MG: 0.2 INJECTION, SOLUTION INTRAMUSCULAR; INTRAVENOUS at 16:40

## 2024-01-01 RX ADMIN — GLYCOPYRROLATE 0.1 MG: 0.2 INJECTION, SOLUTION INTRAMUSCULAR; INTRAVENOUS at 23:39

## 2024-01-01 RX ADMIN — MORPHINE SULFATE 2 MG: 2 INJECTION, SOLUTION INTRAMUSCULAR; INTRAVENOUS at 01:53

## 2024-01-01 RX ADMIN — LORAZEPAM 1 MG: 2 INJECTION INTRAMUSCULAR; INTRAVENOUS at 17:28

## 2024-01-01 RX ADMIN — MORPHINE SULFATE 2 MG: 2 INJECTION, SOLUTION INTRAMUSCULAR; INTRAVENOUS at 08:30

## 2024-01-01 RX ADMIN — MORPHINE SULFATE 2 MG: 2 INJECTION, SOLUTION INTRAMUSCULAR; INTRAVENOUS at 23:34

## 2024-01-01 RX ADMIN — Medication 0.5 MG/HR: at 14:34

## 2024-01-01 RX ADMIN — ONDANSETRON 4 MG: 2 INJECTION INTRAMUSCULAR; INTRAVENOUS at 16:49

## 2024-01-01 RX ADMIN — MORPHINE SULFATE 2 MG: 2 INJECTION, SOLUTION INTRAMUSCULAR; INTRAVENOUS at 12:40

## 2024-01-01 RX ADMIN — GLYCOPYRROLATE 0.1 MG: 0.2 INJECTION, SOLUTION INTRAMUSCULAR; INTRAVENOUS at 01:02

## 2024-01-01 RX ADMIN — MORPHINE SULFATE 2 MG: 2 INJECTION, SOLUTION INTRAMUSCULAR; INTRAVENOUS at 14:04

## 2024-01-01 RX ADMIN — FENTANYL CITRATE 100 MCG: 50 INJECTION INTRAMUSCULAR; INTRAVENOUS at 16:59

## 2024-01-01 RX ADMIN — MORPHINE SULFATE 2 MG: 2 INJECTION, SOLUTION INTRAMUSCULAR; INTRAVENOUS at 21:36

## 2024-01-01 RX ADMIN — MORPHINE SULFATE 2 MG: 2 INJECTION, SOLUTION INTRAMUSCULAR; INTRAVENOUS at 10:34

## 2024-01-01 RX ADMIN — GLYCOPYRROLATE 0.1 MG: 0.2 INJECTION, SOLUTION INTRAMUSCULAR; INTRAVENOUS at 00:48

## 2024-01-01 RX ADMIN — FENTANYL CITRATE 100 MCG: 50 INJECTION, SOLUTION INTRAMUSCULAR; INTRAVENOUS at 16:59

## 2024-01-01 RX ADMIN — GLYCOPYRROLATE 0.1 MG: 0.2 INJECTION, SOLUTION INTRAMUSCULAR; INTRAVENOUS at 12:23

## 2024-01-01 RX ADMIN — MORPHINE SULFATE 2 MG: 2 INJECTION, SOLUTION INTRAMUSCULAR; INTRAVENOUS at 02:50

## 2024-01-05 ENCOUNTER — OFFICE VISIT (OUTPATIENT)
Dept: FAMILY MEDICINE CLINIC | Facility: CLINIC | Age: 84
End: 2024-01-05
Payer: COMMERCIAL

## 2024-01-05 VITALS
OXYGEN SATURATION: 97 % | TEMPERATURE: 97.9 F | WEIGHT: 135.25 LBS | HEART RATE: 94 BPM | BODY MASS INDEX: 22.51 KG/M2 | DIASTOLIC BLOOD PRESSURE: 60 MMHG | SYSTOLIC BLOOD PRESSURE: 140 MMHG

## 2024-01-05 DIAGNOSIS — J06.9 BACTERIAL UPPER RESPIRATORY INFECTION: Primary | ICD-10-CM

## 2024-01-05 DIAGNOSIS — B96.89 BACTERIAL UPPER RESPIRATORY INFECTION: Primary | ICD-10-CM

## 2024-01-05 DIAGNOSIS — J44.9 CHRONIC OBSTRUCTIVE PULMONARY DISEASE, UNSPECIFIED COPD TYPE (HCC): ICD-10-CM

## 2024-01-05 DIAGNOSIS — J96.11 CHRONIC RESPIRATORY FAILURE WITH HYPOXIA (HCC): ICD-10-CM

## 2024-01-05 PROCEDURE — 99214 OFFICE O/P EST MOD 30 MIN: CPT | Performed by: PHYSICIAN ASSISTANT

## 2024-01-05 RX ORDER — AZITHROMYCIN 250 MG/1
TABLET, FILM COATED ORAL
Qty: 6 TABLET | Refills: 0 | Status: SHIPPED | OUTPATIENT
Start: 2024-01-05 | End: 2024-01-09

## 2024-01-05 NOTE — PATIENT INSTRUCTIONS
Problem List Items Addressed This Visit          Respiratory    Chronic respiratory failure with hypoxia (HCC)     Patient is on oxygen 24/7 she is using a portable oxygen cylinder today which she states is much more difficult to get air out of rather than her concentrator at home.         Chronic obstructive pulmonary disease (HCC)     Continue inhalers pulmonary follow-up and oxygen use.         Bacterial upper respiratory infection - Primary     Azithromycin as directed patient informed that this would take at least 10 days.  She does not want to go for chest x-ray however I did give her the order and if she develops any worsening of symptoms or does not have any improvement after her antibiotic is completed she should go for this x-ray.  Continue over-the-counter Delsym as needed patient declines prescription cough medicine.         Relevant Medications    azithromycin (ZITHROMAX) 250 mg tablet    Other Relevant Orders    XR chest pa & lateral

## 2024-01-05 NOTE — ASSESSMENT & PLAN NOTE
Patient is on oxygen 24/7 she is using a portable oxygen cylinder today which she states is much more difficult to get air out of rather than her concentrator at home.

## 2024-01-05 NOTE — PROGRESS NOTES
Name: Mary Chung      : 1940      MRN: [id number]  Encounter Provider: Karen Gutierrez PA-C  Encounter Date: 2024   Encounter department: [facility] PRIMARY CARE    Assessment & Plan     1. Bacterial upper respiratory infection  Assessment & Plan:  Azithromycin as directed patient informed that this would take at least 10 days.  She does not want to go for chest x-ray however I did give her the order and if she develops any worsening of symptoms or does not have any improvement after her antibiotic is completed she should go for this x-ray.  Continue over-the-counter Delsym as needed patient declines prescription cough medicine.    Orders:  -     XR chest pa & lateral; Future; Expected date: 2024  -     azithromycin (ZITHROMAX) 250 mg tablet; Take two tablets on day one and then one tablet daily for the next four days.    2. Chronic obstructive pulmonary disease, unspecified COPD type (HCC)  Assessment & Plan:  Continue inhalers pulmonary follow-up and oxygen use.      3. Chronic respiratory failure with hypoxia (HCC)  Assessment & Plan:  Patient is on oxygen  she is using a portable oxygen cylinder today which she states is much more difficult to get air out of rather than her concentrator at home.             Subjective      Patient presents with:  Nasal Congestion: Pt states she had covid in Dec but  states she did not.  Sore Throat  Headache: X 2 weeks. Pt is extremely out of breath due to the walk from the waiting room.  Mgb    Patient here today companied by her .  She states that he was sick and was given amoxicillin last week by another provider here and now she is getting sick and she is feeling that her cough is more than usual her mucus from her nose is more than usual she has a sore throat that she is having just a little bit more trouble breathing than usual.  She did have COVID in November and was treated with an antiviral.  She is not having any fevers  currently.  She did state that she got better after the COVID and that this is not new sickness.          Review of Systems   Constitutional: Negative.    HENT:  Positive for congestion, rhinorrhea and sore throat.    Eyes: Negative.    Respiratory:  Positive for cough.    Cardiovascular: Negative.    Gastrointestinal: Negative.    Endocrine: Negative.    Genitourinary: Negative.    Musculoskeletal: Negative.    Skin: Negative.    Allergic/Immunologic: Negative.    Neurological: Negative.    Hematological: Negative.    Psychiatric/Behavioral: Negative.         Current Outpatient Medications on File Prior to Visit   Medication Sig   • albuterol (PROVENTIL HFA,VENTOLIN HFA) 90 mcg/act inhaler Inhale 2 puffs every 6 (six) hours as needed for wheezing   • alendronate (FOSAMAX) 70 mg tablet TAKE ONE TABLET BY MOUTH EVERY 7 DAYS. TAKE WITH A FULL GLASS OF WATER, ON AN EMPTY STOMACH AND DO NOT LIE DOWN FOR 30 MINUTES AFTERWARDS   • aspirin 81 MG tablet Take 81 mg by mouth daily   • atorvastatin (LIPITOR) 10 mg tablet Take 1 tablet (10 mg total) by mouth daily   • azelastine (ASTELIN) 0.1 % nasal spray 1 spray into each nostril 2 (two) times a day Use in each nostril as directed   • Calcium Carbonate (CALCIUM 600 PO) Take by mouth   • Cholecalciferol (VITAMIN D3) 2000 units capsule Take 4,000 Units by mouth daily    • famotidine (PEPCID) 20 mg tablet Take 1 tablet (20 mg total) by mouth 2 (two) times a day   • hydrochlorothiazide (HYDRODIURIL) 25 mg tablet Take 1 tablet (25 mg total) by mouth daily   • ipratropium-albuterol (DUO-NEB) 0.5-2.5 mg/3 mL nebulizer solution    • levothyroxine 50 mcg tablet Take 1 tablet (50 mcg total) by mouth daily   • montelukast (SINGULAIR) 10 mg tablet Take 1 tablet (10 mg total) by mouth daily at bedtime   • Multiple Vitamin (multivitamin) capsule Take 1 capsule by mouth daily   • Omega-3 Fatty Acids (FISH OIL PO) Take 1,200 mg by mouth 2 (two) times a day   • umeclidinium-vilanterol (Anoro  Ellipta) 62.5-25 mcg/actuation inhaler Inhale 1 puff daily   • Zinc 50 MG TABS Take 25 mg by mouth   • baclofen 10 mg tablet Take 1 tablet by mouth 3 (three) times a day (Patient not taking: Reported on 11/15/2023)   • cloNIDine (CATAPRES) 0.1 mg tablet TAKE ONE TABLET BY MOUTH EVERY 12 HOURS (Patient not taking: Reported on 1/5/2024)   • dicyclomine (BENTYL) 10 mg capsule TAKE ONE CAPSULE BY MOUTH FOUR TIMES A DAY BEFORE MEALS AND AT BEDTIME (Patient not taking: Reported on 11/15/2023)   • diltiazem (CARDIZEM CD) 180 mg 24 hr capsule Take 1 capsule (180 mg total) by mouth daily at bedtime (Patient not taking: Reported on 11/15/2023)   • pantoprazole (PROTONIX) 40 mg tablet Take 1 tablet by mouth 30 minutes before breakfast daily. (Patient not taking: Reported on 11/15/2023)       Objective     /60   Pulse 94   Temp 97.9 °F (36.6 °C) (Temporal)   Wt 61.3 kg (135 lb 4 oz)   SpO2 97%   BMI 22.51 kg/m²     Physical Exam  Vitals and nursing note reviewed.   Constitutional:       General: She is not in acute distress.     Appearance: She is well-developed. She is not diaphoretic.   HENT:      Head: Normocephalic and atraumatic.      Ears:      Comments: Bilateral hearing aids.     Nose: Nose normal.      Right Turbinates: Enlarged.      Left Turbinates: Enlarged.      Mouth/Throat:      Pharynx: Pharyngeal swelling and posterior oropharyngeal erythema present.      Comments: Posterior pharynx erythema without exudate.  Eyes:      General:         Right eye: No discharge.         Left eye: No discharge.      Conjunctiva/sclera: Conjunctivae normal.   Neck:      Vascular: No carotid bruit.   Cardiovascular:      Rate and Rhythm: Normal rate and regular rhythm.      Heart sounds: Normal heart sounds. No murmur heard.     No friction rub. No gallop.   Pulmonary:      Effort: Pulmonary effort is normal. No respiratory distress.      Breath sounds: Decreased air movement present. Examination of the right-upper field  reveals decreased breath sounds. Examination of the left-upper field reveals decreased breath sounds. Examination of the right-middle field reveals decreased breath sounds. Examination of the left-middle field reveals decreased breath sounds. Examination of the right-lower field reveals decreased breath sounds. Examination of the left-lower field reveals decreased breath sounds. Decreased breath sounds present. No wheezing.      Comments: Fine intermittent crackles and expiratory right-sided.  Musculoskeletal:      Cervical back: Neck supple.   Skin:     General: Skin is warm and dry.   Neurological:      Mental Status: She is alert and oriented to person, place, and time.   Psychiatric:         Judgment: Judgment normal.       Karen Gutierrez PA-C

## 2024-01-05 NOTE — ASSESSMENT & PLAN NOTE
Azithromycin as directed patient informed that this would take at least 10 days.  She does not want to go for chest x-ray however I did give her the order and if she develops any worsening of symptoms or does not have any improvement after her antibiotic is completed she should go for this x-ray.  Continue over-the-counter Delsym as needed patient declines prescription cough medicine.

## 2024-01-08 ENCOUNTER — APPOINTMENT (OUTPATIENT)
Dept: RADIOLOGY | Facility: MEDICAL CENTER | Age: 84
End: 2024-01-08
Payer: COMMERCIAL

## 2024-01-08 DIAGNOSIS — J06.9 BACTERIAL UPPER RESPIRATORY INFECTION: ICD-10-CM

## 2024-01-08 DIAGNOSIS — B96.89 BACTERIAL UPPER RESPIRATORY INFECTION: ICD-10-CM

## 2024-01-08 PROCEDURE — 71046 X-RAY EXAM CHEST 2 VIEWS: CPT

## 2024-01-08 NOTE — RESULT ENCOUNTER NOTE
Please let pt know that her chest xray does not show any acute infection. It does show incidental findings of hiatal hernia, T11 and 12 compression fractures and scarring in the melly upper lobes- all of which are stable compared to the last CXR. Thank you.

## 2024-02-27 DIAGNOSIS — E87.6 HYPOKALEMIA: Primary | ICD-10-CM

## 2024-02-27 LAB
ALBUMIN SERPL-MCNC: 4.1 G/DL (ref 3.6–5.1)
ALBUMIN/GLOB SERPL: 1.7 (CALC) (ref 1–2.5)
ALP SERPL-CCNC: 52 U/L (ref 37–153)
ALT SERPL-CCNC: 21 U/L (ref 6–29)
AST SERPL-CCNC: 27 U/L (ref 10–35)
BILIRUB SERPL-MCNC: 0.5 MG/DL (ref 0.2–1.2)
BUN SERPL-MCNC: 18 MG/DL (ref 7–25)
BUN/CREAT SERPL: 34 (CALC) (ref 6–22)
CALCIUM SERPL-MCNC: 9.7 MG/DL (ref 8.6–10.4)
CHLORIDE SERPL-SCNC: 89 MMOL/L (ref 98–110)
CHOLEST SERPL-MCNC: 199 MG/DL
CHOLEST/HDLC SERPL: 1.9 (CALC)
CO2 SERPL-SCNC: 44 MMOL/L (ref 20–32)
CREAT SERPL-MCNC: 0.53 MG/DL (ref 0.6–0.95)
GFR/BSA.PRED SERPLBLD CYS-BASED-ARV: 91 ML/MIN/1.73M2
GLOBULIN SER CALC-MCNC: 2.4 G/DL (CALC) (ref 1.9–3.7)
GLUCOSE SERPL-MCNC: 102 MG/DL (ref 65–99)
HDLC SERPL-MCNC: 105 MG/DL
LDLC SERPL CALC-MCNC: 80 MG/DL (CALC)
NONHDLC SERPL-MCNC: 94 MG/DL (CALC)
POTASSIUM SERPL-SCNC: 3.4 MMOL/L (ref 3.5–5.3)
PROT SERPL-MCNC: 6.5 G/DL (ref 6.1–8.1)
SODIUM SERPL-SCNC: 141 MMOL/L (ref 135–146)
TRIGL SERPL-MCNC: 62 MG/DL
TSH SERPL-ACNC: 2.15 MIU/L (ref 0.4–4.5)

## 2024-03-15 ENCOUNTER — RA CDI HCC (OUTPATIENT)
Dept: OTHER | Facility: HOSPITAL | Age: 84
End: 2024-03-15

## 2024-03-15 PROBLEM — R42 DIZZINESS: Status: RESOLVED | Noted: 2018-10-26 | Resolved: 2024-03-15

## 2024-03-15 PROBLEM — U07.1 COVID-19: Status: RESOLVED | Noted: 2023-11-20 | Resolved: 2024-03-15

## 2024-03-22 ENCOUNTER — OFFICE VISIT (OUTPATIENT)
Dept: FAMILY MEDICINE CLINIC | Facility: CLINIC | Age: 84
End: 2024-03-22
Payer: COMMERCIAL

## 2024-03-22 VITALS
WEIGHT: 137 LBS | OXYGEN SATURATION: 97 % | HEIGHT: 65 IN | DIASTOLIC BLOOD PRESSURE: 72 MMHG | SYSTOLIC BLOOD PRESSURE: 140 MMHG | BODY MASS INDEX: 22.82 KG/M2 | HEART RATE: 68 BPM

## 2024-03-22 DIAGNOSIS — J96.11 CHRONIC RESPIRATORY FAILURE WITH HYPOXIA (HCC): ICD-10-CM

## 2024-03-22 DIAGNOSIS — E78.2 MIXED HYPERLIPIDEMIA: ICD-10-CM

## 2024-03-22 DIAGNOSIS — I10 ESSENTIAL HYPERTENSION: ICD-10-CM

## 2024-03-22 DIAGNOSIS — K21.9 GASTROESOPHAGEAL REFLUX DISEASE WITHOUT ESOPHAGITIS: ICD-10-CM

## 2024-03-22 DIAGNOSIS — J44.9 CHRONIC OBSTRUCTIVE PULMONARY DISEASE, UNSPECIFIED COPD TYPE (HCC): ICD-10-CM

## 2024-03-22 DIAGNOSIS — R10.84 GENERALIZED ABDOMINAL PAIN: Primary | ICD-10-CM

## 2024-03-22 PROBLEM — J06.9 BACTERIAL UPPER RESPIRATORY INFECTION: Status: RESOLVED | Noted: 2024-01-05 | Resolved: 2024-03-22

## 2024-03-22 PROBLEM — E27.8 ADRENAL NODULE (HCC): Status: RESOLVED | Noted: 2021-07-02 | Resolved: 2024-03-22

## 2024-03-22 PROBLEM — B96.89 BACTERIAL UPPER RESPIRATORY INFECTION: Status: RESOLVED | Noted: 2024-01-05 | Resolved: 2024-03-22

## 2024-03-22 PROBLEM — E27.9 ADRENAL NODULE (HCC): Status: RESOLVED | Noted: 2021-07-02 | Resolved: 2024-01-01

## 2024-03-22 LAB
BASOPHILS # BLD AUTO: 58 CELLS/UL (ref 0–200)
BASOPHILS NFR BLD AUTO: 1 %
BUN SERPL-MCNC: 26 MG/DL (ref 7–25)
BUN/CREAT SERPL: 46 (CALC) (ref 6–22)
CALCIUM SERPL-MCNC: 9.4 MG/DL (ref 8.6–10.4)
CHLORIDE SERPL-SCNC: 90 MMOL/L (ref 98–110)
CO2 SERPL-SCNC: 44 MMOL/L (ref 20–32)
CREAT SERPL-MCNC: 0.57 MG/DL (ref 0.6–0.95)
EOSINOPHIL # BLD AUTO: 58 CELLS/UL (ref 15–500)
EOSINOPHIL NFR BLD AUTO: 1 %
ERYTHROCYTE [DISTWIDTH] IN BLOOD BY AUTOMATED COUNT: 13.5 % (ref 11–15)
GFR/BSA.PRED SERPLBLD CYS-BASED-ARV: 89 ML/MIN/1.73M2
GLUCOSE SERPL-MCNC: 103 MG/DL (ref 65–99)
HCT VFR BLD AUTO: 36.6 % (ref 35–45)
HGB BLD-MCNC: 11.6 G/DL (ref 11.7–15.5)
LYMPHOCYTES # BLD AUTO: 1856 CELLS/UL (ref 850–3900)
LYMPHOCYTES NFR BLD AUTO: 32 %
MCH RBC QN AUTO: 26.2 PG (ref 27–33)
MCHC RBC AUTO-ENTMCNC: 31.7 G/DL (ref 32–36)
MCV RBC AUTO: 82.8 FL (ref 80–100)
MONOCYTES # BLD AUTO: 493 CELLS/UL (ref 200–950)
MONOCYTES NFR BLD AUTO: 8.5 %
NEUTROPHILS # BLD AUTO: 3335 CELLS/UL (ref 1500–7800)
NEUTROPHILS NFR BLD AUTO: 57.5 %
PLATELET # BLD AUTO: 291 THOUSAND/UL (ref 140–400)
PMV BLD REES-ECKER: 10.5 FL (ref 7.5–12.5)
POTASSIUM SERPL-SCNC: 3.6 MMOL/L (ref 3.5–5.3)
RBC # BLD AUTO: 4.42 MILLION/UL (ref 3.8–5.1)
SODIUM SERPL-SCNC: 140 MMOL/L (ref 135–146)
WBC # BLD AUTO: 5.8 THOUSAND/UL (ref 3.8–10.8)

## 2024-03-22 PROCEDURE — 99214 OFFICE O/P EST MOD 30 MIN: CPT | Performed by: FAMILY MEDICINE

## 2024-03-22 PROCEDURE — G2211 COMPLEX E/M VISIT ADD ON: HCPCS | Performed by: FAMILY MEDICINE

## 2024-03-22 RX ORDER — CLONIDINE HYDROCHLORIDE 0.1 MG/1
0.1 TABLET ORAL EVERY 12 HOURS
Qty: 90 TABLET | Refills: 1 | Status: SHIPPED | OUTPATIENT
Start: 2024-03-22

## 2024-03-22 RX ORDER — DICYCLOMINE HYDROCHLORIDE 10 MG/1
10 CAPSULE ORAL 4 TIMES DAILY PRN
Qty: 60 CAPSULE | Refills: 3 | Status: SHIPPED | OUTPATIENT
Start: 2024-03-22

## 2024-03-22 RX ORDER — HYDROCHLOROTHIAZIDE 25 MG/1
25 TABLET ORAL DAILY
Qty: 90 TABLET | Refills: 1 | Status: SHIPPED | OUTPATIENT
Start: 2024-03-22

## 2024-03-22 RX ORDER — ATORVASTATIN CALCIUM 10 MG/1
10 TABLET, FILM COATED ORAL DAILY
Qty: 90 TABLET | Refills: 1 | Status: SHIPPED | OUTPATIENT
Start: 2024-03-22

## 2024-03-22 NOTE — PATIENT INSTRUCTIONS
Having  a lot  of  gi issues, wants to try Bentyl  again, can't  do upper  endoscopy  due  emphysema-will discuss  with lung  doc  whether  upper  gi  xray  Depression   AMBULATORY CARE:   Depression  is a mood disorder that causes feelings of sadness or hopelessness that do not go away. Depression may cause you to lose interest in things you used to enjoy. These feelings may interfere with your daily life.  Common signs and symptoms:   Appetite changes, or weight gain or loss    Trouble falling or staying asleep, or sleeping too much    Fatigue (being mentally and physically tired) or lack of energy    Feeling restless, irritable, or withdrawn    Feeling worthless, hopeless, discouraged, or guilty    Trouble concentrating, remembering things, doing daily tasks, or making decisions    Thoughts about hurting or killing yourself    Call your local emergency number (911 in the ) if:   You think about hurting yourself or someone else.    You have done something on purpose to hurt yourself.    Call your therapist or doctor if:   Your symptoms get worse or do not get better with treatment.    Your depression keeps you from doing your regular daily activities.    You have new symptoms since your last visit.    You have questions or concerns about your condition or care.    The following resources are available at any time to help you, if needed:   Contact a suicide prevention organization:        For the [employer] Suicide and Crisis Lifeline:     Call or text [employer]     Send a chat on https://[employer].org/chat     Call 0-350-766-2960 (1-800-273-TALK)    For the Suicide Hotline, call 3-113-295-5172 (2-697-SLYCNKN)    For a list of international numbers: https://save.org/find-help/international-resources/  Treatment for depression  depends on how severe your symptoms are. You may need any of the following:  Cognitive behavioral therapy (CBT)  teaches you how to identify and change negative thought patterns.    Antidepressant  medicine  may be given to decrease or manage symptoms. You may need to take this medicine for several weeks before they start working.    Self-care:   Talk to someone about your depression.  Your healthcare provider may suggest counseling. You might feel more comfortable talking with a friend or family member about your depression. Choose someone you know will be supportive and encouraging.    Get regular physical activity.  Physical activity can lower your stress, improve your mood, and help you sleep better. Work with your healthcare provider to develop a plan that you enjoy.         Create a regular sleep schedule.  A routine can help you relax before bed. Listen to music, read, or do yoga. Try to go to bed and wake up at the same time every day. Sleep is important for emotional health.    Eat a variety of healthy foods.  Healthy foods include fruits, vegetables, whole-grain breads, low-fat dairy products, lean meats, fish, and cooked beans. A healthy meal plan is low in fat, salt, and added sugar.         Do not use alcohol, drugs, or nicotine products.  These can worsen depression or make it hard to manage. Talk to your therapist or healthcare provider if you use any of these products and need help to quit.    Follow up with your therapist or doctor as directed:  Your healthcare provider will monitor your progress at follow-up visits. Your provider will also monitor your medicine if you take antidepressants and ask if the medicine is helping. Tell your provider about any side effects or problems you have with your medicine. The type or amount of medicine may need to be changed. Write down your questions so you remember to ask them during your visits.  For more information or support:   [facility] [employer] [facility]  [address]  [place] [address]  Phone: 2- 549 - 895-5263  Phone: 5- 112 - 394-7021  Web Address: http://www.[employer].org  [address] Su[facility] and Crisis [facility]  [address]  [place] [address]  Phone: 6- 653 - 565  Web Address: http://www.suicidepreventionlifeline.org OR https://[employer]/chat/    © Copyright [employer] 2023 Information is for End User's use only and may not be sold, redistributed or otherwise used for commercial purposes.  The above information is an  only. It is not intended as medical advice for individual conditions or treatments. Talk to your doctor, nurse or pharmacist before following any medical regimen to see if it is safe and effective for you.

## 2024-03-22 NOTE — PROGRESS NOTES
Name: Mary Chung      : 1940      MRN: [id number]  Encounter Provider: Chen Pierce MD  Encounter Date: 3/22/2024   Encounter department: [facility] PR[facility]    Assessment & Plan     1. Generalized abdominal pain  -     dicyclomine (BENTYL) 10 mg capsule; Take 1 capsule (10 mg total) by mouth 4 (four) times a day as needed (abd pain/gas)  -     CBC and differential  -     Basic metabolic panel    2. Mixed hyperlipidemia  Comments:  ON HOLD WHILE ON PAXLOVID.  Assessment & Plan:  Lip[ids  stable    Orders:  -     atorvastatin (LIPITOR) 10 mg tablet; Take 1 tablet (10 mg total) by mouth daily    3. Essential hypertension  Comments:  BP stable  Assessment & Plan:  BP stable  on  clonidine and  hctz    Orders:  -     hydroCHLOROthiazide 25 mg tablet; Take 1 tablet (25 mg total) by mouth daily  -     cloNIDine (CATAPRES) 0.1 mg tablet; Take 1 tablet (0.1 mg total) by mouth every 12 (twelve) hours    4. Essential hypertension  Assessment & Plan:  BP stable  on  clonidine and  hctz    Orders:  -     hydroCHLOROthiazide 25 mg tablet; Take 1 tablet (25 mg total) by mouth daily  -     cloNIDine (CATAPRES) 0.1 mg tablet; Take 1 tablet (0.1 mg total) by mouth every 12 (twelve) hours    5. Chronic obstructive pulmonary disease, unspecified COPD type (HCC)  Assessment & Plan:  Rec  support  group for  patients  with copd, feel depression would be  better  if  can network with others      6. Chronic respiratory failure with hypoxia (HCC)  Assessment & Plan:  Rec  support  group for  patients  with copd, feel depression would be  better  if  can network with others      7. Gastroesophageal reflux disease without esophagitis  Assessment & Plan:  Discussed  upper  gi since  can't  be  put  to sleep for  upper  endoscopy,will discuss  with pulmonary             Subjective      Patient presents with:  Follow-up: 4 month f/u - pt has been having upset stomach and growls like she was hungry but she is not. She used  to take dicyclomine 10 mg 4 times a day for gut pain. Wants to know if that will help-on Pepcid  and  PPI         Review of Systems   Constitutional:  Negative for activity change, appetite change and fatigue.   Respiratory:  Negative for shortness of breath.    Cardiovascular:  Negative for chest pain.   Gastrointestinal:  Positive for abdominal pain.        Excessive  gas   Neurological:  Negative for dizziness, light-headedness and headaches.   Hematological:  Negative for adenopathy.   Psychiatric/Behavioral:  Positive for dysphoric mood. The patient is nervous/anxious.        Current Outpatient Medications on File Prior to Visit   Medication Sig    albuterol (PROVENTIL HFA,VENTOLIN HFA) 90 mcg/act inhaler Inhale 2 puffs every 6 (six) hours as needed for wheezing    alendronate (FOSAMAX) 70 mg tablet TAKE ONE TABLET BY MOUTH EVERY 7 DAYS. TAKE WITH A FULL GLASS OF WATER, ON AN EMPTY STOMACH AND DO NOT LIE DOWN FOR 30 MINUTES AFTERWARDS    aspirin 81 MG tablet Take 81 mg by mouth daily    azelastine (ASTELIN) 0.1 % nasal spray 1 spray into each nostril 2 (two) times a day Use in each nostril as directed    Calcium Carbonate (CALCIUM 600 PO) Take by mouth    Cholecalciferol (VITAMIN D3) 2000 units capsule Take 4,000 Units by mouth daily     famotidine (PEPCID) 20 mg tablet Take 1 tablet (20 mg total) by mouth 2 (two) times a day    ipratropium-albuterol (DUO-NEB) 0.5-2.5 mg/3 mL nebulizer solution     levothyroxine 50 mcg tablet Take 1 tablet (50 mcg total) by mouth daily    montelukast (SINGULAIR) 10 mg tablet Take 1 tablet (10 mg total) by mouth daily at bedtime    Multiple Vitamin (multivitamin) capsule Take 1 capsule by mouth daily    Omega-3 Fatty Acids (FISH OIL PO) Take 1,200 mg by mouth 2 (two) times a day    umeclidinium-vilanterol (Anoro Ellipta) 62.5-25 mcg/actuation inhaler Inhale 1 puff daily    Zinc 50 MG TABS Take 25 mg by mouth    [DISCONTINUED] atorvastatin (LIPITOR) 10 mg tablet Take 1 tablet (10  "mg total) by mouth daily    [DISCONTINUED] cloNIDine (CATAPRES) 0.1 mg tablet TAKE ONE TABLET BY MOUTH EVERY 12 HOURS    [DISCONTINUED] hydrochlorothiazide (HYDRODIURIL) 25 mg tablet Take 1 tablet (25 mg total) by mouth daily    pantoprazole (PROTONIX) 40 mg tablet Take 1 tablet by mouth 30 minutes before breakfast daily. (Patient not taking: Reported on 11/15/2023)    [DISCONTINUED] baclofen 10 mg tablet Take 1 tablet by mouth 3 (three) times a day (Patient not taking: Reported on 11/15/2023)    [DISCONTINUED] dicyclomine (BENTYL) 10 mg capsule TAKE ONE CAPSULE BY MOUTH FOUR TIMES A DAY BEFORE MEALS AND AT BEDTIME (Patient not taking: Reported on 11/15/2023)    [DISCONTINUED] diltiazem (CARDIZEM CD) 180 mg 24 hr capsule Take 1 capsule (180 mg total) by mouth daily at bedtime (Patient not taking: Reported on 11/15/2023)       Objective     /72 (BP Location: Right arm, Patient Position: Sitting, Cuff Size: Adult)   Pulse 68   Ht 5' 5\" (1.651 m)   Wt 62.1 kg (137 lb)   SpO2 97%   BMI 22.80 kg/m²     Physical Exam  Vitals reviewed.   Constitutional:       Appearance: Normal appearance.   Cardiovascular:      Rate and Rhythm: Normal rate and regular rhythm.      Pulses: Normal pulses.      Heart sounds: Normal heart sounds.   Pulmonary:      Effort: Pulmonary effort is normal.      Breath sounds: Normal breath sounds.   Abdominal:      General: Abdomen is flat. Bowel sounds are normal.      Palpations: Abdomen is soft.   Musculoskeletal:      Right lower leg: No edema.      Left lower leg: No edema.   Lymphadenopathy:      Cervical: No cervical adenopathy.   Neurological:      Mental Status: She is alert.   Psychiatric:         Mood and Affect: Mood normal.       PHQ-2/9 Depression Screening    Little interest or pleasure in doing things: 2 - more than half the days  Feeling down, depressed, or hopeless: 1 - several days  Trouble falling or staying asleep, or sleeping too much: 2 - more than half the " days  Feeling tired or having little energy: 3 - nearly every day  Poor appetite or overeatin - not at all  Feeling bad about yourself - or that you are a failure or have let yourself or your family down: 3 - nearly every day  Trouble concentrating on things, such as reading the newspaper or watching television: 1 - several days  Moving or speaking so slowly that other people could have noticed. Or the opposite - being so fidgety or restless that you have been moving around a lot more than usual: 1 - several days  Thoughts that you would be better off dead, or of hurting yourself in some way: 1 - several days  PHQ-2 Score: 3  PHQ-2 Interpretation: POSITIVE depression screen  PHQ-9 Score: 14  PHQ-9 Interpretation: Moderate depression        Chen Pierce MD    Depression Screening Follow-up Plan: Patient's depression screening was positive with a PHQ-2 score of 3. Their PHQ-9 score was 14. Patient assessed for underlying major depression. They have no active suicidal ideations. Brief counseling provided and recommend additional follow-up/re-evaluation next office visit. Continue regular follow-up with their psychologist/therapist/psychiatrist who is managing their mental health condition(s). Patient's depressive symptoms likely due to other medical condition. Would recommend treatment of underlying condition. Will continue to monitor at next office visit.

## 2024-03-22 NOTE — ASSESSMENT & PLAN NOTE
Discussed  upper  gi since  can't  be  put  to sleep for  upper  endoscopy,will discuss  with pulmonary

## 2024-03-22 NOTE — ASSESSMENT & PLAN NOTE
Rec  support  group for  patients  with copd, feel depression would be  better  if  can network with others

## 2024-03-25 ENCOUNTER — TELEPHONE (OUTPATIENT)
Dept: FAMILY MEDICINE CLINIC | Facility: CLINIC | Age: 84
End: 2024-03-25

## 2024-04-03 ENCOUNTER — OFFICE VISIT (OUTPATIENT)
Dept: PULMONOLOGY | Facility: CLINIC | Age: 84
End: 2024-04-03
Payer: COMMERCIAL

## 2024-04-03 VITALS
SYSTOLIC BLOOD PRESSURE: 150 MMHG | HEART RATE: 90 BPM | TEMPERATURE: 98 F | BODY MASS INDEX: 23.22 KG/M2 | HEIGHT: 65 IN | DIASTOLIC BLOOD PRESSURE: 90 MMHG | WEIGHT: 139.4 LBS | OXYGEN SATURATION: 95 %

## 2024-04-03 DIAGNOSIS — J44.9 CHRONIC OBSTRUCTIVE PULMONARY DISEASE, UNSPECIFIED COPD TYPE (HCC): Primary | ICD-10-CM

## 2024-04-03 DIAGNOSIS — J18.9 PNEUMONIA DUE TO INFECTIOUS ORGANISM, UNSPECIFIED LATERALITY, UNSPECIFIED PART OF LUNG: ICD-10-CM

## 2024-04-03 DIAGNOSIS — J96.11 CHRONIC RESPIRATORY FAILURE WITH HYPOXIA (HCC): ICD-10-CM

## 2024-04-03 DIAGNOSIS — J43.2 CENTRILOBULAR EMPHYSEMA (HCC): ICD-10-CM

## 2024-04-03 PROCEDURE — 99214 OFFICE O/P EST MOD 30 MIN: CPT | Performed by: INTERNAL MEDICINE

## 2024-04-03 RX ORDER — ALBUTEROL SULFATE 90 UG/1
2 AEROSOL, METERED RESPIRATORY (INHALATION) EVERY 6 HOURS PRN
Qty: 54 G | Refills: 3 | Status: SHIPPED | OUTPATIENT
Start: 2024-04-03

## 2024-04-03 RX ORDER — UMECLIDINIUM BROMIDE AND VILANTEROL TRIFENATATE 62.5; 25 UG/1; UG/1
1 POWDER RESPIRATORY (INHALATION) DAILY
Qty: 180 BLISTER | Refills: 3 | Status: SHIPPED | OUTPATIENT
Start: 2024-04-03

## 2024-04-03 RX ORDER — IPRATROPIUM BROMIDE AND ALBUTEROL SULFATE 2.5; .5 MG/3ML; MG/3ML
3 SOLUTION RESPIRATORY (INHALATION) 2 TIMES DAILY
Qty: 180 ML | Refills: 3 | Status: SHIPPED | OUTPATIENT
Start: 2024-04-03 | End: 2024-08-01

## 2024-04-03 RX ORDER — DICYCLOMINE HYDROCHLORIDE 10 MG/1
10 CAPSULE ORAL
COMMUNITY

## 2024-04-03 NOTE — ASSESSMENT & PLAN NOTE
Had URI symptoms in January  X-ray was performed.  Question of hiatal hernia.  To my read there appears to be a posterior infiltrate notable on the lateral film.  Question pneumonia at that time  Will have her obtain a follow-up x-ray

## 2024-04-03 NOTE — PROGRESS NOTES
Progress note - Pulmonary Medicine   Mary Chung 84 y.o. female MRN: [id number]       Impression & Plan:     Chronic obstructive pulmonary disease (HCC)  Takes Anoro daily, rescue albuterol when needed  Typically takes a DuoNeb in the afternoon with good response to the nebulizer treatment  Very frustrated with her condition and has done less activity.  Has anxiety about her oxygen.  Discussed the need to remain active.  Offered referral to pulmonary rehab.  She wishes to try to increase her activity on her own first over the summer but will reach out if she feels referral is necessary    Chronic respiratory failure with hypoxia (HCC)  Using 4 L nasal cannula, using appropriately and continues to benefit from therapy    Pneumonia due to infectious organism  Had URI symptoms in January  X-ray was performed.  Question of hiatal hernia.  To my read there appears to be a posterior infiltrate notable on the lateral film.  Question pneumonia at that time  Will have her obtain a follow-up x-ray    Return in about 6 months (around 10/3/2024).    ______________________________________________________________________    HPI:    Mary Chung presents today for follow-up of emphysema with chronic hypoxemic respiratory failure.  She has been on Anoro with good response to therapy but still has shortness of breath with exertion.  She has a lot of anxiety about her oxygen and as a result has become less active.  She is concerned that she will not have enough oxygen when she is active.    No chest pain.  No cardiac complaints.  She does have intermittent cough and phlegm.  She did have a significant URI in January.  She was sent for a chest x-ray.  This was not read as demonstrating any abnormality however.  She was treated for exacerbation of COPD.    Her weight and appetite remain stable.  No headache or neurologic symptoms.  She has been very bothered by abdominal symptoms with belching and gurgling.  She is on Pepcid and  was started on Bentyl by primary care    Current Medications:    Current Outpatient Medications:     albuterol (PROVENTIL HFA,VENTOLIN HFA) 90 mcg/act inhaler, Inhale 2 puffs every 6 (six) hours as needed for wheezing, Disp: 54 g, Rfl: 3    alendronate (FOSAMAX) 70 mg tablet, TAKE ONE TABLET BY MOUTH EVERY 7 DAYS. TAKE WITH A FULL GLASS OF WATER, ON AN EMPTY STOMACH AND DO NOT LIE DOWN FOR 30 MINUTES AFTERWARDS, Disp: , Rfl:     aspirin 81 MG tablet, Take 81 mg by mouth daily, Disp: , Rfl:     atorvastatin (LIPITOR) 10 mg tablet, Take 1 tablet (10 mg total) by mouth daily, Disp: 90 tablet, Rfl: 1    azelastine (ASTELIN) 0.1 % nasal spray, 1 spray into each nostril 2 (two) times a day Use in each nostril as directed, Disp: 1 mL, Rfl: 2    Calcium Carbonate (CALCIUM 600 PO), Take by mouth, Disp: , Rfl:     Cholecalciferol (VITAMIN D3) 2000 units capsule, Take 4,000 Units by mouth daily , Disp: , Rfl:     cloNIDine (CATAPRES) 0.1 mg tablet, Take 1 tablet (0.1 mg total) by mouth every 12 (twelve) hours, Disp: 90 tablet, Rfl: 1    dicyclomine (BENTYL) 10 mg capsule, Take 10 mg by mouth 4 (four) times a day (before meals and at bedtime), Disp: , Rfl:     famotidine (PEPCID) 20 mg tablet, Take 1 tablet (20 mg total) by mouth 2 (two) times a day, Disp: 90 tablet, Rfl: 3    hydroCHLOROthiazide 25 mg tablet, Take 1 tablet (25 mg total) by mouth daily, Disp: 90 tablet, Rfl: 1    ipratropium-albuterol (DUO-NEB) 0.5-2.5 mg/3 mL nebulizer solution, Take 3 mL by nebulization 2 (two) times a day, Disp: 180 mL, Rfl: 3    levothyroxine 50 mcg tablet, Take 1 tablet (50 mcg total) by mouth daily, Disp: 90 tablet, Rfl: 1    montelukast (SINGULAIR) 10 mg tablet, Take 1 tablet (10 mg total) by mouth daily at bedtime (Patient taking differently: Take 10 mg by mouth daily at bedtime prn), Disp: 30 tablet, Rfl: 3    Multiple Vitamin (multivitamin) capsule, Take 1 capsule by mouth daily, Disp: , Rfl:     Omega-3 Fatty Acids (FISH OIL PO),  "Take 1,200 mg by mouth 2 (two) times a day, Disp: , Rfl:     umeclidinium-vilanterol (Anoro Ellipta) 62.5-25 mcg/actuation inhaler, Inhale 1 puff daily, Disp: 180 blister, Rfl: 3    Zinc 50 MG TABS, Take 25 mg by mouth, Disp: , Rfl:     Review of Systems:  Aside from what is mentioned in the HPI, the review of systems is otherwise negative    Past medical history, surgical history, and family history were reviewed and updated as appropriate    Social history updates:  Social History     Tobacco Use   Smoking Status Former    Current packs/day: 0.00    Average packs/day: 2.0 packs/day for 39.0 years (78.0 ttl pk-yrs)    Types: Cigarettes    Start date:     Quit date:     Years since quittin.2   Smokeless Tobacco Never       PhysicalExamination:  Vitals:   /90 (BP Location: Right arm, Patient Position: Sitting, Cuff Size: Standard)   Pulse 90   Temp 98 °F (36.7 °C) (Tympanic)   Ht 5' 5\" (1.651 m)   Wt 63.2 kg (139 lb 6.4 oz)   SpO2 95%   BMI 23.20 kg/m²     Gen:  Comfortable on room air.  No conversational dyspnea  HEENT:  Conjugate gaze.  sclerae anicteric.  Oropharynx moist  Neck: Trachea is midline. No JVD. No adenopathy  Chest: Very distant breath sounds.  Symmetric.  No wheezes or crackles  Cardiac: Regular. no murmur  Abdomen:  benign  Extremities: No edema  Neuro:  Normal speech and mentation    Diagnostic Data:  Labs:  I personally reviewed the most recent laboratory data pertinent to today's visit    Lab Results   Component Value Date    WBC 5.8 2024    HGB 11.6 (L) 2024    HCT 36.6 2024    MCV 82.8 2024     2024     Lab Results   Component Value Date    SODIUM 140 2024    K 3.6 2024    CO2 44 (H) 2024    CL 90 (L) 2024    BUN 26 (H) 2024    CREATININE 0.57 (L) 2024    CALCIUM 9.4 2024       Imaging:  I personally reviewed the images on the PAC system pertinent to today's visit  I personally reviewed her " chest x-ray from January.  There does appear to be a posterior lower lobe infiltrate on the lateral film    Surinder Yadav MD

## 2024-04-03 NOTE — ASSESSMENT & PLAN NOTE
Takes Anoro daily, rescue albuterol when needed  Typically takes a DuoNeb in the afternoon with good response to the nebulizer treatment  Very frustrated with her condition and has done less activity.  Has anxiety about her oxygen.  Discussed the need to remain active.  Offered referral to pulmonary rehab.  She wishes to try to increase her activity on her own first over the summer but will reach out if she feels referral is necessary

## 2024-04-05 ENCOUNTER — APPOINTMENT (OUTPATIENT)
Dept: RADIOLOGY | Facility: MEDICAL CENTER | Age: 84
End: 2024-04-05
Payer: COMMERCIAL

## 2024-04-05 ENCOUNTER — TELEPHONE (OUTPATIENT)
Dept: PULMONOLOGY | Facility: CLINIC | Age: 84
End: 2024-04-05

## 2024-04-05 DIAGNOSIS — J18.9 PNEUMONIA DUE TO INFECTIOUS ORGANISM, UNSPECIFIED LATERALITY, UNSPECIFIED PART OF LUNG: ICD-10-CM

## 2024-04-05 PROCEDURE — 71046 X-RAY EXAM CHEST 2 VIEWS: CPT

## 2024-04-05 NOTE — TELEPHONE ENCOUNTER
Patient's  called and said they lost her papers for her chest x-ray that was ordered. I let him know if they go to any [facility] lab that they won't need the paper because they can just access it in her chart, but if they go to a different facility that's not [facility] then they could just go back in to the doctor's office and have someone go into the chart and reprint out the orders for them. Patient's  verbalized understanding.

## 2024-04-10 DIAGNOSIS — I10 ESSENTIAL HYPERTENSION: ICD-10-CM

## 2024-04-10 RX ORDER — HYDROCHLOROTHIAZIDE 25 MG/1
25 TABLET ORAL DAILY
Qty: 90 TABLET | Refills: 1 | Status: SHIPPED | OUTPATIENT
Start: 2024-04-10

## 2024-04-22 DIAGNOSIS — K21.9 GASTROESOPHAGEAL REFLUX DISEASE WITHOUT ESOPHAGITIS: ICD-10-CM

## 2024-04-22 NOTE — TELEPHONE ENCOUNTER
GI Physician:       Refever Request for Medication: famotidine (PEPCID) 20 mg tablet   Pharmacy and Location:   [address] [phone]

## 2024-04-23 RX ORDER — FAMOTIDINE 20 MG/1
20 TABLET, FILM COATED ORAL 2 TIMES DAILY
Qty: 60 TABLET | Refills: 5 | Status: SHIPPED | OUTPATIENT
Start: 2024-04-23

## 2024-05-01 DIAGNOSIS — I10 ESSENTIAL HYPERTENSION: ICD-10-CM

## 2024-05-02 RX ORDER — CLONIDINE HYDROCHLORIDE 0.1 MG/1
0.1 TABLET ORAL EVERY 12 HOURS
Qty: 180 TABLET | Refills: 1 | Status: SHIPPED | OUTPATIENT
Start: 2024-05-02

## 2024-05-03 PROBLEM — J18.9 PNEUMONIA DUE TO INFECTIOUS ORGANISM: Status: RESOLVED | Noted: 2022-06-20 | Resolved: 2024-05-03

## 2024-06-05 DIAGNOSIS — E03.9 ACQUIRED HYPOTHYROIDISM: ICD-10-CM

## 2024-06-05 NOTE — TELEPHONE ENCOUNTER
Cc'd chart    Reason for call:   [x] Refill   [] Prior Auth  [] Other:     Office:   [x] PCP/Provider - Chen Pierce MD  [] Specialty/Provider -     Medication: levothyroxine 50 mcg tablet     Dose/Frequency: 50 mcg, Oral, Daily     Quantity: 90    Pharmacy: [facility] [id number] - [place]  [address] [place] [address]

## 2024-06-06 RX ORDER — LEVOTHYROXINE SODIUM 0.05 MG/1
50 TABLET ORAL DAILY
Qty: 90 TABLET | Refills: 0 | Status: SHIPPED | OUTPATIENT
Start: 2024-06-06

## 2024-06-19 ENCOUNTER — OFFICE VISIT (OUTPATIENT)
Dept: RHEUMATOLOGY | Facility: CLINIC | Age: 84
End: 2024-06-19
Payer: COMMERCIAL

## 2024-06-19 ENCOUNTER — HOME HEALTH ADMISSION (OUTPATIENT)
Dept: HOME HEALTH SERVICES | Facility: HOME HEALTHCARE | Age: 84
End: 2024-06-19
Payer: COMMERCIAL

## 2024-06-19 VITALS
BODY MASS INDEX: 22.16 KG/M2 | SYSTOLIC BLOOD PRESSURE: 136 MMHG | HEIGHT: 65 IN | WEIGHT: 133 LBS | DIASTOLIC BLOOD PRESSURE: 74 MMHG

## 2024-06-19 DIAGNOSIS — M81.0 AGE-RELATED OSTEOPOROSIS WITHOUT CURRENT PATHOLOGICAL FRACTURE: Primary | ICD-10-CM

## 2024-06-19 DIAGNOSIS — R29.898 WEAKNESS OF BACK: ICD-10-CM

## 2024-06-19 PROCEDURE — 1159F MED LIST DOCD IN RCRD: CPT | Performed by: INTERNAL MEDICINE

## 2024-06-19 PROCEDURE — 3078F DIAST BP <80 MM HG: CPT | Performed by: INTERNAL MEDICINE

## 2024-06-19 PROCEDURE — 1160F RVW MEDS BY RX/DR IN RCRD: CPT | Performed by: INTERNAL MEDICINE

## 2024-06-19 PROCEDURE — 3075F SYST BP GE 130 - 139MM HG: CPT | Performed by: INTERNAL MEDICINE

## 2024-06-19 PROCEDURE — 99214 OFFICE O/P EST MOD 30 MIN: CPT | Performed by: INTERNAL MEDICINE

## 2024-06-19 NOTE — PATIENT INSTRUCTIONS
Discuss with Dr. Pierce about possibly lowering hydrochlorothiazide to 12.5mg daily  Continue daily calcium; can take 1 Tums daily  Continue Vit. D 4,000 units a day  home physical therapy referral made for strengthening back muscles  Ordered next DEXA scan that is due in 4/2025     Return to clinic in 1 year

## 2024-06-19 NOTE — PROGRESS NOTES
Assessment and Plan:   Mary Chung is a 84 y.o.  female who presents for follow up for osteoporosis. Completed 3 annual Reclast infusions. Currently on a drug holiday. Last DEXA scan was stable. Needs home PT for back conditioning and posture. Is walker and home oxygen dependent.    Discuss with Dr. Pierce about possibly lowering hydrochlorothiazide to 12.5mg daily since may be dropping her BP to low and making her lightheaded  Continue daily calcium; can take 1 Tums daily  Continue Vit. D 4,000 units a day  home physical therapy referral made for strengthening back muscles  Ordered next DEXA scan that is due in 4/2025; may need Prolia in future if has worsening osteoporosis     Return to clinic in 1 year       Plan:  Diagnoses and all orders for this visit:    Age-related osteoporosis without current pathological fracture  -     DXA bone density spine hip and pelvis; Future    Weakness of back  -     Referral to [facility] Health- [facility]; Future      Follow-up plan: Return to clinic in 1 year        Rheumatic Disease Summary  Initial visit 8/16/21: Patient presented as a Rheumatology consult referred by her PCP Chen Pierce MD for evaluation of osteoporosis.   Since this was the 1st time patient had been diagnosed with osteoporosis, and she had no history of fragility fractures, it is worth at least trying her on alendronate 70 mg p.o. weekly as initial therapy.  Her lowest T-score was  -2.7 at the left femoral neck, so her osteoporosis was not severe, and oral bisphosphonate therapy may be adequate.  If patient was unable to tolerate alendronate due to esophagitis side effects, then could consider yearly IV Reclast infusions for 3 years.  She had good renal function.  Patient was supposed to continue daily calcium and vitamin-D.  Advised patient how to appropriately take alendronate 70mg po once a week to help prevent heartburn/esophagitis symptoms; take with a full glass of water, on an empty stomach, and  do not lie down for 30 minutes afterwards.    2/14/22: Patient presents for follow up for osteoporosis. Tolerated her first IV Reclast infusion on 9/30/21 well, is due for next infusion on 9/30/22 this year, will order and arrange closer to day. Her next DEXA scan is due in 4/2023; will order closer to date.    No recent falls and fractures. Patient denies joint pain.   Patient never tried Alendronate because of potential side effects  Latest calcium and Vit D levels are WNR.      Continue Reclast infusions once a year for a max of 3 years, next infusion due 9/30/22  Continue calcium 600mg daily  Continue Vit. D 4,000 units a day  Next DEXA scan due in 4/2023 6/16/23: Patient presents for follow up for osteoporosis. Tolerating Reclast infusions fine.  Continue one more Reclast infusion, due 10/2/23  Continue daily calcium; can take 1 Tums daily  Continue Vit. D 4,000 units a day  Do back exercises for strengthening back muscles; let me know if want home physical therapy referral  Next DEXA scan due in 4/2025    HPI  Mary Chung is a 84 y.o.  female who presents for follow up. Tolerated yearly Reclast x3 fine. On oxygen, uses walker to ambulate. Has stooped posture and weak back muscles; hard to stand straight.      Review of Systems:   Review of Systems   Constitutional:  Negative for chills and fever.   HENT:  Negative for congestion, rhinorrhea, sneezing and sore throat.    Eyes:  Negative for pain and discharge.   Respiratory:  Positive for cough, shortness of breath and wheezing. Negative for chest tightness.    Cardiovascular:  Negative for chest pain and leg swelling.   Gastrointestinal:  Negative for abdominal pain, nausea and vomiting.   Endocrine: Negative for polydipsia, polyphagia and polyuria.   Genitourinary:  Negative for flank pain, frequency and urgency.   Musculoskeletal:  Positive for back pain. Negative for arthralgias and joint swelling.   Skin:  Negative for color change and pallor.    Neurological:  Positive for weakness. Negative for dizziness, light-headedness and headaches.   Psychiatric/Behavioral:  Negative for agitation and confusion.        Home Medications:    Current Outpatient Medications:     albuterol (PROVENTIL HFA,VENTOLIN HFA) 90 mcg/act inhaler, Inhale 2 puffs every 6 (six) hours as needed for wheezing, Disp: 54 g, Rfl: 3    aspirin 81 MG tablet, Take 81 mg by mouth daily, Disp: , Rfl:     atorvastatin (LIPITOR) 10 mg tablet, Take 1 tablet (10 mg total) by mouth daily, Disp: 90 tablet, Rfl: 1    azelastine (ASTELIN) 0.1 % nasal spray, 1 spray into each nostril 2 (two) times a day Use in each nostril as directed (Patient not taking: Reported on 6/21/2024), Disp: 1 mL, Rfl: 2    Calcium Carbonate (CALCIUM 600 PO), Take 1 tablet by mouth daily, Disp: , Rfl:     Cholecalciferol (VITAMIN D3) 2000 units capsule, Take 4,000 Units by mouth daily, Disp: , Rfl:     cloNIDine (CATAPRES) 0.1 mg tablet, TAKE 1 TABLET BY MOUTH EVERY 12 HOURS, Disp: 180 tablet, Rfl: 1    famotidine (PEPCID) 20 mg tablet, Take 1 tablet (20 mg total) by mouth 2 (two) times a day, Disp: 60 tablet, Rfl: 5    hydroCHLOROthiazide 25 mg tablet, TAKE ONE TABLET BY MOUTH DAILY, Disp: 90 tablet, Rfl: 1    ipratropium-albuterol (DUO-NEB) 0.5-2.5 mg/3 mL nebulizer solution, Take 3 mL by nebulization 2 (two) times a day, Disp: 180 mL, Rfl: 3    levothyroxine 50 mcg tablet, Take 1 tablet (50 mcg total) by mouth daily, Disp: 90 tablet, Rfl: 0    montelukast (SINGULAIR) 10 mg tablet, Take 1 tablet (10 mg total) by mouth daily at bedtime (Patient not taking: Reported on 6/21/2024), Disp: 30 tablet, Rfl: 3    Multiple Vitamin (multivitamin) capsule, Take 1 capsule by mouth daily, Disp: , Rfl:     Omega-3 Fatty Acids (FISH OIL PO), Take 1,200 mg by mouth 2 (two) times a day, Disp: , Rfl:     umeclidinium-vilanterol (Anoro Ellipta) 62.5-25 mcg/actuation inhaler, Inhale 1 puff daily, Disp: 180 blister, Rfl: 3    Zinc 50 MG TABS,  "Take 25 mg by mouth daily, Disp: , Rfl:     dicyclomine (BENTYL) 10 mg capsule, Take 10 mg by mouth 4 (four) times a day (before meals and at bedtime) patient reports that she is no longer taking this medication, Disp: , Rfl:     oxygen gas, Inhale 4 L/min continuous. administered via nasal cannula  Indications: ., Disp: , Rfl:   No current facility-administered medications for this visit.    Objective:    Vitals:    06/19/24 1405   BP: 136/74   Weight: 60.3 kg (133 lb)   Height: 5' 5\" (1.651 m)       Physical Exam  Constitutional:       General: She is not in acute distress.     Appearance: She is well-developed. She is not diaphoretic.   HENT:      Head: Normocephalic.      Mouth/Throat:      Pharynx: No oropharyngeal exudate.   Eyes:      Pupils: Pupils are equal, round, and reactive to light.   Cardiovascular:      Rate and Rhythm: Normal rate and regular rhythm.      Heart sounds: No murmur heard.  Pulmonary:      Effort: Pulmonary effort is normal. No respiratory distress.      Breath sounds: No wheezing or rales.      Comments: On 4L of NC  Abdominal:      General: Bowel sounds are normal.      Palpations: Abdomen is soft.      Tenderness: There is no abdominal tenderness.   Musculoskeletal:         General: No tenderness. Normal range of motion.      Cervical back: Normal range of motion.      Comments: Stooped posture   Skin:     General: Skin is warm.   Neurological:      Mental Status: She is alert and oriented to person, place, and time.      Gait: Gait abnormal (walk with a walker).       Reviewed labs and imaging.    Imaging:   DEXA scan 4/25/234  LUMBAR SPINE:  L1-L3:  BMD 0.777 gm/cm2  T-score -2.2 and 3% higher than 2021, statistically significant change.  Z-score 0.6     LEFT TOTAL HIP:  BMD 0.642 gm/cm2  T-score -2.5, osteoporosis.  Z-score -0.2     Left FEMORAL NECK:  BMD 0.554 gm/cm2  T-score -2.7, osteoporosis, and 2% higher than 2021.  Z-score -0.2     The left forearm BMD is 0.545 and the T " score is -2.5, osteoporosis, and 9% less than 2021, statistically significant change. Z score is 1.2.       DEXA scan 04/21/2021  RESULTS:   LUMBAR SPINE:  L1-L3:  BMD 0.751 gm/cm2  T-score below normal, -2.4  Z-score 0.3     LEFT TOTAL HIP:  BMD 0.648 gm/cm2  T-score below normal, -2.4  Z-score -0.2     LEFT FEMORAL NECK:  BMD 0.545 gm/cm2  T-score below normal, -2.7, osteoporosis.  Z-score -0.3     The left forearm BMD is 0.602 and the T score is below normal, -1.5.  Z score is +1.9.     A 25-hydroxy vitamin D level, an intact PTH, and a comprehensive metabolic panel are suggested in this patient.     Treatment is a consideration if appropriate to total clinical health evaluation.     IMPRESSION:  1. Based on the WHO classification, this study identifies a diagnosis of osteoporosis, notable at the femoral neck area and the patient is considered at increased risk for fracture.     Labs:   Orders Only on 03/22/2024   Component Date Value Ref Range Status    Glucose, Random 03/22/2024 103 (H)  65 - 99 mg/dL Final    Comment:               Fasting reference interval     For someone without known diabetes, a glucose value  between 100 and 125 mg/dL is consistent with  prediabetes and should be confirmed with a  follow-up test.         BUN 03/22/2024 26 (H)  7 - 25 mg/dL Final    Creatinine 03/22/2024 0.57 (L)  0.60 - 0.95 mg/dL Final    eGFR 03/22/2024 89  > OR = 60 mL/min/1.73m2 Final    SL AMB BUN/CREATININE RATIO 03/22/2024 46 (H)  6 - 22 (calc) Final    Sodium 03/22/2024 140  135 - 146 mmol/L Final    Potassium 03/22/2024 3.6  3.5 - 5.3 mmol/L Final    Chloride 03/22/2024 90 (L)  98 - 110 mmol/L Final    CO2 03/22/2024 44 (H)  20 - 32 mmol/L Final    Calcium 03/22/2024 9.4  8.6 - 10.4 mg/dL Final    White Blood Cell Count 03/22/2024 5.8  3.8 - 10.8 Thousand/uL Final    Red Blood Cell Count 03/22/2024 4.42  3.80 - 5.10 Million/uL Final    Hemoglobin 03/22/2024 11.6 (L)  11.7 - 15.5 g/dL Final    HCT 03/22/2024 36.6   35.0 - 45.0 % Final    MCV 03/22/2024 82.8  80.0 - 100.0 fL Final    MCH 03/22/2024 26.2 (L)  27.0 - 33.0 pg Final    MCHC 03/22/2024 31.7 (L)  32.0 - 36.0 g/dL Final    RDW 03/22/2024 13.5  11.0 - 15.0 % Final    Platelet Count 03/22/2024 291  140 - 400 Thousand/uL Final    SL AMB MPV 03/22/2024 10.5  7.5 - 12.5 fL Final    Neutrophils (Absolute) 03/22/2024 3,335  1,500 - 7,800 cells/uL Final    Lymphocytes (Absolute) 03/22/2024 1,856  850 - 3,900 cells/uL Final    Monocytes (Absolute) 03/22/2024 493  200 - 950 cells/uL Final    Eosinophils (Absolute) 03/22/2024 58  15 - 500 cells/uL Final    Basophils ABS 03/22/2024 58  0 - 200 cells/uL Final    Neutrophils 03/22/2024 57.5  % Final    Lymphocytes 03/22/2024 32.0  % Final    Monocytes 03/22/2024 8.5  % Final    Eosinophils 03/22/2024 1.0  % Final    Basophils PCT 03/22/2024 1.0  % Final   Orders Only on 02/27/2024   Component Date Value Ref Range Status    Total Cholesterol 02/27/2024 199  <200 mg/dL Final    HDL 02/27/2024 105  > OR = 50 mg/dL Final    Triglycerides 02/27/2024 62  <150 mg/dL Final    LDL Calculated 02/27/2024 80  mg/dL (calc) Final    Comment: Reference range: <100     Desirable range <100 mg/dL for primary prevention;    <70 mg/dL for patients with CHD or diabetic patients   with > or = 2 CHD risk factors.     LDL-C is now calculated using the Nicole   calculation, which is a validated novel method providing   better accuracy than the Friedewald equation in the   estimation of LDL-C.   Brian PIERCE et al. SHAKEEL. 2013;310(19): 9105-6481   (http://education.[employer].Aveksa/faq/XMP082)      Chol HDLC Ratio 02/27/2024 1.9  <5.0 (calc) Final    Non-HDL Cholesterol 02/27/2024 94  <130 mg/dL (calc) Final    Comment: For patients with diabetes plus 1 major ASCVD risk   factor, treating to a non-HDL-C goal of <100 mg/dL   (LDL-C of <70 mg/dL) is considered a therapeutic   option.      Glucose, Random 02/27/2024 102 (H)  65 - 99 mg/dL Final     Comment:               Fasting reference interval     For someone without known diabetes, a glucose value  between 100 and 125 mg/dL is consistent with  prediabetes and should be confirmed with a  follow-up test.         BUN 02/27/2024 18  7 - 25 mg/dL Final    Creatinine 02/27/2024 0.53 (L)  0.60 - 0.95 mg/dL Final    eGFR 02/27/2024 91  > OR = 60 mL/min/1.73m2 Final    SL AMB BUN/CREATININE RATIO 02/27/2024 34 (H)  6 - 22 (calc) Final    Sodium 02/27/2024 141  135 - 146 mmol/L Final    Potassium 02/27/2024 3.4 (L)  3.5 - 5.3 mmol/L Final    Chloride 02/27/2024 89 (L)  98 - 110 mmol/L Final    CO2 02/27/2024 44 (H)  20 - 32 mmol/L Final    Calcium 02/27/2024 9.7  8.6 - 10.4 mg/dL Final    Protein, Total 02/27/2024 6.5  6.1 - 8.1 g/dL Final    Albumin 02/27/2024 4.1  3.6 - 5.1 g/dL Final    Globulin 02/27/2024 2.4  1.9 - 3.7 g/dL (calc) Final    Albumin/Globulin Ratio 02/27/2024 1.7  1.0 - 2.5 (calc) Final    TOTAL BILIRUBIN 02/27/2024 0.5  0.2 - 1.2 mg/dL Final    Alkaline Phosphatase 02/27/2024 52  37 - 153 U/L Final    AST 02/27/2024 27  10 - 35 U/L Final    ALT 02/27/2024 21  6 - 29 U/L Final    TSH 02/27/2024 2.15  0.40 - 4.50 mIU/L Final

## 2024-06-21 ENCOUNTER — HOME CARE VISIT (OUTPATIENT)
Dept: HOME HEALTH SERVICES | Facility: HOME HEALTHCARE | Age: 84
End: 2024-06-21
Payer: COMMERCIAL

## 2024-06-21 VITALS — HEART RATE: 81 BPM | DIASTOLIC BLOOD PRESSURE: 90 MMHG | SYSTOLIC BLOOD PRESSURE: 155 MMHG | OXYGEN SATURATION: 99 %

## 2024-06-21 PROCEDURE — G0151 HHCP-SERV OF PT,EA 15 MIN: HCPCS

## 2024-06-24 ENCOUNTER — HOME CARE VISIT (OUTPATIENT)
Dept: HOME HEALTH SERVICES | Facility: HOME HEALTHCARE | Age: 84
End: 2024-06-24
Payer: COMMERCIAL

## 2024-06-24 NOTE — Clinical Note
Upon med review it appears the alendronate is now  but it is listed as a current med on the latest avs.Please clarify if the pt should be taking this and update the med list in epic.Any changes please notify the patient  any questions call    griffin

## 2024-06-25 ENCOUNTER — HOME CARE VISIT (OUTPATIENT)
Dept: HOME HEALTH SERVICES | Facility: HOME HEALTHCARE | Age: 84
End: 2024-06-25
Payer: COMMERCIAL

## 2024-06-25 ENCOUNTER — TELEPHONE (OUTPATIENT)
Age: 84
End: 2024-06-25

## 2024-06-25 VITALS — DIASTOLIC BLOOD PRESSURE: 86 MMHG | OXYGEN SATURATION: 95 % | HEART RATE: 66 BPM | SYSTOLIC BLOOD PRESSURE: 140 MMHG

## 2024-06-25 PROCEDURE — G0151 HHCP-SERV OF PT,EA 15 MIN: HCPCS

## 2024-06-25 NOTE — TELEPHONE ENCOUNTER
Received call from patient's  stating that patient saw Dr. Jackson on 6/19 and was recommended to lower her HCTZ to 12.5 mg daily.  Please see note from Dr. Jackson in chart.   would like a call back with direction on this medication.  Please advise.

## 2024-06-27 NOTE — CASE COMMUNICATION
Call attempt multiple times. Pt is watching the news as I can hear during time of call. She is not speaking or responding.

## 2024-06-28 ENCOUNTER — HOME CARE VISIT (OUTPATIENT)
Dept: HOME HEALTH SERVICES | Facility: HOME HEALTHCARE | Age: 84
End: 2024-06-28
Payer: COMMERCIAL

## 2024-06-28 ENCOUNTER — TELEPHONE (OUTPATIENT)
Age: 84
End: 2024-06-28

## 2024-06-28 VITALS — SYSTOLIC BLOOD PRESSURE: 180 MMHG | HEART RATE: 87 BPM | DIASTOLIC BLOOD PRESSURE: 82 MMHG | OXYGEN SATURATION: 98 %

## 2024-06-28 PROCEDURE — G0151 HHCP-SERV OF PT,EA 15 MIN: HCPCS

## 2024-06-28 NOTE — TELEPHONE ENCOUNTER
Greg from [facility] called to report that patient continues with nausea, weakness, and dizziness. Her BP today was 180/82.

## 2024-06-29 ENCOUNTER — HOSPITAL ENCOUNTER (EMERGENCY)
Facility: HOSPITAL | Age: 84
Discharge: HOME | End: 2024-06-29
Attending: EMERGENCY MEDICINE
Payer: COMMERCIAL

## 2024-06-29 ENCOUNTER — APPOINTMENT (EMERGENCY)
Dept: RADIOLOGY | Facility: HOSPITAL | Age: 84
End: 2024-06-29
Payer: COMMERCIAL

## 2024-06-29 VITALS
RESPIRATION RATE: 28 BRPM | DIASTOLIC BLOOD PRESSURE: 83 MMHG | OXYGEN SATURATION: 99 % | TEMPERATURE: 98.8 F | SYSTOLIC BLOOD PRESSURE: 177 MMHG | HEART RATE: 72 BPM

## 2024-06-29 DIAGNOSIS — I10 UNCONTROLLED HYPERTENSION: ICD-10-CM

## 2024-06-29 DIAGNOSIS — E87.1 HYPONATREMIA: Primary | ICD-10-CM

## 2024-06-29 DIAGNOSIS — E87.6 HYPOKALEMIA: ICD-10-CM

## 2024-06-29 DIAGNOSIS — J96.11 CHRONIC HYPOXIC RESPIRATORY FAILURE (HCC): ICD-10-CM

## 2024-06-29 LAB
2HR DELTA HS TROPONIN: -3 NG/L
ANION GAP SERPL CALCULATED.3IONS-SCNC: 6 MMOL/L (ref 4–13)
ATRIAL RATE: 78 BPM
ATRIAL RATE: 88 BPM
BASOPHILS # BLD AUTO: 0.05 THOUSANDS/ÂΜL (ref 0–0.1)
BASOPHILS NFR BLD AUTO: 1 % (ref 0–1)
BNP SERPL-MCNC: 81 PG/ML (ref 0–100)
BUN SERPL-MCNC: 11 MG/DL (ref 5–25)
CALCIUM SERPL-MCNC: 9.8 MG/DL (ref 8.4–10.2)
CARDIAC TROPONIN I PNL SERPL HS: 11 NG/L
CARDIAC TROPONIN I PNL SERPL HS: 8 NG/L
CHLORIDE SERPL-SCNC: 82 MMOL/L (ref 96–108)
CO2 SERPL-SCNC: 41 MMOL/L (ref 21–32)
CREAT SERPL-MCNC: 0.52 MG/DL (ref 0.6–1.3)
EOSINOPHIL # BLD AUTO: 0.03 THOUSAND/ÂΜL (ref 0–0.61)
EOSINOPHIL NFR BLD AUTO: 1 % (ref 0–6)
ERYTHROCYTE [DISTWIDTH] IN BLOOD BY AUTOMATED COUNT: 15.6 % (ref 11.6–15.1)
GFR SERPL CREATININE-BSD FRML MDRD: 87 ML/MIN/1.73SQ M
GLUCOSE SERPL-MCNC: 106 MG/DL (ref 65–140)
HCT VFR BLD AUTO: 39.3 % (ref 34.8–46.1)
HGB BLD-MCNC: 13.1 G/DL (ref 11.5–15.4)
IMM GRANULOCYTES # BLD AUTO: 0.01 THOUSAND/UL (ref 0–0.2)
IMM GRANULOCYTES NFR BLD AUTO: 0 % (ref 0–2)
LYMPHOCYTES # BLD AUTO: 1.34 THOUSANDS/ÂΜL (ref 0.6–4.47)
LYMPHOCYTES NFR BLD AUTO: 22 % (ref 14–44)
MCH RBC QN AUTO: 29.2 PG (ref 26.8–34.3)
MCHC RBC AUTO-ENTMCNC: 33.3 G/DL (ref 31.4–37.4)
MCV RBC AUTO: 88 FL (ref 82–98)
MONOCYTES # BLD AUTO: 0.61 THOUSAND/ÂΜL (ref 0.17–1.22)
MONOCYTES NFR BLD AUTO: 10 % (ref 4–12)
NEUTROPHILS # BLD AUTO: 3.95 THOUSANDS/ÂΜL (ref 1.85–7.62)
NEUTS SEG NFR BLD AUTO: 66 % (ref 43–75)
NRBC BLD AUTO-RTO: 0 /100 WBCS
P AXIS: 85 DEGREES
PLATELET # BLD AUTO: 235 THOUSANDS/UL (ref 149–390)
PMV BLD AUTO: 10 FL (ref 8.9–12.7)
POTASSIUM SERPL-SCNC: 3.1 MMOL/L (ref 3.5–5.3)
PR INTERVAL: 180 MS
QRS AXIS: -24 DEGREES
QRS AXIS: 13 DEGREES
QRSD INTERVAL: 66 MS
QRSD INTERVAL: 68 MS
QT INTERVAL: 350 MS
QT INTERVAL: 374 MS
QTC INTERVAL: 426 MS
QTC INTERVAL: 446 MS
RBC # BLD AUTO: 4.49 MILLION/UL (ref 3.81–5.12)
SODIUM SERPL-SCNC: 129 MMOL/L (ref 135–147)
T WAVE AXIS: 82 DEGREES
T WAVE AXIS: 89 DEGREES
VENTRICULAR RATE: 78 BPM
VENTRICULAR RATE: 98 BPM
WBC # BLD AUTO: 5.99 THOUSAND/UL (ref 4.31–10.16)

## 2024-06-29 PROCEDURE — 99285 EMERGENCY DEPT VISIT HI MDM: CPT | Performed by: EMERGENCY MEDICINE

## 2024-06-29 PROCEDURE — 71046 X-RAY EXAM CHEST 2 VIEWS: CPT

## 2024-06-29 PROCEDURE — 80048 BASIC METABOLIC PNL TOTAL CA: CPT | Performed by: EMERGENCY MEDICINE

## 2024-06-29 PROCEDURE — 93005 ELECTROCARDIOGRAM TRACING: CPT

## 2024-06-29 PROCEDURE — 84484 ASSAY OF TROPONIN QUANT: CPT | Performed by: EMERGENCY MEDICINE

## 2024-06-29 PROCEDURE — 85025 COMPLETE CBC W/AUTO DIFF WBC: CPT | Performed by: EMERGENCY MEDICINE

## 2024-06-29 PROCEDURE — 96374 THER/PROPH/DIAG INJ IV PUSH: CPT

## 2024-06-29 PROCEDURE — 93010 ELECTROCARDIOGRAM REPORT: CPT | Performed by: INTERNAL MEDICINE

## 2024-06-29 PROCEDURE — 36415 COLL VENOUS BLD VENIPUNCTURE: CPT | Performed by: EMERGENCY MEDICINE

## 2024-06-29 PROCEDURE — 83880 ASSAY OF NATRIURETIC PEPTIDE: CPT | Performed by: EMERGENCY MEDICINE

## 2024-06-29 PROCEDURE — 96361 HYDRATE IV INFUSION ADD-ON: CPT

## 2024-06-29 PROCEDURE — 99285 EMERGENCY DEPT VISIT HI MDM: CPT

## 2024-06-29 RX ORDER — POTASSIUM CHLORIDE 20 MEQ/1
40 TABLET, EXTENDED RELEASE ORAL ONCE
Status: COMPLETED | OUTPATIENT
Start: 2024-06-29 | End: 2024-06-29

## 2024-06-29 RX ORDER — ALBUTEROL SULFATE 2.5 MG/3ML
5 SOLUTION RESPIRATORY (INHALATION) ONCE
Status: COMPLETED | OUTPATIENT
Start: 2024-06-29 | End: 2024-06-29

## 2024-06-29 RX ORDER — LABETALOL HYDROCHLORIDE 5 MG/ML
10 INJECTION, SOLUTION INTRAVENOUS ONCE
Status: COMPLETED | OUTPATIENT
Start: 2024-06-29 | End: 2024-06-29

## 2024-06-29 RX ADMIN — ALBUTEROL SULFATE 5 MG: 2.5 SOLUTION RESPIRATORY (INHALATION) at 11:33

## 2024-06-29 RX ADMIN — LABETALOL HYDROCHLORIDE 10 MG: 5 INJECTION, SOLUTION INTRAVENOUS at 13:42

## 2024-06-29 RX ADMIN — POTASSIUM CHLORIDE 40 MEQ: 1500 TABLET, EXTENDED RELEASE ORAL at 13:13

## 2024-06-29 RX ADMIN — IPRATROPIUM BROMIDE 0.5 MG: 0.5 SOLUTION RESPIRATORY (INHALATION) at 11:33

## 2024-06-29 RX ADMIN — SODIUM CHLORIDE 500 ML: 0.9 INJECTION, SOLUTION INTRAVENOUS at 13:19

## 2024-06-29 NOTE — DISCHARGE INSTRUCTIONS
Start keeping a blood pressure log:    Take your blood pressure twice a day - once in the morning and once at night.    Make sure you have been sitting for at least 10 minutes and are calm and relaxes prior to taking the blood pressure.  Document the readings and keep the log to discuss with your primary care doctor.  If your blood pressure is elevated DO NOT keep rechecking it because it will likely just continue to increase because of your concern/worry.  You DO NOT need to come to the Emergency Department for an elevated blood pressure unless you are having signs of a stroke (numbness/weakness to one side of the body, trouble speaking, trouble with balance) or heart attack (severe chest pain, trouble breathing).     Call your Pulmonologist to discuss your shortness of breath with exertion.

## 2024-06-29 NOTE — ED PROVIDER NOTES
"History  Chief Complaint   Patient presents with    Shortness of Breath     Pt reports increased SOB beginning yesterday. Denies CP     84y F brought in by  with complaints of elevated blood pressure and secondary complaint of mild dyspnea. Pt was at PT yesterday and felt a little more short of breath than her baseline. Pt w/ chronic hypoxic respiratory failure due to COPD and is on 3L NC at all times.  Pt reports that she doesn't feel her portable unit works as well as her home unit and she has to \"work harder\" to get the oxygen.  Yesterday pt noted her blood pressure was elevated and today was even higher at home. Upon arrival here reported to have a pulse ox of 69% on her usual 3L NC      History provided by:  Patient   used: No    Shortness of Breath      Prior to Admission Medications   Prescriptions Last Dose Informant Patient Reported? Taking?   Calcium Carbonate (CALCIUM 600 PO)  Self Yes No   Sig: Take 1 tablet by mouth daily   Cholecalciferol (VITAMIN D3) 2000 units capsule  Self Yes No   Sig: Take 4,000 Units by mouth daily   Multiple Vitamin (multivitamin) capsule  Self Yes No   Sig: Take 1 capsule by mouth daily   Omega-3 Fatty Acids (FISH OIL PO)  Self Yes No   Sig: Take 1,200 mg by mouth 2 (two) times a day   Zinc 50 MG TABS  Self Yes No   Sig: Take 25 mg by mouth daily   albuterol (PROVENTIL HFA,VENTOLIN HFA) 90 mcg/act inhaler   No No   Sig: Inhale 2 puffs every 6 (six) hours as needed for wheezing   aspirin 81 MG tablet  Self Yes No   Sig: Take 81 mg by mouth daily   atorvastatin (LIPITOR) 10 mg tablet   No No   Sig: Take 1 tablet (10 mg total) by mouth daily   azelastine (ASTELIN) 0.1 % nasal spray  Self No No   Si spray into each nostril 2 (two) times a day Use in each nostril as directed   Patient not taking: Reported on 2024   cloNIDine (CATAPRES) 0.1 mg tablet   No No   Sig: TAKE 1 TABLET BY MOUTH EVERY 12 HOURS   dicyclomine (BENTYL) 10 mg capsule   Yes No "   Sig: Take 10 mg by mouth 4 (four) times a day (before meals and at bedtime) patient reports that she is no longer taking this medication   famotidine (PEPCID) 20 mg tablet   No No   Sig: Take 1 tablet (20 mg total) by mouth 2 (two) times a day   hydroCHLOROthiazide 25 mg tablet   No No   Sig: TAKE ONE TABLET BY MOUTH DAILY   ipratropium-albuterol (DUO-NEB) 0.5-2.5 mg/3 mL nebulizer solution   No No   Sig: Take 3 mL by nebulization 2 (two) times a day   levothyroxine 50 mcg tablet   No No   Sig: Take 1 tablet (50 mcg total) by mouth daily   montelukast (SINGULAIR) 10 mg tablet  Self No No   Sig: Take 1 tablet (10 mg total) by mouth daily at bedtime   Patient not taking: Reported on 6/21/2024   oxygen gas   Yes No   Sig: Inhale 4 L/min continuous. administered via nasal cannula  Indications: .   umeclidinium-vilanterol (Anoro Ellipta) 62.5-25 mcg/actuation inhaler   No No   Sig: Inhale 1 puff daily      Facility-Administered Medications: None       Past Medical History:   Diagnosis Date    Anxiety     Back pain     Cancer (HCC)     skin    Cataract     COPD (chronic obstructive pulmonary disease) (HCC)     COVID-19 11/20/2023    Disease of thyroid gland     Emphysema lung (HCC)     Emphysema of lung (HCC)     Geographic tongue     last assessed: 07/25/2014    GERD (gastroesophageal reflux disease)     Hyperlipidemia     Hypertension     Hypothyroidism (acquired)     Mild intermittent asthma     Multiple pulmonary nodules 07/05/2017    · Last imaging study April 2017 shows stable nodules, no further follow-up recommended · Initial CT scan was 2012 at [facility] demonstrating a 6 mm right upper lobe nodule    Sciatica     Seasonal allergies        Past Surgical History:   Procedure Laterality Date    BACK SURGERY      ENDOSCOPIC ULTRASOUND (UPPER)  12/2020    SKIN LESION EXCISION         Family History   Problem Relation Age of Onset    Stroke Mother     Cirrhosis Father         alcoholic    Cancer Sister      Cancer Daughter      I have reviewed and agree with the history as documented.    E-Cigarette/Vaping    E-Cigarette Use Never User      E-Cigarette/Vaping Substances    Nicotine No     THC No     CBD No     Flavoring No     Other No     Unknown No      Social History     Tobacco Use    Smoking status: Former     Current packs/day: 0.00     Average packs/day: 2.0 packs/day for 39.0 years (78.0 ttl pk-yrs)     Types: Cigarettes     Start date:      Quit date:      Years since quittin.5    Smokeless tobacco: Never   Vaping Use    Vaping status: Never Used   Substance Use Topics    Alcohol use: No    Drug use: No       Review of Systems   Respiratory:  Positive for shortness of breath.    All other systems reviewed and are negative.      Physical Exam  Physical Exam  Vitals and nursing note reviewed.   Constitutional:       Appearance: Normal appearance.      Comments: Elderly and frail appearing   HENT:      Mouth/Throat:      Mouth: Mucous membranes are moist.   Eyes:      Conjunctiva/sclera: Conjunctivae normal.   Cardiovascular:      Rate and Rhythm: Normal rate.   Pulmonary:      Effort: Tachypnea present. No accessory muscle usage or respiratory distress.      Breath sounds: Decreased air movement present. No wheezing, rhonchi or rales.       Abdominal:      Palpations: Abdomen is soft.   Musculoskeletal:         General: No tenderness.      Cervical back: Normal range of motion.   Skin:     General: Skin is warm.   Neurological:      General: No focal deficit present.      Mental Status: She is alert and oriented to person, place, and time.   Psychiatric:         Mood and Affect: Mood normal.         Vital Signs  ED Triage Vitals   Temperature Pulse Respirations Blood Pressure SpO2   24 1500 24 1110 24 1110 24 1110 24 1110   98.8 °F (37.1 °C) 96 (!) 26 (!) 198/92 (!) 69 %      Temp Source Heart Rate Source Patient Position - Orthostatic VS BP Location FiO2 (%)    06/29/24 1500 06/29/24 1110 06/29/24 1110 06/29/24 1110 --   Oral Monitor Sitting Left arm       Pain Score       --                  Vitals:    06/29/24 1345 06/29/24 1400 06/29/24 1445 06/29/24 1500   BP: 156/70 153/73 (!) 177/83    Pulse: 86 72 86 72   Patient Position - Orthostatic VS: Lying  Lying          Visual Acuity      ED Medications  Medications   labetalol (NORMODYNE) injection 10 mg (10 mg Intravenous Given 6/29/24 1342)   albuterol inhalation solution 5 mg (5 mg Nebulization Given 6/29/24 1133)   ipratropium (ATROVENT) 0.02 % inhalation solution 0.5 mg (0.5 mg Nebulization Given 6/29/24 1133)   sodium chloride 0.9 % bolus 500 mL (0 mL Intravenous Stopped 6/29/24 1454)   potassium chloride (Klor-Con M20) CR tablet 40 mEq (40 mEq Oral Given 6/29/24 1313)       Diagnostic Studies  Results Reviewed       Procedure Component Value Units Date/Time    HS Troponin I 2hr [[id number]]  (Normal) Collected: 06/29/24 1317    Lab Status: Final result Specimen: Blood from Line, Venous Updated: 06/29/24 1346     hs TnI 2hr 8 ng/L      Delta 2hr hsTnI -3 ng/L     B-Type Natriuretic Peptide(BNP) [[id number]]  (Normal) Collected: 06/29/24 1131    Lab Status: Final result Specimen: Blood from Line, Venous Updated: 06/29/24 1219     BNP 81 pg/mL     HS Troponin I 4hr [[id number]]     Lab Status: No result Specimen: Blood     HS Troponin 0hr (reflex protocol) [[id number]]  (Normal) Collected: 06/29/24 1131    Lab Status: Final result Specimen: Blood from Line, Venous Updated: 06/29/24 1205     hs TnI 0hr 11 ng/L     Basic metabolic panel [[id number]]  (Abnormal) Collected: 06/29/24 1131    Lab Status: Final result Specimen: Blood from Line, Venous Updated: 06/29/24 1158     Sodium 129 mmol/L      Potassium 3.1 mmol/L      Chloride 82 mmol/L      CO2 41 mmol/L      ANION GAP 6 mmol/L      BUN 11 mg/dL      Creatinine 0.52 mg/dL      Glucose 106 mg/dL      Calcium 9.8 mg/dL      eGFR 87 ml/min/1.73sq m     Narrative:       National Kidney Disease Foundation guidelines for Chronic Kidney Disease (CKD):     Stage 1 with normal or high GFR (GFR > 90 mL/min/1.73 square meters)    Stage 2 Mild CKD (GFR = 60-89 mL/min/1.73 square meters)    Stage 3A Moderate CKD (GFR = 45-59 mL/min/1.73 square meters)    Stage 3B Moderate CKD (GFR = 30-44 mL/min/1.73 square meters)    Stage 4 Severe CKD (GFR = 15-29 mL/min/1.73 square meters)    Stage 5 End Stage CKD (GFR <15 mL/min/1.73 square meters)  Note: GFR calculation is accurate only with a steady state creatinine    CBC and differential [[id number]]  (Abnormal) Collected: 06/29/24 1131    Lab Status: Final result Specimen: Blood from Line, Venous Updated: 06/29/24 1141     WBC 5.99 Thousand/uL      RBC 4.49 Million/uL      Hemoglobin 13.1 g/dL      Hematocrit 39.3 %      MCV 88 fL      MCH 29.2 pg      MCHC 33.3 g/dL      RDW 15.6 %      MPV 10.0 fL      Platelets 235 Thousands/uL      nRBC 0 /100 WBCs      Segmented % 66 %      Immature Grans % 0 %      Lymphocytes % 22 %      Monocytes % 10 %      Eosinophils Relative 1 %      Basophils Relative 1 %      Absolute Neutrophils 3.95 Thousands/µL      Absolute Immature Grans 0.01 Thousand/uL      Absolute Lymphocytes 1.34 Thousands/µL      Absolute Monocytes 0.61 Thousand/µL      Eosinophils Absolute 0.03 Thousand/µL      Basophils Absolute 0.05 Thousands/µL                    XR chest 2 views   ED Interpretation by Saranya Stauffer DO (06/29 1228)   Xray reviewed and independently interpreted by me: no acute findings                   Procedures  ECG 12 Lead Documentation Only    Date/Time: 6/29/2024 11:25 AM    Performed by: Saranya Stauffer DO  Authorized by: Saranya Stauffer DO    Indications / Diagnosis:  Dyspnea  ECG reviewed by me, the ED Provider: yes    Patient location:  ED  Previous ECG:     Previous ECG:  Compared to current    Similarity:  No change  Interpretation:     Interpretation: normal    Quality:     Tracing quality:  Limited  by artifact  Rate:     ECG rate:  83    ECG rate assessment: normal    Rhythm:     Rhythm: sinus rhythm    Ectopy:     Ectopy: none    ST segments:     ST segments:  Normal  T waves:     T waves: normal             ED Course  ED Course as of 06/29/24 1534   Sat Jun 29, 2024   1214 Sodium(!): 129  140 three months ago   1214 Potassium(!): 3.1  Will replete   1251 D/w pt lab results. Pt reports hx of hyponatremia and hypokalemia - has been told to manage through diet.     1354 Delta 2hr hsTnI: -3  Will ambulate patient   1455 Pt steady while ambulating.  Did have her pulse ox drop on the 3L.  Pt's  reported that she's been on 4L for months but the therapist dropped her to 3L    Instructed pt to increase oxygen back to 4L and before activity turn it up, complete her activity, then turn it back down to her 4L once her activity is complete    Recommended she contact her pulmonologist to discuss her home oxygen and to f/u w/ her PCP for further monitoring of her blood pressure   1522 Per nursing, pt's temp taken at dc and was 98 - will be charted but she's in another room                               SBIRT 22yo+      Flowsheet Row Most Recent Value   Initial Alcohol Screen: US AUDIT-C     1. How often do you have a drink containing alcohol? 0 Filed at: 06/29/2024 1146   2. How many drinks containing alcohol do you have on a typical day you are drinking?  0 Filed at: 06/29/2024 1146   3b. FEMALE Any Age, or MALE 65+: How often do you have 4 or more drinks on one occassion? 0 Filed at: 06/29/2024 1146   Audit-C Score 0 Filed at: 06/29/2024 1146   MO: How many times in the past year have you...    Used an illegal drug or used a prescription medication for non-medical reasons? Never Filed at: 06/29/2024 1146                      Medical Decision Making  Will get EKG to r/o arrhythmia, ischemic changes.  Will get labs to r/o acute life threatening metabolic abnl, cardiac ischemia, volume overload, significant anemia.   Will get CXR to r/o occult pna, pulm edema, ptx,     Problems Addressed:  Chronic hypoxic respiratory failure (HCC): chronic illness or injury with severe exacerbation, progression, or side effects of treatment     Details: No acute findings of pna, pulm edema, ptx.  Instructed to contact her pulm doctor to discuss  Hypokalemia: acute illness or injury     Details: Pt w/ known intermittent hypokalemia treated w/ diet  Hyponatremia: acute illness or injury     Details: Pt reports hx of recurrent hyponatremia treated w/ diet  Uncontrolled hypertension: chronic illness or injury with exacerbation, progression, or side effects of treatment     Details: Suspect significant anxiety component. Educated pt and  on BP log and PCP f/u    Amount and/or Complexity of Data Reviewed  Independent Historian: spouse  Labs: ordered. Decision-making details documented in ED Course.  Radiology: ordered and independent interpretation performed.  ECG/medicine tests: ordered and independent interpretation performed.    Risk  Prescription drug management.             Disposition  Final diagnoses:   Hyponatremia   Hypokalemia   Uncontrolled hypertension   Chronic hypoxic respiratory failure (HCC)     Time reflects when diagnosis was documented in both MDM as applicable and the Disposition within this note       Time User Action Codes Description Comment    6/29/2024 12:29 PM Saranya Stauffer Add [E87.1] Hyponatremia     6/29/2024 12:29 PM Saranya Stauffer Add [E87.6] Hypokalemia     6/29/2024  3:01 PM Saranya Stauffer Add [I10] Uncontrolled hypertension     6/29/2024  3:02 PM Saranya Stauffer Add [J96.11] Chronic hypoxic respiratory failure (HCC)           ED Disposition       ED Disposition   Discharge    Condition   Stable    Date/Time   Sat Jun 29, 2024 1502    Comment                  Follow-up Information       Follow up With Specialties Details Why Contact Info    Chen Pierce MD Family Medicine Schedule an appointment as  soon as possible for a visit in 1 week for further evaluation and treatment of your elevated blood pressure [address]  [address]  [place] [address] [phone]      Surinder Yadav MD Pulmonary Disease, Pulmonology, Critical Care Medicine, Intensive Care Schedule an appointment as soon as possible for a visit  to discuss your shortness of breath with exertion [address]  [address]  [facility] [facility] [id number] [phone]              Discharge Medication List as of 6/29/2024  3:07 PM        CONTINUE these medications which have NOT CHANGED    Details   albuterol (PROVENTIL HFA,VENTOLIN HFA) 90 mcg/act inhaler Inhale 2 puffs every 6 (six) hours as needed for wheezing, Starting Wed 4/3/2024, Normal      aspirin 81 MG tablet Take 81 mg by mouth daily, Historical Med      atorvastatin (LIPITOR) 10 mg tablet Take 1 tablet (10 mg total) by mouth daily, Starting Fri 3/22/2024, Normal      azelastine (ASTELIN) 0.1 % nasal spray 1 spray into each nostril 2 (two) times a day Use in each nostril as directed, Starting Sat 12/10/2022, Normal      Calcium Carbonate (CALCIUM 600 PO) Take 1 tablet by mouth daily, Historical Med      Cholecalciferol (VITAMIN D3) 2000 units capsule Take 4,000 Units by mouth daily, Historical Med      cloNIDine (CATAPRES) 0.1 mg tablet TAKE 1 TABLET BY MOUTH EVERY 12 HOURS, Starting Thu 5/2/2024, Normal      dicyclomine (BENTYL) 10 mg capsule Take 10 mg by mouth 4 (four) times a day (before meals and at bedtime) patient reports that she is no longer taking this medication, Historical Med      famotidine (PEPCID) 20 mg tablet Take 1 tablet (20 mg total) by mouth 2 (two) times a day, Starting Tue 4/23/2024, Normal      hydroCHLOROthiazide 25 mg tablet TAKE ONE TABLET BY MOUTH DAILY, Starting Wed 4/10/2024, Normal      ipratropium-albuterol (DUO-NEB) 0.5-2.5 mg/3 mL nebulizer solution Take 3 mL by nebulization 2 (two) times a day, Starting Wed 4/3/2024, Until Thu 8/1/2024, Normal       levothyroxine 50 mcg tablet Take 1 tablet (50 mcg total) by mouth daily, Starting Thu 6/6/2024, Normal      montelukast (SINGULAIR) 10 mg tablet Take 1 tablet (10 mg total) by mouth daily at bedtime, Starting Thu 1/26/2023, Normal      Multiple Vitamin (multivitamin) capsule Take 1 capsule by mouth daily, Historical Med      Omega-3 Fatty Acids (FISH OIL PO) Take 1,200 mg by mouth 2 (two) times a day, Historical Med      oxygen gas Inhale 4 L/min continuous. administered via nasal cannula  Indications: ., Historical Med      umeclidinium-vilanterol (Anoro Ellipta) 62.5-25 mcg/actuation inhaler Inhale 1 puff daily, Starting Wed 4/3/2024, Normal      Zinc 50 MG TABS Take 25 mg by mouth daily, Historical Med             No discharge procedures on file.    PDMP Review         Value Time User    PDMP Reviewed  Yes 5/29/2020  9:51 AM Chen Pierce MD            ED Provider  Electronically Signed by             Saranya Stauffer DO  06/29/24 [id number]

## 2024-06-30 LAB
ATRIAL RATE: 357 BPM
QRS AXIS: -3 DEGREES
QRSD INTERVAL: 66 MS
QT INTERVAL: 344 MS
QTC INTERVAL: 404 MS
T WAVE AXIS: 82 DEGREES
VENTRICULAR RATE: 83 BPM

## 2024-06-30 PROCEDURE — 93010 ELECTROCARDIOGRAM REPORT: CPT | Performed by: INTERNAL MEDICINE

## 2024-07-02 ENCOUNTER — HOME CARE VISIT (OUTPATIENT)
Dept: HOME HEALTH SERVICES | Facility: HOME HEALTHCARE | Age: 84
End: 2024-07-02
Payer: COMMERCIAL

## 2024-07-02 VITALS — HEART RATE: 71 BPM | DIASTOLIC BLOOD PRESSURE: 100 MMHG | SYSTOLIC BLOOD PRESSURE: 155 MMHG | OXYGEN SATURATION: 96 %

## 2024-07-02 PROCEDURE — G0151 HHCP-SERV OF PT,EA 15 MIN: HCPCS

## 2024-07-03 ENCOUNTER — OFFICE VISIT (OUTPATIENT)
Dept: FAMILY MEDICINE CLINIC | Facility: CLINIC | Age: 84
End: 2024-07-03
Payer: COMMERCIAL

## 2024-07-03 VITALS
WEIGHT: 134 LBS | DIASTOLIC BLOOD PRESSURE: 72 MMHG | HEART RATE: 92 BPM | SYSTOLIC BLOOD PRESSURE: 162 MMHG | HEIGHT: 65 IN | BODY MASS INDEX: 22.33 KG/M2 | OXYGEN SATURATION: 90 %

## 2024-07-03 DIAGNOSIS — E78.2 MIXED HYPERLIPIDEMIA: ICD-10-CM

## 2024-07-03 DIAGNOSIS — J44.9 CHRONIC OBSTRUCTIVE PULMONARY DISEASE, UNSPECIFIED COPD TYPE (HCC): ICD-10-CM

## 2024-07-03 DIAGNOSIS — E03.9 ACQUIRED HYPOTHYROIDISM: ICD-10-CM

## 2024-07-03 DIAGNOSIS — F41.9 ANXIETY: ICD-10-CM

## 2024-07-03 DIAGNOSIS — I10 ESSENTIAL HYPERTENSION: Primary | ICD-10-CM

## 2024-07-03 DIAGNOSIS — E87.6 HYPOKALEMIA: ICD-10-CM

## 2024-07-03 DIAGNOSIS — F33.9 DEPRESSION, RECURRENT (HCC): ICD-10-CM

## 2024-07-03 PROCEDURE — 99214 OFFICE O/P EST MOD 30 MIN: CPT | Performed by: PHYSICIAN ASSISTANT

## 2024-07-03 PROCEDURE — G2211 COMPLEX E/M VISIT ADD ON: HCPCS | Performed by: PHYSICIAN ASSISTANT

## 2024-07-03 RX ORDER — BUSPIRONE HYDROCHLORIDE 5 MG/1
5 TABLET ORAL 2 TIMES DAILY
Qty: 60 TABLET | Refills: 5 | Status: SHIPPED | OUTPATIENT
Start: 2024-07-03

## 2024-07-03 RX ORDER — AMLODIPINE BESYLATE 5 MG/1
5 TABLET ORAL DAILY
Qty: 90 TABLET | Refills: 1 | Status: SHIPPED | OUTPATIENT
Start: 2024-07-03

## 2024-07-03 NOTE — PROGRESS NOTES
Ambulatory Visit  Name: Mary Chung      : 1940      MRN: [id number]  Encounter Provider: Chauncey Oden PA-C  Encounter Date: 7/3/2024   Encounter department: Replaced by [facility]  Patient Instructions   Assessment/plan:  1.  Benign essential hypertension-patient recently seen in the hospital with significant elevations.  She is currently on hydrochlorothiazide 25 mg, clonidine 0.1 mg.  I would recommend stopping the hydrochlorothiazide because of her hypokalemia and hyponatremia.  Will try adding amlodipine 5 mg daily and patient will have follow-up with Dr. Pierce in 3 weeks.  Would recommend she continue to monitor blood pressure at home and keep recordings.  2.  Hypokalemia-potassium was a bit low in the emergency room.  Patient was given potassium supplement.  Recommend reassessing in the next week.  Hydrochlorothiazide discontinued.  3.  COPD-patient with some recent exacerbation, continues low-flow oxygen and follow-up with pulmonologist on long-term inhaler therapies.  4.  Acquired hypothyroidism-stable with levothyroxine 50 mcg daily.  5.  Mixed hyperlipidemia-stable on atorvastatin 10 mg daily.  6.  Anxiety- is requesting a nerve pill for the patient.  She would like to try something mild.  I will try BuSpar 5 mg twice daily as needed.  I would recommend she wait to start this for a few days after starting amlodipine so that we know if there is any side effects where it is coming from.  Patient is agreeable to this.  She will follow-up with Dr. Pierce as planned in 3 weeks.    Assessment & Plan   1. Essential hypertension  -     CBC and differential  -     Comprehensive metabolic panel  -     amLODIPine (NORVASC) 5 mg tablet; Take 1 tablet (5 mg total) by mouth daily  2. Hypokalemia  -     CBC and differential  -     Comprehensive metabolic panel  3. Chronic obstructive pulmonary disease, unspecified COPD type (HCC)  4. Acquired hypothyroidism  5. Mixed hyperlipidemia  6.  Depression, recurrent (HCC)  7. Anxiety  -     busPIRone (BUSPAR) 5 mg tablet; Take 1 tablet (5 mg total) by mouth 2 (two) times a day       History of Present Illness   {Disappearing Hyperlinks I Encounters * My Last Note * Since Last Visit * History :[id number]}  HPI: This is an 84-year-old female that presents to the office accompanied by her .  She was seen in the emergency room recently with significantly elevated blood pressure readings.  She has been feeling well for the most part but she is on chronic oxygen therapy for COPD and continues to follow with pulmonary medicine.  She does sometimes have a feeling of cloudiness or fullness in the head.  She has not had any recent sinus issues going on.  When she was in the hospital her potassium and sodium were quite low.  She was given some supplementation and discharge.  She is not having any chest pains or difficulty breathing greater than baseline.        Review of Systems   Constitutional:  Negative for chills, fatigue and fever.   HENT:  Negative for congestion, ear pain and sinus pressure.    Eyes:  Negative for visual disturbance.   Respiratory:  Negative for cough, chest tightness and shortness of breath.    Cardiovascular:  Negative for chest pain and palpitations.   Gastrointestinal:  Negative for diarrhea, nausea and vomiting.   Endocrine: Negative for polyuria.   Genitourinary:  Negative for dysuria and frequency.   Musculoskeletal:  Negative for arthralgias and myalgias.   Skin:  Negative for pallor and rash.   Neurological:  Negative for dizziness, weakness, light-headedness, numbness and headaches.   Psychiatric/Behavioral:  Negative for agitation, behavioral problems and sleep disturbance.    All other systems reviewed and are negative.      Objective   {Disappearing Hyperlinks   Review Vitals * Enter New Vitals * Results Review * Labs * Imaging * Cardiology * Procedures * Lung Cancer Screening :[id number]}  /72 (BP Location: Left arm, Patient  "Position: Sitting, Cuff Size: Standard)   Pulse 92   Ht 5' 5\" (1.651 m)   Wt 60.8 kg (134 lb)   SpO2 90%   BMI 22.30 kg/m²     Physical Exam  Constitutional:       General: She is not in acute distress.     Appearance: Normal appearance.      Comments: Using a rollator walker to ambulate.  Currently on low-flow oxygen through nasal cannula at 2 L.   HENT:      Head: Normocephalic and atraumatic.      Right Ear: Tympanic membrane normal.      Left Ear: Tympanic membrane normal.      Nose: No congestion or rhinorrhea.   Eyes:      Conjunctiva/sclera: Conjunctivae normal.      Pupils: Pupils are equal, round, and reactive to light.   Neck:      Vascular: No carotid bruit.   Cardiovascular:      Rate and Rhythm: Normal rate and regular rhythm.      Heart sounds: No murmur heard.  Pulmonary:      Effort: Pulmonary effort is normal. No respiratory distress.      Breath sounds: Normal breath sounds.   Abdominal:      Palpations: Abdomen is soft.   Musculoskeletal:         General: Normal range of motion.      Cervical back: Normal range of motion and neck supple. No muscular tenderness.   Lymphadenopathy:      Cervical: No cervical adenopathy.   Skin:     General: Skin is warm.      Capillary Refill: Capillary refill takes less than 2 seconds.   Neurological:      General: No focal deficit present.      Mental Status: She is alert and oriented to person, place, and time.   Psychiatric:         Mood and Affect: Mood normal.       Administrative Statements {Disappearing Hyperlinks I  Level of Service * [id number]}          "

## 2024-07-03 NOTE — PATIENT INSTRUCTIONS
Assessment/plan:  1.  Benign essential hypertension-patient recently seen in the hospital with significant elevations.  She is currently on hydrochlorothiazide 25 mg, clonidine 0.1 mg.  I would recommend stopping the hydrochlorothiazide because of her hypokalemia and hyponatremia.  Will try adding amlodipine 5 mg daily and patient will have follow-up with Dr. Pierce in 3 weeks.  Would recommend she continue to monitor blood pressure at home and keep recordings.  2.  Hypokalemia-potassium was a bit low in the emergency room.  Patient was given potassium supplement.  Recommend reassessing in the next week.  Hydrochlorothiazide discontinued.  3.  COPD-patient with some recent exacerbation, continues low-flow oxygen and follow-up with pulmonologist on long-term inhaler therapies.  4.  Acquired hypothyroidism-stable with levothyroxine 50 mcg daily.  5.  Mixed hyperlipidemia-stable on atorvastatin 10 mg daily.  6.  Anxiety- is requesting a nerve pill for the patient.  She would like to try something mild.  I will try BuSpar 5 mg twice daily as needed.  I would recommend she wait to start this for a few days after starting amlodipine so that we know if there is any side effects where it is coming from.  Patient is agreeable to this.  She will follow-up with Dr. Pierce as planned in 3 weeks.

## 2024-07-08 ENCOUNTER — HOME CARE VISIT (OUTPATIENT)
Dept: HOME HEALTH SERVICES | Facility: HOME HEALTHCARE | Age: 84
End: 2024-07-08
Payer: COMMERCIAL

## 2024-07-08 VITALS — OXYGEN SATURATION: 98 % | HEART RATE: 78 BPM | SYSTOLIC BLOOD PRESSURE: 174 MMHG | DIASTOLIC BLOOD PRESSURE: 90 MMHG

## 2024-07-08 PROCEDURE — G0151 HHCP-SERV OF PT,EA 15 MIN: HCPCS

## 2024-07-10 ENCOUNTER — HOME CARE VISIT (OUTPATIENT)
Dept: HOME HEALTH SERVICES | Facility: HOME HEALTHCARE | Age: 84
End: 2024-07-10
Payer: COMMERCIAL

## 2024-07-10 PROCEDURE — G0151 HHCP-SERV OF PT,EA 15 MIN: HCPCS

## 2024-07-10 NOTE — TELEPHONE ENCOUNTER
PT at visit with patient today . Her BP this am was 176/92. She has been taking the Amlodipine 5mg for a week now. She is asymptomatic. Patient is very resistant and does not want to go back to the ER

## 2024-07-12 LAB
ALBUMIN SERPL-MCNC: 4.3 G/DL (ref 3.6–5.1)
ALBUMIN/GLOB SERPL: 1.7 (CALC) (ref 1–2.5)
ALP SERPL-CCNC: 64 U/L (ref 37–153)
ALT SERPL-CCNC: 31 U/L (ref 6–29)
AST SERPL-CCNC: 32 U/L (ref 10–35)
BASOPHILS # BLD AUTO: 40 CELLS/UL (ref 0–200)
BASOPHILS NFR BLD AUTO: 0.8 %
BILIRUB SERPL-MCNC: 0.6 MG/DL (ref 0.2–1.2)
BUN SERPL-MCNC: 13 MG/DL (ref 7–25)
BUN/CREAT SERPL: 25 (CALC) (ref 6–22)
CALCIUM SERPL-MCNC: 9.3 MG/DL (ref 8.6–10.4)
CHLORIDE SERPL-SCNC: 91 MMOL/L (ref 98–110)
CO2 SERPL-SCNC: 38 MMOL/L (ref 20–32)
CREAT SERPL-MCNC: 0.51 MG/DL (ref 0.6–0.95)
EOSINOPHIL # BLD AUTO: 60 CELLS/UL (ref 15–500)
EOSINOPHIL NFR BLD AUTO: 1.2 %
ERYTHROCYTE [DISTWIDTH] IN BLOOD BY AUTOMATED COUNT: 14.2 % (ref 11–15)
GFR/BSA.PRED SERPLBLD CYS-BASED-ARV: 91 ML/MIN/1.73M2
GLOBULIN SER CALC-MCNC: 2.6 G/DL (CALC) (ref 1.9–3.7)
GLUCOSE SERPL-MCNC: 111 MG/DL (ref 65–99)
HCT VFR BLD AUTO: 41.7 % (ref 35–45)
HGB BLD-MCNC: 13.5 G/DL (ref 11.7–15.5)
LYMPHOCYTES # BLD AUTO: 1245 CELLS/UL (ref 850–3900)
LYMPHOCYTES NFR BLD AUTO: 24.9 %
MCH RBC QN AUTO: 28.8 PG (ref 27–33)
MCHC RBC AUTO-ENTMCNC: 32.4 G/DL (ref 32–36)
MCV RBC AUTO: 88.9 FL (ref 80–100)
MONOCYTES # BLD AUTO: 370 CELLS/UL (ref 200–950)
MONOCYTES NFR BLD AUTO: 7.4 %
NEUTROPHILS # BLD AUTO: 3285 CELLS/UL (ref 1500–7800)
NEUTROPHILS NFR BLD AUTO: 65.7 %
PLATELET # BLD AUTO: 229 THOUSAND/UL (ref 140–400)
PMV BLD REES-ECKER: 10.2 FL (ref 7.5–12.5)
POTASSIUM SERPL-SCNC: 3.9 MMOL/L (ref 3.5–5.3)
PROT SERPL-MCNC: 6.9 G/DL (ref 6.1–8.1)
RBC # BLD AUTO: 4.69 MILLION/UL (ref 3.8–5.1)
SODIUM SERPL-SCNC: 136 MMOL/L (ref 135–146)
WBC # BLD AUTO: 5 THOUSAND/UL (ref 3.8–10.8)

## 2024-07-15 ENCOUNTER — RA CDI HCC (OUTPATIENT)
Dept: OTHER | Facility: HOSPITAL | Age: 84
End: 2024-07-15

## 2024-07-24 ENCOUNTER — OFFICE VISIT (OUTPATIENT)
Dept: FAMILY MEDICINE CLINIC | Facility: CLINIC | Age: 84
End: 2024-07-24
Payer: COMMERCIAL

## 2024-07-24 VITALS
BODY MASS INDEX: 22.49 KG/M2 | SYSTOLIC BLOOD PRESSURE: 150 MMHG | WEIGHT: 135.13 LBS | DIASTOLIC BLOOD PRESSURE: 86 MMHG | HEART RATE: 97 BPM | OXYGEN SATURATION: 91 %

## 2024-07-24 DIAGNOSIS — I10 ESSENTIAL HYPERTENSION: ICD-10-CM

## 2024-07-24 DIAGNOSIS — R32 URINARY INCONTINENCE, UNSPECIFIED TYPE: ICD-10-CM

## 2024-07-24 DIAGNOSIS — F41.9 ANXIETY: ICD-10-CM

## 2024-07-24 DIAGNOSIS — F33.9 DEPRESSION, RECURRENT (HCC): ICD-10-CM

## 2024-07-24 DIAGNOSIS — J44.9 CHRONIC OBSTRUCTIVE PULMONARY DISEASE, UNSPECIFIED COPD TYPE (HCC): ICD-10-CM

## 2024-07-24 DIAGNOSIS — J96.11 CHRONIC RESPIRATORY FAILURE WITH HYPOXIA (HCC): ICD-10-CM

## 2024-07-24 DIAGNOSIS — Z00.00 MEDICARE ANNUAL WELLNESS VISIT, SUBSEQUENT: Primary | ICD-10-CM

## 2024-07-24 DIAGNOSIS — E03.9 ACQUIRED HYPOTHYROIDISM: ICD-10-CM

## 2024-07-24 PROCEDURE — G0439 PPPS, SUBSEQ VISIT: HCPCS | Performed by: FAMILY MEDICINE

## 2024-07-24 PROCEDURE — 99214 OFFICE O/P EST MOD 30 MIN: CPT | Performed by: FAMILY MEDICINE

## 2024-07-24 NOTE — PATIENT INSTRUCTIONS
"Rec rsv vaccine,start Buspar  for anxiety  Patient Education     Urinary incontinence in females   The Basics   Written by the doctors and editors at [facility]   What is urinary incontinence? -- Urinary incontinence is the medical term for when a person leaks urine or loses bladder control.  Incontinence is a very common problem, but it is not a normal part of aging. If you have this problem, there are treatments that can help. There are also things that you can do on your own to stop or reduce urine leakage so you don't have to \"just live with it.\"  What are the symptoms of incontinence? -- There are different types of incontinence. Each causes different symptoms. The 3 most common types are:   Stress incontinence - With stress incontinence, you leak urine when you laugh, cough, sneeze, or do anything that \"stresses\" the belly. Stress incontinence is most common in females, especially those who have had a baby.   Urgency incontinence - With urgency incontinence, you feel a strong need to urinate all of a sudden. This is also known as \"urge incontinence.\" Often, the \"urge\" is so strong that you can't make it to the bathroom in time. \"Overactive bladder\" is another term for having a sudden, frequent urge to urinate. People with overactive bladder might or might not actually leak urine.   Mixed incontinence - With mixed incontinence, you have symptoms of both stress and urgency incontinence.  Is there anything I can do on my own to feel better? -- Yes. Here are some things that can help reduce urine leaks:   Reduce the amount of liquid that you drink, especially a few hours before bed.   Cut down on any foods or drinks that make your symptoms worse. Some people find that alcohol, caffeine, or spicy or acidic foods irritate the bladder.   Try to lose weight, if you are overweight. Your doctor or nurse can help you do this in a healthy way.   If you have diabetes, keep your blood sugar as close to your goal level as " "possible.   If you take medicines called diuretics, plan ahead. These medicines increase the need to urinate. Try to take them when you know you will be near a bathroom for a few hours. If you keep having problems with leakage because of diuretics, ask your doctor if you can take a lower dose or switch to a different medicine.  These techniques can also help improve bladder control:   Bladder retraining - During bladder retraining, you go to the bathroom at scheduled times. For instance, you might decide that you will go every hour. Make yourself go every hour, even if you don't feel like you need to. Try to wait the whole hour, even if you need to go sooner. Then, once you get used to going every hour, increase the amount of time you wait in between bathroom visits. Over time, you might be able to \"retrain\" your bladder to wait 3 or 4 hours between bathroom visits.   Pelvic floor muscle training - This involves learning exercises to strengthen and relax your pelvic muscles. These include the muscles that control the flow of urine and bowel movements. When done right, these exercises can help. But people often do them wrong. Ask your doctor or nurse how to do them right. Your doctor might suggest working with a physical therapist who has special training in these exercises.  Should I see my doctor or nurse? -- Yes. Your doctor or nurse can find out what might be causing your incontinence. They can also suggest ways to relieve the problem.  When you speak to your doctor or nurse, ask if any of the medicines you take could be causing your symptoms. Some medicines can cause incontinence or make it worse.  Some people choose to wear pads or special underwear. These can help if you accidentally leak urine once in a while. But they can also cause skin irritation if you use them a lot. If you have incontinence, ask your doctor or nurse how to treat it.  How is incontinence treated? -- The treatment options differ depending " on what type of incontinence you have. Some of the options include:   Medicines to relax the bladder   Surgery to repair the tissues that support the bladder or to improve the flow of urine   Electrical stimulation of the nerves that relax the bladder  Urinary incontinence is more common in people who have been through menopause. (Menopause is when you stop having monthly periods). Some people have vaginal dryness after menopause. If this is the case for you, a treatment called vaginal estrogen might help.  What will my life be like? -- Many people with incontinence can regain bladder control or at least reduce the amount of leakage they have. The most important thing is to tell your doctor or nurse. Then, work with them to find an approach that helps you.  All topics are updated as new evidence becomes available and our peer review process is complete.  This topic retrieved from [employer] on: Feb 26, 2024.  Topic 89932 Version 18.0  Release: 32.2.4 - C32.56  © 2024 UpToDate, Inc. and/or its affiliates. All rights reserved.  figure 1: Location of the bladder     This drawing shows the side view of a woman's body. The bladder is in front of the vagina. The urethra is the tube that carries urine from the bladder out of the body.  Graphic 350622 Version 1.0  Consumer Information Use and Disclaimer   Disclaimer: This generalized information is a limited summary of diagnosis, treatment, and/or medication information. It is not meant to be comprehensive and should be used as a tool to help the user understand and/or assess potential diagnostic and treatment options. It does NOT include all information about conditions, treatments, medications, side effects, or risks that may apply to a specific patient. It is not intended to be medical advice or a substitute for the medical advice, diagnosis, or treatment of a health care provider based on the health care provider's examination and assessment of a patient's specific and  "unique circumstances. Patients must speak with a health care provider for complete information about their health, medical questions, and treatment options, including any risks or benefits regarding use of medications. This information does not endorse any treatments or medications as safe, effective, or approved for treating a specific patient. UpToDate, Inc. and its affiliates disclaim any warranty or liability relating to this information or the use thereof.The use of this information is governed by the Terms of Use, available at https://www.[employer].com/en/know/clinical-effectiveness-terms. 2024© UpToDate, Inc. and its affiliates and/or licensors. All rights reserved.  Copyright   © 2024 UpToDate, Inc. and/or its affiliates. All rights reserved.    Patient Education     Pelvic floor muscle exercises   The Basics   Written by the doctors and editors at [email or web address]   What is the pelvic floor? -- The \"pelvic floor\" is the name for the muscles that support the organs in the pelvis. These organs include the bladder and rectum. In the female pelvis, they also include the uterus (figure 1).  What are pelvic floor muscle exercises? -- These are exercises that can make your pelvic floor muscles stronger. They involve learning ways to tighten and relax specific muscles.  Pelvic floor muscle exercises can help keep you from leaking urine, gas, or bowel movements. They can also help with a condition called \"pelvic organ prolapse.\" This is when the organs in the lower belly drop down and press against or bulge into the vagina.  One way to strengthen your pelvic floor muscles is to do exercises. These are also known as \"Kegel\" exercises.  How do I do pelvic floor muscle exercises? -- If you want to try pelvic floor muscle exercises, start by talking to your doctor or nurse. They can talk to you about whether these exercises can help you. They can also teach you how to do them correctly.  You will need to learn which muscles " "to tighten and relax. It is sometimes hard to figure out the right muscles.  Some ways you can practice:   People with female or male anatomy - Squeeze the muscles you would use to avoid passing gas.   People with female anatomy - Put a finger inside your vagina and squeeze the muscles around your finger. Or you can imagine that you are sitting on a marble and have to pick it up using your vagina.   People with male anatomy - Squeeze the muscles that control the flow of urine. These exercises might help reduce urine leaks in people who have had surgery to treat prostate cancer or an enlarged prostate.  No matter how you learn to do pelvic floor muscle exercises, know that the muscles involved are not in your belly, thighs, or buttocks.  After you learn which muscles to tighten and relax, you can do the exercises in any position (standing, sitting, or lying down).  Should I see a physical therapist? -- Your doctor or nurse might suggest working with a physical therapist who has special training in pelvic floor issues. They can check your muscle strength and teach you specific exercises.  How often should I do the exercises? -- A common approach is to try to do a set of the exercises 3 times a day.  For each set, do the following about 10 times:   Squeeze your pelvic muscles.   Hold the muscles tight for about 10 seconds.   Relax the muscles completely.  Keep up this routine for at least a few months. You will probably notice results, but it might take a few weeks to several months.  How do pelvic floor muscle exercises help? -- Pelvic floor muscle exercises can help:   Prevent urine leaks in people who have \"stress incontinence\" - This means that they leak urine when they cough, laugh, sneeze, or strain.   Control sudden urges to urinate - These happen to people with \"urinary urgency\" or \"urge incontinence.\"   Control the release of gas or bowel movements   Improve symptoms caused by pelvic organ prolapse - These can " "include pressure or bulging in the vagina. If you have these symptoms, see your doctor or nurse to find out the cause.  It might take a few months of doing the exercises regularly before you notice them working. If you have been doing pelvic floor muscle exercises for several months and they don't seem to be making a difference, tell your doctor or nurse. They might suggest seeing a physical therapist or trying other treatments for your condition.  All topics are updated as new evidence becomes available and our peer review process is complete.  This topic retrieved from [employer] on: Feb 26, 2024.  Topic 52061 Version 15.0  Release: 32.2.4 - C32.56  © 2024 UpToDate, Inc. and/or its affiliates. All rights reserved.  figure 1: Pelvic floor muscles     The \"pelvic floor\" is a group of muscles that support the organs in the pelvis. In females, these organs include the uterus, bladder, and rectum.  Graphic 786209 Version 2.0  Consumer Information Use and Disclaimer   Disclaimer: This generalized information is a limited summary of diagnosis, treatment, and/or medication information. It is not meant to be comprehensive and should be used as a tool to help the user understand and/or assess potential diagnostic and treatment options. It does NOT include all information about conditions, treatments, medications, side effects, or risks that may apply to a specific patient. It is not intended to be medical advice or a substitute for the medical advice, diagnosis, or treatment of a health care provider based on the health care provider's examination and assessment of a patient's specific and unique circumstances. Patients must speak with a health care provider for complete information about their health, medical questions, and treatment options, including any risks or benefits regarding use of medications. This information does not endorse any treatments or medications as safe, effective, or approved for treating a specific " patient. UpToDate, Inc. and its affiliates disclaim any warranty or liability relating to this information or the use thereof.The use of this information is governed by the Terms of Use, available at https://www.[employer].com/en/know/clinical-effectiveness-terms. 2024© UpToDate, Inc. and its affiliates and/or licensors. All rights reserved.  Copyright   © 2024 UpToDate, Inc. and/or its affiliates. All rights reserved.

## 2024-07-24 NOTE — PROGRESS NOTES
Ambulatory Visit  Name: Mary Chung      : 1940      MRN: [id number]  Encounter Provider: Chen Pierce MD  Encounter Date: 2024   Encounter department: [facility] PRIMARY [facility]ARE    Assessment & Plan   1. Medicare annual wellness visit, subsequent  2. Urinary incontinence, unspecified type  3. Acquired hypothyroidism  -     Lipid panel; Future; Expected date: 2024  -     Comprehensive metabolic panel; Future; Expected date: 2024  -     TSH, 3rd generation; Future; Expected date: 2024  4. Essential hypertension  Assessment & Plan:  Home  Bps have  been  higher , pt  is stressed  Orders:  -     Comprehensive metabolic panel; Future; Expected date: 2024  -     TSH, 3rd generation; Future; Expected date: 2024  -     CBC and differential; Future; Expected date: 2024  5. Anxiety  Assessment & Plan:  I  agree pt should start Buspar  6. Depression, recurrent (HCC)  Assessment & Plan:  I  agree pt should start Buspar  7. Chronic obstructive pulmonary disease, unspecified COPD type (HCC)  Assessment & Plan:  Needs to see pulmonary, on Trelegy  8. Chronic respiratory failure with hypoxia (HCC)  Assessment & Plan:  Needs to see pulmonary, on Trelegy       Preventive health issues were discussed with patient, and age appropriate screening tests were ordered as noted in patient's After Visit Summary. Personalized health advice and appropriate referrals for health education or preventive services given if needed, as noted in patient's After Visit Summary.    History of Present Illness     Patient presents with:  Medicare Wellness Visit   Pt  has  buspar , hasn't started  med  yet, encouraged to do so       Patient Care Team:  Chen Pierce MD as PCP - General (Family Medicine)  MD Surinder Borrero MD    Review of Systems   Constitutional:  Positive for fatigue. Negative for activity change and appetite change.   Respiratory:  Positive for shortness of breath  and wheezing.    Cardiovascular:  Positive for leg swelling. Negative for chest pain and palpitations.   Neurological:  Negative for dizziness, light-headedness and headaches.     Medical History Reviewed by provider this encounter:  Tobacco  Allergies  Meds  Problems  Med Hx  Surg Hx  Fam Hx       Annual Wellness Visit Questionnaire   Mary is here for her Subsequent Wellness visit.     Health Risk Assessment:   Patient rates overall health as good. Patient feels that their physical health rating is slightly worse. Patient is satisfied with their life. Eyesight was rated as same. Hearing was rated as same. Patient feels that their emotional and mental health rating is same. Patients states they are never, rarely angry. Patient states they are always unusually tired/fatigued. Pain experienced in the last 7 days has been a lot. Patient's pain rating has been 5/10. Patient states that she has experienced no weight loss or gain in last 6 months. Abd  pain , insurance  doesn't  pay for med    Depression Screening:   PHQ-9 Score: 0      Fall Risk Screening:   In the past year, patient has experienced: no history of falling in past year      Urinary Incontinence Screening:   Patient has leaked urine accidently in the last six months.     Home Safety:  Patient has trouble with stairs inside or outside of their home. Patient has working smoke alarms and has working carbon monoxide detector. Home safety hazards include: none.     Nutrition:   Current diet is No Added Salt and Regular.     Medications:   Patient is currently taking over-the-counter supplements. OTC medications include: see medication list. Patient is able to manage medications.     Activities of Daily Living (ADLs)/Instrumental Activities of Daily Living (IADLs):   Walk and transfer into and out of bed and chair?: Yes  Dress and groom yourself?: Yes    Bathe or shower yourself?: Yes    Feed yourself? Yes  Do your laundry/housekeeping?: No  Manage  your money, pay your bills and track your expenses?: Yes  Make your own meals?: No    Do your own shopping?: No    Previous Hospitalizations:   Any hospitalizations or ED visits within the last 12 months?: Yes    How many hospitalizations have you had in the last year?: 1-2    Hospitalization Comments: [facility]  for  low  sodium    Advance Care Planning:   Living will: Yes    Durable POA for healthcare: Yes    Advanced directive: Yes      Comments:   is POA    Cognitive Screening:   Provider or family/friend/caregiver concerned regarding cognition?: No    PREVENTIVE SCREENINGS      Cardiovascular Screening:    General: History Lipid Disorder and Screening Current      Diabetes Screening:     General: Screening Current      Colorectal Cancer Screening:     General: Screening Not Indicated      Breast Cancer Screening:     General: Screening Not Indicated      Cervical Cancer Screening:    General: Screening Not Indicated      Osteoporosis Screening:    General: History Osteoporosis and Screening Current      Abdominal Aortic Aneurysm (AAA) Screening:        General: Screening Not Indicated      Lung Cancer Screening:     General: Screening Not Indicated      Hepatitis C Screening:    General: Screening Not Indicated      Preventive Screening Comments: Do rec  rsv vaccine    Screening, Brief Intervention, and Referral to Treatment (SBIRT)    Screening  Typical number of drinks in a day: 0  Typical number of drinks in a week: 0  Interpretation: Low risk drinking behavior.    Single Item Drug Screening:  How often have you used an illegal drug (including marijuana) or a prescription medication for non-medical reasons in the past year? never    Single Item Drug Screen Score: 0  Interpretation: Negative screen for possible drug use disorder    Social Determinants of Health     Financial Resource Strain: Low Risk  (7/6/2023)    Overall Financial Resource Strain (CARDIA)     Difficulty of Paying Living Expenses: Not  hard at all   Food Insecurity: No Food Insecurity (7/24/2024)    Hunger Vital Sign     Worried About Running Out of Food in the Last Year: Never true     Ran Out of Food in the Last Year: Never true   Transportation Needs: No Transportation Needs (7/24/2024)    PRAPARE - Transportation     Lack of Transportation (Medical): No     Lack of Transportation (Non-Medical): No   Housing Stability: Low Risk  (7/24/2024)    Housing Stability Vital Sign     Unable to Pay for Housing in the Last Year: No     Number of Times Moved in the Last Year: 1     Homeless in the Last Year: No   Utilities: Not At Risk (7/24/2024)    [facility] Utilities     Threatened with loss of utilities: No     No results found.    Objective     /86 (BP Location: Left arm, Patient Position: Sitting, Cuff Size: Standard)   Pulse 97   Wt 61.3 kg (135 lb 2 oz)   SpO2 91%   BMI 22.49 kg/m²     Physical Exam  Vitals reviewed.   Constitutional:       Appearance: Normal appearance.   Neck:      Vascular: No carotid bruit.   Cardiovascular:      Rate and Rhythm: Normal rate and regular rhythm.      Pulses: Normal pulses.      Heart sounds: Normal heart sounds.   Pulmonary:      Effort: Pulmonary effort is normal.      Comments: Decreased bs  at  bases  Lymphadenopathy:      Cervical: No cervical adenopathy.   Neurological:      Mental Status: She is alert.   Psychiatric:         Mood and Affect: Mood normal.

## 2024-08-27 ENCOUNTER — TELEPHONE (OUTPATIENT)
Dept: GASTROENTEROLOGY | Facility: CLINIC | Age: 84
End: 2024-08-27

## 2024-08-27 NOTE — TELEPHONE ENCOUNTER
LVM, to provide patient office address for the [facility] office. Office did not move to the [facility]

## 2024-08-28 ENCOUNTER — OFFICE VISIT (OUTPATIENT)
Dept: GASTROENTEROLOGY | Facility: CLINIC | Age: 84
End: 2024-08-28
Payer: COMMERCIAL

## 2024-08-28 VITALS
HEIGHT: 65 IN | HEART RATE: 83 BPM | SYSTOLIC BLOOD PRESSURE: 133 MMHG | OXYGEN SATURATION: 92 % | DIASTOLIC BLOOD PRESSURE: 74 MMHG | TEMPERATURE: 97.9 F | WEIGHT: 143 LBS | BODY MASS INDEX: 23.82 KG/M2

## 2024-08-28 DIAGNOSIS — K59.04 CHRONIC IDIOPATHIC CONSTIPATION: ICD-10-CM

## 2024-08-28 DIAGNOSIS — K21.9 GASTROESOPHAGEAL REFLUX DISEASE WITHOUT ESOPHAGITIS: Primary | ICD-10-CM

## 2024-08-28 DIAGNOSIS — K86.2 PANCREATIC CYST: ICD-10-CM

## 2024-08-28 PROCEDURE — 99212 OFFICE O/P EST SF 10 MIN: CPT | Performed by: INTERNAL MEDICINE

## 2024-08-28 NOTE — PROGRESS NOTES
[facility] - Outpatient Follow-up Note  Mary Chung 84 y.o. female MRN: [id number]  Encounter: [id number]          ASSESSMENT AND PLAN:    Mary Chung is a 84 y.o. female with advanced COPD who is following up for GERD and pancreatic cysts.  Will continue her current management with daily pantoprazole and twice daily famotidine.  Avoid dietary triggers.  We have previously discussed the cysts and are not planning further follow-up.  Continue fiber supplements and MiraLAX for constipation.    She would like to continue to follow-up every 6 to 12 months.    1. Gastroesophageal reflux disease without esophagitis    2. Pancreatic cyst    3. Chronic idiopathic constipation        No orders of the defined types were placed in this encounter.    ______________________________________________________________________    SUBJECTIVE:    Mary Chung is a 84 y.o. female with advanced COPD on home oxygen requirement who presents to follow-up regarding GERD and pancreatic cysts.  She is in the office with her .    She is doing well in terms of GERD.  She remains on daily pantoprazole and twice daily famotidine.  Her GERD is mostly controlled, although she occasionally has symptoms.  She is aware of her dietary triggers.    Regarding pancreatic cysts.,  We have decided not to pursue further follow-up given her age and health status.    Bowel function is regular and she takes occasional Metamucil and MiraLAX.      REVIEW OF SYSTEMS IS OTHERWISE NEGATIVE.      Historical Information   Past Medical History:   Diagnosis Date    Anxiety     Back pain     Cancer (HCC)     skin    Cataract     COPD (chronic obstructive pulmonary disease) (HCC)     COVID-19 11/20/2023    Disease of thyroid gland     Emphysema lung (HCC)     Emphysema of lung (HCC)     Geographic tongue     last assessed: 07/25/2014    GERD (gastroesophageal reflux disease)     Hyperlipidemia     Hypertension     Hypothyroidism  "(acquired)     Mild intermittent asthma     Multiple pulmonary nodules 2017    · Last imaging study 2017 shows stable nodules, no further follow-up recommended · Initial CT scan was  at [facility] demonstrating a 6 mm right upper lobe nodule    Sciatica     Seasonal allergies      Past Surgical History:   Procedure Laterality Date    BACK SURGERY      ENDOSCOPIC ULTRASOUND (UPPER)  2020    SKIN LESION EXCISION       Social History   Social History     Substance and Sexual Activity   Alcohol Use No     Social History     Substance and Sexual Activity   Drug Use No     Social History     Tobacco Use   Smoking Status Former    Current packs/day: 0.00    Average packs/day: 2.0 packs/day for 39.0 years (78.0 ttl pk-yrs)    Types: Cigarettes    Start date:     Quit date:     Years since quittin.6   Smokeless Tobacco Never     Family History   Problem Relation Age of Onset    Stroke Mother     Cirrhosis Father         alcoholic    Cancer Sister     Cancer Daughter        Meds/Allergies       Current Outpatient Medications:     albuterol (PROVENTIL HFA,VENTOLIN HFA) 90 mcg/act inhaler    atorvastatin (LIPITOR) 10 mg tablet    busPIRone (BUSPAR) 5 mg tablet    Calcium Carbonate (CALCIUM 600 PO)    Cholecalciferol (VITAMIN D3) 2000 units capsule    cloNIDine (CATAPRES) 0.1 mg tablet    famotidine (PEPCID) 20 mg tablet    levothyroxine 50 mcg tablet    Multiple Vitamin (multivitamin) capsule    Omega-3 Fatty Acids (FISH OIL PO)    oxygen gas    umeclidinium-vilanterol (Anoro Ellipta) 62.5-25 mcg/actuation inhaler    Zinc 50 MG TABS    amLODIPine (NORVASC) 5 mg tablet    aspirin 81 MG tablet    azelastine (ASTELIN) 0.1 % nasal spray    Allergies   Allergen Reactions    Other Other (See Comments)     Steroids, it effects her eyes           Objective     Blood pressure 133/74, pulse 83, temperature 97.9 °F (36.6 °C), temperature source Tympanic, height 5' 5\" (1.651 m), weight 64.9 kg " (143 lb), SpO2 92%, not currently breastfeeding. Body mass index is 23.8 kg/m².      PHYSICAL EXAM:      General Appearance:   Alert, cooperative, no distress   HEENT:   Normocephalic, atraumatic, anicteric.     Neck:  Supple, symmetrical, trachea midline   Lungs:   Clear to auscultation bilaterally; no rales, rhonchi or wheezing; respirations unlabored    Heart::   Regular rate and rhythm; no murmur, rub, or gallop.   Abdomen:   Soft, non-tender, non-distended; normal bowel sounds; no masses, no organomegaly    Genitalia:   Deferred    Rectal:   Deferred    Extremities:  No cyanosis, clubbing or edema    Pulses:  2+ and symmetric    Skin:  No jaundice, rashes, or lesions    Lymph nodes:  No palpable cervical lymphadenopathy        Lab Results:   No visits with results within 1 Day(s) from this visit.   Latest known visit with results is:   Orders Only on 07/11/2024   Component Date Value    Glucose, Random 07/11/2024 111 (H)     BUN 07/11/2024 13     Creatinine 07/11/2024 0.51 (L)     eGFR 07/11/2024 91     SL AMB BUN/CREATININE RA* 07/11/2024 25 (H)     Sodium 07/11/2024 136     Potassium 07/11/2024 3.9     Chloride 07/11/2024 91 (L)     CO2 07/11/2024 38 (H)     Calcium 07/11/2024 9.3     Protein, Total 07/11/2024 6.9     Albumin 07/11/2024 4.3     Globulin 07/11/2024 2.6     Albumin/Globulin Ratio 07/11/2024 1.7     TOTAL BILIRUBIN 07/11/2024 0.6     Alkaline Phosphatase 07/11/2024 64     AST 07/11/2024 32     ALT 07/11/2024 31 (H)     White Blood Cell Count 07/11/2024 5.0     Red Blood Cell Count 07/11/2024 4.69     Hemoglobin 07/11/2024 13.5     HCT 07/11/2024 41.7     MCV 07/11/2024 88.9     MCH 07/11/2024 28.8     MCHC 07/11/2024 32.4     RDW 07/11/2024 14.2     Platelet Count 07/11/2024 229     SL AMB MPV 07/11/2024 10.2     Neutrophils (Absolute) 07/11/2024 3,285     Lymphocytes (Absolute) 07/11/2024 1,245     Monocytes (Absolute) 07/11/2024 370     Eosinophils (Absolute) 07/11/2024 60     Basophils ABS  "07/11/2024 40     Neutrophils 07/11/2024 65.7     Lymphocytes 07/11/2024 24.9     Monocytes 07/11/2024 7.4     Eosinophils 07/11/2024 1.2     Basophils PCT 07/11/2024 0.8        Lab Results   Component Value Date    WBC 5.0 07/11/2024    HGB 13.5 07/11/2024    HCT 41.7 07/11/2024    MCV 88.9 07/11/2024     07/11/2024       Lab Results   Component Value Date    SODIUM 136 07/11/2024    K 3.9 07/11/2024    CL 91 (L) 07/11/2024    CO2 38 (H) 07/11/2024    AGAP 6 06/29/2024    BUN 13 07/11/2024    CREATININE 0.51 (L) 07/11/2024    GLUC 111 (H) 07/11/2024    CALCIUM 9.3 07/11/2024    AST 32 07/11/2024    ALT 31 (H) 07/11/2024    ALKPHOS 64 07/11/2024    TP 6.9 07/11/2024    TBILI 0.6 07/11/2024    EGFR 91 07/11/2024       Lab Results   Component Value Date    CRP 0.4 03/03/2020       Lab Results   Component Value Date    PEF2MKQUNCAN 1.73 02/11/2017    TSH 2.15 02/27/2024       No results found for: \"IRON\", \"TIBC\", \"FERRITIN\"    Radiology Results:   No results found.  "

## 2024-09-08 DIAGNOSIS — E78.2 MIXED HYPERLIPIDEMIA: ICD-10-CM

## 2024-09-08 RX ORDER — ATORVASTATIN CALCIUM 10 MG/1
10 TABLET, FILM COATED ORAL DAILY
Qty: 90 TABLET | Refills: 1 | Status: SHIPPED | OUTPATIENT
Start: 2024-09-08

## 2024-09-23 PROBLEM — J96.00 ACUTE RESPIRATORY FAILURE (HCC): Status: ACTIVE | Noted: 2024-01-01

## 2024-09-23 PROBLEM — I61.5 INTRAVENTRICULAR HEMORRHAGE (HCC): Status: ACTIVE | Noted: 2024-01-01

## 2024-09-23 NOTE — SEPSIS NOTE
Sepsis Note   Mary Chung 84 y.o. female MRN: [id number]  Unit/Bed#: ICU 02 Encounter: [id number]       Initial Sepsis Screening       Row Name 09/23/24 1925                Is the patient's history suggestive of a new or worsening infection? No  intraventricular hemorrhage  -NG                  User Key  (r) = Recorded By, (t) = Taken By, (c) = Cosigned By      Initials Name Provider Type    JE Rocha Nurse Practitioner                        Body mass index is 23.74 kg/m².  Wt Readings from Last 1 Encounters:   09/23/24 64.7 kg (142 lb 10.2 oz)     IBW (Ideal Body Weight): 57 kg    Ideal body weight: 57 kg (125 lb 10.6 oz)  Adjusted ideal body weight: 60.1 kg (132 lb 7.2 oz)

## 2024-09-23 NOTE — ASSESSMENT & PLAN NOTE
Baseline on 4 L nasal cannula  2/2 COPD -- PFT     Plan:  Titrate FiO2 for SpO2 > 88%  Given family wishes not to escalate care will continue PTA as needed albuterol  If family opts for comfort measures, will extubate to 4L nasal cannula as this is he baseline and hold PTA medications

## 2024-09-23 NOTE — RESPIRATORY THERAPY NOTE
"Transported pt to ICU 2 via transport ventilator on same settings as noted. The entire transport was uneventful. Pt tolerated. Report given to icu therapist.       09/23/24 1920   Respiratory Assessment   Assessment Type Assess only   General Appearance Sedated   Respiratory Pattern Assisted   Chest Assessment Chest expansion symmetrical   Vent Information   Vent ID [phone]   Vent type     Vent Mode AC/VC   $ Pulse Oximetry Spot Check Charge Completed   SpO2 95 %   AC/VC Settings   Resp Rate (BPM) 18 BPM   Vt (mL) 450 mL   FIO2 (%) 90 %   PEEP (cmH2O) 6 cmH2O   Flow Pattern (LPM) 60 L/min   Trigger Sensitivity Flow (lpm) 2 %   Humidification Heater   Heater Temperature (Set) 98.6 °F (37 °C)   AC/VC Actuals   Resp Rate (BPM) 18 BPM   VT (mL) 421   MV 7.99   MAP (cmH2O) 15 cmH2O   Peak Pressure (cmH2O) 37 cmH2O   I/E Ratio (Obs) 1:1   Heater Temperature (Obs) 98.6 °F (37 °C)   Static Compliance (mL/cmH20) 41 mL/cmH2O   Plateau Pressure (cm H2O) 15 cm H2O   AC/VC Alarms   High Peak Pressure (cmH2O) 45   High Resp Rate (BPM) 45 BPM   High MV (L/min) 23 L/min   Low MV (L/min) 3 L/min   Vt High (mL) 1000 mL   Vt Low (mL) 200 mL   AC/VC Apnea Settings   Resp Rate (BPM) 18 BPM   VT (mL) 450 mL   FIO2 (%) 100 %   Apnea Time (s) 20 S   Apnea Flow (L/min) 60 L/min   Maintenance   Alarm (pink) cable attached No   Resuscitation bag with peep valve at bedside Yes   Water bag changed No   Circuit changed No   Daily Screen   Patient safety screen outcome: Failed   Not Ready for Weaning due to: Underline problem not resolved   IHI Ventilator Associated Pneumonia Bundle   Daily Assessment of Readiness to Extubate Not applicable (Comment)   Head of Bed Elevated HOB 30   Adult IBW/VT Calculations   Height 5' 5\" (1.651 m)   IBW (Ideal Body Weight) 57 kg   Range VT 6 ML/Kg 342 mL/kg   Range VT 8 ML/Kg 456 mL/kg   ETT  Oral;Cuffed 7.5 mm   Placement Date/Time: 09/23/24 1654   Preoxygenated: Yes  Technique: Video laryngoscopy  Type: " Oral;Cuffed  Tube Size: 7.5 mm  Insertion attempts: 1  Placement Verification: Auscultation;End tidal CO2  Secured at (cm): 24  Placed By: Resident   Placed ...   Secured at (cm) 24   Measured from Lips   Secured Location Center   Secured by Commercial tube cannon   Site Condition Dry   Cuff Pressure (cm H2O)   (MLT)

## 2024-09-23 NOTE — ED PROCEDURE NOTE
Procedure  Intubation    Date/Time: 9/23/2024 4:52 PM    Performed by: Jesusita Reeder DO  Authorized by: Jesusita Reeder DO    Patient location:  ED  Consent:     Consent obtained:  Emergent situation  Pre-procedure details:     Patient status:  Unresponsive    Pretreatment medications:  Etomidate    Paralytics:  Succinylcholine  Indications:     Indications for intubation: respiratory distress and airway protection    Procedure details:     Preoxygenation:  Bag valve mask    CPR in progress: no      Intubation method:  Oral    Oral intubation technique:  Glidescope    Laryngoscope blade:  Mac 3    Tube size (mm):  7.5    Tube type:  Cuffed    Number of attempts:  1    Tube visualized through cords: yes    Placement assessment:     ETT to lip:  24    Tube secured with:  ETT cannon    Breath sounds:  Equal    Placement verification: chest rise, condensation and ETCO2 detector      CXR findings:  ETT in proper place  Post-procedure details:     Patient tolerance of procedure:  Tolerated well, no immediate complications                 Jesusita Reeder DO  09/23/24 [id number]

## 2024-09-23 NOTE — CONSULTS
e-Consult (IPC)     Consults     Contacted by Dr. Herb Mccauley.    Mary Chung 84 y.o. female MRN: [id number]  Unit/Bed#: ED-07 Encounter: [id number]      A&P    Intraventricular Hemmorhage  84-year-old presenting with large IVH and GCS of 3-4.  ICH score 6.  Likely fatal intracranial hemorrhage.  No neurosurgical intervention recommended.  Discussed comfort care with family.    Reason for Consult    Per provider report, patient presents with sudden onset decreased level of consciousness and agonal breathing after episode of nausea and vomiting. Available past medical history,social history, surgical history, medication list, drug allergies and review of systems were reviewed.    BP (!) 180/79   Pulse 73   Resp (!) 9   Wt 64.7 kg (142 lb 10.2 oz)   SpO2 99%   BMI 23.74 kg/m²      Clinical exam per provider report, posturing with agonal breathing prior to intubation.    Imaging personally reviewed.  CT scan of the head without contrast dated September 23, 2024.  Large intraventricular hemorrhage extending through the lateral ventricles third ventricles fourth ventricle with possible small right parietal periventricular hemorrhage.  Early hydrocephalus noted.  Age-appropriate degree of cortical atrophy.        All questions answered. Provider is in agreement with the course of action. 11-20 minutes, >50% of the total time devoted to medical consultative verbal/EMR discussion between providers. Written report will be generated in the EMR.

## 2024-09-23 NOTE — ED PROVIDER NOTES
1. Intraventricular hemorrhage (HCC)    2. Hydrocephalus (HCC)      ED Disposition       ED Disposition   Admit    Condition   Stable    Date/Time   Mon Sep 23, 2024  5:37 PM    Comment   Case was discussed with ICU and the patient's admission status was agreed to be Admission Status: inpatient status to the service of Dr. Chu .               Assessment & Plan       Medical Decision Making  85 yo F with devastating intraventricular bleed-  Admitted to ICU for further family discussions/comfort measures/withdrawal of care    Problems Addressed:  Hydrocephalus (HCC): acute illness or injury  Intraventricular hemorrhage (HCC): acute illness or injury    Amount and/or Complexity of Data Reviewed  Independent Historian: EMS     Details: Patient's /daughter  External Data Reviewed: labs, radiology, ECG and notes.  Labs: ordered. Decision-making details documented in ED Course.  Radiology: ordered and independent interpretation performed. Decision-making details documented in ED Course.  ECG/medicine tests: ordered and independent interpretation performed. Decision-making details documented in ED Course.    Risk  Prescription drug management.  Parenteral controlled substances.  Decision regarding hospitalization.                ED Course as of 09/23/24 2259   Mon Sep 23, 2024   1717 Extensive intraventricular hemorrhage and moderate hydrocephalus   1740 EKG independently interpreted by myself: limited by arifact, sinus with occasional PVC, no sig st changes   1747 Discussed prognosis with family ( and daughter), they are calling for additional family to come in to the hospital. Admitted to ICU        Medications   ondansetron (ZOFRAN) injection (  Canceled Entry 9/23/24 1700)   labetalol (NORMODYNE) injection (  Canceled Entry 9/23/24 1700)   etomidate (AMIDATE) 2 mg/mL injection (20 mg Intravenous Given 9/23/24 1650)   Succinylcholine Chloride 100 mg/5 mL syringe (100 mg Intravenous Given 9/23/24 1651)    etomidate (AMIDATE) 2 mg/mL injection 20 mg (20 mg Intravenous Given 9/23/24 1650)   Succinylcholine Chloride 100 mg/5 mL syringe 100 mg (100 mg Intravenous Given 9/23/24 1704)   fentaNYL injection 100 mcg (100 mcg Intravenous Given 9/23/24 1659)   LORazepam (ATIVAN) injection 1 mg (1 mg Intravenous Given 9/23/24 1728)   LORazepam (ATIVAN) injection 2 mg (2 mg Intravenous Given 9/23/24 1821)   sodium chloride 0.9 % bolus 1,000 mL (1,000 mL Intravenous New Bag 9/23/24 1822)       History of Present Illness       HPI    85 yo F brought in by EMS.  Pt was evaluated immediately on arrival.   Unresponsive with irregular/agonal respirations and posturing  /95  EMS report that pt had episode of vomiting and then became unresponsive  Prehospital glucose WNL  EMS report pt is full code    High concern for intracranial bleed given presentation- pt was given Zofran/Labetalol  Airway secured, intubation without incident   Pt taken immediately after airway secured to CT scan accompanied by myself  CT scan independently interpreted by myself showing large intraventricular hemorrhage    Case discussed with NSG who stated this is likely fatal, no NSG intervention recommended    Discussed results/prognosis with pt's  and daughter  Family state they have large family and would like everyone to be able to come in to see her        Objective     ED Triage Vitals   Temperature Pulse Blood Pressure Respirations SpO2 Patient Position - Orthostatic VS   09/23/24 1850 09/23/24 1647 09/23/24 1647 09/23/24 1647 09/23/24 1647 09/23/24 1930   98.6 °F (37 °C) 104 (!) 233/95 (!) 30 92 % Lying      Temp Source Heart Rate Source BP Location FiO2 (%) Pain Score    09/23/24 1850 09/23/24 1715 09/23/24 1930 -- 09/23/24 2031    Bladder Monitor Left arm  Med Not Given for Pain - for MAR use only        Physical Exam  Vitals and nursing note reviewed.   Constitutional:       General: She is in acute distress.      Appearance: She is  ill-appearing.      Comments: unresponsive   HENT:      Head: Normocephalic and atraumatic.   Eyes:      Pupils: Pupils are equal, round, and reactive to light.   Cardiovascular:      Rate and Rhythm: Normal rate and regular rhythm.   Pulmonary:      Comments: Irregular agonal respirations  Abdominal:      Palpations: Abdomen is soft.      Tenderness: There is no abdominal tenderness.   Neurological:      Comments: GCS 3 with occasional involuntary movements of extremities/concern for posturing         Labs Reviewed   CBC AND DIFFERENTIAL - Abnormal       Result Value    WBC 10.89 (*)     RBC 4.27      Hemoglobin 12.4      Hematocrit 39.7      MCV 93      MCH 29.0      MCHC 31.2 (*)     RDW 13.4      MPV 10.9      Platelets 230      nRBC 0      Segmented % 75      Immature Grans % 0      Lymphocytes % 18      Monocytes % 5      Eosinophils Relative 1      Basophils Relative 1      Absolute Neutrophils 8.28 (*)     Absolute Immature Grans 0.03      Absolute Lymphocytes 1.91      Absolute Monocytes 0.56      Eosinophils Absolute 0.05      Basophils Absolute 0.06     COMPREHENSIVE METABOLIC PANEL - Abnormal    Sodium 142      Potassium 3.8      Chloride 97      CO2 40 (*)     ANION GAP 5      BUN 17      Creatinine 0.53 (*)     Glucose 142 (*)     Calcium 9.1      AST 30      ALT 24      Alkaline Phosphatase 64      Total Protein 6.8      Albumin 4.1      Total Bilirubin 0.36      eGFR 86      Narrative:     National Kidney Disease Foundation guidelines for Chronic Kidney Disease (CKD):     Stage 1 with normal or high GFR (GFR > 90 mL/min/1.73 square meters)    Stage 2 Mild CKD (GFR = 60-89 mL/min/1.73 square meters)    Stage 3A Moderate CKD (GFR = 45-59 mL/min/1.73 square meters)    Stage 3B Moderate CKD (GFR = 30-44 mL/min/1.73 square meters)    Stage 4 Severe CKD (GFR = 15-29 mL/min/1.73 square meters)    Stage 5 End Stage CKD (GFR <15 mL/min/1.73 square meters)  Note: GFR calculation is accurate only with a steady  state creatinine   APTT - Normal    PTT 23     PROTIME-INR - Normal    Protime 12.8      INR 0.94      Narrative:     INR Therapeutic Range    Indication                                             INR Range      Atrial Fibrillation                                               2.0-3.0  Hypercoagulable State                                    2.0.2.3  Left Ventricular Asist Device                            2.0-3.0  Mechanical Heart Valve                                  -    Aortic(with afib, MI, embolism, HF, LA enlargement,    and/or coagulopathy)                                     2.0-3.0 (2.5-3.5)     Mitral                                                             2.5-3.5  Prosthetic/Bioprosthetic Heart Valve               2.0-3.0  Venous thromboembolism (VTE: VT, PE        2.0-3.0   HS TROPONIN I 0HR - Normal    hs TnI 0hr 7     LIPASE - Normal    Lipase 27     POCT GLUCOSE - Normal    POC Glucose 140       XR chest 1 view portable   Final Interpretation by Blaine Allred MD (09/23 2152)      ETT tip above the radha. No acute cardiopulmonary findings.            Workstation performed: [id number]         CT head without contrast   ED Interpretation by Kelsey Jeffrey DO (09/23 0207)   IMPRESSION:        1. Extensive intraventricular hemorrhage throughout the lateral, third and fourth ventricles resulting in moderate hydrocephalus and transependymal flow of CSF. Recommend neurosurgical consultation and MRI of the brain with gadolinium.  2. Mild periventricular hemorrhage in the right occipital region.        Final Interpretation by Jacky Plaza MD (09/23 4122)         1. Extensive intraventricular hemorrhage throughout the lateral, third and fourth ventricles resulting in moderate hydrocephalus and transependymal flow of CSF. Recommend neurosurgical consultation and MRI of the brain with gadolinium.   2. Mild periventricular hemorrhage in the right occipital region.         I personally  discussed this study with JOSE JEFFREY on 9/23/2024 5:16 PM.                        Workstation performed: [id number]             CriticalCare Time    Date/Time: 9/23/2024 6:10 PM    Performed by: Jose Jeffrey DO  Authorized by: Jose Jeffrey DO    Critical care provider statement:     Critical care time (minutes):  31    Critical care time was exclusive of:  Separately billable procedures and treating other patients and teaching time    Critical care was necessary to treat or prevent imminent or life-threatening deterioration of the following conditions:  Respiratory failure and CNS failure or compromise    Critical care was time spent personally by me on the following activities:  Obtaining history from patient or surrogate, development of treatment plan with patient or surrogate, discussions with consultants, evaluation of patient's response to treatment, examination of patient, interpretation of cardiac output measurements, ordering and performing treatments and interventions, ordering and review of laboratory studies, ordering and review of radiographic studies, re-evaluation of patient's condition and review of old charts  Comments:      Unresponsive, large intraventricular bleed, requiring intubation, IV Labetalol for BP pressure, continuous monitoring/reassessments, discussion with family, ICU and NSG      ED Medication and Procedure Management   None     There are no discharge medications for this patient.    No discharge procedures on file.     Jose Jeffrey DO  09/23/24 1695

## 2024-09-23 NOTE — ED NOTES
RN attempted to call report to ICU. ICU RN unavailable at moment stated she will call in 10 mins     Leonela Martinez RN  09/23/24 3557       Leonela Martinez RN  09/23/24 2883

## 2024-09-23 NOTE — ACP (ADVANCE CARE PLANNING)
Critical Care Advanced Care Planning Note  Mary Chung 84 y.o. female MRN: [id number]  Unit/Bed#: ICU 02 Encounter: [id number]    Mary Chung is a 84 y.o. female requiring critical care evaluation and advanced care planning. The patient has chronic comorbidities, including but not limited to COPD, chronic hypoxic respiratory failure, HTN, HLD, and hypothyroidism, which is now further complicated by the following acute conditions: Large intraventricular hemorrhage and respiratory failure.  Due to the severity of the patient's acute condition and/or the extent of chronic conditions, additional conversations pertaining to advanced care planning were required. Today's discussion, which was held in a face-to-face meeting, included  Nisa (spouse), Nasrin (daughter) and additional family members  , and it was established that all stake holders understood the rationale for the advanced care planning. The patient was unable to participate in the discussion due to encephalopathy 2/2 IVH..    Summary of Discussion:  Patient is currently intubated with poor mental status exam.  CT scan with large volume intraventricular hemorrhage with effect on surrounding brain tissue (moderate hydrocephalus and transependymal flow of CSF).  I again reviewed neurosurgery's opinion that there is no surgical options and given extent of bleeding, these findings carry high mortality.    I reviewed that in the event her heart would stop it would likely cause more harm doing CPR with the chance of rib fractures and possibly causing additional bleeding into her brain.  I also reviewed that this diagnosis can develop instability of blood pressure.      I reviewed options of DNR level 2 - no escalation of care to allow family some additional time to process vs DNR level 4 - comfort measures only, including removing the ETT, providing medications for comfort and allowing her to pass naturally.      At this time Nisa (spouse) states that he does  not want CPR, medications for blood pressure, or further escalation of care.  He and additional family members are not ready to transition to Level 4 comfort measures only.        Total time spent, (15) minutes (1939 to 1954).      CODE STATUS: DNR, accepts intubation - Level 2  POA:    POLST:      SIGNATURE: JE Sharma  DATE: September 23, 2024  TIME: 7:54 PM

## 2024-09-24 NOTE — H&P
"H&P - Critical Care/ICU   Name: Mary Chung 84 y.o. female I MRN: [id number]  Unit/Bed#: ICU 02 I Date of Admission: 9/23/2024   Date of Service: 9/24/2024 I Hospital Day: 1       Assessment & Plan  Intraventricular hemorrhage (HCC)  Patient presented to the ER unresponsive.  Patient's  states that he was attempting to wake his wife for approximately 2 hours prior to her having vomiting and difficulty breathing.  On arrival to ER was noted to be hypertensive with /95.  She was emergently intubated for airway protection.  CTH: \"Extensive intraventricular hemorrhage throughout the lateral, third and fourth ventricles resulting in moderate hydrocephalus and transependymal flow of CSF.  Mild periventricular hemorrhage in the right occipital region.\"  ER consulted Neurosurgery.  Per Neurosurgery note, no surgical intervention will be offered and bleed is likely fatal.  Comfort measures recommended.   On exam: GCS 4T off sedation.  Decerebrate posturing of BUE, triple flexion of BLE.  Positive cough reflex to suctioning.  Negative corneal and gag reflexes.  Pupils 3 mm and fixed bilaterally.     Plan:  Admit to ICU  Serial neuro checks  Goals of care conversation with family at bedside.  HOB 30 degrees  Avoid hypotension, hypertension, hypoxia, hypoglycemia, hypothermia  If family opts for comfort measures, no further neuro checks.    Acute respiratory failure (HCC)  In ER patient had agonal respirations and was emergently intubated.  CXR: ETT in appropriate position.  No infiltrate or consolidation noted.     Plan:  Maintain ETT/Vent while family meeting to decide if they will proceed with comfort measures  ACVC vent settings  Titrate FiO2 for SpO2 > 88%  Vent bundle ordered  DVT ppx: SCDs only 2/2 large intraventricular hemorrhage  PUD ppx: pepcid  If family decides to pursue comfort measures will extubate to her baseline nasal cannula 4L  Essential hypertension  PTA: amlodipine  On arrival to ER BP " 233/95.  Following sedation and intubation BP now normotensive.    Plan:  Holding PTA amlodipine for now.  Chronic respiratory failure with hypoxia (HCC)  Baseline on 4 L nasal cannula  2/2 COPD -- PFT     Plan:  Titrate FiO2 for SpO2 > 88%  Given family wishes not to escalate care will continue PTA as needed albuterol  If family opts for comfort measures, will extubate to 4L nasal cannula as this is he baseline and hold PTA medications    Disposition: Critical care    History of Present Illness   Mary Chung is a 84 y.o. who presents with Large volume intraventricular hemorrhage, acute encephalopathy, acute respiratory failure, chronic hypoxic respiratory failure on baseline 4 L nasal cannula, HTN, HLD, and COPD.  Earlier this evening, patient's  attempted to wake her over the course of 2 hours, she then developed vomiting and difficulty breathing.  On arrival to the ER her respirations were agonal and she was emergently intubated.  Initial /95, but quickly became normotensive following intubation.  CTH was obtained and revealed large volume intraventricular hemorrhage throughout lateral, third and fourth ventricles with moderate hydrocepahlus.  Neurosurgery was consulted in the ER and unfortunately no surgical intervention to be offered and bleed is likely non-survivable.      Patient's  and additional family members to come to bedside.      History obtained from spouse, chart review, and unobtainable from patient due to mental status.  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 demonstrating a 6 mm right                upper lobe nodule  No date: Sciatica  No date: Seasonal allergies Past Surgical History:  No date: BACK SURGERY  2020: ENDOSCOPIC ULTRASOUND (UPPER)  No date: SKIN LESION EXCISION   No current outpatient medications Allergies   Allergen Reactions    Other Other (See Comments)     Steroids, it effects her eyes      Social History     Tobacco Use    Smoking status: Former     Current packs/day: 0.00     Average packs/day: 2.0 packs/day for 39.0 years (78.0 ttl pk-yrs)     Types: Cigarettes     Start date:      Quit date:      Years since quittin.7    Smokeless tobacco: Never   Vaping Use    Vaping status: Never Used   Substance Use Topics    Alcohol use: No    Drug use: No    Family History   Problem Relation Age of Onset    Stroke Mother     Cirrhosis Father         alcoholic    Cancer Sister     Cancer Daughter           Objective                          Vitals I/O      Most Recent Min/Max in 24hrs   Temp 99 °F (37.2 °C) Temp  Min: 98.6 °F (37 °C)  Max: 99.5 °F (37.5 °C)   Pulse 86 Pulse  Min: 61  Max: 104   Resp (!) 32 Resp  Min: 3  Max: 81   /72 BP  Min: 90/56  Max: 233/95   O2 Sat (!) 66 % SpO2  Min: 60 %  Max: 100 %      Intake/Output Summary (Last 24 hours) at 2024  Last data filed at 2024 1929  Gross per 24 hour   Intake 1010.86 ml   Output 150 ml   Net 860.86 ml       Diet Regular; Pleasure Feed    Invasive Monitoring           Physical Exam   Physical Exam  Vitals and nursing note reviewed.   Eyes:       Pupils: Pupils are equal.      Right eye: Pupil is not reactive. Pupil is round. No corneal abrasion.      Left eye: Pupil is not reactive. Pupil is round. No corneal abrasion.   Skin:     General: Skin is warm, dry and not mottled extremities.      Coloration: Skin is not jaundiced or pale.      Findings: No rash or wound.   HENT:      Head: Normocephalic and atraumatic.      Right Ear: No drainage.      Left Ear: No drainage.      Nose: No nasal deformity, nasal tenderness, congestion, rhinorrhea, epistaxis or nasogastric tube.      Mouth/Throat:      Mouth: Mucous membranes are moist. No injury or angioedema.      Dentition: Normal dentition.      Pharynx: No oropharyngeal exudate.   Neck:      Vascular: No JVD.   no midline tenderness Cardiovascular:      Rate and Rhythm: Normal rate and regular rhythm.      Pulses: No decreased pulses.      Heart sounds: No murmur heard.     No friction rub. No gallop.   Musculoskeletal:         General: No swelling, tenderness, deformity or signs of injury.      Right lower leg: No edema.      Left lower leg: No edema.   Abdominal: General: Bowel sounds are normal. There is no distension.      Palpations: Abdomen is soft.      Tenderness: There is no abdominal tenderness.   Constitutional:       General: She is in acute distress.      Appearance: She is well-developed and well-nourished. She is ill-appearing. She is not toxic-appearing.      Interventions: She is intubated. She is not sedated and not restrained.  Pulmonary:      Effort: No tachypnea, accessory muscle usage, respiratory distress or accessory muscle usage. She is intubated.      Breath sounds: No decreased breath sounds, wheezing, rhonchi or rales.   Neurological:      Mental Status: She is unresponsive.      GCS: GCS eye subscore is 1. GCS verbal subscore is 1. GCS motor subscore is 2.      Cranial Nerves: No facial asymmetry.   Genitourinary/Anorectal:  Rivas present.   On exam: GCS 4T off sedation.   "Decerebrate posturing of BUE, triple flexion of BLE.  Positive cough reflex to suctioning.  Negative corneal and gag reflexes.  Pupils 3 mm and fixed bilaterally.           Diagnostic Studies        Lab Results: I have reviewed the following results: CBC/BMP:   .     09/23/24  1707   WBC 10.89*   HGB 12.4   HCT 39.7      SODIUM 142   K 3.8   CL 97   CO2 40*   BUN 17   CREATININE 0.53*   GLUC 142*    , ABG: No new results in last 24 hours.      CTH images and radiology interpretation personally reviewed by me.  CTH: \"Extensive intraventricular hemorrhage throughout the lateral, third and fourth ventricles resulting in moderate hydrocephalus and transependymal flow of CSF.  Mild periventricular hemorrhage in the right occipital region.\"    Medications:  Scheduled PRN      glycopyrrolate, 0.1 mg, Q4H PRN  morphine injection, 2 mg, Q1H PRN       Continuous          Labs:   CBC    Recent Labs     09/23/24  1707   WBC 10.89*   HGB 12.4   HCT 39.7        BMP    Recent Labs     09/23/24  1707   SODIUM 142   K 3.8   CL 97   CO2 40*   AGAP 5   BUN 17   CREATININE 0.53*   CALCIUM 9.1       Coags    Recent Labs     09/23/24  1707   INR 0.94   PTT 23        Additional Electrolytes  No recent results       Blood Gas    No recent results  No recent results LFTs  Recent Labs     09/23/24  1707   ALT 24   AST 30   ALKPHOS 64   ALB 4.1   TBILI 0.36       Infectious  No recent results  Glucose  Recent Labs     09/23/24  1707   GLUC 142*        Critical Care Time Statement: Upon my evaluation, this patient had a high probability of imminent or life-threatening deterioration due to large volume intraventricular hemorrhage, which required my direct attention, intervention, and personal management.  I spent a total of 40 minutes directly providing critical care services, including interpretation of complex medical databases, evaluating for the presence of life-threatening injuries or illnesses, management of organ system " failure(s) , and ventilator management. This time is exclusive of procedures, teaching, family meetings, and any prior time recorded by providers other than myself.

## 2024-09-24 NOTE — ASSESSMENT & PLAN NOTE
In ER patient had agonal respirations and was emergently intubated.  CXR: ETT in appropriate position.  No infiltrate or consolidation noted.     Plan:  Currently on comfort directed care  Can continue oxygen for comfort  Will continue prn morphine as needed for breathlessness- may consider a continuous infusion

## 2024-09-24 NOTE — ASSESSMENT & PLAN NOTE
Baseline on 4 L nasal cannula  2/2 COPD -- PFT     Plan:  Titrate FiO2 for SpO2 > 88%  Given family wishes not to escalate care will continue PTA as needed albuterol  Can continue nasal cannula oxygen for comfort

## 2024-09-24 NOTE — PROGRESS NOTES
Progress Note -     Name: Mary Chung 84 y.o. female I MRN: [id number]  Unit/Bed#: ICU 02 I Date of Admission: 9/23/2024   Date of Service: 9/24/2024 I Hospital Day: 1     This SmartSection is not supported in the current .       09/24/24 1300   Clinical Encounter Type   Visited With Family   Routine Visit Introduction   [employer] Encounters   Ep[employer]alian Needs Prayer

## 2024-09-24 NOTE — PROGRESS NOTES
"Progress Note - Critical Care/ICU   Name: Mary Chung 84 y.o. female I MRN: [id number]  Unit/Bed#: ICU 02 I Date of Admission: 9/23/2024   Date of Service: 9/24/2024 I Hospital Day: 1      Assessment & Plan  Intraventricular hemorrhage (HCC)  Patient presented to the ER unresponsive.  Patient's  states that he was attempting to wake his wife for approximately 2 hours prior to her having vomiting and difficulty breathing.  On arrival to ER was noted to be hypertensive with /95.  She was emergently intubated for airway protection.  CTH: \"Extensive intraventricular hemorrhage throughout the lateral, third and fourth ventricles resulting in moderate hydrocephalus and transependymal flow of CSF.  Mild periventricular hemorrhage in the right occipital region.\"  ER consulted Neurosurgery.  Per Neurosurgery note, no surgical intervention will be offered and bleed is likely fatal.  Comfort measures recommended.   On exam: GCS 4T off sedation.  Decerebrate posturing of BUE, triple flexion of BLE.  Positive cough reflex to suctioning.  Negative corneal and gag reflexes.  Pupils 3 mm and fixed bilaterally.     Plan:  Admitted to the ICU  Transitioned to comfort care overnight  Will ask hospice to evaluate today     Acute respiratory failure (HCC)  In ER patient had agonal respirations and was emergently intubated.  CXR: ETT in appropriate position.  No infiltrate or consolidation noted.     Plan:  Currently on comfort directed care  Can continue oxygen for comfort  Will continue prn morphine as needed for breathlessness- may consider a continuous infusion  Essential hypertension  PTA: amlodipine  On arrival to ER /95.  Following sedation and intubation BP now normotensive.    Plan:  Holding PTA amlodipine for now.  Chronic respiratory failure with hypoxia (HCC)  Baseline on 4 L nasal cannula  2/2 COPD -- PFT     Plan:  Titrate FiO2 for SpO2 > 88%  Given family wishes not to escalate care will continue PTA " as needed albuterol  Can continue nasal cannula oxygen for comfort    Disposition: Med Surg    ICU Core Measures     A: Assess, Prevent, and Manage Pain Has pain been assessed? Yes  Need for changes to pain regimen? NA   B: Both SAT/SAT  N/A   C: Choice of Sedation RASS Goal: N/A patient not on sedation  Need for changes to sedation or analgesia regimen? NA   D: Delirium CAM-ICU: Unable to perform secondary to Acute cognitive dysfunction   E: Early Mobility  Plan for early mobility? No   F: Family Engagement Plan for family engagement today? Yes       Review of Invasive Devices:    Evelyn Plan: end of life care        Prophylaxis:  VTE Contraindicated secondary to: comfort directed care   Stress Ulcer  not ordered       24 Hour Events   24hr events: Admitted overnight with IVH found to be non-operable. She was transitioned to comfort directed care    Subjective   Review of Systems: Review of Systems not obtainable due to Altered mental status    Objective                          Vitals I/O      Most Recent Min/Max in 24hrs   Temp 98.6 °F (37 °C) Temp  Min: 98.6 °F (37 °C)  Max: 99.5 °F (37.5 °C)   Pulse 96 Pulse  Min: 61  Max: 104   Resp (!) 25 Resp  Min: 3  Max: 81   /72 BP  Min: 90/56  Max: 233/95   O2 Sat 94 % SpO2  Min: 42 %  Max: 100 %      Intake/Output Summary (Last 24 hours) at 9/24/2024 0740  Last data filed at 9/23/2024 1929  Gross per 24 hour   Intake 1010.86 ml   Output 150 ml   Net 860.86 ml       Diet Regular; Pleasure Feed    Invasive Monitoring           Physical Exam   Physical Exam  Eyes:      Comments: Sluggish to react   Skin:     General: Skin is warm and dry.   HENT:      Head: Normocephalic.      Mouth/Throat:      Mouth: Mucous membranes are dry.   Cardiovascular:      Rate and Rhythm: Normal rate.   Abdominal:      Palpations: Abdomen is soft.      Tenderness: There is no abdominal tenderness.   Constitutional:       Appearance: She is ill-appearing.   Pulmonary:      Effort: Pulmonary  effort is normal.      Breath sounds: Normal breath sounds.   Neurological:      Comments: Essentially unresponsive, primitive brain stem reflexes- breathing          Diagnostic Studies      Lab Results: I have reviewed the following results: CBC/BMP:   .     09/23/24  1707   WBC 10.89*   HGB 12.4   HCT 39.7      SODIUM 142   K 3.8   CL 97   CO2 40*   BUN 17   CREATININE 0.53*   GLUC 142*         Medications:  Scheduled PRN      glycopyrrolate, 0.1 mg, Q4H PRN  morphine injection, 2 mg, Q1H PRN       Continuous            CBC    Recent Labs     09/23/24  1707   WBC 10.89*   HGB 12.4   HCT 39.7        BMP    Recent Labs     09/23/24  1707   SODIUM 142   K 3.8   CL 97   CO2 40*   AGAP 5   BUN 17   CREATININE 0.53*   CALCIUM 9.1       Coags    Recent Labs     09/23/24  1707   INR 0.94   PTT 23        Additional Electrolytes  No recent results       Blood Gas    No recent results  No recent results LFTs  Recent Labs     09/23/24  1707   ALT 24   AST 30   ALKPHOS 64   ALB 4.1   TBILI 0.36       Infectious  No recent results  Glucose  Recent Labs     09/23/24  1707   GLUC 142*

## 2024-09-24 NOTE — UTILIZATION REVIEW
Initial Clinical Review    Admission: Date/Time/Statement:   Admission Orders (From admission, onward)       Ordered        09/23/24 1738  INPATIENT ADMISSION  Once                          Orders Placed This Encounter   Procedures    INPATIENT ADMISSION     Standing Status:   Standing     Number of Occurrences:   1     Order Specific Question:   Level of Care     Answer:   Critical Care [15]     Order Specific Question:   Estimated length of stay     Answer:   More than 2 Midnights     Order Specific Question:   Certification     Answer:   I certify that inpatient services are medically necessary for this patient for a duration of greater than two midnights. See H&P and MD Progress Notes for additional information about the patient's course of treatment.     ED Arrival Information       Expected   -    Arrival   9/23/2024 16:43    Acuity   Immediate              Means of arrival   Stretcher    Escorted by   [facility] ([facility])    Service   Critical Care/ICU    Admission type   Emergency              Arrival complaint   unresponsive             Chief Complaint   Patient presents with    Altered Mental Status     Patient's  reports that his wife vomited about one hour prior to arrival and then became unresponsive shortly afterwards.       Initial Presentation: 84 y.o. female to ED by EMS presents as Inpatient ICU admission due to large volume IVH, AMS, acute respiratory failure,   Earlier this evening, patient's  attempted to wake her over the course of 2 hours, she then developed vomiting and difficulty breathing , EMS report pt had   PMH  chronic respiratory failure on 4 L NC, HTN     EXAM  Unresponsive with irregular/agonal respirations and posturing, /95, CTH  reveals large volume intraventricular hemorrhage throughout lateral, third and fourth ventricles with moderate hydrocepahlus , requires intubation  ICU management, mechanical ventilation, per Neuro[facility]X consult no surgical  intervention, l;jean a fatal IVH. Seiral neuro checks, GOC discussion w Family, HOB 30 degrees, avoid hypotension, hypertension, hypoxia, hypoglycemia, hypothermia; if Family elects Comfort measures stop neuro checks  Maintain vent titrate FiO2 for PIX > 88%, IV Pepcid, if Comfort measures will extubate to 4 L NC;     ACP Provider spouse states that he does not want CPR, medications for blood pressure, or further escalation of care. He and additional family members are not ready to transition to Level 4 comfort measures only.   Update PM Note  placed for comfort measures and compassionate extubation   Date: 9/24/2024   Day 2: ICU  Extubated overnight after neurosurgical evaluation virtually.  Continue comfort measures. Will ask Hospice to eval today  O2 for comfort, prn morphine as needed for breathlessness- may consider a continuous infusion   Date: 9/25/2024  Day 3: Has surpassed a 2nd midnight with active treatments and services.  HOSPICE RN Hospice Liaison arrived at the hospital to evaluate patient for hospice services. Was informed that her RR is 2/BPM. Family is at bedside Not stable for transport. Did not disturb them   Presents with  unresponsive with irregular/agonal respirations and posturing, /95, CTH  reveals large volume intraventricular hemorrhage  On exam appears ill, unresponsive, periods of apnea  Abnormal labs or imaging  Diagnosis/Plan      IVH Comfort measures initiated  Continue IV Dilaudid drip  Patient will remain in the hospital as she is too unstable for transport to inpatient hospice at this time  Acute respiratory failure   Extubated to 4 L NC prior to transfer to Med surg    ED Triage Vitals   Temperature Pulse Respirations Blood Pressure SpO2 Pain Score   09/23/24 1850 09/23/24 1647 09/23/24 1647 09/23/24 1647 09/23/24 1647 09/23/24 2031   98.6 °F (37 °C) 104 (!) 30 (!) 233/95 92 % Med Not Given for Pain - for MAR use only     Weight (last 2 days)       Date/Time Weight     09/23/24 1930 62 (136.69)    09/23/24 1724 64.7 (142.64)            Vital Signs (last 3 days)       Date/Time Temp Pulse Resp BP MAP (mmHg) SpO2 Calculated FIO2 (%) - Nasal Cannula Nasal Cannula O2 Flow Rate (L/min) O2 Device Patient Position - Orthostatic VS [place] Coma Scale Score Pain    09/25/24 0908 -- -- -- 120/56 81 -- -- -- -- -- -- --    09/25/24 0558 -- -- -- -- -- -- -- -- -- -- -- Med Not Given for Pain - for MAR use only    09/25/24 0441 -- -- -- -- -- -- -- -- -- -- -- Med Not Given for Pain - for MAR use only    09/25/24 0300 -- -- 28 -- -- 80 % 36 4 L/min Nasal cannula -- -- --    09/24/24 2100 99.5 °F (37.5 °C) 96 54 -- -- 76 % -- -- -- -- -- --    09/24/24 1930 100.2 °F (37.9 °C) 92 25 -- -- 86 % -- -- -- -- 5 --    09/24/24 1600 100.6 °F (38.1 °C) 101 62 -- -- 91 % -- -- -- -- -- --    09/24/24 1434 -- -- -- -- -- -- -- -- -- -- -- Med Not Given for Pain - for MAR use only    09/24/24 1404 -- -- -- -- -- -- -- -- -- -- -- Med Not Given for Pain - for MAR use only    09/24/24 1240 -- -- -- -- -- -- -- -- -- -- -- Med Not Given for Pain - for MAR use only    09/24/24 1200 99.9 °F (37.7 °C) 106 100 -- -- 74 % -- -- -- -- -- --    09/24/24 1137 -- -- -- -- -- -- -- -- -- -- -- Med Not Given for Pain - for MAR use only    09/24/24 1034 -- -- -- -- -- -- -- -- -- -- -- Med Not Given for Pain - for MAR use only    09/24/24 0900 99.5 °F (37.5 °C) 97 28 -- -- 43 % -- -- -- -- -- --    09/24/24 0830 -- -- -- -- -- -- -- -- -- -- -- Med Not Given for Pain - for MAR use only    09/24/24 0800 -- -- -- -- -- -- -- -- -- -- 5 --    09/24/24 0700 98.6 °F (37 °C) 96 25 -- -- 94 % 36 4 L/min Nasal cannula -- -- --    09/24/24 0623 -- -- -- -- -- -- -- -- -- -- -- Med Not Given for Pain - for MAR use only    09/24/24 0514 -- -- -- -- -- -- -- -- -- -- -- Med Not Given for Pain - for MAR use only    09/24/24 0500 98.8 °F (37.1 °C) 96 21 -- -- 44 % -- -- -- -- -- --    09/24/24 0403 -- -- -- -- -- -- -- -- -- -- --  Med Not Given for Pain - for MAR use only    09/24/24 0400 98.8 °F (37.1 °C) 89 26 -- -- 43 % -- -- -- -- -- --    09/24/24 0300 98.8 °F (37.1 °C) 87 24 -- -- 42 % 36 4 L/min Nasal cannula -- -- --    09/24/24 0250 -- -- -- -- -- -- -- -- -- -- -- Med Not Given for Pain - for MAR use only    09/24/24 0153 -- -- -- -- -- -- -- -- -- -- -- Med Not Given for Pain - for MAR use only    09/24/24 0047 -- -- -- -- -- -- -- -- -- -- -- Med Not Given for Pain - for MAR use only    09/23/24 2335 99 °F (37.2 °C) 86 32 -- -- 66 % -- -- -- -- -- --    09/23/24 2334 -- -- -- -- -- -- -- -- -- -- -- Med Not Given for Pain - for MAR use only    09/23/24 2239 -- -- -- -- -- -- -- -- -- -- -- Med Not Given for Pain - for MAR use only    09/23/24 2230 99.1 °F (37.3 °C) 86 32 -- -- 60 % -- -- -- -- -- --    09/23/24 2200 99.1 °F (37.3 °C) 85 29 -- -- 73 % -- -- -- -- -- --    09/23/24 2150 99.1 °F (37.3 °C) 86 81 -- -- 67 % -- -- -- -- -- --    09/23/24 2136 -- -- -- -- -- -- -- -- -- -- -- Med Not Given for Pain - for MAR use only    09/23/24 2130 99.3 °F (37.4 °C) 90 40 -- -- 84 % -- -- -- -- 5 --    09/23/24 2100 99.5 °F (37.5 °C) 91 29 -- -- 100 % 36 4 L/min Nasal cannula -- -- --    09/23/24 2031 -- -- -- -- -- -- -- -- -- -- -- Med Not Given for Pain - for MAR use only    09/23/24 2000 99.1 °F (37.3 °C) 72 21 157/72 104 100 % -- -- -- -- -- --    09/23/24 1930 99 °F (37.2 °C) 72 18 155/69 99 100 % -- -- Ventilator Lying 5 --    09/23/24 1920 -- 87 26 -- -- 95 % -- -- -- -- -- --    09/23/24 1850 98.6 °F (37 °C) -- -- -- -- -- -- -- -- -- -- --    09/23/24 1830 -- 67 12 125/65 87 100 % -- -- -- -- -- --    09/23/24 1815 -- 67 13 160/91 116 100 % -- -- -- -- -- --    09/23/24 1800 -- 62 20 110/58 76 99 % -- -- -- -- -- --    09/23/24 1745 -- 63 17 90/56 69 98 % -- -- -- -- -- --    09/23/24 1730 -- 61 16 106/58 75 99 % -- -- Ventilator -- -- --    09/23/24 1720 -- -- -- -- -- -- -- -- Ventilator -- -- --    09/23/24 1717 -- -- --  -- -- -- -- -- -- -- 3 --    09/23/24 1715 -- 82 23 110/59 79 97 % -- -- Ventilator -- -- --    09/23/24 1658 -- -- 9 -- -- -- -- -- -- -- -- --    09/23/24 1657 -- 73 -- -- -- 99 % -- -- -- -- -- --    09/23/24 1655 -- -- -- 180/79 -- -- -- -- -- -- -- --    09/23/24 1654 -- 93 -- -- -- 99 % -- -- -- -- -- --    09/23/24 1653 -- -- 3 -- -- -- -- -- -- -- -- --    09/23/24 1651 -- 92 17 -- -- 98 % -- -- -- -- -- --    09/23/24 1650 -- -- -- 170/67 -- -- -- -- -- -- -- --    09/23/24 1649 -- -- -- 173/74 -- -- -- -- -- -- -- --    09/23/24 1648 -- 101 27 -- -- 96 % -- -- -- -- -- --    09/23/24 1647 -- 104 30 233/95 -- 92 % -- -- Non-rebreather mask -- -- --              Pertinent Labs/Diagnostic Test Results:   Radiology:  XR chest 1 view portable   Final Interpretation by Blaine Allred MD (09/23 2152)      ETT tip above the radha. No acute cardiopulmonary findings.         CT head without contrast   ED Interpretation by Kelsey Jeffrey DO (09/23 1723)   IMPRESSION:        1. Extensive intraventricular hemorrhage throughout the lateral, third and fourth ventricles resulting in moderate hydrocephalus and transependymal flow of CSF. Recommend neurosurgical consultation and MRI of the brain with gadolinium.  2. Mild periventricular hemorrhage in the right occipital region.        Final Interpretation by Jacky Plaza MD (09/23 1718)         1. Extensive intraventricular hemorrhage throughout the lateral, third and fourth ventricles resulting in moderate hydrocephalus and transependymal flow of CSF. Recommend neurosurgical consultation and MRI of the brain with gadolinium.   2. Mild periventricular hemorrhage in the right occipital region.        Cardiology:  ECG 12 lead   Final Result by Chance Flynn DO (09/24 0802)   Normal sinus rhythm   Baseline artifact   Normal ECG   When compared with ECG of 29-JUN-2024 13:16,   Questionable change in QRS axis   Confirmed by Chance Flynn ([id number]) on  "9/24/2024 8:02:25 AM        GI:  No orders to display           Results from last 7 days   Lab Units 09/23/24  1707   WBC Thousand/uL 10.89*   HEMOGLOBIN g/dL 12.4   HEMATOCRIT % 39.7   PLATELETS Thousands/uL 230   TOTAL NEUT ABS Thousands/µL 8.28*         Results from last 7 days   Lab Units 09/23/24  1707   SODIUM mmol/L 142   POTASSIUM mmol/L 3.8   CHLORIDE mmol/L 97   CO2 mmol/L 40*   ANION GAP mmol/L 5   BUN mg/dL 17   CREATININE mg/dL 0.53*   EGFR ml/min/1.73sq m 86   CALCIUM mg/dL 9.1     Results from last 7 days   Lab Units 09/23/24  1707   AST U/L 30   ALT U/L 24   ALK PHOS U/L 64   TOTAL PROTEIN g/dL 6.8   ALBUMIN g/dL 4.1   TOTAL BILIRUBIN mg/dL 0.36     Results from last 7 days   Lab Units 09/23/24  1646   POC GLUCOSE mg/dl 140     Results from last 7 days   Lab Units 09/23/24  1707   GLUCOSE RANDOM mg/dL 142*             No results found for: \"BETA-HYDROXYBUTYRATE\"                   Results from last 7 days   Lab Units 09/23/24  1707   HS TNI 0HR ng/L 7         Results from last 7 days   Lab Units 09/23/24  1707   PROTIME seconds 12.8   INR  0.94   PTT seconds 23           Results from last 7 days   Lab Units 09/23/24  1707   LIPASE u/L 27     ED Treatment-Medication Administration from 09/23/2024 1643 to 09/23/2024 1911         Date/Time Order Dose Route Action     09/23/2024 1649 ondansetron (ZOFRAN) injection 4 mg Intravenous Given     09/23/2024 1650 labetalol (NORMODYNE) injection 10 mg Intravenous Given     09/23/2024 1650 etomidate (AMIDATE) 2 mg/mL injection 20 mg Intravenous Given     09/23/2024 1651 Succinylcholine Chloride 100 mg/5 mL syringe 100 mg Intravenous Given     09/23/2024 1650 etomidate (AMIDATE) 2 mg/mL injection 20 mg 20 mg Intravenous Given     09/23/2024 1704 Succinylcholine Chloride 100 mg/5 mL syringe 100 mg 100 mg Intravenous Given     09/23/2024 1659 fentaNYL injection 100 mcg 100 mcg Intravenous Given     09/23/2024 1703 propofol (DIPRIVAN) 1000 mg in 100 mL infusion " (premix) 10 mcg/kg/min Intravenous New Bag     09/23/2024 1838 propofol (DIPRIVAN) 1000 mg in 100 mL infusion (premix) 20 mcg/kg/min Intravenous Rate/Dose Change     09/23/2024 1728 LORazepam (ATIVAN) injection 1 mg 1 mg Intravenous Given     09/23/2024 1821 LORazepam (ATIVAN) injection 2 mg 2 mg Intravenous Given     09/23/2024 1822 sodium chloride 0.9 % bolus 1,000 mL 1,000 mL Intravenous New Bag            Past Medical History:   Diagnosis Date    Anxiety     Back pain     Cancer (HCC)     skin    Cataract     COPD (chronic obstructive pulmonary disease) (HCC)     COVID-19 11/20/2023    Disease of thyroid gland     Emphysema lung (HCC)     Emphysema of lung (HCC)     Geographic tongue     last assessed: 07/25/2014    GERD (gastroesophageal reflux disease)     Hyperlipidemia     Hypertension     Hypothyroidism (acquired)     Mild intermittent asthma     Multiple pulmonary nodules 07/05/2017    · Last imaging study April 2017 shows stable nodules, no further follow-up recommended · Initial CT scan was 2012 at [facility] demonstrating a 6 mm right upper lobe nodule    Sciatica     Seasonal allergies      Present on Admission:   Chronic respiratory failure with hypoxia (HCC)   Essential hypertension      Admitting Diagnosis: Hydrocephalus (HCC) [G91.9]  Altered mental status [R41.82]  Intraventricular hemorrhage (HCC) [I61.5]  Age/Sex: 84 y.o. female  Admission Orders:    Scheduled Medications:     Continuous IV Infusions:  HYDROmorphone, 0.5 mg/hr, Intravenous, Continuous    PRN Meds:  glycopyrrolate, 0.1 mg, Intravenous, Q4H PRN  HYDROmorphone, 0.5 mg, Intravenous, Q3H PRN    IP CONSULT TO CASE MANAGEMENT  IP CONSULT TO HOSPICE    Network Utilization Review Department  ATTENTION: Please call with any questions or concerns to [phone] and carefully listen to the prompts so that you are directed to the right person. All voicemails are confidential.   For Discharge needs, contact Care Management [place]  Support Team at [phone] opt. [phone]  Send all requests for admission clinical reviews, approved or denied determinations and any other requests to dedicated fax number below belonging to the campus where the patient is receiving treatment. List of dedicated fax numbers for the Facilities:  FACILITY NAME UR FAX NUMBER   ADMISSION DENIALS (Administrative/Medical Necessity) [phone]   DISCHARGE SUPPORT TEAM (NETWORK) [phone]   PARENT CHILD HEALTH (Maternity/NICU/Pediatrics) [phone]   [facility] [phone]   [facility] [phone]   [facility] [phone]   [facility] [phone]   [facility] [phone]   [facility] [phone]   [facility] [phone]   [facility] [phone]   [facility] [phone]   [facility] [phone]   General acute hospital [phone]   [facility] [phone]

## 2024-09-24 NOTE — QUICK NOTE
Called to patient's room by , Nisa.  He inquired about comfort measures and I explained that we would provide medications to prevent pain and suffering, and the ETT would be removed.  I explained that is it difficult to provide time line as to how long she will be able to breathe on her own.      He states that he and his wife had conversations in the past about circumstances that would lead to poor quality of life or little chance for recovery and states that she would not want to be kept alive on machines.      At time time, orders are placed for comfort measures and compassionate extubation.

## 2024-09-24 NOTE — PLAN OF CARE
Problem: Prexisting or High Potential for Compromised Skin Integrity  Goal: Skin integrity is maintained or improved  Description: INTERVENTIONS:  - Identify patients at risk for skin breakdown  - Assess and monitor skin integrity  - Assess and monitor nutrition and hydration status  - Monitor labs   - Assess for incontinence   - Turn and reposition patient  - Assist with mobility/ambulation  - Relieve pressure over bony prominences  - Avoid friction and shearing  - Provide appropriate hygiene as needed including keeping skin clean and dry  - Evaluate need for skin moisturizer/barrier cream  - Collaborate with interdisciplinary team   - Patient/family teaching  - Consider wound care consult   9/23/2024 2016 by Abbi Vargas RN  Outcome: Progressing  9/23/2024 2016 by Abbi Vargas RN  Outcome: Progressing  9/23/2024 2015 by Abbi Vargas RN  Outcome: Progressing     Problem: PAIN - ADULT  Goal: Verbalizes/displays adequate comfort level or baseline comfort level  Description: Interventions:  - Encourage patient to monitor pain and request assistance  - Assess pain using appropriate pain scale  - Administer analgesics based on type and severity of pain and evaluate response  - Implement non-pharmacological measures as appropriate and evaluate response  - Consider cultural and social influences on pain and pain management  - Notify physician/advanced practitioner if interventions unsuccessful or patient reports new pain  9/23/2024 2016 by Abbi Vargas RN  Outcome: Progressing  9/23/2024 2016 by Abbi Vargas RN  Outcome: Progressing  9/23/2024 2015 by Abbi Vargas RN  Outcome: Progressing     Problem: INFECTION - ADULT  Goal: Absence or prevention of progression during hospitalization  Description: INTERVENTIONS:  - Assess and monitor for signs and symptoms of infection  - Monitor lab/diagnostic results  - Monitor all insertion sites, i.e. indwelling lines, tubes, and drains  - Monitor  endotracheal if appropriate and nasal secretions for changes in amount and color  - [place] appropriate cooling/warming therapies per order  - Administer medications as ordered  - Instruct and encourage patient and family to use good hand hygiene technique  - Identify and instruct in appropriate isolation precautions for identified infection/condition  9/23/2024 2016 by Abbi Vargas RN  Outcome: Progressing  9/23/2024 2016 by Abbi Vargas RN  Outcome: Progressing  9/23/2024 2015 by Abbi Vargas RN  Outcome: Progressing  Goal: Absence of fever/infection during neutropenic period  Description: INTERVENTIONS:  - Monitor WBC    9/23/2024 2016 by Abbi Vargas RN  Outcome: Progressing  9/23/2024 2016 by Abbi Vargas RN  Outcome: Progressing  9/23/2024 2015 by Abbi Vargas RN  Outcome: Progressing     Problem: SAFETY ADULT  Goal: Patient will remain free of falls  Description: INTERVENTIONS:  - Educate patient/family on patient safety including physical limitations  - Instruct patient to call for assistance with activity   - Consult OT/PT to assist with strengthening/mobility   - Keep Call bell within reach  - Keep bed low and locked with side rails adjusted as appropriate  - Keep care items and personal belongings within reach  - Initiate and maintain comfort rounds  - Make Fall Risk Sign visible to staff  - Initiate/Maintain bed alarm  - Apply yellow socks and bracelet for high fall risk patients  - Consider moving patient to room near nurses station  9/23/2024 2016 by Abbi Vargas RN  Outcome: Progressing  9/23/2024 2016 by Abbi Vargas RN  Outcome: Progressing  9/23/2024 2015 by Abbi Vargas RN  Outcome: Progressing  Goal: Maintain or return to baseline ADL function  Description: INTERVENTIONS:  -  Assess patient's ability to carry out ADLs; assess patient's baseline for ADL function and identify physical deficits which impact ability to perform ADLs (bathing, care of mouth/teeth,  toileting, grooming, dressing, etc.)  - Assess/evaluate cause of self-care deficits   - Assess range of motion  - Assess patient's mobility; develop plan if impaired  - Assess patient's need for assistive devices and provide as appropriate  - Encourage maximum independence but intervene and supervise when necessary  - Involve family in performance of ADLs  - Assess for home care needs following discharge   - Consider OT consult to assist with ADL evaluation and planning for discharge  - Provide patient education as appropriate  9/23/2024 2016 by Abbi Vargas RN  Outcome: Progressing  9/23/2024 2016 by Abbi Vargas RN  Outcome: Progressing  9/23/2024 2015 by Abbi Vargas RN  Outcome: Progressing  Goal: Maintains/Returns to pre admission functional level  Description: INTERVENTIONS:  - Perform AM-PAC 6 Click Basic Mobility/ Daily Activity assessment daily.  - Set and communicate daily mobility goal to care team and patient/family/caregiver.   - Collaborate with rehabilitation services on mobility goals if consulted  - Out of bed for toileting  - Record patient progress and toleration of activity level   9/23/2024 2016 by Abbi Vargas RN  Outcome: Progressing  9/23/2024 2016 by Abbi Vargas RN  Outcome: Progressing  9/23/2024 2015 by Abbi Vargas RN  Outcome: Progressing     Problem: DISCHARGE PLANNING  Goal: Discharge to home or other facility with appropriate resources  Description: INTERVENTIONS:  - Identify barriers to discharge w/patient and caregiver  - Arrange for needed discharge resources and transportation as appropriate  - Identify discharge learning needs (meds, wound care, etc.)  - Arrange for interpretive services to assist at discharge as needed  - Refer to Case Management Department for coordinating discharge planning if the patient needs post-hospital services based on physician/advanced practitioner order or complex needs related to functional status, cognitive ability, or  social support system  9/23/2024 2016 by Abbi Vargas RN  Outcome: Progressing  9/23/2024 2016 by Abbi Vargas RN  Outcome: Progressing  9/23/2024 2015 by Abbi Vargas RN  Outcome: Progressing     Problem: Knowledge Deficit  Goal: Patient/family/caregiver demonstrates understanding of disease process, treatment plan, medications, and discharge instructions  Description: Complete learning assessment and assess knowledge base.  Interventions:  - Provide teaching at level of understanding  - Provide teaching via preferred learning methods  9/23/2024 2016 by Abbi Vargas RN  Outcome: Progressing  9/23/2024 2016 by Abbi Vargas RN  Outcome: Progressing  9/23/2024 2015 by Abbi Vargas RN  Outcome: Progressing     Problem: NEUROSENSORY - ADULT  Goal: Achieves stable or improved neurological status  Description: INTERVENTIONS  - Monitor and report changes in neurological status  - Monitor vital signs such as temperature, blood pressure, glucose, and any other labs ordered   - Initiate measures to prevent increased intracranial pressure  - Monitor for seizure activity and implement precautions if appropriate      9/23/2024 2016 by Abbi Vargas RN  Outcome: Progressing  9/23/2024 2016 by Abbi Vargas RN  Outcome: Progressing  Goal: Remains free of injury related to seizures activity  Description: INTERVENTIONS  - Maintain airway, patient safety  and administer oxygen as ordered  - Monitor patient for seizure activity, document and report duration and description of seizure to physician/advanced practitioner  - If seizure occurs,  ensure patient safety during seizure  - Reorient patient post seizure  - Seizure pads on all 4 side rails  - Instruct patient/family to notify RN of any seizure activity including if an aura is experienced  - Instruct patient/family to call for assistance with activity based on nursing assessment  - Administer anti-seizure medications if ordered    9/23/2024 2016 by  Abbi Vargas RN  Outcome: Progressing  9/23/2024 2016 by Abbi Vargas RN  Outcome: Progressing  Goal: Achieves maximal functionality and self care  Description: INTERVENTIONS  - Monitor swallowing and airway patency with patient fatigue and changes in neurological status  - Encourage and assist patient to increase activity and self care.   - Encourage visually impaired, hearing impaired and aphasic patients to use assistive/communication devices  9/23/2024 2016 by Abbi Vargas RN  Outcome: Progressing  9/23/2024 2016 by Abbi Vargas RN  Outcome: Progressing     Problem: RESPIRATORY - ADULT  Goal: Achieves optimal ventilation and oxygenation  Description: INTERVENTIONS:  - Assess for changes in respiratory status  - Assess for changes in mentation and behavior  - Position to facilitate oxygenation and minimize respiratory effort  - Oxygen administered by appropriate delivery if ordered  - Initiate smoking cessation education as indicated  - Encourage broncho-pulmonary hygiene including cough, deep breathe, Incentive Spirometry  - Assess the need for suctioning and aspirate as needed  - Assess and instruct to report SOB or any respiratory difficulty  - Respiratory Therapy support as indicated  9/23/2024 2016 by Abbi Vargas RN  Outcome: Progressing  9/23/2024 2016 by Abbi Vargas RN  Outcome: Progressing     Problem: METABOLIC, FLUID AND ELECTROLYTES - ADULT  Goal: Electrolytes maintained within normal limits  Description: INTERVENTIONS:  - Monitor labs and assess patient for signs and symptoms of electrolyte imbalances  - Administer electrolyte replacement as ordered  - Monitor response to electrolyte replacements, including repeat lab results as appropriate  - Instruct patient on fluid and nutrition as appropriate  9/23/2024 2016 by Abbi Vargas RN  Outcome: Progressing  9/23/2024 2016 by Abbi Vargas RN  Outcome: Progressing  Goal: Fluid balance maintained  Description:  INTERVENTIONS:  - Monitor labs   - Monitor I/O and WT  - Instruct patient on fluid and nutrition as appropriate  - Assess for signs & symptoms of volume excess or deficit  9/23/2024 2016 by Abbi Vargas RN  Outcome: Progressing  9/23/2024 2016 by Abbi Vargas RN  Outcome: Progressing  Goal: Glucose maintained within target range  Description: INTERVENTIONS:  - Monitor Blood Glucose as ordered  - Assess for signs and symptoms of hyperglycemia and hypoglycemia  - Administer ordered medications to maintain glucose within target range  - Assess nutritional intake and initiate nutrition service referral as needed  9/23/2024 2016 by Abbi Vargas RN  Outcome: Progressing  9/23/2024 2016 by Abbi Vargas RN  Outcome: Progressing

## 2024-09-24 NOTE — ASSESSMENT & PLAN NOTE
In ER patient had agonal respirations and was emergently intubated.  CXR: ETT in appropriate position.  No infiltrate or consolidation noted.     Plan:  Maintain ETT/Vent while family meeting to decide if they will proceed with comfort measures  ACVC vent settings  Titrate FiO2 for SpO2 > 88%  Vent bundle ordered  DVT ppx: SCDs only 2/2 large intraventricular hemorrhage  PUD ppx: pepcid  If family decides to pursue comfort measures will extubate to her baseline nasal cannula 4L

## 2024-09-24 NOTE — HOSPICE NOTE
Received hospice referral. I spoke to pt's daughter Nasrin with other family on speaker.  Hospice benefits reviewed per Medicare guidelines. Family does not want her moved at this time. DARIAN Landa updated. Liaison will follow up with family tomorrow.

## 2024-09-24 NOTE — PROGRESS NOTES
Progress Note -     Name: Mary Chung 84 y.o. female I MRN: [id number]  Unit/Bed#: ICU 02 I Date of Admission: 9/23/2024   Date of Service: 9/24/2024 I Hospital Day: 1     This SmartSection is not supported in the current .     Pastoral Care Progress Note            Patient family are at bedside had  visit, provided prayers  for patient and family. Patient is not Buddhism but  is,  provided prayers and supporting family.

## 2024-09-24 NOTE — ASSESSMENT & PLAN NOTE
"Patient presented to the ER unresponsive.  Patient's  states that he was attempting to wake his wife for approximately 2 hours prior to her having vomiting and difficulty breathing.  On arrival to ER was noted to be hypertensive with /95.  She was emergently intubated for airway protection.  CTH: \"Extensive intraventricular hemorrhage throughout the lateral, third and fourth ventricles resulting in moderate hydrocephalus and transependymal flow of CSF.  Mild periventricular hemorrhage in the right occipital region.\"  ER consulted Neurosurgery.  Per Neurosurgery note, no surgical intervention will be offered and bleed is likely fatal.  Comfort measures recommended.   On exam: GCS 4T off sedation.  Decerebrate posturing of BUE, triple flexion of BLE.  Positive cough reflex to suctioning.  Negative corneal and gag reflexes.  Pupils 3 mm and fixed bilaterally.     Plan:  Admitted to the ICU  Transitioned to comfort care overnight  Will ask hospice to evaluate today     "

## 2024-09-24 NOTE — CASE MANAGEMENT
Case Management Discharge Planning Note    Patient name Mary Chung  Location ICU /ICU  MRN [id number]  : 1940 Date 2024       Current Admission Date: 2024  Current Admission Diagnosis:Intraventricular hemorrhage (HCC)   Patient Active Problem List    Diagnosis Date Noted Date Diagnosed    Intraventricular hemorrhage (HCC) 2024     Acute respiratory failure (HCC) 2024     Depression, recurrent (HCC) 2024     Postural kyphosis of thoracic region 2022     Chronic midline thoracic back pain 10/14/2021     Age-related osteoporosis without current pathological fracture 2021     Pancreatic cyst 2020     Urinary frequency 2019     Chronic respiratory failure with hypoxia (HCC) 2017     Chronic obstructive pulmonary disease (HCC) 2017     Seasonal allergies      Essential hypertension      Mixed hyperlipidemia      Sciatica      GERD (gastroesophageal reflux disease)      Anxiety      Acquired hypothyroidism      OAB (overactive bladder) 2016     Vaginal atrophy 2016     Tremor 2015     Female stress incontinence 2015       LOS (days): 1  Geometric Mean LOS (GMLOS) (days):   Days to GMLOS:     OBJECTIVE:  Risk of Unplanned Readmission Score: 8.4         Current admission status: Inpatient   Preferred Pharmacy:   [place] [id number]  Phone: [phone] Fax: [phone]    Primary Care Provider: Chen Pierce MD    Primary Insurance: [facility]  Secondary Insurance:     DISCHARGE DETAILS:    Discharge planning discussed with:: Family  Freedom of Choice: Yes  Comments - [place] of Choice: Hospice  CM contacted family/caregiver?: No- see comments (family at the bedside)       Contacts  Patient Contacts: Nisa Chung (spouse) [phone]  Relationship to Patient:: Family  Contact Method: Phone  Phone Number: Nisa Chung (spouse)  [phone]  Reason/Outcome: Emergency Contact, Discharge Planning, Continuity of Care    Requested Home Health Care         Is the patient interested in HHC at discharge?: No    DME Referral Provided  Referral made for DME?: No    Other Referral/Resources/Interventions Provided:  Interventions: Hospice  Referral Comments: Referral placed for [facility]       Treatment Team Recommendation: Hospice  Discharge Destination Plan:: Hospice         Additional Comments: CM met with the family at the  bedside. CM introduced self and briefly reviewed role. At the time of M visit the patient was comfort care. CM offered to contact Pastoral Care for support since family denied having any CM needs. Primary nurse contacted Pastoral Care for the family. The family would now like the patient to be placed on Hospice Service. CM contacted the patient's ICU provider to verify services needed prior to placing the referral. Referral placed for [facility] service and will continue to follow for assistance with disposition as needed.

## 2024-09-24 NOTE — ASSESSMENT & PLAN NOTE
"Patient presented to the ER unresponsive.  Patient's  states that he was attempting to wake his wife for approximately 2 hours prior to her having vomiting and difficulty breathing.  On arrival to ER was noted to be hypertensive with /95.  She was emergently intubated for airway protection.  CTH: \"Extensive intraventricular hemorrhage throughout the lateral, third and fourth ventricles resulting in moderate hydrocephalus and transependymal flow of CSF.  Mild periventricular hemorrhage in the right occipital region.\"  ER consulted Neurosurgery.  Per Neurosurgery note, no surgical intervention will be offered and bleed is likely fatal.  Comfort measures recommended.   On exam: GCS 4T off sedation.  Decerebrate posturing of BUE, triple flexion of BLE.  Positive cough reflex to suctioning.  Negative corneal and gag reflexes.  Pupils 3 mm and fixed bilaterally.     Plan:  Admit to ICU  Serial neuro checks  Goals of care conversation with family at bedside.  HOB 30 degrees  Avoid hypotension, hypertension, hypoxia, hypoglycemia, hypothermia  If family opts for comfort measures, no further neuro checks.    "

## 2024-09-24 NOTE — ASSESSMENT & PLAN NOTE
PTA: amlodipine  On arrival to ER /95.  Following sedation and intubation BP now normotensive.    Plan:  Holding PTA amlodipine for now.

## 2024-09-25 NOTE — CASE MANAGEMENT
Case Management Assessment & Discharge Planning Note    Patient name Mary Chung  Location East 4 /E4 -* MRN [id number]  : 1940 Date 2024       Current Admission Date: 2024  Current Admission Diagnosis:Intraventricular hemorrhage (HCC)   Patient Active Problem List    Diagnosis Date Noted Date Diagnosed    Intraventricular hemorrhage (HCC) 2024     Acute respiratory failure (HCC) 2024     Depression, recurrent (HCC) 2024     Postural kyphosis of thoracic region 2022     Chronic midline thoracic back pain 10/14/2021     Age-related osteoporosis without current pathological fracture 2021     Pancreatic cyst 2020     Urinary frequency 2019     Chronic respiratory failure with hypoxia (HCC) 2017     Chronic obstructive pulmonary disease (HCC) 2017     Seasonal allergies      Essential hypertension      Mixed hyperlipidemia      Sciatica      GERD (gastroesophageal reflux disease)      Anxiety      Acquired hypothyroidism      OAB (overactive bladder) 2016     Vaginal atrophy 2016     Tremor 2015     Female stress incontinence 2015       LOS (days): 2  Geometric Mean LOS (GMLOS) (days): 5.2  Days to GMLOS:3.3     OBJECTIVE:    Risk of Unplanned Readmission Score: 7.7         Current admission status: Inpatient       Preferred Pharmacy:   [place] [address]  Phone: [phone] Fax: [phone]    Primary Care Provider: Chen Pierce MD    Primary Insurance: [facility]  Secondary Insurance:     ASSESSMENT:  Active Health Care Proxies    There are no active Health Care Proxies on file.                 Readmission Root Cause  30 Day Readmission: No    Patient Information  Admitted from:: Home  Mental Status: Comatose  During Assessment patient was accompanied by: Spouse, Other-Comment (multiple family members were  present.)  Assessment information provided by:: Spouse, Other - please comment (family)  Primary Caregiver: Spouse  Caregiver's Name:: Pt- Spouse  Caregiver's Relationship to Patient:: Family Member  Caregiver's Telephone Number:: [phone]  Support Systems: Spouse/significant other  [facility] of Residence: [place]  What [facility] do you live in?: [place]  Living Arrangements: Lives w/ Spouse/significant other  Is patient a ?: No              Patient Information Continued  Income Source: Pension/FCI                    DISCHARGE DETAILS:                                                                                                 Additional Comments: CM had met with the family around 1000.  Pt was unresponsve and the apouse was at the bedside and mutliple family members were present.  Minimal assessment information obtained at this time.  Information obtained via chart.  Spouse and family are following end of life Measures.  [facility] Hospice was following.  The spouse and family had been asked if IP Hospice was planeed.  they all stated that they do not want the pt moved due to her impending death.  CM allowed family to verbalize their feelings and emotional support was provided.  Hospice liaison and provider were informed of the Pt family wishes.

## 2024-09-25 NOTE — ASSESSMENT & PLAN NOTE
Patient extubated in the ICU prior to transfer to [facility]  Continue Dilaudid drip

## 2024-09-25 NOTE — RESTORATIVE TECHNICIAN NOTE
Restorative Technician Note      Patient Name: Mary Chung     Restorative Tech Visit Date: 09/25/24  Note Type: Mobility  Patient Position Upon Consult: Supine  Activity Performed: Repositioned  Patient Position at End of Consult: All needs within reach; Supine    ns

## 2024-09-25 NOTE — PROGRESS NOTES
Progress Note - Critical Care/ICU   Name: Mary Chung 84 y.o. female I MRN: [id number]  Unit/Bed#: ICU 02 I Date of Admission: 9/23/2024   Date of Service: 9/24/2024 I Hospital Day: 1       Critical Care Interval Transfer Note:    Brief Hospital Summary: Hospital Course: No notes on file 84 y.o. who presents with Large volume intraventricular hemorrhage, acute encephalopathy, acute respiratory failure, chronic hypoxic respiratory failure on baseline 4 L nasal cannula, HTN, HLD, and COPD.  Earlier this evening, patient's  attempted to wake her over the course of 2 hours, she then developed vomiting and difficulty breathing.  On arrival to the ER her respirations were agonal and she was emergently intubated.  Initial /95, but quickly became normotensive following intubation.  CTH was obtained and revealed large volume intraventricular hemorrhage throughout lateral, third and fourth ventricles with moderate hydrocepahlus.  Neurosurgery was consulted in the ER and unfortunately no surgical intervention to be offered and bleed is likely non-survivable.     Pt transitioned to Comfort care.    Barriers to discharge:   Pt evaluated by hospice and doesn't want to leave at this time  Will transfer to [place]     Consults: IP CONSULT TO CASE MANAGEMENT  IP CONSULT TO HOSPICE    Recommended to review admission imaging for incidental findings and document in discharge navigator: pt comfort care     Discharge Plan: Anticipate discharge in 24-48 hrs to pt is currently comfort care.  [place] Plan: end of life care       Patient seen and evaluated by Critical Care today and deemed to be appropriate for transfer to Med Surg. Spoke to odilia gomez from [facility] to accept transfer. Critical care can be contacted via SecureChat with any questions or concerns.

## 2024-09-25 NOTE — PROGRESS NOTES
Progress Note - Hospitalist   Name: Mary Chung 84 y.o. female I MRN: [id number]  Unit/Bed#: E4 -01 I Date of Admission: 9/23/2024   Date of Service: 9/25/2024 I Hospital Day: 2    Assessment & Plan  Intraventricular hemorrhage (HCC)  Patient was brought into the ER unresponsive, intubated for airway protection  Was found to have CT evidence of extensive intraventricular hemorrhage resulting in moderate hydrocephalus  ER consulted neurosurgery, no surgical intervention offered, bleed likely fatal  Comfort measures initiated  Continue IV Dilaudid drip  Patient will remain in the hospital as she is too unstable for transport to inpatient hospice at this time  Essential hypertension  BP elevated at 233/95 upon arrival to the emergency room  Chronic respiratory failure with hypoxia (HCC)  Continue oxygen as needed comfort   Acute respiratory failure (HCC)  Patient extubated in the ICU prior to transfer to [facility] floor  Continue Dilaudid drip    VTE Pharmacologic Prophylaxis: VTE Score: 5 High Risk (Score >/= 5) - Pharmacological DVT Prophylaxis Contraindicated. Sequential Compression Devices Ordered.    Mobility:   Basic Mobility Inpatient Raw Score: 6  JH-HLM Goal: 2: Bed activities/Dependent transfer  JH-HLM Achieved: 1: Laying in bed  JH-HLM Goal NOT achieved. Continue with multidisciplinary rounding and encourage appropriate mobility to improve upon JH-HLM goals.    Patient Centered Rounds: I performed bedside rounds with nursing staff today.   Discussions with Specialists or Other Care Team Provider: Hospice liaison    Education and Discussions with Family / Patient: Updated  (numerous family members) at bedside.    Current Length of Stay: 2 day(s)  Current Patient Status: Inpatient   Certification Statement: The patient will continue to require additional inpatient hospital stay due to intraventricular hemorrhage on comfort care requiring IV medications  Discharge Plan:  Will remain  inpatient on comfort measures    Code Status: Level 4 - Comfort Care    Subjective   Patient seen and examined at bedside with multiple family in the room.  Family is requesting to review patient's CAT scan.  Patient appears in no acute distress.    Objective     Vitals:   Temp (24hrs), Av.1 °F (37.8 °C), Min:99.5 °F (37.5 °C), Max:100.6 °F (38.1 °C)    Temp:  [99.5 °F (37.5 °C)-100.6 °F (38.1 °C)] 99.5 °F (37.5 °C)  HR:  [] 96  Resp:  [25-62] 28  BP: (120)/(56) 120/56  SpO2:  [76 %-91 %] 80 %  Body mass index is 22.75 kg/m².     Input and Output Summary (last 24 hours):     Intake/Output Summary (Last 24 hours) at 2024 1307  Last data filed at 2024 2101  Gross per 24 hour   Intake 3.23 ml   Output --   Net 3.23 ml       Physical Exam  Constitutional:       Appearance: She is ill-appearing.      Interventions: Nasal cannula in place.      Comments: Unresponsive   HENT:      Head: Normocephalic and atraumatic.      Mouth/Throat:      Mouth: Oropharynx is clear and moist.   Eyes:      General: No scleral icterus.     Extraocular Movements: EOM normal.      Conjunctiva/sclera: Conjunctivae normal.   Cardiovascular:      Rate and Rhythm: Normal rate and regular rhythm.      Heart sounds: Normal heart sounds.   Pulmonary:      Effort: Pulmonary effort is normal.      Comments: Periods of apnea  Abdominal:      General: There is no distension.      Palpations: Abdomen is soft.   Musculoskeletal:         General: No edema. Normal range of motion.      Cervical back: Neck supple.   Skin:     General: Skin is warm and dry.   Neurological:      Mental Status: She is unresponsive.   Psychiatric:         Mood and Affect: Mood and affect normal.          Lines/Drains:  Lines/Drains/Airways       Active Status       Name Placement date Placement time Site Days    Urethral Catheter Temperature probe 16 Fr. 24  1803  Temperature probe  1                  Urinary Catheter:  Goal for removal:  Comfort  measures                 Lab Results: I have reviewed the following results:    Results from last 7 days   Lab Units 09/23/24  1707   WBC Thousand/uL 10.89*   HEMOGLOBIN g/dL 12.4   HEMATOCRIT % 39.7   PLATELETS Thousands/uL 230   SEGS PCT % 75   LYMPHO PCT % 18   MONO PCT % 5   EOS PCT % 1     Results from last 7 days   Lab Units 09/23/24  1707   SODIUM mmol/L 142   POTASSIUM mmol/L 3.8   CHLORIDE mmol/L 97   CO2 mmol/L 40*   BUN mg/dL 17   CREATININE mg/dL 0.53*   ANION GAP mmol/L 5   CALCIUM mg/dL 9.1   ALBUMIN g/dL 4.1   TOTAL BILIRUBIN mg/dL 0.36   ALK PHOS U/L 64   ALT U/L 24   AST U/L 30   GLUCOSE RANDOM mg/dL 142*     Results from last 7 days   Lab Units 09/23/24  1707   INR  0.94     Results from last 7 days   Lab Units 09/23/24  1646   POC GLUCOSE mg/dl 140               Recent Cultures (last 7 days):         Imaging Review: Reviewed radiology reports from this admission including: CT head.  Other Studies: No additional pertinent studies reviewed.    Last 24 Hours Medication List:     Current Facility-Administered Medications:     glycopyrrolate (ROBINUL) injection 0.1 mg, Q4H PRN    HYDROmorphone (DILAUDID) 50 mg in sodium chloride 0.9% 50mL drip, Continuous, Last Rate: 1.5 mg/hr (09/25/24 1002)    HYDROmorphone (DILAUDID) injection 0.5 mg, Q3H PRN    Administrative Statements   Today, Patient Was Seen By: Susie Abbott PA-C      **Please Note: This note may have been constructed using a voice recognition system.**

## 2024-09-25 NOTE — ASSESSMENT & PLAN NOTE
Patient was brought into the ER unresponsive, intubated for airway protection  Was found to have CT evidence of extensive intraventricular hemorrhage resulting in moderate hydrocephalus  ER consulted neurosurgery, no surgical intervention offered, bleed likely fatal  Comfort measures initiated  Continue IV Dilaudid drip  Patient will remain in the hospital as she is too unstable for transport to inpatient hospice at this time

## 2024-09-25 NOTE — HOSPICE NOTE
Hospice Liaison arrived at the hospital to evaluate patient for hospice services.  Was informed that her RR is 2/BPM.  Family is at bedside  Not stable for transport.  Did not disturb them.

## 2024-09-26 NOTE — DEATH NOTE
INPATIENT DEATH NOTE  Mary Chung 84 y.o. female MRN: [id number]  Unit/Bed#: E4 -01 Encounter: [id number]         Patient's Information  Did the patient's death occur in the ED?: No  Did the patient's death occur in the OR?: No  Did the patient's death occur less than 10 days post-op?: No  Did the patient's death occur within 24 hours of admission?: No  Was code status DNR at the time of death?: Yes  Time of death 8am    PHYSICAL EXAM:  Spontaneous respirations absent, Breath sounds absent, Heart sounds absent, and pulseless. unresponsive    Medical Examiner notification criteria:  NONE APPLICABLE   Medical Examiner's office notified?:  No, does not meet ME notification criteria   Medical Examiner accepted case?:  No  Name of Medical Examiner:       Family Notification  Was the family notified?: Yes  Date Notified: 24  Time Notified: 814  Notified by: Oumou at bedside   Relationship to Patient: Daughter-in-law  Family Notification Route: At bedside  Was the family told to contact a  home?: Yes  Name of  Home:: [facility]  Home    Autopsy Options:  Post-mortem examination declined by next of kin    Primary Service Attending Physician notified?:  yes - Attending:  Rajwinder Clay MD    Physician/Resident responsible for completing Discharge Summary:   jacky

## 2024-09-26 NOTE — ASSESSMENT & PLAN NOTE
Patient was brought into the ER unresponsive, intubated for airway protection, admitted to the critical care team  Was found to have CT evidence of extensive intraventricular hemorrhage resulting in moderate hydrocephalus  ER consulted neurosurgery, no surgical intervention offered, bleed likely fatal  Patient's family elected hospice/comfort measures and patient was extubated and placed on IV Dilaudid infusion and then transitioned to the medical floor  Patient was provided comfort care.  She passed away peacefully at 8 AM

## 2024-09-26 NOTE — TELEPHONE ENCOUNTER
----- Message from Rajwinder Clay MD sent at 9/26/2024  9:00 AM EDT -----  Thank you for allowing us to participate in the care of your patient, Mary Chung, who was hospitalized from 9/23/2024 through 9/26/2024 with the admitting diagnosis of unresponsive episode    Mrs. Chung was brought to the ER after being found unresponsive.  She was urgently intubated upon arrival.  CAT scan of the brain unfortunately revealed large intracranial/intraventricular hemorrhage.  Neurosurgery recommended comfort measures.  Patient was extubated in the ICU, started on Dilaudid infusion and transferred out to the medical floor where she passed away peacefully    If you have any additional questions or would like to discuss further, please feel free to contact me.    Rajwinder Clay MD  [facility] Internal Medicine, Hospitalist  [phone]

## 2024-09-26 NOTE — DISCHARGE SUMMARY
Discharge Summary - Hospitalist   Name: Mary Chung 84 y.o. female I MRN: [id number]  Unit/Bed#: E4 -01 I Date of Admission: 9/23/2024   Date of Service: 9/26/2024 I Hospital Day: 3       Discharging Physician / Practitioner: Rajwinder Clay MD  PCP: Chen Pierce MD  Admission Date:   Admission Orders (From admission, onward)       Ordered        09/23/24 8508  INPATIENT ADMISSION  Once                          Date of death date: 09/26/24  Assessment & Plan  Intraventricular hemorrhage (HCC)  Patient was brought into the ER unresponsive, intubated for airway protection, admitted to the critical care team  Was found to have CT evidence of extensive intraventricular hemorrhage resulting in moderate hydrocephalus  ER consulted neurosurgery, no surgical intervention offered, bleed likely fatal  Patient's family elected hospice/comfort measures and patient was extubated and placed on IV Dilaudid infusion and then transitioned to the medical floor  Patient was provided comfort care.  She passed away peacefully at 8 AM  Essential hypertension  BP elevated at 233/95 upon arrival to the emergency room, and she was treated with labetalol x 1 with improvement  Patient's accelerated hypertension is likely secondary to intraventricular hemorrhage  Chronic respiratory failure with hypoxia (HCC)  Patient has a history of COPD and chronic respiratory failure with hypoxia for which she used home oxygen  After extubation, patient was supplied oxygen as needed  Acute respiratory failure (HCC)  Patient was initially admitted in the ICU, unresponsive, and acute respiratory failure secondary to intraventricular hemorrhage  She was extubated in the ICU and transition to comfort measures     Medical Problems       Resolved Problems  Date Reviewed: 7/24/2024   None           Consultations During Hospital Stay:  Neurosurgery: Dr. Young    Procedures Performed:   Intubation/extubation    Significant Findings / Test Results:   9/23:  CT brain:  1. Extensive intraventricular hemorrhage throughout the lateral, third and fourth ventricles resulting in moderate hydrocephalus and transependymal flow of CSF. Recommend neurosurgical consultation and MRI of the brain with gadolinium.   2. Mild periventricular hemorrhage in the right occipital region.     9/23 chest x-ray:  ETT tip above the radha. No acute cardiopulmonary findings     Incidental Findings:   none    Test Results Pending at Discharge (will require follow up):   none     Outpatient Tests Requested:  none    Complications:  n/a    Reason for Admission: Unresponsive    Hospital Course:   Mary Chung is a 84 y.o. female patient who originally presented to the hospital on 9/23/2024 due to unresponsive episode.  Patient was urgently intubated upon arrival to the ICU.  Emergent CAT scan revealed intracranial bleed.  Case was discussed with the neurosurgical team who noted there was no neurosurgical intervention amenable and comfort care measures were recommended.  Patient was admitted to the ICU, and family elected comfort measures.  She was extubated and placed on IV Dilaudid infusion.  Patient was then transferred to the medical floor.  She passed away on 9/26      Please see above list of diagnoses and related plan for additional information.       Discharge Day Visit / Exam:   Exam:   Physical Exam   Patient lying supine with head of the bed at 80 degrees.  Unresponsive.  No spontaneous respirations noted.  Heart: No pulse  Lungs: No respirations    Discussion with Family: Discussed with patient's  at the bedside, and patient's son outside the room    Discharge instructions/Information to patient and family:   See after visit summary for information provided to patient and family.      Administrative Statements   Discharge Statement:  I have spent a total time of 32 minutes in caring for this patient on the day of the visit/encounter. .    **Please Note: This note may have been  constructed using a voice recognition system**

## 2024-09-26 NOTE — ASSESSMENT & PLAN NOTE
BP elevated at 233/95 upon arrival to the emergency room, and she was treated with labetalol x 1 with improvement  Patient's accelerated hypertension is likely secondary to intraventricular hemorrhage

## 2024-09-26 NOTE — ASSESSMENT & PLAN NOTE
Patient was initially admitted in the ICU, unresponsive, and acute respiratory failure secondary to intraventricular hemorrhage  She was extubated in the ICU and transition to comfort measures

## 2024-09-26 NOTE — ASSESSMENT & PLAN NOTE
Patient has a history of COPD and chronic respiratory failure with hypoxia for which she used home oxygen  After extubation, patient was supplied oxygen as needed

## 2024-09-27 NOTE — UTILIZATION REVIEW
NOTIFICATION OF ADMISSION DISCHARGE   This is a Notification of Discharge from [facility]. Please be advised that this patient has been discharge from our facility. Below you will find the admission and discharge date and time including the patient’s disposition.   UTILIZATION REVIEW CONTACT:  Steffany Olivia  Utilization   Network Utilization Review Department  Phone: [phone] x carefully listen to the prompts. All voicemails are confidential.  Email: [email or web address]     ADMISSION INFORMATION  PRESENTATION DATE: 2024  4:43 PM  OBERVATION ADMISSION DATE: N/A  INPATIENT ADMISSION DATE: 24  5:38 PM   DISCHARGE DATE: 2024  1:08 PM   DISPOSITION:    Network Utilization Review Department  ATTENTION: Please call with any questions or concerns to [phone] and carefully listen to the prompts so that you are directed to the right person. All voicemails are confidential.   For Discharge needs, contact Care Management DC Support Team at [phone] opt. 2  Send all requests for admission clinical reviews, approved or denied determinations and any other requests to dedicated fax number below belonging to the campus where the patient is receiving treatment. List of dedicated fax numbers for the Facilities:  FACILITY NAME UR FAX NUMBER   ADMISSION DENIALS (Administrative/Medical Necessity) [phone]   DISCHARGE SUPPORT TEAM ([facility]) [phone]   PARENT CHILD HEALTH (Maternity/NICU/Pediatrics) [phone]   [facility] [phone]   [facility] [phone]   [facility] [phone]   [facility] [phone]   [facility] [phone]   [facility] [phone]   [facility] [phone]   [facility] [phone]   [facility]  [facility] [phone]   [facility] [phone]   [facility] [phone]   [facility] [phone]

## 2024-11-11 NOTE — ED NOTES
PT ASKING FOR A REFILL busPIRone (Buspar) 5 mg tablet SEND TO [address]   Pt ambulated hallway on 3LNC.  Pt SaO2 dropped to 77%.  Pt placed on 5LNC Dr. Stauffer made aware.     Viviana Napoles, MAURILIO  06/29/24 9733
